# Patient Record
Sex: MALE | Race: WHITE | Employment: OTHER | URBAN - NONMETROPOLITAN AREA
[De-identification: names, ages, dates, MRNs, and addresses within clinical notes are randomized per-mention and may not be internally consistent; named-entity substitution may affect disease eponyms.]

---

## 2017-09-13 LAB
BUN BLDV-MCNC: 21 MG/DL
CALCIUM SERPL-MCNC: 8.5 MG/DL
CHLORIDE BLD-SCNC: 103 MMOL/L
CO2: 29 MMOL/L
CREAT SERPL-MCNC: 1.56 MG/DL
CREATININE, URINE: 85
GFR CALCULATED: 44
GLUCOSE BLD-MCNC: 181 MG/DL
MICROALBUMIN/CREAT 24H UR: 398.3 MG/G{CREAT}
MICROALBUMIN/CREAT UR-RTO: 467
POTASSIUM SERPL-SCNC: 3.7 MMOL/L
SODIUM BLD-SCNC: 140 MMOL/L

## 2017-09-15 ENCOUNTER — OFFICE VISIT (OUTPATIENT)
Dept: NEPHROLOGY | Age: 72
End: 2017-09-15
Payer: COMMERCIAL

## 2017-09-15 VITALS
OXYGEN SATURATION: 98 % | WEIGHT: 208.6 LBS | DIASTOLIC BLOOD PRESSURE: 78 MMHG | SYSTOLIC BLOOD PRESSURE: 143 MMHG | HEART RATE: 69 BPM

## 2017-09-15 DIAGNOSIS — I10 ESSENTIAL HYPERTENSION, BENIGN: Primary | ICD-10-CM

## 2017-09-15 PROCEDURE — 99213 OFFICE O/P EST LOW 20 MIN: CPT | Performed by: INTERNAL MEDICINE

## 2017-09-15 RX ORDER — LATANOPROST 50 UG/ML
1 SOLUTION/ DROPS OPHTHALMIC NIGHTLY
COMMUNITY
Start: 2017-09-11

## 2017-09-15 RX ORDER — TAMSULOSIN HYDROCHLORIDE 0.4 MG/1
0.4 CAPSULE ORAL
Status: ON HOLD | COMMUNITY
Start: 2017-07-18 | End: 2019-02-19

## 2017-09-15 ASSESSMENT — ENCOUNTER SYMPTOMS
COUGH: 0
ABDOMINAL PAIN: 0
NAUSEA: 0
BACK PAIN: 0
DIARRHEA: 0
GASTROINTESTINAL NEGATIVE: 1
WHEEZING: 0
RESPIRATORY NEGATIVE: 1
ABDOMINAL DISTENTION: 0
BLOOD IN STOOL: 0
SHORTNESS OF BREATH: 0
CONSTIPATION: 0
VOMITING: 0
FACIAL SWELLING: 0
CHEST TIGHTNESS: 0

## 2018-09-16 LAB
BUN BLDV-MCNC: 28 MG/DL
CALCIUM SERPL-MCNC: 8.5 MG/DL
CHLORIDE BLD-SCNC: 104 MMOL/L
CO2: 29 MMOL/L
CREAT SERPL-MCNC: 1.84 MG/DL
CREATININE, URINE: 318
CREATININE, URINE: NORMAL
GFR CALCULATED: 36
GLUCOSE BLD-MCNC: 168 MG/DL
MICROALBUMIN/CREAT 24H UR: 1513.3 MG/G{CREAT}
MICROALBUMIN/CREAT 24H UR: 1513.3 MG/G{CREAT}
MICROALBUMIN/CREAT UR-RTO: 476
MICROALBUMIN/CREAT UR-RTO: NORMAL
POTASSIUM SERPL-SCNC: 3.9 MMOL/L
SODIUM BLD-SCNC: 139 MMOL/L

## 2018-09-21 ENCOUNTER — OFFICE VISIT (OUTPATIENT)
Dept: NEPHROLOGY | Age: 73
End: 2018-09-21
Payer: COMMERCIAL

## 2018-09-21 VITALS
DIASTOLIC BLOOD PRESSURE: 75 MMHG | WEIGHT: 205.7 LBS | HEART RATE: 59 BPM | OXYGEN SATURATION: 97 % | SYSTOLIC BLOOD PRESSURE: 148 MMHG

## 2018-09-21 DIAGNOSIS — I10 ESSENTIAL HYPERTENSION: ICD-10-CM

## 2018-09-21 DIAGNOSIS — N18.30 CKD (CHRONIC KIDNEY DISEASE), STAGE III (HCC): Primary | ICD-10-CM

## 2018-09-21 DIAGNOSIS — N28.1 RENAL CYST: ICD-10-CM

## 2018-09-21 DIAGNOSIS — E11.21 DIABETIC NEPHROPATHY ASSOCIATED WITH TYPE 2 DIABETES MELLITUS (HCC): ICD-10-CM

## 2018-09-21 PROCEDURE — 99213 OFFICE O/P EST LOW 20 MIN: CPT | Performed by: INTERNAL MEDICINE

## 2018-09-21 RX ORDER — CLONIDINE HYDROCHLORIDE 0.2 MG/1
TABLET ORAL
COMMUNITY
Start: 2018-07-24 | End: 2018-09-21 | Stop reason: SDUPTHER

## 2018-09-21 RX ORDER — PEN NEEDLE, DIABETIC 32 GX 1/4"
NEEDLE, DISPOSABLE MISCELLANEOUS
Status: ON HOLD | COMMUNITY
Start: 2018-07-09 | End: 2022-01-01 | Stop reason: HOSPADM

## 2018-09-21 RX ORDER — VERAPAMIL HYDROCHLORIDE 360 MG/1
360 CAPSULE, DELAYED RELEASE PELLETS ORAL
Status: ON HOLD | COMMUNITY
Start: 2018-08-01 | End: 2019-01-18 | Stop reason: ALTCHOICE

## 2018-09-21 RX ORDER — CLONIDINE HYDROCHLORIDE 0.2 MG/1
TABLET ORAL
Qty: 60 TABLET | Refills: 3 | Status: ON HOLD
Start: 2018-09-21 | End: 2019-01-18 | Stop reason: ALTCHOICE

## 2018-09-21 NOTE — PROGRESS NOTES
guarding  Extremities: No sig LE edema     Medications:  Current Outpatient Prescriptions   Medication Sig Dispense Refill    insulin detemir (LEVEMIR) 100 UNIT/ML injection pen Inject 10 Units into the skin      verapamil (VERELAN) 360 MG extended release capsule Take 360 mg by mouth      cloNIDine (CATAPRES) 0.2 MG tablet 2 tablets in the morning, 1 tablet at 5:00pm and 1 tablet at bedtime.  BD ULTRA-FINE MICRO PEN NEEDLE 32G X 6 MM MISC       tamsulosin (FLOMAX) 0.4 MG capsule Take 0.4 mg by mouth      latanoprost (XALATAN) 0.005 % ophthalmic solution       metoprolol (TOPROL-XL) 50 MG XL tablet Take 50 mg by mouth daily       timolol (TIMOPTIC) 0.5 % ophthalmic solution 1 drop daily       aspirin 81 MG tablet Take 81 mg by mouth daily      glimepiride (AMARYL) 4 MG tablet Take 4 mg by mouth 2 times daily      benazepril (LOTENSIN) 40 MG tablet Take 40 mg by mouth daily       No current facility-administered medications for this visit. Laboratory & Diagnostics:  2015: US: R 10.5, L 10.1, 1.4 cm left kidney cyst  Creat 1.8, K 3.9, UACR ~ 500 mg/g       Impression/Plan:   1. CKD III: likely on basis of HTN and DM nephropathy. He has had some proteinuria. He has been diabetic for 10+ years. He was recently started on insulin. Will monitor renal function. He is on ACEI. Advised low salt diet. 2. HTN: sub-optimally controlled. Continue with current medications but will increase clonidine evening dose to two tablets. He will call me in 1-2 weeks with some readings. 3. Proteinuria secondary to diabetic nephropathy. Many diabetics are salt sensitive and reducing salt intake will help achieve blood pressure goals with secondary benefits of proteinuria reduction. 4. Left kidney cyst: repeat US prior to next visit. D/W pt and wife. 5. Bone Mineral in CKD: check phos and PTH  6. CKD: will also check CBC to evaluate hemoglobin    D/w patient at length.      Orders Placed This Encounter Procedures    US Renal Complete    Basic Metabolic Panel    Urinalysis with Microscopic    Protein / creatinine ratio, urine    Phosphorus    PTH, Intact    CBC     Return in about 6 months (around 3/21/2019).     Angus Opitz, MD  [facility]

## 2018-10-31 DIAGNOSIS — N18.30 CKD (CHRONIC KIDNEY DISEASE), STAGE III (HCC): ICD-10-CM

## 2018-10-31 DIAGNOSIS — N28.1 RENAL CYST: ICD-10-CM

## 2018-12-13 LAB
ALBUMIN SERPL-MCNC: 3 G/DL
ALP BLD-CCNC: 115 U/L
ALT SERPL-CCNC: 30 U/L
ANION GAP SERPL CALCULATED.3IONS-SCNC: NORMAL MMOL/L
AST SERPL-CCNC: 19 U/L
BASOPHILS ABSOLUTE: ABNORMAL /ΜL
BASOPHILS RELATIVE PERCENT: ABNORMAL %
BILIRUB SERPL-MCNC: 0.2 MG/DL (ref 0.1–1.4)
BILIRUBIN, URINE: NEGATIVE
BLOOD, URINE: POSITIVE
BUN BLDV-MCNC: 24 MG/DL
CALCIUM SERPL-MCNC: 8.1 MG/DL
CHLORIDE BLD-SCNC: 107 MMOL/L
CHOLESTEROL, TOTAL: 219 MG/DL
CHOLESTEROL/HDL RATIO: ABNORMAL
CLARITY: CLEAR
CO2: 28 MMOL/L
COLOR: YELLOW
CREAT SERPL-MCNC: 1.8 MG/DL
CREATININE, URINE: 181
EOSINOPHILS ABSOLUTE: ABNORMAL /ΜL
EOSINOPHILS RELATIVE PERCENT: ABNORMAL %
GFR CALCULATED: 37.4
GLUCOSE BLD-MCNC: 102 MG/DL
GLUCOSE URINE: NEGATIVE
HCT VFR BLD CALC: 39.4 % (ref 41–53)
HDLC SERPL-MCNC: 34 MG/DL (ref 35–70)
HEMOGLOBIN: 12.8 G/DL (ref 13.5–17.5)
KETONES, URINE: NEGATIVE
LDL CHOLESTEROL CALCULATED: 156 MG/DL (ref 0–160)
LEUKOCYTE ESTERASE, URINE: NEGATIVE
LYMPHOCYTES ABSOLUTE: ABNORMAL /ΜL
LYMPHOCYTES RELATIVE PERCENT: ABNORMAL %
MCH RBC QN AUTO: ABNORMAL PG
MCHC RBC AUTO-ENTMCNC: ABNORMAL G/DL
MCV RBC AUTO: ABNORMAL FL
MICROALBUMIN/CREAT 24H UR: 2480 MG/G{CREAT}
MICROALBUMIN/CREAT UR-RTO: 1370.2
MONOCYTES ABSOLUTE: ABNORMAL /ΜL
MONOCYTES RELATIVE PERCENT: ABNORMAL %
NEUTROPHILS ABSOLUTE: ABNORMAL /ΜL
NEUTROPHILS RELATIVE PERCENT: ABNORMAL %
NITRITE, URINE: NEGATIVE
PH UA: 6 (ref 4.5–8)
PLATELET # BLD: 221 K/ΜL
PMV BLD AUTO: ABNORMAL FL
POTASSIUM SERPL-SCNC: 4.5 MMOL/L
PROTEIN UA: ABNORMAL
RBC # BLD: 4.83 10^6/ΜL
SODIUM BLD-SCNC: 140 MMOL/L
SPECIFIC GRAVITY, URINE: 1.02
TOTAL PROTEIN: 7.8
TRIGL SERPL-MCNC: 146 MG/DL
UROBILINOGEN, URINE: NORMAL
VLDLC SERPL CALC-MCNC: ABNORMAL MG/DL
WBC # BLD: 5.1 10^3/ML

## 2019-01-17 PROBLEM — I46.9 CARDIAC ARREST (HCC): Status: ACTIVE | Noted: 2019-01-17

## 2019-01-17 PROBLEM — I21.4 NSTEMI (NON-ST ELEVATED MYOCARDIAL INFARCTION) (HCC): Status: ACTIVE | Noted: 2019-01-17

## 2019-01-18 ENCOUNTER — APPOINTMENT (OUTPATIENT)
Dept: GENERAL RADIOLOGY | Age: 74
DRG: 216 | End: 2019-01-18
Attending: INTERNAL MEDICINE
Payer: MEDICARE

## 2019-01-18 ENCOUNTER — HOSPITAL ENCOUNTER (INPATIENT)
Age: 74
LOS: 25 days | Discharge: NURSING, REHAB OR LONG-TERM CARE | DRG: 216 | End: 2019-02-12
Attending: INTERNAL MEDICINE | Admitting: INTERNAL MEDICINE
Payer: MEDICARE

## 2019-01-18 DIAGNOSIS — I21.4 NSTEMI (NON-ST ELEVATED MYOCARDIAL INFARCTION) (HCC): Primary | ICD-10-CM

## 2019-01-18 PROBLEM — Z86.74 HISTORY OF CARDIAC ARREST: Status: ACTIVE | Noted: 2019-01-18

## 2019-01-18 PROBLEM — E11.65 UNCONTROLLED TYPE 2 DIABETES MELLITUS WITH HYPERGLYCEMIA (HCC): Status: ACTIVE | Noted: 2019-01-18

## 2019-01-18 PROBLEM — N18.6 ESRD ON DIALYSIS (HCC): Status: ACTIVE | Noted: 2019-01-18

## 2019-01-18 PROBLEM — Z99.2 ESRD ON DIALYSIS (HCC): Status: ACTIVE | Noted: 2019-01-18

## 2019-01-18 PROBLEM — R94.31 ABNORMAL EKG: Status: ACTIVE | Noted: 2019-01-18

## 2019-01-18 PROBLEM — D64.9 ANEMIA: Status: ACTIVE | Noted: 2019-01-18

## 2019-01-18 PROBLEM — E83.51 HYPOCALCEMIA: Status: ACTIVE | Noted: 2019-01-18

## 2019-01-18 LAB
ABO: NORMAL
ALBUMIN SERPL-MCNC: 2.5 G/DL (ref 3.5–5.1)
ANION GAP SERPL CALCULATED.3IONS-SCNC: 11 MEQ/L (ref 8–16)
ANTIBODY SCREEN: NORMAL
APTT: 65 SECONDS (ref 22–38)
APTT: 67.3 SECONDS (ref 22–38)
APTT: 85.2 SECONDS (ref 22–38)
BACTERIA: ABNORMAL
BILIRUBIN URINE: ABNORMAL
BLOOD, URINE: NEGATIVE
BUN BLDV-MCNC: 50 MG/DL (ref 7–22)
CALCIUM SERPL-MCNC: 7.8 MG/DL (ref 8.5–10.5)
CASTS: ABNORMAL /LPF
CASTS: ABNORMAL /LPF
CHARACTER, URINE: ABNORMAL
CHLORIDE BLD-SCNC: 98 MEQ/L (ref 98–111)
CHOLESTEROL, TOTAL: 101 MG/DL (ref 100–199)
CO2: 27 MEQ/L (ref 23–33)
COLOR: ABNORMAL
CREAT SERPL-MCNC: 3.1 MG/DL (ref 0.4–1.2)
CRYSTALS: ABNORMAL
EKG ATRIAL RATE: 76 BPM
EKG P AXIS: 67 DEGREES
EKG P-R INTERVAL: 172 MS
EKG Q-T INTERVAL: 412 MS
EKG QRS DURATION: 94 MS
EKG QTC CALCULATION (BAZETT): 463 MS
EKG R AXIS: 63 DEGREES
EKG T AXIS: 161 DEGREES
EKG VENTRICULAR RATE: 76 BPM
EPITHELIAL CELLS, UA: ABNORMAL /HPF
ERYTHROCYTE [DISTWIDTH] IN BLOOD BY AUTOMATED COUNT: 18.1 % (ref 11.5–14.5)
ERYTHROCYTE [DISTWIDTH] IN BLOOD BY AUTOMATED COUNT: 57.8 FL (ref 35–45)
FERRITIN: 473 NG/ML (ref 22–322)
FOLATE: > 20 NG/ML (ref 4.8–24.2)
GFR SERPL CREATININE-BSD FRML MDRD: 20 ML/MIN/1.73M2
GLUCOSE BLD-MCNC: 117 MG/DL (ref 70–108)
GLUCOSE BLD-MCNC: 146 MG/DL (ref 70–108)
GLUCOSE BLD-MCNC: 172 MG/DL (ref 70–108)
GLUCOSE BLD-MCNC: 173 MG/DL (ref 70–108)
GLUCOSE BLD-MCNC: 270 MG/DL (ref 70–108)
GLUCOSE, URINE: NEGATIVE MG/DL
HCT VFR BLD CALC: 25 % (ref 42–52)
HCT VFR BLD CALC: 26 % (ref 42–52)
HCT VFR BLD CALC: 28.8 % (ref 42–52)
HDLC SERPL-MCNC: 35 MG/DL
HEMOCCULT STL QL: NEGATIVE
HEMOGLOBIN: 7.5 GM/DL (ref 14–18)
HEMOGLOBIN: 7.9 GM/DL (ref 14–18)
HEMOGLOBIN: 9 GM/DL (ref 14–18)
ICTOTEST: NEGATIVE
INR BLD: 1.29 (ref 0.85–1.13)
IRON: 23 UG/DL (ref 65–195)
KETONES, URINE: ABNORMAL
LACTIC ACID: 1.1 MMOL/L (ref 0.5–2.2)
LDL CHOLESTEROL CALCULATED: 54 MG/DL
LEUKOCYTE EST, POC: ABNORMAL
LV EF: 33 %
LVEF MODALITY: NORMAL
MAGNESIUM: 1.9 MG/DL (ref 1.6–2.4)
MCH RBC QN AUTO: 26.6 PG (ref 26–33)
MCHC RBC AUTO-ENTMCNC: 30.4 GM/DL (ref 32.2–35.5)
MCV RBC AUTO: 87.5 FL (ref 80–94)
MISCELLANEOUS LAB TEST RESULT: ABNORMAL
NITRITE, URINE: NEGATIVE
PH UA: 5
PHOSPHORUS: 3.9 MG/DL (ref 2.4–4.7)
PLATELET # BLD: 166 THOU/MM3 (ref 130–400)
PMV BLD AUTO: 11.1 FL (ref 9.4–12.4)
POTASSIUM SERPL-SCNC: 4.8 MEQ/L (ref 3.5–5.2)
PROTEIN UA: 30 MG/DL
RBC # BLD: 2.97 MILL/MM3 (ref 4.7–6.1)
RBC URINE: ABNORMAL /HPF
RENAL EPITHELIAL, UA: PRESENT
RH FACTOR: NORMAL
SODIUM BLD-SCNC: 136 MEQ/L (ref 135–145)
SPECIFIC GRAVITY UA: 1.02 (ref 1–1.03)
TOTAL CK: 28 U/L (ref 55–170)
TRIGL SERPL-MCNC: 61 MG/DL (ref 0–199)
TROPONIN T: 0.09 NG/ML
TROPONIN T: 0.1 NG/ML
UROBILINOGEN, URINE: 0.2 EU/DL
VITAMIN B-12: 1318 PG/ML (ref 211–911)
WBC # BLD: 8.7 THOU/MM3 (ref 4.8–10.8)
WBC UA: ABNORMAL /HPF
YEAST: PRESENT

## 2019-01-18 PROCEDURE — 85610 PROTHROMBIN TIME: CPT

## 2019-01-18 PROCEDURE — 82550 ASSAY OF CK (CPK): CPT

## 2019-01-18 PROCEDURE — 99232 SBSQ HOSP IP/OBS MODERATE 35: CPT | Performed by: FAMILY MEDICINE

## 2019-01-18 PROCEDURE — 82728 ASSAY OF FERRITIN: CPT

## 2019-01-18 PROCEDURE — 82948 REAGENT STRIP/BLOOD GLUCOSE: CPT

## 2019-01-18 PROCEDURE — 86901 BLOOD TYPING SEROLOGIC RH(D): CPT

## 2019-01-18 PROCEDURE — 6360000002 HC RX W HCPCS: Performed by: INTERNAL MEDICINE

## 2019-01-18 PROCEDURE — 90935 HEMODIALYSIS ONE EVALUATION: CPT | Performed by: INTERNAL MEDICINE

## 2019-01-18 PROCEDURE — 5A1D70Z PERFORMANCE OF URINARY FILTRATION, INTERMITTENT, LESS THAN 6 HOURS PER DAY: ICD-10-PCS | Performed by: INTERNAL MEDICINE

## 2019-01-18 PROCEDURE — 82272 OCCULT BLD FECES 1-3 TESTS: CPT

## 2019-01-18 PROCEDURE — 81001 URINALYSIS AUTO W/SCOPE: CPT

## 2019-01-18 PROCEDURE — 6370000000 HC RX 637 (ALT 250 FOR IP): Performed by: INTERNAL MEDICINE

## 2019-01-18 PROCEDURE — 94660 CPAP INITIATION&MGMT: CPT

## 2019-01-18 PROCEDURE — 85027 COMPLETE CBC AUTOMATED: CPT

## 2019-01-18 PROCEDURE — 80061 LIPID PANEL: CPT

## 2019-01-18 PROCEDURE — 93010 ELECTROCARDIOGRAM REPORT: CPT | Performed by: INTERNAL MEDICINE

## 2019-01-18 PROCEDURE — 83735 ASSAY OF MAGNESIUM: CPT

## 2019-01-18 PROCEDURE — 83540 ASSAY OF IRON: CPT

## 2019-01-18 PROCEDURE — 93005 ELECTROCARDIOGRAM TRACING: CPT | Performed by: INTERNAL MEDICINE

## 2019-01-18 PROCEDURE — 86850 RBC ANTIBODY SCREEN: CPT

## 2019-01-18 PROCEDURE — 85014 HEMATOCRIT: CPT

## 2019-01-18 PROCEDURE — 86900 BLOOD TYPING SEROLOGIC ABO: CPT

## 2019-01-18 PROCEDURE — 99223 1ST HOSP IP/OBS HIGH 75: CPT | Performed by: INTERNAL MEDICINE

## 2019-01-18 PROCEDURE — 2140000000 HC CCU INTERMEDIATE R&B

## 2019-01-18 PROCEDURE — 90935 HEMODIALYSIS ONE EVALUATION: CPT

## 2019-01-18 PROCEDURE — 93306 TTE W/DOPPLER COMPLETE: CPT

## 2019-01-18 PROCEDURE — 85730 THROMBOPLASTIN TIME PARTIAL: CPT

## 2019-01-18 PROCEDURE — 71045 X-RAY EXAM CHEST 1 VIEW: CPT

## 2019-01-18 PROCEDURE — 2709999900 HC NON-CHARGEABLE SUPPLY

## 2019-01-18 PROCEDURE — P9016 RBC LEUKOCYTES REDUCED: HCPCS

## 2019-01-18 PROCEDURE — 85018 HEMOGLOBIN: CPT

## 2019-01-18 PROCEDURE — 80069 RENAL FUNCTION PANEL: CPT

## 2019-01-18 PROCEDURE — 99222 1ST HOSP IP/OBS MODERATE 55: CPT | Performed by: INTERNAL MEDICINE

## 2019-01-18 PROCEDURE — 36415 COLL VENOUS BLD VENIPUNCTURE: CPT

## 2019-01-18 PROCEDURE — 82607 VITAMIN B-12: CPT

## 2019-01-18 PROCEDURE — 83605 ASSAY OF LACTIC ACID: CPT

## 2019-01-18 PROCEDURE — 82746 ASSAY OF FOLIC ACID SERUM: CPT

## 2019-01-18 PROCEDURE — 84484 ASSAY OF TROPONIN QUANT: CPT

## 2019-01-18 PROCEDURE — 2580000003 HC RX 258: Performed by: INTERNAL MEDICINE

## 2019-01-18 PROCEDURE — 86923 COMPATIBILITY TEST ELECTRIC: CPT

## 2019-01-18 RX ORDER — TIMOLOL MALEATE 5 MG/ML
1 SOLUTION/ DROPS OPHTHALMIC DAILY
Status: DISCONTINUED | OUTPATIENT
Start: 2019-01-18 | End: 2019-01-28

## 2019-01-18 RX ORDER — CARVEDILOL 25 MG/1
50 TABLET ORAL 2 TIMES DAILY
Status: DISCONTINUED | OUTPATIENT
Start: 2019-01-18 | End: 2019-01-19

## 2019-01-18 RX ORDER — AMLODIPINE BESYLATE 10 MG/1
10 TABLET ORAL DAILY
Status: ON HOLD | COMMUNITY
End: 2019-02-12 | Stop reason: HOSPADM

## 2019-01-18 RX ORDER — CARVEDILOL 25 MG/1
50 TABLET ORAL 2 TIMES DAILY
Status: ON HOLD | COMMUNITY
End: 2019-02-12 | Stop reason: HOSPADM

## 2019-01-18 RX ORDER — ATORVASTATIN CALCIUM 40 MG/1
40 TABLET, FILM COATED ORAL NIGHTLY
COMMUNITY
End: 2019-03-06

## 2019-01-18 RX ORDER — HEPARIN SODIUM 1000 [USP'U]/ML
60 INJECTION, SOLUTION INTRAVENOUS; SUBCUTANEOUS ONCE
Status: DISCONTINUED | OUTPATIENT
Start: 2019-01-18 | End: 2019-01-18

## 2019-01-18 RX ORDER — AMLODIPINE BESYLATE 10 MG/1
10 TABLET ORAL DAILY
Status: DISCONTINUED | OUTPATIENT
Start: 2019-01-18 | End: 2019-01-19

## 2019-01-18 RX ORDER — HEPARIN SODIUM 10000 [USP'U]/100ML
12 INJECTION, SOLUTION INTRAVENOUS CONTINUOUS
Status: DISCONTINUED | OUTPATIENT
Start: 2019-01-18 | End: 2019-01-18 | Stop reason: ALTCHOICE

## 2019-01-18 RX ORDER — HEPARIN SODIUM 10000 [USP'U]/100ML
INJECTION, SOLUTION INTRAVENOUS
Status: DISPENSED
Start: 2019-01-18 | End: 2019-01-18

## 2019-01-18 RX ORDER — ATORVASTATIN CALCIUM 40 MG/1
40 TABLET, FILM COATED ORAL NIGHTLY
Status: DISCONTINUED | OUTPATIENT
Start: 2019-01-18 | End: 2019-01-28

## 2019-01-18 RX ORDER — NICOTINE POLACRILEX 4 MG
15 LOZENGE BUCCAL PRN
Status: DISCONTINUED | OUTPATIENT
Start: 2019-01-18 | End: 2019-01-28

## 2019-01-18 RX ORDER — INSULIN GLARGINE 100 [IU]/ML
15 INJECTION, SOLUTION SUBCUTANEOUS NIGHTLY
Status: DISCONTINUED | OUTPATIENT
Start: 2019-01-18 | End: 2019-01-28

## 2019-01-18 RX ORDER — HEPARIN SODIUM 1000 [USP'U]/ML
4000 INJECTION, SOLUTION INTRAVENOUS; SUBCUTANEOUS PRN
Status: DISCONTINUED | OUTPATIENT
Start: 2019-01-18 | End: 2019-01-18

## 2019-01-18 RX ORDER — LATANOPROST 50 UG/ML
1 SOLUTION/ DROPS OPHTHALMIC DAILY
Status: DISCONTINUED | OUTPATIENT
Start: 2019-01-18 | End: 2019-01-28

## 2019-01-18 RX ORDER — 0.9 % SODIUM CHLORIDE 0.9 %
250 INTRAVENOUS SOLUTION INTRAVENOUS ONCE
Status: DISCONTINUED | OUTPATIENT
Start: 2019-01-18 | End: 2019-01-28

## 2019-01-18 RX ORDER — ONDANSETRON 2 MG/ML
4 INJECTION INTRAMUSCULAR; INTRAVENOUS EVERY 6 HOURS PRN
Status: DISCONTINUED | OUTPATIENT
Start: 2019-01-18 | End: 2019-01-28

## 2019-01-18 RX ORDER — DEXTROSE MONOHYDRATE 25 G/50ML
12.5 INJECTION, SOLUTION INTRAVENOUS PRN
Status: DISCONTINUED | OUTPATIENT
Start: 2019-01-18 | End: 2019-01-28

## 2019-01-18 RX ORDER — ASPIRIN 81 MG/1
81 TABLET, CHEWABLE ORAL DAILY
Status: DISCONTINUED | OUTPATIENT
Start: 2019-01-18 | End: 2019-01-28

## 2019-01-18 RX ORDER — LISINOPRIL 40 MG/1
40 TABLET ORAL DAILY
Status: DISCONTINUED | OUTPATIENT
Start: 2019-01-18 | End: 2019-01-19

## 2019-01-18 RX ORDER — DEXTROSE MONOHYDRATE 50 MG/ML
100 INJECTION, SOLUTION INTRAVENOUS PRN
Status: DISCONTINUED | OUTPATIENT
Start: 2019-01-18 | End: 2019-01-28

## 2019-01-18 RX ORDER — TAMSULOSIN HYDROCHLORIDE 0.4 MG/1
0.4 CAPSULE ORAL DAILY
Status: DISCONTINUED | OUTPATIENT
Start: 2019-01-18 | End: 2019-01-28

## 2019-01-18 RX ORDER — HEPARIN SODIUM 10000 [USP'U]/100ML
12 INJECTION, SOLUTION INTRAVENOUS CONTINUOUS
Status: DISCONTINUED | OUTPATIENT
Start: 2019-01-18 | End: 2019-01-18

## 2019-01-18 RX ORDER — HEPARIN SODIUM 1000 [USP'U]/ML
2000 INJECTION, SOLUTION INTRAVENOUS; SUBCUTANEOUS PRN
Status: DISCONTINUED | OUTPATIENT
Start: 2019-01-18 | End: 2019-01-18

## 2019-01-18 RX ORDER — SODIUM CHLORIDE 9 MG/ML
INJECTION, SOLUTION INTRAVENOUS CONTINUOUS
Status: DISCONTINUED | OUTPATIENT
Start: 2019-01-18 | End: 2019-01-18

## 2019-01-18 RX ORDER — SODIUM CHLORIDE 0.9 % (FLUSH) 0.9 %
10 SYRINGE (ML) INJECTION EVERY 12 HOURS SCHEDULED
Status: DISCONTINUED | OUTPATIENT
Start: 2019-01-18 | End: 2019-01-23 | Stop reason: SDUPTHER

## 2019-01-18 RX ORDER — SODIUM CHLORIDE 0.9 % (FLUSH) 0.9 %
10 SYRINGE (ML) INJECTION PRN
Status: DISCONTINUED | OUTPATIENT
Start: 2019-01-18 | End: 2019-01-23 | Stop reason: SDUPTHER

## 2019-01-18 RX ADMIN — LATANOPROST 1 DROP: 50 SOLUTION/ DROPS OPHTHALMIC at 20:20

## 2019-01-18 RX ADMIN — CARVEDILOL 50 MG: 25 TABLET, FILM COATED ORAL at 15:49

## 2019-01-18 RX ADMIN — AMLODIPINE BESYLATE 10 MG: 10 TABLET ORAL at 15:49

## 2019-01-18 RX ADMIN — HEPARIN SODIUM AND DEXTROSE 12 UNITS/KG/HR: 10000; 5 INJECTION INTRAVENOUS at 02:28

## 2019-01-18 RX ADMIN — ATORVASTATIN CALCIUM 40 MG: 40 TABLET, FILM COATED ORAL at 20:19

## 2019-01-18 RX ADMIN — INSULIN GLARGINE 15 UNITS: 100 INJECTION, SOLUTION SUBCUTANEOUS at 20:26

## 2019-01-18 RX ADMIN — LISINOPRIL 40 MG: 40 TABLET ORAL at 15:49

## 2019-01-18 RX ADMIN — TIMOLOL MALEATE 1 DROP: 5 SOLUTION/ DROPS OPHTHALMIC at 20:20

## 2019-01-18 RX ADMIN — CARVEDILOL 50 MG: 25 TABLET, FILM COATED ORAL at 20:19

## 2019-01-18 RX ADMIN — TAMSULOSIN HYDROCHLORIDE 0.4 MG: 0.4 CAPSULE ORAL at 15:49

## 2019-01-18 RX ADMIN — Medication 10 ML: at 20:20

## 2019-01-18 RX ADMIN — ASPIRIN 81 MG 81 MG: 81 TABLET ORAL at 15:49

## 2019-01-18 ASSESSMENT — ENCOUNTER SYMPTOMS
EYES NEGATIVE: 1
SHORTNESS OF BREATH: 1
COUGH: 0
VOMITING: 0
BACK PAIN: 0
ABDOMINAL PAIN: 0
NAUSEA: 0
SORE THROAT: 0

## 2019-01-18 ASSESSMENT — PAIN SCALES - GENERAL
PAINLEVEL_OUTOF10: 0
PAINLEVEL_OUTOF10: 0

## 2019-01-19 ENCOUNTER — APPOINTMENT (OUTPATIENT)
Dept: GENERAL RADIOLOGY | Age: 74
DRG: 216 | End: 2019-01-19
Attending: INTERNAL MEDICINE
Payer: MEDICARE

## 2019-01-19 LAB
ALBUMIN SERPL-MCNC: 2.6 G/DL (ref 3.5–5.1)
ANION GAP SERPL CALCULATED.3IONS-SCNC: 12 MEQ/L (ref 8–16)
BASE EXCESS (CALCULATED): 3.4 MMOL/L (ref -2.5–2.5)
BUN BLDV-MCNC: 63 MG/DL (ref 7–22)
CALCIUM SERPL-MCNC: 7.7 MG/DL (ref 8.5–10.5)
CHLORIDE BLD-SCNC: 96 MEQ/L (ref 98–111)
CO2: 27 MEQ/L (ref 23–33)
COLLECTED BY:: ABNORMAL
CREAT SERPL-MCNC: 3.7 MG/DL (ref 0.4–1.2)
DEVICE: ABNORMAL
ERYTHROCYTE [DISTWIDTH] IN BLOOD BY AUTOMATED COUNT: 17.7 % (ref 11.5–14.5)
ERYTHROCYTE [DISTWIDTH] IN BLOOD BY AUTOMATED COUNT: 57.4 FL (ref 35–45)
GFR SERPL CREATININE-BSD FRML MDRD: 16 ML/MIN/1.73M2
GLUCOSE BLD-MCNC: 100 MG/DL (ref 70–108)
GLUCOSE BLD-MCNC: 145 MG/DL (ref 70–108)
GLUCOSE BLD-MCNC: 145 MG/DL (ref 70–108)
GLUCOSE BLD-MCNC: 234 MG/DL (ref 70–108)
GLUCOSE BLD-MCNC: 291 MG/DL (ref 70–108)
HCO3: 28 MMOL/L (ref 23–28)
HCT VFR BLD CALC: 27.5 % (ref 42–52)
HEMOGLOBIN: 8.4 GM/DL (ref 14–18)
IFIO2: 1
INR BLD: 1.24 (ref 0.85–1.13)
MAGNESIUM: 2 MG/DL (ref 1.6–2.4)
MCH RBC QN AUTO: 26.9 PG (ref 26–33)
MCHC RBC AUTO-ENTMCNC: 30.5 GM/DL (ref 32.2–35.5)
MCV RBC AUTO: 88.1 FL (ref 80–94)
O2 SATURATION: 93 %
PCO2: 40 MMHG (ref 35–45)
PH BLOOD GAS: 7.45 (ref 7.35–7.45)
PHOSPHORUS: 4 MG/DL (ref 2.4–4.7)
PLATELET # BLD: 189 THOU/MM3 (ref 130–400)
PMV BLD AUTO: 10.7 FL (ref 9.4–12.4)
PO2: 66 MMHG (ref 71–104)
POTASSIUM SERPL-SCNC: 4.9 MEQ/L (ref 3.5–5.2)
PRO-BNP: ABNORMAL PG/ML (ref 0–900)
RBC # BLD: 3.12 MILL/MM3 (ref 4.7–6.1)
SODIUM BLD-SCNC: 135 MEQ/L (ref 135–145)
SOURCE, BLOOD GAS: ABNORMAL
WBC # BLD: 6.5 THOU/MM3 (ref 4.8–10.8)

## 2019-01-19 PROCEDURE — 36415 COLL VENOUS BLD VENIPUNCTURE: CPT

## 2019-01-19 PROCEDURE — 71045 X-RAY EXAM CHEST 1 VIEW: CPT

## 2019-01-19 PROCEDURE — 85610 PROTHROMBIN TIME: CPT

## 2019-01-19 PROCEDURE — 2140000000 HC CCU INTERMEDIATE R&B

## 2019-01-19 PROCEDURE — 82948 REAGENT STRIP/BLOOD GLUCOSE: CPT

## 2019-01-19 PROCEDURE — 85027 COMPLETE CBC AUTOMATED: CPT

## 2019-01-19 PROCEDURE — 82803 BLOOD GASES ANY COMBINATION: CPT

## 2019-01-19 PROCEDURE — 83735 ASSAY OF MAGNESIUM: CPT

## 2019-01-19 PROCEDURE — 6370000000 HC RX 637 (ALT 250 FOR IP): Performed by: INTERNAL MEDICINE

## 2019-01-19 PROCEDURE — 36600 WITHDRAWAL OF ARTERIAL BLOOD: CPT

## 2019-01-19 PROCEDURE — 2500000003 HC RX 250 WO HCPCS: Performed by: FAMILY MEDICINE

## 2019-01-19 PROCEDURE — 6370000000 HC RX 637 (ALT 250 FOR IP): Performed by: PHYSICIAN ASSISTANT

## 2019-01-19 PROCEDURE — 2709999900 HC NON-CHARGEABLE SUPPLY

## 2019-01-19 PROCEDURE — 99232 SBSQ HOSP IP/OBS MODERATE 35: CPT | Performed by: FAMILY MEDICINE

## 2019-01-19 PROCEDURE — 99232 SBSQ HOSP IP/OBS MODERATE 35: CPT | Performed by: INTERNAL MEDICINE

## 2019-01-19 PROCEDURE — 99233 SBSQ HOSP IP/OBS HIGH 50: CPT | Performed by: PHYSICIAN ASSISTANT

## 2019-01-19 PROCEDURE — 83880 ASSAY OF NATRIURETIC PEPTIDE: CPT

## 2019-01-19 PROCEDURE — 80069 RENAL FUNCTION PANEL: CPT

## 2019-01-19 PROCEDURE — 2580000003 HC RX 258: Performed by: INTERNAL MEDICINE

## 2019-01-19 RX ORDER — BUMETANIDE 0.25 MG/ML
2 INJECTION, SOLUTION INTRAMUSCULAR; INTRAVENOUS ONCE
Status: COMPLETED | OUTPATIENT
Start: 2019-01-19 | End: 2019-01-19

## 2019-01-19 RX ORDER — CARVEDILOL 6.25 MG/1
6.25 TABLET ORAL 2 TIMES DAILY WITH MEALS
Status: DISCONTINUED | OUTPATIENT
Start: 2019-01-19 | End: 2019-01-20

## 2019-01-19 RX ADMIN — Medication 10 ML: at 20:28

## 2019-01-19 RX ADMIN — INSULIN LISPRO 3 UNITS: 100 INJECTION, SOLUTION INTRAVENOUS; SUBCUTANEOUS at 11:39

## 2019-01-19 RX ADMIN — Medication 10 ML: at 08:24

## 2019-01-19 RX ADMIN — TAMSULOSIN HYDROCHLORIDE 0.4 MG: 0.4 CAPSULE ORAL at 08:23

## 2019-01-19 RX ADMIN — ASPIRIN 81 MG 81 MG: 81 TABLET ORAL at 08:23

## 2019-01-19 RX ADMIN — TIMOLOL MALEATE 1 DROP: 5 SOLUTION/ DROPS OPHTHALMIC at 08:24

## 2019-01-19 RX ADMIN — CARVEDILOL 6.25 MG: 6.25 TABLET, FILM COATED ORAL at 17:22

## 2019-01-19 RX ADMIN — BUMETANIDE 2 MG: 0.25 INJECTION INTRAMUSCULAR; INTRAVENOUS at 08:58

## 2019-01-19 RX ADMIN — INSULIN LISPRO 1 UNITS: 100 INJECTION, SOLUTION INTRAVENOUS; SUBCUTANEOUS at 06:03

## 2019-01-19 RX ADMIN — Medication 1 UNITS: at 20:30

## 2019-01-19 RX ADMIN — ATORVASTATIN CALCIUM 40 MG: 40 TABLET, FILM COATED ORAL at 20:25

## 2019-01-19 RX ADMIN — LATANOPROST 1 DROP: 50 SOLUTION/ DROPS OPHTHALMIC at 20:26

## 2019-01-19 ASSESSMENT — PAIN SCALES - GENERAL
PAINLEVEL_OUTOF10: 0

## 2019-01-20 ENCOUNTER — APPOINTMENT (OUTPATIENT)
Dept: GENERAL RADIOLOGY | Age: 74
DRG: 216 | End: 2019-01-20
Attending: INTERNAL MEDICINE
Payer: MEDICARE

## 2019-01-20 LAB
ALLEN TEST: POSITIVE
ANION GAP SERPL CALCULATED.3IONS-SCNC: 11 MEQ/L (ref 8–16)
APTT: 156 SECONDS (ref 22–38)
APTT: 38.2 SECONDS (ref 22–38)
BASE EXCESS (CALCULATED): 3.3 MMOL/L (ref -2.5–2.5)
BUN BLDV-MCNC: 76 MG/DL (ref 7–22)
CALCIUM SERPL-MCNC: 7.9 MG/DL (ref 8.5–10.5)
CHLORIDE BLD-SCNC: 99 MEQ/L (ref 98–111)
CO2: 25 MEQ/L (ref 23–33)
COLLECTED BY:: ABNORMAL
CREAT SERPL-MCNC: 3.7 MG/DL (ref 0.4–1.2)
DEVICE: ABNORMAL
DIRECT COOMBS: NORMAL
ERYTHROCYTE [DISTWIDTH] IN BLOOD BY AUTOMATED COUNT: 17.7 % (ref 11.5–14.5)
ERYTHROCYTE [DISTWIDTH] IN BLOOD BY AUTOMATED COUNT: 17.7 % (ref 11.5–14.5)
ERYTHROCYTE [DISTWIDTH] IN BLOOD BY AUTOMATED COUNT: 56.7 FL (ref 35–45)
ERYTHROCYTE [DISTWIDTH] IN BLOOD BY AUTOMATED COUNT: 58.8 FL (ref 35–45)
GFR SERPL CREATININE-BSD FRML MDRD: 16 ML/MIN/1.73M2
GLUCOSE BLD-MCNC: 143 MG/DL (ref 70–108)
GLUCOSE BLD-MCNC: 155 MG/DL (ref 70–108)
GLUCOSE BLD-MCNC: 161 MG/DL (ref 70–108)
GLUCOSE BLD-MCNC: 175 MG/DL (ref 70–108)
GLUCOSE BLD-MCNC: 279 MG/DL (ref 70–108)
HCO3: 27 MMOL/L (ref 23–28)
HCT VFR BLD CALC: 32.2 % (ref 42–52)
HCT VFR BLD CALC: 32.7 % (ref 42–52)
HEMOGLOBIN: 10.2 GM/DL (ref 14–18)
HEMOGLOBIN: 9.3 GM/DL (ref 14–18)
IFIO2: 2
INR BLD: 1.21 (ref 0.85–1.13)
IRON SATURATION: 15 % (ref 20–50)
IRON: 23 UG/DL (ref 65–195)
MAGNESIUM: 2 MG/DL (ref 1.6–2.4)
MCH RBC QN AUTO: 26.5 PG (ref 26–33)
MCH RBC QN AUTO: 27.3 PG (ref 26–33)
MCHC RBC AUTO-ENTMCNC: 28.9 GM/DL (ref 32.2–35.5)
MCHC RBC AUTO-ENTMCNC: 31.2 GM/DL (ref 32.2–35.5)
MCV RBC AUTO: 87.7 FL (ref 80–94)
MCV RBC AUTO: 91.7 FL (ref 80–94)
O2 SATURATION: 95 %
PCO2: 38 MMHG (ref 35–45)
PH BLOOD GAS: 7.46 (ref 7.35–7.45)
PLATELET # BLD: 205 THOU/MM3 (ref 130–400)
PLATELET # BLD: 239 THOU/MM3 (ref 130–400)
PMV BLD AUTO: 10.1 FL (ref 9.4–12.4)
PMV BLD AUTO: 10.6 FL (ref 9.4–12.4)
PO2: 70 MMHG (ref 71–104)
POTASSIUM SERPL-SCNC: 4.5 MEQ/L (ref 3.5–5.2)
RBC # BLD: 3.51 MILL/MM3 (ref 4.7–6.1)
RBC # BLD: 3.73 MILL/MM3 (ref 4.7–6.1)
SODIUM BLD-SCNC: 135 MEQ/L (ref 135–145)
SOURCE, BLOOD GAS: ABNORMAL
TOTAL IRON BINDING CAPACITY: 157 UG/DL (ref 171–450)
TSH SERPL DL<=0.05 MIU/L-ACNC: 3.29 UIU/ML (ref 0.4–4.2)
WBC # BLD: 5.9 THOU/MM3 (ref 4.8–10.8)
WBC # BLD: 8.7 THOU/MM3 (ref 4.8–10.8)

## 2019-01-20 PROCEDURE — 6370000000 HC RX 637 (ALT 250 FOR IP): Performed by: INTERNAL MEDICINE

## 2019-01-20 PROCEDURE — 85730 THROMBOPLASTIN TIME PARTIAL: CPT

## 2019-01-20 PROCEDURE — 2140000000 HC CCU INTERMEDIATE R&B

## 2019-01-20 PROCEDURE — 82948 REAGENT STRIP/BLOOD GLUCOSE: CPT

## 2019-01-20 PROCEDURE — 36600 WITHDRAWAL OF ARTERIAL BLOOD: CPT

## 2019-01-20 PROCEDURE — 83540 ASSAY OF IRON: CPT

## 2019-01-20 PROCEDURE — 2580000003 HC RX 258: Performed by: INTERNAL MEDICINE

## 2019-01-20 PROCEDURE — 84443 ASSAY THYROID STIM HORMONE: CPT

## 2019-01-20 PROCEDURE — 86334 IMMUNOFIX E-PHORESIS SERUM: CPT

## 2019-01-20 PROCEDURE — 2500000003 HC RX 250 WO HCPCS: Performed by: FAMILY MEDICINE

## 2019-01-20 PROCEDURE — 85027 COMPLETE CBC AUTOMATED: CPT

## 2019-01-20 PROCEDURE — 83735 ASSAY OF MAGNESIUM: CPT

## 2019-01-20 PROCEDURE — 99233 SBSQ HOSP IP/OBS HIGH 50: CPT | Performed by: FAMILY MEDICINE

## 2019-01-20 PROCEDURE — 82803 BLOOD GASES ANY COMBINATION: CPT

## 2019-01-20 PROCEDURE — 85610 PROTHROMBIN TIME: CPT

## 2019-01-20 PROCEDURE — 71045 X-RAY EXAM CHEST 1 VIEW: CPT

## 2019-01-20 PROCEDURE — 2700000000 HC OXYGEN THERAPY PER DAY

## 2019-01-20 PROCEDURE — 6370000000 HC RX 637 (ALT 250 FOR IP): Performed by: FAMILY MEDICINE

## 2019-01-20 PROCEDURE — 93005 ELECTROCARDIOGRAM TRACING: CPT | Performed by: INTERNAL MEDICINE

## 2019-01-20 PROCEDURE — 6370000000 HC RX 637 (ALT 250 FOR IP): Performed by: PHYSICIAN ASSISTANT

## 2019-01-20 PROCEDURE — 93005 ELECTROCARDIOGRAM TRACING: CPT | Performed by: FAMILY MEDICINE

## 2019-01-20 PROCEDURE — 2500000003 HC RX 250 WO HCPCS: Performed by: PHYSICIAN ASSISTANT

## 2019-01-20 PROCEDURE — 36415 COLL VENOUS BLD VENIPUNCTURE: CPT

## 2019-01-20 PROCEDURE — 86880 COOMBS TEST DIRECT: CPT

## 2019-01-20 PROCEDURE — 2709999900 HC NON-CHARGEABLE SUPPLY

## 2019-01-20 PROCEDURE — 2580000003 HC RX 258: Performed by: PHYSICIAN ASSISTANT

## 2019-01-20 PROCEDURE — 80048 BASIC METABOLIC PNL TOTAL CA: CPT

## 2019-01-20 PROCEDURE — 99232 SBSQ HOSP IP/OBS MODERATE 35: CPT | Performed by: INTERNAL MEDICINE

## 2019-01-20 PROCEDURE — 83550 IRON BINDING TEST: CPT

## 2019-01-20 PROCEDURE — 94640 AIRWAY INHALATION TREATMENT: CPT

## 2019-01-20 PROCEDURE — 6360000002 HC RX W HCPCS: Performed by: INTERNAL MEDICINE

## 2019-01-20 RX ORDER — METOPROLOL TARTRATE 5 MG/5ML
5 INJECTION INTRAVENOUS ONCE
Status: COMPLETED | OUTPATIENT
Start: 2019-01-20 | End: 2019-01-20

## 2019-01-20 RX ORDER — HEPARIN SODIUM 10000 [USP'U]/100ML
18 INJECTION, SOLUTION INTRAVENOUS CONTINUOUS
Status: DISCONTINUED | OUTPATIENT
Start: 2019-01-20 | End: 2019-01-28

## 2019-01-20 RX ORDER — HEPARIN SODIUM 1000 [USP'U]/ML
80 INJECTION, SOLUTION INTRAVENOUS; SUBCUTANEOUS PRN
Status: DISCONTINUED | OUTPATIENT
Start: 2019-01-20 | End: 2019-01-28

## 2019-01-20 RX ORDER — CARVEDILOL 6.25 MG/1
12.5 TABLET ORAL 2 TIMES DAILY WITH MEALS
Status: DISCONTINUED | OUTPATIENT
Start: 2019-01-20 | End: 2019-01-20

## 2019-01-20 RX ORDER — HEPARIN SODIUM 1000 [USP'U]/ML
80 INJECTION, SOLUTION INTRAVENOUS; SUBCUTANEOUS ONCE
Status: COMPLETED | OUTPATIENT
Start: 2019-01-20 | End: 2019-01-20

## 2019-01-20 RX ORDER — IPRATROPIUM BROMIDE AND ALBUTEROL SULFATE 2.5; .5 MG/3ML; MG/3ML
1 SOLUTION RESPIRATORY (INHALATION)
Status: DISCONTINUED | OUTPATIENT
Start: 2019-01-20 | End: 2019-01-21

## 2019-01-20 RX ORDER — HEPARIN SODIUM 1000 [USP'U]/ML
40 INJECTION, SOLUTION INTRAVENOUS; SUBCUTANEOUS PRN
Status: DISCONTINUED | OUTPATIENT
Start: 2019-01-20 | End: 2019-01-28

## 2019-01-20 RX ORDER — BUMETANIDE 0.25 MG/ML
1 INJECTION, SOLUTION INTRAMUSCULAR; INTRAVENOUS ONCE
Status: COMPLETED | OUTPATIENT
Start: 2019-01-20 | End: 2019-01-20

## 2019-01-20 RX ORDER — CARVEDILOL 25 MG/1
25 TABLET ORAL 2 TIMES DAILY WITH MEALS
Status: DISCONTINUED | OUTPATIENT
Start: 2019-01-20 | End: 2019-01-24

## 2019-01-20 RX ADMIN — ASPIRIN 81 MG 81 MG: 81 TABLET ORAL at 10:10

## 2019-01-20 RX ADMIN — DILTIAZEM HYDROCHLORIDE 5 MG/HR: 5 INJECTION INTRAVENOUS at 10:35

## 2019-01-20 RX ADMIN — HEPARIN SODIUM 7340 UNITS: 1000 INJECTION INTRAVENOUS; SUBCUTANEOUS at 10:20

## 2019-01-20 RX ADMIN — BUMETANIDE 1 MG: 0.25 INJECTION INTRAMUSCULAR; INTRAVENOUS at 20:26

## 2019-01-20 RX ADMIN — HEPARIN SODIUM AND DEXTROSE 18 UNITS/KG/HR: 10000; 5 INJECTION INTRAVENOUS at 10:18

## 2019-01-20 RX ADMIN — INSULIN LISPRO 1 UNITS: 100 INJECTION, SOLUTION INTRAVENOUS; SUBCUTANEOUS at 20:27

## 2019-01-20 RX ADMIN — LATANOPROST 1 DROP: 50 SOLUTION/ DROPS OPHTHALMIC at 20:27

## 2019-01-20 RX ADMIN — Medication 10 ML: at 20:28

## 2019-01-20 RX ADMIN — TIMOLOL MALEATE 1 DROP: 5 SOLUTION/ DROPS OPHTHALMIC at 10:11

## 2019-01-20 RX ADMIN — Medication 10 ML: at 08:38

## 2019-01-20 RX ADMIN — INSULIN LISPRO 6 UNITS: 100 INJECTION, SOLUTION INTRAVENOUS; SUBCUTANEOUS at 12:44

## 2019-01-20 RX ADMIN — INSULIN LISPRO 2 UNITS: 100 INJECTION, SOLUTION INTRAVENOUS; SUBCUTANEOUS at 16:43

## 2019-01-20 RX ADMIN — ATORVASTATIN CALCIUM 40 MG: 40 TABLET, FILM COATED ORAL at 20:26

## 2019-01-20 RX ADMIN — IPRATROPIUM BROMIDE AND ALBUTEROL SULFATE 1 AMPULE: .5; 3 SOLUTION RESPIRATORY (INHALATION) at 19:11

## 2019-01-20 RX ADMIN — METOPROLOL TARTRATE 5 MG: 5 INJECTION, SOLUTION INTRAVENOUS at 08:36

## 2019-01-20 RX ADMIN — INSULIN GLARGINE 15 UNITS: 100 INJECTION, SOLUTION SUBCUTANEOUS at 20:27

## 2019-01-20 RX ADMIN — CARVEDILOL 12.5 MG: 6.25 TABLET, FILM COATED ORAL at 10:10

## 2019-01-20 RX ADMIN — INSULIN LISPRO 2 UNITS: 100 INJECTION, SOLUTION INTRAVENOUS; SUBCUTANEOUS at 10:20

## 2019-01-20 RX ADMIN — CARVEDILOL 25 MG: 25 TABLET, FILM COATED ORAL at 17:30

## 2019-01-20 RX ADMIN — TAMSULOSIN HYDROCHLORIDE 0.4 MG: 0.4 CAPSULE ORAL at 10:10

## 2019-01-20 ASSESSMENT — PAIN SCALES - GENERAL
PAINLEVEL_OUTOF10: 0

## 2019-01-21 LAB
ABSOLUTE RETIC #: 82 THOU/MM3 (ref 20–115)
ANION GAP SERPL CALCULATED.3IONS-SCNC: 10 MEQ/L (ref 8–16)
APTT: 42.6 SECONDS (ref 22–38)
APTT: 65.4 SECONDS (ref 22–38)
APTT: 76 SECONDS (ref 22–38)
BUN BLDV-MCNC: 72 MG/DL (ref 7–22)
CALCIUM SERPL-MCNC: 7.7 MG/DL (ref 8.5–10.5)
CHLORIDE BLD-SCNC: 97 MEQ/L (ref 98–111)
CO2: 26 MEQ/L (ref 23–33)
CREAT SERPL-MCNC: 3.3 MG/DL (ref 0.4–1.2)
EKG ATRIAL RATE: 119 BPM
EKG ATRIAL RATE: 72 BPM
EKG ATRIAL RATE: 85 BPM
EKG ATRIAL RATE: 89 BPM
EKG P AXIS: 54 DEGREES
EKG P AXIS: 60 DEGREES
EKG P AXIS: 65 DEGREES
EKG P-R INTERVAL: 164 MS
EKG P-R INTERVAL: 168 MS
EKG P-R INTERVAL: 170 MS
EKG Q-T INTERVAL: 348 MS
EKG Q-T INTERVAL: 372 MS
EKG Q-T INTERVAL: 380 MS
EKG Q-T INTERVAL: 392 MS
EKG QRS DURATION: 102 MS
EKG QRS DURATION: 102 MS
EKG QRS DURATION: 94 MS
EKG QRS DURATION: 96 MS
EKG QTC CALCULATION (BAZETT): 407 MS
EKG QTC CALCULATION (BAZETT): 452 MS
EKG QTC CALCULATION (BAZETT): 476 MS
EKG QTC CALCULATION (BAZETT): 489 MS
EKG R AXIS: 59 DEGREES
EKG R AXIS: 60 DEGREES
EKG R AXIS: 64 DEGREES
EKG R AXIS: 69 DEGREES
EKG T AXIS: -107 DEGREES
EKG T AXIS: -119 DEGREES
EKG T AXIS: -152 DEGREES
EKG T AXIS: -174 DEGREES
EKG VENTRICULAR RATE: 119 BPM
EKG VENTRICULAR RATE: 72 BPM
EKG VENTRICULAR RATE: 85 BPM
EKG VENTRICULAR RATE: 89 BPM
ERYTHROCYTE [DISTWIDTH] IN BLOOD BY AUTOMATED COUNT: 17.6 % (ref 11.5–14.5)
ERYTHROCYTE [DISTWIDTH] IN BLOOD BY AUTOMATED COUNT: 56.7 FL (ref 35–45)
GFR SERPL CREATININE-BSD FRML MDRD: 19 ML/MIN/1.73M2
GLUCOSE BLD-MCNC: 116 MG/DL (ref 70–108)
GLUCOSE BLD-MCNC: 136 MG/DL (ref 70–108)
GLUCOSE BLD-MCNC: 162 MG/DL (ref 70–108)
GLUCOSE BLD-MCNC: 229 MG/DL (ref 70–108)
HCT VFR BLD CALC: 28.5 % (ref 42–52)
HEMOGLOBIN: 8.7 GM/DL (ref 14–18)
IMMATURE RETIC FRACT: 16.7 % (ref 2.3–13.4)
INR BLD: 1.28 (ref 0.85–1.13)
MCH RBC QN AUTO: 27.1 PG (ref 26–33)
MCHC RBC AUTO-ENTMCNC: 30.5 GM/DL (ref 32.2–35.5)
MCV RBC AUTO: 88.8 FL (ref 80–94)
PLATELET # BLD: 152 THOU/MM3 (ref 130–400)
PMV BLD AUTO: 11.8 FL (ref 9.4–12.4)
POTASSIUM SERPL-SCNC: 4.1 MEQ/L (ref 3.5–5.2)
PROCALCITONIN: 0.77 NG/ML (ref 0.01–0.09)
RBC # BLD: 3.21 MILL/MM3 (ref 4.7–6.1)
RETIC HEMOGLOBIN: 28.5 PG (ref 28.2–35.7)
RETICULOCYTE ABSOLUTE COUNT: 2.6 % (ref 0.5–2)
SODIUM BLD-SCNC: 133 MEQ/L (ref 135–145)
WBC # BLD: 5.3 THOU/MM3 (ref 4.8–10.8)

## 2019-01-21 PROCEDURE — 93005 ELECTROCARDIOGRAM TRACING: CPT | Performed by: NURSE PRACTITIONER

## 2019-01-21 PROCEDURE — 85046 RETICYTE/HGB CONCENTRATE: CPT

## 2019-01-21 PROCEDURE — 93010 ELECTROCARDIOGRAM REPORT: CPT | Performed by: INTERNAL MEDICINE

## 2019-01-21 PROCEDURE — 6370000000 HC RX 637 (ALT 250 FOR IP): Performed by: PHYSICIAN ASSISTANT

## 2019-01-21 PROCEDURE — 6370000000 HC RX 637 (ALT 250 FOR IP): Performed by: FAMILY MEDICINE

## 2019-01-21 PROCEDURE — 82948 REAGENT STRIP/BLOOD GLUCOSE: CPT

## 2019-01-21 PROCEDURE — 99232 SBSQ HOSP IP/OBS MODERATE 35: CPT | Performed by: INTERNAL MEDICINE

## 2019-01-21 PROCEDURE — 6360000002 HC RX W HCPCS: Performed by: INTERNAL MEDICINE

## 2019-01-21 PROCEDURE — 7100000000 HC PACU RECOVERY - FIRST 15 MIN: Performed by: INTERNAL MEDICINE

## 2019-01-21 PROCEDURE — 2580000003 HC RX 258: Performed by: INTERNAL MEDICINE

## 2019-01-21 PROCEDURE — 7100000001 HC PACU RECOVERY - ADDTL 15 MIN: Performed by: INTERNAL MEDICINE

## 2019-01-21 PROCEDURE — 6370000000 HC RX 637 (ALT 250 FOR IP): Performed by: INTERNAL MEDICINE

## 2019-01-21 PROCEDURE — 0DJ08ZZ INSPECTION OF UPPER INTESTINAL TRACT, VIA NATURAL OR ARTIFICIAL OPENING ENDOSCOPIC: ICD-10-PCS | Performed by: INTERNAL MEDICINE

## 2019-01-21 PROCEDURE — 2709999900 HC NON-CHARGEABLE SUPPLY

## 2019-01-21 PROCEDURE — 90935 HEMODIALYSIS ONE EVALUATION: CPT

## 2019-01-21 PROCEDURE — 94640 AIRWAY INHALATION TREATMENT: CPT

## 2019-01-21 PROCEDURE — 84145 PROCALCITONIN (PCT): CPT

## 2019-01-21 PROCEDURE — 80048 BASIC METABOLIC PNL TOTAL CA: CPT

## 2019-01-21 PROCEDURE — 2140000000 HC CCU INTERMEDIATE R&B

## 2019-01-21 PROCEDURE — 36415 COLL VENOUS BLD VENIPUNCTURE: CPT

## 2019-01-21 PROCEDURE — 85610 PROTHROMBIN TIME: CPT

## 2019-01-21 PROCEDURE — 99232 SBSQ HOSP IP/OBS MODERATE 35: CPT | Performed by: FAMILY MEDICINE

## 2019-01-21 PROCEDURE — C9113 INJ PANTOPRAZOLE SODIUM, VIA: HCPCS | Performed by: INTERNAL MEDICINE

## 2019-01-21 PROCEDURE — 2709999900 HC NON-CHARGEABLE SUPPLY: Performed by: INTERNAL MEDICINE

## 2019-01-21 PROCEDURE — 85027 COMPLETE CBC AUTOMATED: CPT

## 2019-01-21 PROCEDURE — 3609012800 HC EGD DIAGNOSTIC ONLY: Performed by: INTERNAL MEDICINE

## 2019-01-21 PROCEDURE — 85730 THROMBOPLASTIN TIME PARTIAL: CPT

## 2019-01-21 RX ORDER — NITROGLYCERIN 0.4 MG/1
0.4 TABLET SUBLINGUAL EVERY 5 MIN PRN
COMMUNITY
End: 2019-05-06 | Stop reason: ALTCHOICE

## 2019-01-21 RX ORDER — 0.9 % SODIUM CHLORIDE 0.9 %
10 VIAL (ML) INJECTION 2 TIMES DAILY
Status: DISCONTINUED | OUTPATIENT
Start: 2019-01-21 | End: 2019-01-28

## 2019-01-21 RX ORDER — IPRATROPIUM BROMIDE AND ALBUTEROL SULFATE 2.5; .5 MG/3ML; MG/3ML
1 SOLUTION RESPIRATORY (INHALATION) EVERY 4 HOURS PRN
Status: DISCONTINUED | OUTPATIENT
Start: 2019-01-21 | End: 2019-01-28

## 2019-01-21 RX ORDER — ACETAMINOPHEN 325 MG/1
650 TABLET ORAL EVERY 4 HOURS PRN
COMMUNITY

## 2019-01-21 RX ORDER — GUAIFENESIN 600 MG/1
600 TABLET, EXTENDED RELEASE ORAL 2 TIMES DAILY
Status: DISCONTINUED | OUTPATIENT
Start: 2019-01-21 | End: 2019-01-23

## 2019-01-21 RX ORDER — FENTANYL CITRATE 50 UG/ML
INJECTION, SOLUTION INTRAMUSCULAR; INTRAVENOUS PRN
Status: DISCONTINUED | OUTPATIENT
Start: 2019-01-21 | End: 2019-01-21 | Stop reason: HOSPADM

## 2019-01-21 RX ORDER — MIDAZOLAM HYDROCHLORIDE 1 MG/ML
INJECTION INTRAMUSCULAR; INTRAVENOUS PRN
Status: DISCONTINUED | OUTPATIENT
Start: 2019-01-21 | End: 2019-01-21 | Stop reason: HOSPADM

## 2019-01-21 RX ORDER — PANTOPRAZOLE SODIUM 40 MG/1
40 TABLET, DELAYED RELEASE ORAL
Status: DISCONTINUED | OUTPATIENT
Start: 2019-01-22 | End: 2019-01-28

## 2019-01-21 RX ORDER — PANTOPRAZOLE SODIUM 40 MG/10ML
40 INJECTION, POWDER, LYOPHILIZED, FOR SOLUTION INTRAVENOUS 2 TIMES DAILY
Status: DISCONTINUED | OUTPATIENT
Start: 2019-01-21 | End: 2019-01-23

## 2019-01-21 RX ADMIN — LATANOPROST 1 DROP: 50 SOLUTION/ DROPS OPHTHALMIC at 20:06

## 2019-01-21 RX ADMIN — CARVEDILOL 25 MG: 25 TABLET, FILM COATED ORAL at 17:41

## 2019-01-21 RX ADMIN — INSULIN GLARGINE 15 UNITS: 100 INJECTION, SOLUTION SUBCUTANEOUS at 20:18

## 2019-01-21 RX ADMIN — PANTOPRAZOLE SODIUM 40 MG: 40 INJECTION, POWDER, FOR SOLUTION INTRAVENOUS at 20:06

## 2019-01-21 RX ADMIN — GUAIFENESIN 600 MG: 600 TABLET, EXTENDED RELEASE ORAL at 13:17

## 2019-01-21 RX ADMIN — CARVEDILOL 25 MG: 25 TABLET, FILM COATED ORAL at 13:16

## 2019-01-21 RX ADMIN — Medication 10 ML: at 13:17

## 2019-01-21 RX ADMIN — INSULIN LISPRO 2 UNITS: 100 INJECTION, SOLUTION INTRAVENOUS; SUBCUTANEOUS at 20:18

## 2019-01-21 RX ADMIN — ASPIRIN 81 MG 81 MG: 81 TABLET ORAL at 13:16

## 2019-01-21 RX ADMIN — GUAIFENESIN 600 MG: 600 TABLET, EXTENDED RELEASE ORAL at 20:06

## 2019-01-21 RX ADMIN — Medication 10 ML: at 20:07

## 2019-01-21 RX ADMIN — HEPARIN SODIUM AND DEXTROSE 14 UNITS/KG/HR: 10000; 5 INJECTION INTRAVENOUS at 04:51

## 2019-01-21 RX ADMIN — TAMSULOSIN HYDROCHLORIDE 0.4 MG: 0.4 CAPSULE ORAL at 13:15

## 2019-01-21 RX ADMIN — ATORVASTATIN CALCIUM 40 MG: 40 TABLET, FILM COATED ORAL at 20:06

## 2019-01-21 RX ADMIN — TIMOLOL MALEATE 1 DROP: 5 SOLUTION/ DROPS OPHTHALMIC at 13:16

## 2019-01-21 RX ADMIN — IPRATROPIUM BROMIDE AND ALBUTEROL SULFATE 1 AMPULE: .5; 3 SOLUTION RESPIRATORY (INHALATION) at 07:33

## 2019-01-21 ASSESSMENT — PAIN SCALES - GENERAL
PAINLEVEL_OUTOF10: 0

## 2019-01-21 ASSESSMENT — PAIN - FUNCTIONAL ASSESSMENT: PAIN_FUNCTIONAL_ASSESSMENT: 0-10

## 2019-01-22 LAB
ANION GAP SERPL CALCULATED.3IONS-SCNC: 9 MEQ/L (ref 8–16)
APTT: 140.3 SECONDS (ref 22–38)
APTT: 59.9 SECONDS (ref 22–38)
APTT: 97.5 SECONDS (ref 22–38)
BUN BLDV-MCNC: 38 MG/DL (ref 7–22)
CALCIUM SERPL-MCNC: 7.9 MG/DL (ref 8.5–10.5)
CHLORIDE BLD-SCNC: 99 MEQ/L (ref 98–111)
CO2: 28 MEQ/L (ref 23–33)
CREAT SERPL-MCNC: 2.5 MG/DL (ref 0.4–1.2)
GFR SERPL CREATININE-BSD FRML MDRD: 26 ML/MIN/1.73M2
GLUCOSE BLD-MCNC: 118 MG/DL (ref 70–108)
GLUCOSE BLD-MCNC: 134 MG/DL (ref 70–108)
GLUCOSE BLD-MCNC: 180 MG/DL (ref 70–108)
GLUCOSE BLD-MCNC: 205 MG/DL (ref 70–108)
GLUCOSE BLD-MCNC: 263 MG/DL (ref 70–108)
HCT VFR BLD CALC: 32.6 % (ref 42–52)
HEMOGLOBIN: 9.7 GM/DL (ref 14–18)
INR BLD: 1.32 (ref 0.85–1.13)
MAGNESIUM: 2 MG/DL (ref 1.6–2.4)
PLATELET # BLD: 182 THOU/MM3 (ref 130–400)
POTASSIUM SERPL-SCNC: 4.5 MEQ/L (ref 3.5–5.2)
REASON FOR REJECTION: NORMAL
REJECTED TEST: NORMAL
SODIUM BLD-SCNC: 136 MEQ/L (ref 135–145)

## 2019-01-22 PROCEDURE — C9113 INJ PANTOPRAZOLE SODIUM, VIA: HCPCS | Performed by: INTERNAL MEDICINE

## 2019-01-22 PROCEDURE — 6370000000 HC RX 637 (ALT 250 FOR IP): Performed by: FAMILY MEDICINE

## 2019-01-22 PROCEDURE — 2709999900 HC NON-CHARGEABLE SUPPLY

## 2019-01-22 PROCEDURE — 99232 SBSQ HOSP IP/OBS MODERATE 35: CPT | Performed by: INTERNAL MEDICINE

## 2019-01-22 PROCEDURE — 85014 HEMATOCRIT: CPT

## 2019-01-22 PROCEDURE — 97530 THERAPEUTIC ACTIVITIES: CPT

## 2019-01-22 PROCEDURE — 6360000002 HC RX W HCPCS: Performed by: INTERNAL MEDICINE

## 2019-01-22 PROCEDURE — 2140000000 HC CCU INTERMEDIATE R&B

## 2019-01-22 PROCEDURE — 85049 AUTOMATED PLATELET COUNT: CPT

## 2019-01-22 PROCEDURE — 83735 ASSAY OF MAGNESIUM: CPT

## 2019-01-22 PROCEDURE — 97110 THERAPEUTIC EXERCISES: CPT

## 2019-01-22 PROCEDURE — 6370000000 HC RX 637 (ALT 250 FOR IP): Performed by: PHYSICIAN ASSISTANT

## 2019-01-22 PROCEDURE — 97535 SELF CARE MNGMENT TRAINING: CPT

## 2019-01-22 PROCEDURE — 97166 OT EVAL MOD COMPLEX 45 MIN: CPT

## 2019-01-22 PROCEDURE — 85018 HEMOGLOBIN: CPT

## 2019-01-22 PROCEDURE — 99232 SBSQ HOSP IP/OBS MODERATE 35: CPT | Performed by: NURSE PRACTITIONER

## 2019-01-22 PROCEDURE — 80048 BASIC METABOLIC PNL TOTAL CA: CPT

## 2019-01-22 PROCEDURE — 6370000000 HC RX 637 (ALT 250 FOR IP): Performed by: INTERNAL MEDICINE

## 2019-01-22 PROCEDURE — 2580000003 HC RX 258: Performed by: INTERNAL MEDICINE

## 2019-01-22 PROCEDURE — 36415 COLL VENOUS BLD VENIPUNCTURE: CPT

## 2019-01-22 PROCEDURE — 97162 PT EVAL MOD COMPLEX 30 MIN: CPT

## 2019-01-22 PROCEDURE — 85730 THROMBOPLASTIN TIME PARTIAL: CPT

## 2019-01-22 PROCEDURE — 85610 PROTHROMBIN TIME: CPT

## 2019-01-22 PROCEDURE — 82948 REAGENT STRIP/BLOOD GLUCOSE: CPT

## 2019-01-22 RX ORDER — SODIUM CHLORIDE 9 MG/ML
INJECTION, SOLUTION INTRAVENOUS CONTINUOUS
Status: DISCONTINUED | OUTPATIENT
Start: 2019-01-22 | End: 2019-01-23 | Stop reason: SDUPTHER

## 2019-01-22 RX ADMIN — LATANOPROST 1 DROP: 50 SOLUTION/ DROPS OPHTHALMIC at 20:02

## 2019-01-22 RX ADMIN — PANTOPRAZOLE SODIUM 40 MG: 40 INJECTION, POWDER, FOR SOLUTION INTRAVENOUS at 08:19

## 2019-01-22 RX ADMIN — PANTOPRAZOLE SODIUM 40 MG: 40 TABLET, DELAYED RELEASE ORAL at 06:16

## 2019-01-22 RX ADMIN — INSULIN LISPRO 3 UNITS: 100 INJECTION, SOLUTION INTRAVENOUS; SUBCUTANEOUS at 20:56

## 2019-01-22 RX ADMIN — Medication 10 ML: at 20:01

## 2019-01-22 RX ADMIN — SODIUM CHLORIDE: 9 INJECTION, SOLUTION INTRAVENOUS at 23:21

## 2019-01-22 RX ADMIN — GUAIFENESIN 600 MG: 600 TABLET, EXTENDED RELEASE ORAL at 08:19

## 2019-01-22 RX ADMIN — GUAIFENESIN 600 MG: 600 TABLET, EXTENDED RELEASE ORAL at 20:01

## 2019-01-22 RX ADMIN — TAMSULOSIN HYDROCHLORIDE 0.4 MG: 0.4 CAPSULE ORAL at 08:19

## 2019-01-22 RX ADMIN — IRON SUCROSE 200 MG: 20 INJECTION, SOLUTION INTRAVENOUS at 16:00

## 2019-01-22 RX ADMIN — PANTOPRAZOLE SODIUM 40 MG: 40 INJECTION, POWDER, FOR SOLUTION INTRAVENOUS at 20:02

## 2019-01-22 RX ADMIN — Medication 10 ML: at 08:19

## 2019-01-22 RX ADMIN — HEPARIN SODIUM AND DEXTROSE 12 UNITS/KG/HR: 10000; 5 INJECTION INTRAVENOUS at 06:25

## 2019-01-22 RX ADMIN — Medication 10 ML: at 10:13

## 2019-01-22 RX ADMIN — ACETYLCYSTEINE 1000 MG: 500 TABLET, EFFERVESCENT ORAL at 15:59

## 2019-01-22 RX ADMIN — ATORVASTATIN CALCIUM 40 MG: 40 TABLET, FILM COATED ORAL at 20:01

## 2019-01-22 RX ADMIN — CARVEDILOL 25 MG: 25 TABLET, FILM COATED ORAL at 08:19

## 2019-01-22 RX ADMIN — TIMOLOL MALEATE 1 DROP: 5 SOLUTION/ DROPS OPHTHALMIC at 14:21

## 2019-01-22 RX ADMIN — INSULIN LISPRO 4 UNITS: 100 INJECTION, SOLUTION INTRAVENOUS; SUBCUTANEOUS at 11:25

## 2019-01-22 RX ADMIN — ASPIRIN 81 MG 81 MG: 81 TABLET ORAL at 08:19

## 2019-01-22 RX ADMIN — CARVEDILOL 25 MG: 25 TABLET, FILM COATED ORAL at 16:01

## 2019-01-22 RX ADMIN — DARBEPOETIN ALFA 40 MCG: 40 INJECTION, SOLUTION INTRAVENOUS; SUBCUTANEOUS at 18:10

## 2019-01-22 ASSESSMENT — PAIN SCALES - GENERAL
PAINLEVEL_OUTOF10: 0

## 2019-01-23 ENCOUNTER — APPOINTMENT (OUTPATIENT)
Dept: GENERAL RADIOLOGY | Age: 74
DRG: 216 | End: 2019-01-23
Attending: INTERNAL MEDICINE
Payer: MEDICARE

## 2019-01-23 ENCOUNTER — APPOINTMENT (OUTPATIENT)
Dept: CARDIAC CATH/INVASIVE PROCEDURES | Age: 74
DRG: 216 | End: 2019-01-23
Attending: INTERNAL MEDICINE
Payer: MEDICARE

## 2019-01-23 LAB
ABO: NORMAL
ANION GAP SERPL CALCULATED.3IONS-SCNC: 12 MEQ/L (ref 8–16)
ANION GAP SERPL CALCULATED.3IONS-SCNC: 13 MEQ/L (ref 8–16)
ANTIBODY SCREEN: NORMAL
APTT: 79 SECONDS (ref 22–38)
APTT: 81.8 SECONDS (ref 22–38)
BASE EXCESS (CALCULATED): 1.6 MMOL/L (ref -2.5–2.5)
BASE EXCESS (CALCULATED): 2.5 MMOL/L (ref -2.5–2.5)
BASOPHILS # BLD: 0.2 %
BASOPHILS ABSOLUTE: 0 THOU/MM3 (ref 0–0.1)
BUN BLDV-MCNC: 33 MG/DL (ref 7–22)
BUN BLDV-MCNC: 46 MG/DL (ref 7–22)
CALCIUM IONIZED: 1.06 MMOL/L (ref 1.12–1.32)
CALCIUM SERPL-MCNC: 7.7 MG/DL (ref 8.5–10.5)
CALCIUM SERPL-MCNC: 7.7 MG/DL (ref 8.5–10.5)
CHLORIDE BLD-SCNC: 102 MEQ/L (ref 98–111)
CHLORIDE BLD-SCNC: 97 MEQ/L (ref 98–111)
CO2: 22 MEQ/L (ref 23–33)
CO2: 22 MEQ/L (ref 23–33)
COLLECTED BY:: ABNORMAL
CREAT SERPL-MCNC: 2.1 MG/DL (ref 0.4–1.2)
CREAT SERPL-MCNC: 2.9 MG/DL (ref 0.4–1.2)
DEVICE: ABNORMAL
DEVICE: ABNORMAL
EKG ATRIAL RATE: 86 BPM
EKG P AXIS: 56 DEGREES
EKG P-R INTERVAL: 166 MS
EKG Q-T INTERVAL: 380 MS
EKG QRS DURATION: 102 MS
EKG QTC CALCULATION (BAZETT): 454 MS
EKG R AXIS: 46 DEGREES
EKG T AXIS: -80 DEGREES
EKG VENTRICULAR RATE: 86 BPM
EOSINOPHIL # BLD: 0 %
EOSINOPHILS ABSOLUTE: 0 THOU/MM3 (ref 0–0.4)
ERYTHROCYTE [DISTWIDTH] IN BLOOD BY AUTOMATED COUNT: 17.2 % (ref 11.5–14.5)
ERYTHROCYTE [DISTWIDTH] IN BLOOD BY AUTOMATED COUNT: 17.2 % (ref 11.5–14.5)
ERYTHROCYTE [DISTWIDTH] IN BLOOD BY AUTOMATED COUNT: 53.1 FL (ref 35–45)
ERYTHROCYTE [DISTWIDTH] IN BLOOD BY AUTOMATED COUNT: 56.1 FL (ref 35–45)
GFR SERPL CREATININE-BSD FRML MDRD: 22 ML/MIN/1.73M2
GFR SERPL CREATININE-BSD FRML MDRD: 31 ML/MIN/1.73M2
GLUCOSE BLD-MCNC: 151 MG/DL (ref 70–108)
GLUCOSE BLD-MCNC: 213 MG/DL (ref 70–108)
GLUCOSE BLD-MCNC: 215 MG/DL (ref 70–108)
GLUCOSE, WHOLE BLOOD: 174 MG/DL (ref 70–108)
GLUCOSE, WHOLE BLOOD: 249 MG/DL (ref 70–108)
HCO3: 24 MMOL/L (ref 23–28)
HCO3: 25 MMOL/L (ref 23–28)
HCT VFR BLD CALC: 27.3 % (ref 42–52)
HCT VFR BLD CALC: 28.3 % (ref 42–52)
HEMOGLOBIN: 8.6 GM/DL (ref 14–18)
HEMOGLOBIN: 8.6 GM/DL (ref 14–18)
IFIO2: 50
IFIO2: 50
IMMATURE GRANS (ABS): 0.02 THOU/MM3 (ref 0–0.07)
IMMATURE GRANULOCYTES: 0.3 %
INR BLD: 1.24 (ref 0.85–1.13)
INR BLD: 1.29 (ref 0.85–1.13)
LYMPHOCYTES # BLD: 26.8 %
LYMPHOCYTES ABSOLUTE: 1.7 THOU/MM3 (ref 1–4.8)
MAGNESIUM: 2 MG/DL (ref 1.6–2.4)
MCH RBC QN AUTO: 27 PG (ref 26–33)
MCH RBC QN AUTO: 27.1 PG (ref 26–33)
MCHC RBC AUTO-ENTMCNC: 30.4 GM/DL (ref 32.2–35.5)
MCHC RBC AUTO-ENTMCNC: 31.5 GM/DL (ref 32.2–35.5)
MCV RBC AUTO: 85.6 FL (ref 80–94)
MCV RBC AUTO: 89.3 FL (ref 80–94)
MODE: ABNORMAL
MODE: ABNORMAL
MONOCYTES # BLD: 5.9 %
MONOCYTES ABSOLUTE: 0.4 THOU/MM3 (ref 0.4–1.3)
MRSA SCREEN RT-PCR: POSITIVE
NUCLEATED RED BLOOD CELLS: 0 /100 WBC
O2 SATURATION: 100 %
O2 SATURATION: 98 %
PCO2 (TEMP CORRECTED): 25 (ref 35–45)
PCO2: 26 MMHG (ref 35–45)
PCO2: 34 MMHG (ref 35–45)
PH (TEMPERATURE CORRECTED): 7.58 (ref 7.35–7.45)
PH BLOOD GAS: 7.48 (ref 7.35–7.45)
PH BLOOD GAS: 7.58 (ref 7.35–7.45)
PHOSPHORUS: 2.8 MG/DL (ref 2.4–4.7)
PLATELET # BLD: 141 THOU/MM3 (ref 130–400)
PLATELET # BLD: 170 THOU/MM3 (ref 130–400)
PMV BLD AUTO: 11.2 FL (ref 9.4–12.4)
PMV BLD AUTO: 11.3 FL (ref 9.4–12.4)
PO2 (TEMP CORRECTED): 166 (ref 71–104)
PO2: 167 MMHG (ref 71–104)
PO2: 95 MMHG (ref 71–104)
POC O2 SATURATION: 48 % (ref 94–97)
POC O2 SATURATION: 49 % (ref 94–97)
POC O2 SATURATION: 90 % (ref 94–97)
POTASSIUM REFLEX MAGNESIUM: 4 MEQ/L (ref 3.5–5.2)
POTASSIUM SERPL-SCNC: 4.3 MEQ/L (ref 3.5–5.2)
RBC # BLD: 3.17 MILL/MM3 (ref 4.7–6.1)
RBC # BLD: 3.19 MILL/MM3 (ref 4.7–6.1)
RH FACTOR: NORMAL
SEG NEUTROPHILS: 66.8 %
SEGMENTED NEUTROPHILS ABSOLUTE COUNT: 4.1 THOU/MM3 (ref 1.8–7.7)
SET PEEP: 8 MMHG
SET PEEP: 8 MMHG
SET PRESS SUPP: 14 CMH2O
SET PRESS SUPP: 16 CMH2O
SET RESPIRATORY RATE: 12 BPM
SET RESPIRATORY RATE: 12 BPM
SODIUM BLD-SCNC: 131 MEQ/L (ref 135–145)
SODIUM BLD-SCNC: 137 MEQ/L (ref 135–145)
SOURCE, BLOOD GAS: ABNORMAL
TEMPERATURE: 36.9
VANCOMYCIN RESISTANT ENTEROCOCCUS: POSITIVE
WBC # BLD: 5.3 THOU/MM3 (ref 4.8–10.8)
WBC # BLD: 6.2 THOU/MM3 (ref 4.8–10.8)

## 2019-01-23 PROCEDURE — 2580000003 HC RX 258: Performed by: INTERNAL MEDICINE

## 2019-01-23 PROCEDURE — 6360000002 HC RX W HCPCS: Performed by: INTERNAL MEDICINE

## 2019-01-23 PROCEDURE — 87081 CULTURE SCREEN ONLY: CPT

## 2019-01-23 PROCEDURE — 94002 VENT MGMT INPAT INIT DAY: CPT

## 2019-01-23 PROCEDURE — 5A1935Z RESPIRATORY VENTILATION, LESS THAN 24 CONSECUTIVE HOURS: ICD-10-PCS | Performed by: INTERNAL MEDICINE

## 2019-01-23 PROCEDURE — 82803 BLOOD GASES ANY COMBINATION: CPT

## 2019-01-23 PROCEDURE — 36620 INSERTION CATHETER ARTERY: CPT | Performed by: INTERNAL MEDICINE

## 2019-01-23 PROCEDURE — 86850 RBC ANTIBODY SCREEN: CPT

## 2019-01-23 PROCEDURE — 2500000003 HC RX 250 WO HCPCS: Performed by: INTERNAL MEDICINE

## 2019-01-23 PROCEDURE — 93460 R&L HRT ART/VENTRICLE ANGIO: CPT

## 2019-01-23 PROCEDURE — 2700000000 HC OXYGEN THERAPY PER DAY

## 2019-01-23 PROCEDURE — C1887 CATHETER, GUIDING: HCPCS

## 2019-01-23 PROCEDURE — 86901 BLOOD TYPING SEROLOGIC RH(D): CPT

## 2019-01-23 PROCEDURE — 85025 COMPLETE CBC W/AUTO DIFF WBC: CPT

## 2019-01-23 PROCEDURE — 99291 CRITICAL CARE FIRST HOUR: CPT | Performed by: INTERNAL MEDICINE

## 2019-01-23 PROCEDURE — 86900 BLOOD TYPING SEROLOGIC ABO: CPT

## 2019-01-23 PROCEDURE — 2709999900 HC NON-CHARGEABLE SUPPLY

## 2019-01-23 PROCEDURE — 87147 CULTURE TYPE IMMUNOLOGIC: CPT

## 2019-01-23 PROCEDURE — 71045 X-RAY EXAM CHEST 1 VIEW: CPT

## 2019-01-23 PROCEDURE — 85610 PROTHROMBIN TIME: CPT

## 2019-01-23 PROCEDURE — 99223 1ST HOSP IP/OBS HIGH 75: CPT | Performed by: THORACIC SURGERY (CARDIOTHORACIC VASCULAR SURGERY)

## 2019-01-23 PROCEDURE — 80048 BASIC METABOLIC PNL TOTAL CA: CPT

## 2019-01-23 PROCEDURE — 2500000003 HC RX 250 WO HCPCS

## 2019-01-23 PROCEDURE — 31500 INSERT EMERGENCY AIRWAY: CPT | Performed by: INTERNAL MEDICINE

## 2019-01-23 PROCEDURE — 6360000002 HC RX W HCPCS

## 2019-01-23 PROCEDURE — 99292 CRITICAL CARE ADDL 30 MIN: CPT | Performed by: INTERNAL MEDICINE

## 2019-01-23 PROCEDURE — 82330 ASSAY OF CALCIUM: CPT

## 2019-01-23 PROCEDURE — 6370000000 HC RX 637 (ALT 250 FOR IP): Performed by: INTERNAL MEDICINE

## 2019-01-23 PROCEDURE — 31500 INSERT EMERGENCY AIRWAY: CPT

## 2019-01-23 PROCEDURE — 37799 UNLISTED PX VASCULAR SURGERY: CPT

## 2019-01-23 PROCEDURE — 94640 AIRWAY INHALATION TREATMENT: CPT

## 2019-01-23 PROCEDURE — 90935 HEMODIALYSIS ONE EVALUATION: CPT

## 2019-01-23 PROCEDURE — 6370000000 HC RX 637 (ALT 250 FOR IP): Performed by: NURSE PRACTITIONER

## 2019-01-23 PROCEDURE — 82947 ASSAY GLUCOSE BLOOD QUANT: CPT

## 2019-01-23 PROCEDURE — 82948 REAGENT STRIP/BLOOD GLUCOSE: CPT

## 2019-01-23 PROCEDURE — 94770 HC ETCO2 MONITOR DAILY: CPT

## 2019-01-23 PROCEDURE — 83735 ASSAY OF MAGNESIUM: CPT

## 2019-01-23 PROCEDURE — 2720000010 HC SURG SUPPLY STERILE

## 2019-01-23 PROCEDURE — 6370000000 HC RX 637 (ALT 250 FOR IP): Performed by: FAMILY MEDICINE

## 2019-01-23 PROCEDURE — 92950 HEART/LUNG RESUSCITATION CPR: CPT

## 2019-01-23 PROCEDURE — C1769 GUIDE WIRE: HCPCS

## 2019-01-23 PROCEDURE — 6360000004 HC RX CONTRAST MEDICATION: Performed by: INTERNAL MEDICINE

## 2019-01-23 PROCEDURE — 93005 ELECTROCARDIOGRAM TRACING: CPT | Performed by: NURSE PRACTITIONER

## 2019-01-23 PROCEDURE — B2151ZZ FLUOROSCOPY OF LEFT HEART USING LOW OSMOLAR CONTRAST: ICD-10-PCS | Performed by: INTERNAL MEDICINE

## 2019-01-23 PROCEDURE — 84100 ASSAY OF PHOSPHORUS: CPT

## 2019-01-23 PROCEDURE — 02HV33Z INSERTION OF INFUSION DEVICE INTO SUPERIOR VENA CAVA, PERCUTANEOUS APPROACH: ICD-10-PCS | Performed by: INTERNAL MEDICINE

## 2019-01-23 PROCEDURE — 99232 SBSQ HOSP IP/OBS MODERATE 35: CPT | Performed by: INTERNAL MEDICINE

## 2019-01-23 PROCEDURE — 36620 INSERTION CATHETER ARTERY: CPT

## 2019-01-23 PROCEDURE — C1894 INTRO/SHEATH, NON-LASER: HCPCS

## 2019-01-23 PROCEDURE — 87086 URINE CULTURE/COLONY COUNT: CPT

## 2019-01-23 PROCEDURE — 75630 X-RAY AORTA LEG ARTERIES: CPT | Performed by: INTERNAL MEDICINE

## 2019-01-23 PROCEDURE — 36556 INSERT NON-TUNNEL CV CATH: CPT | Performed by: INTERNAL MEDICINE

## 2019-01-23 PROCEDURE — 75625 CONTRAST EXAM ABDOMINL AORTA: CPT

## 2019-01-23 PROCEDURE — 93460 R&L HRT ART/VENTRICLE ANGIO: CPT | Performed by: INTERNAL MEDICINE

## 2019-01-23 PROCEDURE — 2580000003 HC RX 258

## 2019-01-23 PROCEDURE — 87500 VANOMYCIN DNA AMP PROBE: CPT

## 2019-01-23 PROCEDURE — 87641 MR-STAPH DNA AMP PROBE: CPT

## 2019-01-23 PROCEDURE — 36415 COLL VENOUS BLD VENIPUNCTURE: CPT

## 2019-01-23 PROCEDURE — B2111ZZ FLUOROSCOPY OF MULTIPLE CORONARY ARTERIES USING LOW OSMOLAR CONTRAST: ICD-10-PCS | Performed by: INTERNAL MEDICINE

## 2019-01-23 PROCEDURE — B3101ZZ FLUOROSCOPY OF THORACIC AORTA USING LOW OSMOLAR CONTRAST: ICD-10-PCS | Performed by: INTERNAL MEDICINE

## 2019-01-23 PROCEDURE — 85730 THROMBOPLASTIN TIME PARTIAL: CPT

## 2019-01-23 PROCEDURE — 4A023N8 MEASUREMENT OF CARDIAC SAMPLING AND PRESSURE, BILATERAL, PERCUTANEOUS APPROACH: ICD-10-PCS | Performed by: INTERNAL MEDICINE

## 2019-01-23 PROCEDURE — 85027 COMPLETE CBC AUTOMATED: CPT

## 2019-01-23 PROCEDURE — 0BH18EZ INSERTION OF ENDOTRACHEAL AIRWAY INTO TRACHEA, VIA NATURAL OR ARTIFICIAL OPENING ENDOSCOPIC: ICD-10-PCS | Performed by: INTERNAL MEDICINE

## 2019-01-23 PROCEDURE — C1751 CATH, INF, PER/CENT/MIDLINE: HCPCS

## 2019-01-23 PROCEDURE — 2000000000 HC ICU R&B

## 2019-01-23 PROCEDURE — B41D1ZZ FLUOROSCOPY OF AORTA AND BILATERAL LOWER EXTREMITY ARTERIES USING LOW OSMOLAR CONTRAST: ICD-10-PCS | Performed by: INTERNAL MEDICINE

## 2019-01-23 PROCEDURE — 36556 INSERT NON-TUNNEL CV CATH: CPT

## 2019-01-23 PROCEDURE — 82810 BLOOD GASES O2 SAT ONLY: CPT

## 2019-01-23 RX ORDER — LORAZEPAM 2 MG/ML
INJECTION INTRAMUSCULAR
Status: DISPENSED
Start: 2019-01-23 | End: 2019-01-24

## 2019-01-23 RX ORDER — SODIUM CHLORIDE 9 MG/ML
INJECTION, SOLUTION INTRAVENOUS CONTINUOUS
Status: DISCONTINUED | OUTPATIENT
Start: 2019-01-23 | End: 2019-01-23

## 2019-01-23 RX ORDER — SODIUM CHLORIDE 0.9 % (FLUSH) 0.9 %
10 SYRINGE (ML) INJECTION EVERY 12 HOURS SCHEDULED
Status: DISCONTINUED | OUTPATIENT
Start: 2019-01-23 | End: 2019-01-23 | Stop reason: SDUPTHER

## 2019-01-23 RX ORDER — WARFARIN SODIUM 5 MG/1
5 TABLET ORAL ONCE
Status: DISCONTINUED | OUTPATIENT
Start: 2019-01-23 | End: 2019-01-23

## 2019-01-23 RX ORDER — ATROPINE SULFATE 0.4 MG/ML
0.5 AMPUL (ML) INJECTION
Status: ACTIVE | OUTPATIENT
Start: 2019-01-23 | End: 2019-01-23

## 2019-01-23 RX ORDER — NITROGLYCERIN 0.4 MG/1
0.4 TABLET SUBLINGUAL EVERY 5 MIN PRN
Status: DISCONTINUED | OUTPATIENT
Start: 2019-01-23 | End: 2019-01-28

## 2019-01-23 RX ORDER — SODIUM CHLORIDE 9 MG/ML
INJECTION, SOLUTION INTRAVENOUS CONTINUOUS
Status: DISCONTINUED | OUTPATIENT
Start: 2019-01-23 | End: 2019-01-23 | Stop reason: SDUPTHER

## 2019-01-23 RX ORDER — KETAMINE HCL IN NACL, ISO-OSM 100MG/10ML
SYRINGE (ML) INJECTION
Status: DISPENSED
Start: 2019-01-23 | End: 2019-01-24

## 2019-01-23 RX ORDER — ALBUMIN (HUMAN) 12.5 G/50ML
25 SOLUTION INTRAVENOUS PRN
Status: DISCONTINUED | OUTPATIENT
Start: 2019-01-23 | End: 2019-01-28

## 2019-01-23 RX ORDER — HEPARIN SODIUM 10000 [USP'U]/100ML
INJECTION, SOLUTION INTRAVENOUS
Status: DISPENSED
Start: 2019-01-23 | End: 2019-01-24

## 2019-01-23 RX ORDER — ASPIRIN 325 MG
325 TABLET ORAL ONCE
Status: COMPLETED | OUTPATIENT
Start: 2019-01-23 | End: 2019-01-23

## 2019-01-23 RX ORDER — DIPHENHYDRAMINE HYDROCHLORIDE 50 MG/ML
50 INJECTION INTRAMUSCULAR; INTRAVENOUS ONCE
Status: DISCONTINUED | OUTPATIENT
Start: 2019-01-23 | End: 2019-01-28

## 2019-01-23 RX ORDER — DEXMEDETOMIDINE HYDROCHLORIDE 4 UG/ML
0.2 INJECTION, SOLUTION INTRAVENOUS CONTINUOUS
Status: DISCONTINUED | OUTPATIENT
Start: 2019-01-23 | End: 2019-01-23

## 2019-01-23 RX ORDER — PROPOFOL 10 MG/ML
INJECTION, EMULSION INTRAVENOUS
Status: DISPENSED
Start: 2019-01-23 | End: 2019-01-24

## 2019-01-23 RX ORDER — SODIUM CHLORIDE 0.9 % (FLUSH) 0.9 %
10 SYRINGE (ML) INJECTION PRN
Status: DISCONTINUED | OUTPATIENT
Start: 2019-01-23 | End: 2019-01-23 | Stop reason: SDUPTHER

## 2019-01-23 RX ORDER — CHLORHEXIDINE GLUCONATE 0.12 MG/ML
15 RINSE ORAL 2 TIMES DAILY
Status: DISCONTINUED | OUTPATIENT
Start: 2019-01-23 | End: 2019-01-24

## 2019-01-23 RX ORDER — ACETAMINOPHEN 325 MG/1
650 TABLET ORAL EVERY 4 HOURS PRN
Status: DISCONTINUED | OUTPATIENT
Start: 2019-01-23 | End: 2019-01-28

## 2019-01-23 RX ORDER — SODIUM CHLORIDE 0.9 % (FLUSH) 0.9 %
10 SYRINGE (ML) INJECTION EVERY 12 HOURS SCHEDULED
Status: DISCONTINUED | OUTPATIENT
Start: 2019-01-23 | End: 2019-01-28

## 2019-01-23 RX ORDER — SODIUM CHLORIDE 0.9 % (FLUSH) 0.9 %
10 SYRINGE (ML) INJECTION PRN
Status: DISCONTINUED | OUTPATIENT
Start: 2019-01-23 | End: 2019-01-28

## 2019-01-23 RX ORDER — ALBUMIN (HUMAN) 12.5 G/50ML
25 SOLUTION INTRAVENOUS ONCE
Status: DISCONTINUED | OUTPATIENT
Start: 2019-01-23 | End: 2019-01-28

## 2019-01-23 RX ADMIN — SODIUM CHLORIDE 0.2 MCG/KG/HR: 9 INJECTION, SOLUTION INTRAVENOUS at 12:45

## 2019-01-23 RX ADMIN — HEPARIN SODIUM AND DEXTROSE 10 UNITS/KG/HR: 10000; 5 INJECTION INTRAVENOUS at 14:16

## 2019-01-23 RX ADMIN — IPRATROPIUM BROMIDE AND ALBUTEROL SULFATE 1 AMPULE: .5; 3 SOLUTION RESPIRATORY (INHALATION) at 11:52

## 2019-01-23 RX ADMIN — INSULIN GLARGINE 15 UNITS: 100 INJECTION, SOLUTION SUBCUTANEOUS at 22:01

## 2019-01-23 RX ADMIN — Medication 15 ML: at 18:17

## 2019-01-23 RX ADMIN — INSULIN LISPRO 4 UNITS: 100 INJECTION, SOLUTION INTRAVENOUS; SUBCUTANEOUS at 18:16

## 2019-01-23 RX ADMIN — EPINEPHRINE 3 MCG/MIN: 1 INJECTION, SOLUTION INTRAMUSCULAR; INTRAVENOUS; SUBCUTANEOUS at 13:42

## 2019-01-23 RX ADMIN — IOPAMIDOL 140 ML: 755 INJECTION, SOLUTION INTRAVENOUS at 11:07

## 2019-01-23 RX ADMIN — Medication 50 MEQ: at 13:06

## 2019-01-23 RX ADMIN — INSULIN LISPRO 1 UNITS: 100 INJECTION, SOLUTION INTRAVENOUS; SUBCUTANEOUS at 22:01

## 2019-01-23 RX ADMIN — ACETYLCYSTEINE 1000 MG: 500 TABLET, EFFERVESCENT ORAL at 21:54

## 2019-01-23 RX ADMIN — ATORVASTATIN CALCIUM 40 MG: 40 TABLET, FILM COATED ORAL at 21:53

## 2019-01-23 RX ADMIN — ASPIRIN 325 MG: 325 TABLET ORAL at 09:56

## 2019-01-23 RX ADMIN — Medication 3300 ML/HR: at 20:56

## 2019-01-23 RX ADMIN — Medication 10 ML: at 21:54

## 2019-01-23 RX ADMIN — MIDAZOLAM 1 MG/HR: 5 INJECTION INTRAMUSCULAR; INTRAVENOUS at 15:56

## 2019-01-23 RX ADMIN — Medication 3300 ML/HR: at 16:27

## 2019-01-23 RX ADMIN — Medication 3300 ML/HR: at 16:29

## 2019-01-23 RX ADMIN — Medication 15 ML: at 21:54

## 2019-01-23 RX ADMIN — Medication 3300 ML/HR: at 16:28

## 2019-01-23 RX ADMIN — LATANOPROST 1 DROP: 50 SOLUTION/ DROPS OPHTHALMIC at 21:58

## 2019-01-23 RX ADMIN — Medication 3300 ML/HR: at 20:55

## 2019-01-23 RX ADMIN — EPINEPHRINE 1 MG: 0.1 INJECTION, SOLUTION ENDOTRACHEAL; INTRACARDIAC; INTRAVENOUS at 13:05

## 2019-01-23 RX ADMIN — Medication 3300 ML/HR: at 20:54

## 2019-01-23 ASSESSMENT — PAIN SCALES - GENERAL
PAINLEVEL_OUTOF10: 0

## 2019-01-23 ASSESSMENT — PULMONARY FUNCTION TESTS
PIF_VALUE: 29
PIF_VALUE: 16
PIF_VALUE: 25

## 2019-01-24 ENCOUNTER — APPOINTMENT (OUTPATIENT)
Dept: GENERAL RADIOLOGY | Age: 74
DRG: 216 | End: 2019-01-24
Attending: INTERNAL MEDICINE
Payer: MEDICARE

## 2019-01-24 ENCOUNTER — APPOINTMENT (OUTPATIENT)
Dept: CT IMAGING | Age: 74
DRG: 216 | End: 2019-01-24
Attending: INTERNAL MEDICINE
Payer: MEDICARE

## 2019-01-24 ENCOUNTER — APPOINTMENT (OUTPATIENT)
Dept: INTERVENTIONAL RADIOLOGY/VASCULAR | Age: 74
DRG: 216 | End: 2019-01-24
Attending: INTERNAL MEDICINE
Payer: MEDICARE

## 2019-01-24 LAB
ALLEN TEST: ABNORMAL
ALLEN TEST: NEGATIVE
AMORPHOUS: ABNORMAL
ANION GAP SERPL CALCULATED.3IONS-SCNC: 10 MEQ/L (ref 8–16)
ANION GAP SERPL CALCULATED.3IONS-SCNC: 11 MEQ/L (ref 8–16)
ANION GAP SERPL CALCULATED.3IONS-SCNC: 11 MEQ/L (ref 8–16)
ANION GAP SERPL CALCULATED.3IONS-SCNC: 6 MEQ/L (ref 8–16)
APTT: 131.8 SECONDS (ref 22–38)
APTT: 47.5 SECONDS (ref 22–38)
APTT: 49.4 SECONDS (ref 22–38)
APTT: 56.2 SECONDS (ref 22–38)
APTT: 94.7 SECONDS (ref 22–38)
APTT: 96.1 SECONDS (ref 22–38)
BACTERIA: ABNORMAL
BASE EXCESS (CALCULATED): 1.5 MMOL/L (ref -2.5–2.5)
BASE EXCESS (CALCULATED): 2 MMOL/L (ref -2.5–2.5)
BASE EXCESS (CALCULATED): 3 MMOL/L (ref -2.5–2.5)
BASE EXCESS (CALCULATED): 3.3 MMOL/L (ref -2.5–2.5)
BASE EXCESS (CALCULATED): 4.3 MMOL/L (ref -2.5–2.5)
BILIRUBIN URINE: ABNORMAL
BLOOD, URINE: ABNORMAL
BUN BLDV-MCNC: 15 MG/DL (ref 7–22)
BUN BLDV-MCNC: 16 MG/DL (ref 7–22)
BUN BLDV-MCNC: 17 MG/DL (ref 7–22)
BUN BLDV-MCNC: 20 MG/DL (ref 7–22)
BUN BLDV-MCNC: 22 MG/DL (ref 7–22)
BUN BLDV-MCNC: 27 MG/DL (ref 7–22)
CALCIUM IONIZED: 1.07 MMOL/L (ref 1.12–1.32)
CALCIUM IONIZED: 1.09 MMOL/L (ref 1.12–1.32)
CALCIUM IONIZED: 1.1 MMOL/L (ref 1.12–1.32)
CALCIUM IONIZED: 1.1 MMOL/L (ref 1.12–1.32)
CALCIUM IONIZED: 1.11 MMOL/L (ref 1.12–1.32)
CALCIUM IONIZED: 1.11 MMOL/L (ref 1.12–1.32)
CALCIUM SERPL-MCNC: 7.6 MG/DL (ref 8.5–10.5)
CALCIUM SERPL-MCNC: 7.6 MG/DL (ref 8.5–10.5)
CALCIUM SERPL-MCNC: 7.7 MG/DL (ref 8.5–10.5)
CALCIUM SERPL-MCNC: 7.8 MG/DL (ref 8.5–10.5)
CALCIUM SERPL-MCNC: 8.1 MG/DL (ref 8.5–10.5)
CALCIUM SERPL-MCNC: 8.1 MG/DL (ref 8.5–10.5)
CASTS: ABNORMAL /LPF
CHARACTER, URINE: ABNORMAL
CHLORIDE BLD-SCNC: 101 MEQ/L (ref 98–111)
CHLORIDE BLD-SCNC: 103 MEQ/L (ref 98–111)
CHLORIDE BLD-SCNC: 105 MEQ/L (ref 98–111)
CHLORIDE BLD-SCNC: 99 MEQ/L (ref 98–111)
CO2: 23 MEQ/L (ref 23–33)
CO2: 24 MEQ/L (ref 23–33)
CO2: 24 MEQ/L (ref 23–33)
CO2: 25 MEQ/L (ref 23–33)
CO2: 26 MEQ/L (ref 23–33)
CO2: 26 MEQ/L (ref 23–33)
COLLECTED BY:: ABNORMAL
COLLECTED BY:: NORMAL
COLOR: ABNORMAL
CREAT SERPL-MCNC: 1.4 MG/DL (ref 0.4–1.2)
CREAT SERPL-MCNC: 1.4 MG/DL (ref 0.4–1.2)
CREAT SERPL-MCNC: 1.5 MG/DL (ref 0.4–1.2)
CREAT SERPL-MCNC: 1.6 MG/DL (ref 0.4–1.2)
CREAT SERPL-MCNC: 1.6 MG/DL (ref 0.4–1.2)
CREAT SERPL-MCNC: 1.7 MG/DL (ref 0.4–1.2)
CRYSTALS: ABNORMAL
DEVICE: ABNORMAL
EPITHELIAL CELLS, UA: ABNORMAL /HPF
ERYTHROCYTE [DISTWIDTH] IN BLOOD BY AUTOMATED COUNT: 17.2 % (ref 11.5–14.5)
ERYTHROCYTE [DISTWIDTH] IN BLOOD BY AUTOMATED COUNT: 17.4 % (ref 11.5–14.5)
ERYTHROCYTE [DISTWIDTH] IN BLOOD BY AUTOMATED COUNT: 17.5 % (ref 11.5–14.5)
ERYTHROCYTE [DISTWIDTH] IN BLOOD BY AUTOMATED COUNT: 17.6 % (ref 11.5–14.5)
ERYTHROCYTE [DISTWIDTH] IN BLOOD BY AUTOMATED COUNT: 17.7 % (ref 11.5–14.5)
ERYTHROCYTE [DISTWIDTH] IN BLOOD BY AUTOMATED COUNT: 18 % (ref 11.5–14.5)
ERYTHROCYTE [DISTWIDTH] IN BLOOD BY AUTOMATED COUNT: 53 FL (ref 35–45)
ERYTHROCYTE [DISTWIDTH] IN BLOOD BY AUTOMATED COUNT: 53.7 FL (ref 35–45)
ERYTHROCYTE [DISTWIDTH] IN BLOOD BY AUTOMATED COUNT: 55.1 FL (ref 35–45)
ERYTHROCYTE [DISTWIDTH] IN BLOOD BY AUTOMATED COUNT: 56 FL (ref 35–45)
ERYTHROCYTE [DISTWIDTH] IN BLOOD BY AUTOMATED COUNT: 56.4 FL (ref 35–45)
ERYTHROCYTE [DISTWIDTH] IN BLOOD BY AUTOMATED COUNT: 58.3 FL (ref 35–45)
GFR SERPL CREATININE-BSD FRML MDRD: 40 ML/MIN/1.73M2
GFR SERPL CREATININE-BSD FRML MDRD: 43 ML/MIN/1.73M2
GFR SERPL CREATININE-BSD FRML MDRD: 43 ML/MIN/1.73M2
GFR SERPL CREATININE-BSD FRML MDRD: 46 ML/MIN/1.73M2
GFR SERPL CREATININE-BSD FRML MDRD: 50 ML/MIN/1.73M2
GFR SERPL CREATININE-BSD FRML MDRD: 50 ML/MIN/1.73M2
GLUCOSE BLD-MCNC: 128 MG/DL (ref 70–108)
GLUCOSE BLD-MCNC: 134 MG/DL (ref 70–108)
GLUCOSE BLD-MCNC: 141 MG/DL (ref 70–108)
GLUCOSE BLD-MCNC: 146 MG/DL (ref 70–108)
GLUCOSE BLD-MCNC: 149 MG/DL (ref 70–108)
GLUCOSE BLD-MCNC: 154 MG/DL (ref 70–108)
GLUCOSE BLD-MCNC: 157 MG/DL (ref 70–108)
GLUCOSE BLD-MCNC: 177 MG/DL (ref 70–108)
GLUCOSE, URINE: NEGATIVE MG/DL
GLUCOSE, WHOLE BLOOD: 138 MG/DL (ref 70–108)
GLUCOSE, WHOLE BLOOD: 149 MG/DL (ref 70–108)
HCO3: 25 MMOL/L (ref 23–28)
HCO3: 26 MMOL/L (ref 23–28)
HCO3: 26 MMOL/L (ref 23–28)
HCO3: 27 MMOL/L (ref 23–28)
HCO3: 29 MMOL/L (ref 23–28)
HCT VFR BLD CALC: 24.9 % (ref 42–52)
HCT VFR BLD CALC: 25.3 % (ref 42–52)
HCT VFR BLD CALC: 26.6 % (ref 42–52)
HCT VFR BLD CALC: 27.2 % (ref 42–52)
HCT VFR BLD CALC: 28.8 % (ref 42–52)
HCT VFR BLD CALC: 33.4 % (ref 42–52)
HEMOGLOBIN: 10 GM/DL (ref 14–18)
HEMOGLOBIN: 7.7 GM/DL (ref 14–18)
HEMOGLOBIN: 8 GM/DL (ref 14–18)
HEMOGLOBIN: 8.4 GM/DL (ref 14–18)
HEMOGLOBIN: 8.4 GM/DL (ref 14–18)
HEMOGLOBIN: 8.8 GM/DL (ref 14–18)
ICTOTEST: NORMAL
IFIO2: 25
IFIO2: 25
IFIO2: 3
IFIO2: 30
IMMUNOFIXATION RESULT, SERUM: NORMAL
INR BLD: 1.25 (ref 0.85–1.13)
INR BLD: 1.29 (ref 0.85–1.13)
INR BLD: 1.29 (ref 0.85–1.13)
INR BLD: 1.3 (ref 0.85–1.13)
INR BLD: 1.34 (ref 0.85–1.13)
INR BLD: 1.34 (ref 0.85–1.13)
KETONES, URINE: ABNORMAL
LEUKOCYTE EST, POC: ABNORMAL
MAGNESIUM: 2 MG/DL (ref 1.6–2.4)
MAGNESIUM: 2.1 MG/DL (ref 1.6–2.4)
MAGNESIUM: 2.1 MG/DL (ref 1.6–2.4)
MAGNESIUM: 2.2 MG/DL (ref 1.6–2.4)
MAGNESIUM: 2.2 MG/DL (ref 1.6–2.4)
MAGNESIUM: 2.3 MG/DL (ref 1.6–2.4)
MCH RBC QN AUTO: 26.7 PG (ref 26–33)
MCH RBC QN AUTO: 26.7 PG (ref 26–33)
MCH RBC QN AUTO: 26.9 PG (ref 26–33)
MCH RBC QN AUTO: 27 PG (ref 26–33)
MCH RBC QN AUTO: 27.1 PG (ref 26–33)
MCH RBC QN AUTO: 27.2 PG (ref 26–33)
MCHC RBC AUTO-ENTMCNC: 29.9 GM/DL (ref 32.2–35.5)
MCHC RBC AUTO-ENTMCNC: 30.6 GM/DL (ref 32.2–35.5)
MCHC RBC AUTO-ENTMCNC: 30.9 GM/DL (ref 32.2–35.5)
MCHC RBC AUTO-ENTMCNC: 30.9 GM/DL (ref 32.2–35.5)
MCHC RBC AUTO-ENTMCNC: 31.6 GM/DL (ref 32.2–35.5)
MCHC RBC AUTO-ENTMCNC: 31.6 GM/DL (ref 32.2–35.5)
MCV RBC AUTO: 85.5 FL (ref 80–94)
MCV RBC AUTO: 86.1 FL (ref 80–94)
MCV RBC AUTO: 86.3 FL (ref 80–94)
MCV RBC AUTO: 87.1 FL (ref 80–94)
MCV RBC AUTO: 87.5 FL (ref 80–94)
MCV RBC AUTO: 90.5 FL (ref 80–94)
MODE: ABNORMAL
MUCUS: ABNORMAL
NITRITE, URINE: NEGATIVE
O2 SATURATION: 92 %
O2 SATURATION: 97 %
O2 SATURATION: 97 %
O2 SATURATION: 98 %
O2 SATURATION: 98 %
PCO2 (TEMP CORRECTED): 25 (ref 35–45)
PCO2: 27 MMHG (ref 35–45)
PCO2: 32 MMHG (ref 35–45)
PCO2: 38 MMHG (ref 35–45)
PCO2: 40 MMHG (ref 35–45)
PCO2: 45 MMHG (ref 35–45)
PH (TEMPERATURE CORRECTED): 7.6 (ref 7.35–7.45)
PH BLOOD GAS: 7.39 (ref 7.35–7.45)
PH BLOOD GAS: 7.45 (ref 7.35–7.45)
PH BLOOD GAS: 7.46 (ref 7.35–7.45)
PH BLOOD GAS: 7.52 (ref 7.35–7.45)
PH BLOOD GAS: 7.58 (ref 7.35–7.45)
PH UA: 6
PHOSPHORUS: 2.2 MG/DL (ref 2.4–4.7)
PHOSPHORUS: 2.3 MG/DL (ref 2.4–4.7)
PHOSPHORUS: 2.5 MG/DL (ref 2.4–4.7)
PHOSPHORUS: 2.6 MG/DL (ref 2.4–4.7)
PHOSPHORUS: 2.8 MG/DL (ref 2.4–4.7)
PHOSPHORUS: 2.8 MG/DL (ref 2.4–4.7)
PIP: 18 CMH2O
PIP: 18 CMH2O
PLATELET # BLD: 100 THOU/MM3 (ref 130–400)
PLATELET # BLD: 147 THOU/MM3 (ref 130–400)
PLATELET # BLD: 151 THOU/MM3 (ref 130–400)
PLATELET # BLD: 154 THOU/MM3 (ref 130–400)
PLATELET # BLD: 156 THOU/MM3 (ref 130–400)
PLATELET # BLD: 163 THOU/MM3 (ref 130–400)
PMV BLD AUTO: 10.4 FL (ref 9.4–12.4)
PMV BLD AUTO: 10.5 FL (ref 9.4–12.4)
PMV BLD AUTO: 10.5 FL (ref 9.4–12.4)
PMV BLD AUTO: 11.2 FL (ref 9.4–12.4)
PMV BLD AUTO: 11.8 FL (ref 9.4–12.4)
PMV BLD AUTO: 13 FL (ref 9.4–12.4)
PO2 (TEMP CORRECTED): 68 (ref 71–104)
PO2: 103 MMHG (ref 71–104)
PO2: 60 MMHG (ref 71–104)
PO2: 74 MMHG (ref 71–104)
PO2: 88 MMHG (ref 71–104)
PO2: 92 MMHG (ref 71–104)
POTASSIUM SERPL-SCNC: 4.2 MEQ/L (ref 3.5–5.2)
POTASSIUM SERPL-SCNC: 4.3 MEQ/L (ref 3.5–5.2)
POTASSIUM SERPL-SCNC: 4.4 MEQ/L (ref 3.5–5.2)
POTASSIUM SERPL-SCNC: 4.6 MEQ/L (ref 3.5–5.2)
PROTEIN UA: 100 MG/DL
RBC # BLD: 2.86 MILL/MM3 (ref 4.7–6.1)
RBC # BLD: 2.96 MILL/MM3 (ref 4.7–6.1)
RBC # BLD: 3.09 MILL/MM3 (ref 4.7–6.1)
RBC # BLD: 3.15 MILL/MM3 (ref 4.7–6.1)
RBC # BLD: 3.29 MILL/MM3 (ref 4.7–6.1)
RBC # BLD: 3.69 MILL/MM3 (ref 4.7–6.1)
RBC URINE: > 200 /HPF
SET PEEP: 6 MMHG
SET PRESS SUPP: 12 CMH2O
SET RESPIRATORY RATE: 12 BPM
SET RESPIRATORY RATE: 12 BPM
SODIUM BLD-SCNC: 135 MEQ/L (ref 135–145)
SODIUM BLD-SCNC: 135 MEQ/L (ref 135–145)
SODIUM BLD-SCNC: 136 MEQ/L (ref 135–145)
SODIUM BLD-SCNC: 137 MEQ/L (ref 135–145)
SODIUM BLD-SCNC: 137 MEQ/L (ref 135–145)
SODIUM BLD-SCNC: 140 MEQ/L (ref 135–145)
SOURCE, BLOOD GAS: ABNORMAL
SPECIFIC GRAVITY UA: 1.01 (ref 1–1.03)
TEMPERATURE: 35.7
UROBILINOGEN, URINE: 0.2 EU/DL
WBC # BLD: 4.6 THOU/MM3 (ref 4.8–10.8)
WBC # BLD: 5.2 THOU/MM3 (ref 4.8–10.8)
WBC # BLD: 5.3 THOU/MM3 (ref 4.8–10.8)
WBC # BLD: 5.7 THOU/MM3 (ref 4.8–10.8)
WBC # BLD: 6.5 THOU/MM3 (ref 4.8–10.8)
WBC # BLD: 8.8 THOU/MM3 (ref 4.8–10.8)
WBC UA: ABNORMAL /HPF

## 2019-01-24 PROCEDURE — 37799 UNLISTED PX VASCULAR SURGERY: CPT

## 2019-01-24 PROCEDURE — 82330 ASSAY OF CALCIUM: CPT

## 2019-01-24 PROCEDURE — 82803 BLOOD GASES ANY COMBINATION: CPT

## 2019-01-24 PROCEDURE — P9046 ALBUMIN (HUMAN), 25%, 20 ML: HCPCS | Performed by: INTERNAL MEDICINE

## 2019-01-24 PROCEDURE — 6370000000 HC RX 637 (ALT 250 FOR IP): Performed by: INTERNAL MEDICINE

## 2019-01-24 PROCEDURE — 6360000002 HC RX W HCPCS: Performed by: INTERNAL MEDICINE

## 2019-01-24 PROCEDURE — 93880 EXTRACRANIAL BILAT STUDY: CPT

## 2019-01-24 PROCEDURE — 2500000003 HC RX 250 WO HCPCS: Performed by: INTERNAL MEDICINE

## 2019-01-24 PROCEDURE — 99291 CRITICAL CARE FIRST HOUR: CPT | Performed by: INTERNAL MEDICINE

## 2019-01-24 PROCEDURE — 94660 CPAP INITIATION&MGMT: CPT

## 2019-01-24 PROCEDURE — 2580000003 HC RX 258: Performed by: INTERNAL MEDICINE

## 2019-01-24 PROCEDURE — 6370000000 HC RX 637 (ALT 250 FOR IP): Performed by: PHYSICIAN ASSISTANT

## 2019-01-24 PROCEDURE — 71045 X-RAY EXAM CHEST 1 VIEW: CPT

## 2019-01-24 PROCEDURE — 94761 N-INVAS EAR/PLS OXIMETRY MLT: CPT

## 2019-01-24 PROCEDURE — 81001 URINALYSIS AUTO W/SCOPE: CPT

## 2019-01-24 PROCEDURE — 74176 CT ABD & PELVIS W/O CONTRAST: CPT

## 2019-01-24 PROCEDURE — 82947 ASSAY GLUCOSE BLOOD QUANT: CPT

## 2019-01-24 PROCEDURE — 2709999900 HC NON-CHARGEABLE SUPPLY

## 2019-01-24 PROCEDURE — 84100 ASSAY OF PHOSPHORUS: CPT

## 2019-01-24 PROCEDURE — 99232 SBSQ HOSP IP/OBS MODERATE 35: CPT | Performed by: PHYSICIAN ASSISTANT

## 2019-01-24 PROCEDURE — 6360000002 HC RX W HCPCS

## 2019-01-24 PROCEDURE — 85027 COMPLETE CBC AUTOMATED: CPT

## 2019-01-24 PROCEDURE — 85610 PROTHROMBIN TIME: CPT

## 2019-01-24 PROCEDURE — 94003 VENT MGMT INPAT SUBQ DAY: CPT

## 2019-01-24 PROCEDURE — 36415 COLL VENOUS BLD VENIPUNCTURE: CPT

## 2019-01-24 PROCEDURE — 71250 CT THORAX DX C-: CPT

## 2019-01-24 PROCEDURE — 94760 N-INVAS EAR/PLS OXIMETRY 1: CPT

## 2019-01-24 PROCEDURE — 83735 ASSAY OF MAGNESIUM: CPT

## 2019-01-24 PROCEDURE — 82948 REAGENT STRIP/BLOOD GLUCOSE: CPT

## 2019-01-24 PROCEDURE — 93971 EXTREMITY STUDY: CPT

## 2019-01-24 PROCEDURE — 99232 SBSQ HOSP IP/OBS MODERATE 35: CPT | Performed by: INTERNAL MEDICINE

## 2019-01-24 PROCEDURE — 80048 BASIC METABOLIC PNL TOTAL CA: CPT

## 2019-01-24 PROCEDURE — 2000000000 HC ICU R&B

## 2019-01-24 PROCEDURE — 85730 THROMBOPLASTIN TIME PARTIAL: CPT

## 2019-01-24 PROCEDURE — 2700000000 HC OXYGEN THERAPY PER DAY

## 2019-01-24 RX ORDER — SODIUM CHLORIDE 450 MG/100ML
INJECTION, SOLUTION INTRAVENOUS CONTINUOUS
Status: DISCONTINUED | OUTPATIENT
Start: 2019-01-24 | End: 2019-01-24

## 2019-01-24 RX ORDER — SODIUM CHLORIDE 0.9 % (FLUSH) 0.9 %
10 SYRINGE (ML) INJECTION PRN
Status: DISCONTINUED | OUTPATIENT
Start: 2019-01-24 | End: 2019-01-24 | Stop reason: SDUPTHER

## 2019-01-24 RX ORDER — METOPROLOL TARTRATE 5 MG/5ML
5 INJECTION INTRAVENOUS EVERY 6 HOURS PRN
Status: DISCONTINUED | OUTPATIENT
Start: 2019-01-24 | End: 2019-01-28

## 2019-01-24 RX ORDER — METOPROLOL SUCCINATE 25 MG/1
12.5 TABLET, EXTENDED RELEASE ORAL DAILY
Status: DISCONTINUED | OUTPATIENT
Start: 2019-01-24 | End: 2019-01-26

## 2019-01-24 RX ORDER — SODIUM CHLORIDE 0.9 % (FLUSH) 0.9 %
10 SYRINGE (ML) INJECTION EVERY 12 HOURS SCHEDULED
Status: DISCONTINUED | OUTPATIENT
Start: 2019-01-24 | End: 2019-01-24 | Stop reason: SDUPTHER

## 2019-01-24 RX ADMIN — Medication 3300 ML/HR: at 10:20

## 2019-01-24 RX ADMIN — SODIUM CHLORIDE 0.7 MCG/KG/HR: 9 INJECTION, SOLUTION INTRAVENOUS at 01:14

## 2019-01-24 RX ADMIN — Medication 15 ML: at 07:33

## 2019-01-24 RX ADMIN — INSULIN LISPRO 2 UNITS: 100 INJECTION, SOLUTION INTRAVENOUS; SUBCUTANEOUS at 17:05

## 2019-01-24 RX ADMIN — Medication 3300 ML/HR: at 02:02

## 2019-01-24 RX ADMIN — Medication 10 ML: at 08:13

## 2019-01-24 RX ADMIN — Medication 3300 ML/HR: at 05:42

## 2019-01-24 RX ADMIN — IRON SUCROSE 200 MG: 20 INJECTION, SOLUTION INTRAVENOUS at 08:13

## 2019-01-24 RX ADMIN — PANTOPRAZOLE SODIUM 40 MG: 40 TABLET, DELAYED RELEASE ORAL at 07:34

## 2019-01-24 RX ADMIN — Medication 3300 ML/HR: at 05:44

## 2019-01-24 RX ADMIN — Medication 3300 ML/HR: at 10:21

## 2019-01-24 RX ADMIN — INSULIN GLARGINE 15 UNITS: 100 INJECTION, SOLUTION SUBCUTANEOUS at 21:07

## 2019-01-24 RX ADMIN — Medication 3300 ML/HR: at 14:43

## 2019-01-24 RX ADMIN — Medication 3300 ML/HR: at 05:43

## 2019-01-24 RX ADMIN — Medication 3300 ML/HR: at 20:23

## 2019-01-24 RX ADMIN — HEPARIN SODIUM AND DEXTROSE 7 UNITS/KG/HR: 10000; 5 INJECTION INTRAVENOUS at 17:40

## 2019-01-24 RX ADMIN — Medication 3300 ML/HR: at 20:25

## 2019-01-24 RX ADMIN — SODIUM PHOSPHATE, MONOBASIC, MONOHYDRATE 14 MMOL: 276; 142 INJECTION, SOLUTION INTRAVENOUS at 06:50

## 2019-01-24 RX ADMIN — SODIUM CHLORIDE 0.8 MCG/KG/HR: 9 INJECTION, SOLUTION INTRAVENOUS at 07:36

## 2019-01-24 RX ADMIN — Medication 10 ML: at 07:31

## 2019-01-24 RX ADMIN — TAMSULOSIN HYDROCHLORIDE 0.4 MG: 0.4 CAPSULE ORAL at 07:30

## 2019-01-24 RX ADMIN — HEPARIN SODIUM 3670 UNITS: 1000 INJECTION INTRAVENOUS; SUBCUTANEOUS at 22:14

## 2019-01-24 RX ADMIN — Medication 3300 ML/HR: at 10:19

## 2019-01-24 RX ADMIN — Medication 3300 ML/HR: at 02:03

## 2019-01-24 RX ADMIN — Medication 3300 ML/HR: at 20:24

## 2019-01-24 RX ADMIN — Medication 1 MG: at 19:57

## 2019-01-24 RX ADMIN — INSULIN LISPRO 2 UNITS: 100 INJECTION, SOLUTION INTRAVENOUS; SUBCUTANEOUS at 08:13

## 2019-01-24 RX ADMIN — TIMOLOL MALEATE 1 DROP: 5 SOLUTION/ DROPS OPHTHALMIC at 07:30

## 2019-01-24 RX ADMIN — METOPROLOL TARTRATE 5 MG: 5 INJECTION, SOLUTION INTRAVENOUS at 18:19

## 2019-01-24 RX ADMIN — ALBUMIN (HUMAN) 25 G: 0.25 INJECTION, SOLUTION INTRAVENOUS at 09:14

## 2019-01-24 RX ADMIN — ACETYLCYSTEINE 1000 MG: 500 TABLET, EFFERVESCENT ORAL at 07:30

## 2019-01-24 RX ADMIN — HYDROMORPHONE HYDROCHLORIDE 1 MG: 1 INJECTION, SOLUTION INTRAMUSCULAR; INTRAVENOUS; SUBCUTANEOUS at 19:57

## 2019-01-24 RX ADMIN — ASPIRIN 81 MG 81 MG: 81 TABLET ORAL at 07:34

## 2019-01-24 RX ADMIN — METOPROLOL SUCCINATE 12.5 MG: 25 TABLET, FILM COATED, EXTENDED RELEASE ORAL at 17:37

## 2019-01-24 RX ADMIN — Medication 3300 ML/HR: at 02:04

## 2019-01-24 RX ADMIN — Medication 3300 ML/HR: at 14:44

## 2019-01-24 ASSESSMENT — PAIN DESCRIPTION - LOCATION: LOCATION: OTHER (COMMENT)

## 2019-01-24 ASSESSMENT — PAIN SCALES - GENERAL
PAINLEVEL_OUTOF10: 0
PAINLEVEL_OUTOF10: 0
PAINLEVEL_OUTOF10: 6
PAINLEVEL_OUTOF10: 0

## 2019-01-24 ASSESSMENT — PAIN DESCRIPTION - PAIN TYPE: TYPE: ACUTE PAIN

## 2019-01-24 ASSESSMENT — PULMONARY FUNCTION TESTS
PIF_VALUE: 20
PIF_VALUE: 19
PIF_VALUE: 19
PIF_VALUE: 21

## 2019-01-25 LAB
ALLEN TEST: ABNORMAL
ALLEN TEST: NORMAL
ANION GAP SERPL CALCULATED.3IONS-SCNC: 10 MEQ/L (ref 8–16)
ANION GAP SERPL CALCULATED.3IONS-SCNC: 10 MEQ/L (ref 8–16)
ANION GAP SERPL CALCULATED.3IONS-SCNC: 6 MEQ/L (ref 8–16)
ANION GAP SERPL CALCULATED.3IONS-SCNC: 8 MEQ/L (ref 8–16)
APTT: 101.3 SECONDS (ref 22–38)
APTT: 56 SECONDS (ref 22–38)
APTT: 60.4 SECONDS (ref 22–38)
APTT: > 200 SECONDS (ref 22–38)
BASE EXCESS (CALCULATED): 2.1 MMOL/L (ref -2.5–2.5)
BASE EXCESS (CALCULATED): 2.3 MMOL/L (ref -2.5–2.5)
BASE EXCESS (CALCULATED): 2.7 MMOL/L (ref -2.5–2.5)
BASE EXCESS (CALCULATED): 3 MMOL/L (ref -2.5–2.5)
BASE EXCESS (CALCULATED): 4.5 MMOL/L (ref -2.5–2.5)
BUN BLDV-MCNC: 11 MG/DL (ref 7–22)
BUN BLDV-MCNC: 12 MG/DL (ref 7–22)
BUN BLDV-MCNC: 13 MG/DL (ref 7–22)
BUN BLDV-MCNC: 15 MG/DL (ref 7–22)
CALCIUM IONIZED: 1.1 MMOL/L (ref 1.12–1.32)
CALCIUM IONIZED: 1.11 MMOL/L (ref 1.12–1.32)
CALCIUM IONIZED: 1.11 MMOL/L (ref 1.12–1.32)
CALCIUM IONIZED: 1.12 MMOL/L (ref 1.12–1.32)
CALCIUM SERPL-MCNC: 7.7 MG/DL (ref 8.5–10.5)
CALCIUM SERPL-MCNC: 7.8 MG/DL (ref 8.5–10.5)
CALCIUM SERPL-MCNC: 7.9 MG/DL (ref 8.5–10.5)
CALCIUM SERPL-MCNC: 8 MG/DL (ref 8.5–10.5)
CHLORIDE BLD-SCNC: 102 MEQ/L (ref 98–111)
CHLORIDE BLD-SCNC: 103 MEQ/L (ref 98–111)
CHLORIDE BLD-SCNC: 104 MEQ/L (ref 98–111)
CHLORIDE BLD-SCNC: 105 MEQ/L (ref 98–111)
CO2: 24 MEQ/L (ref 23–33)
CO2: 24 MEQ/L (ref 23–33)
CO2: 25 MEQ/L (ref 23–33)
CO2: 26 MEQ/L (ref 23–33)
COLLECTED BY:: ABNORMAL
COLLECTED BY:: NORMAL
CREAT SERPL-MCNC: 1.4 MG/DL (ref 0.4–1.2)
CREAT SERPL-MCNC: 1.5 MG/DL (ref 0.4–1.2)
CREAT SERPL-MCNC: 1.5 MG/DL (ref 0.4–1.2)
CREAT SERPL-MCNC: 1.6 MG/DL (ref 0.4–1.2)
DEVICE: ABNORMAL
DEVICE: NORMAL
ERYTHROCYTE [DISTWIDTH] IN BLOOD BY AUTOMATED COUNT: 17.7 % (ref 11.5–14.5)
ERYTHROCYTE [DISTWIDTH] IN BLOOD BY AUTOMATED COUNT: 17.8 % (ref 11.5–14.5)
ERYTHROCYTE [DISTWIDTH] IN BLOOD BY AUTOMATED COUNT: 17.9 % (ref 11.5–14.5)
ERYTHROCYTE [DISTWIDTH] IN BLOOD BY AUTOMATED COUNT: 17.9 % (ref 11.5–14.5)
ERYTHROCYTE [DISTWIDTH] IN BLOOD BY AUTOMATED COUNT: 57.7 FL (ref 35–45)
ERYTHROCYTE [DISTWIDTH] IN BLOOD BY AUTOMATED COUNT: 58.3 FL (ref 35–45)
GFR SERPL CREATININE-BSD FRML MDRD: 43 ML/MIN/1.73M2
GFR SERPL CREATININE-BSD FRML MDRD: 46 ML/MIN/1.73M2
GFR SERPL CREATININE-BSD FRML MDRD: 46 ML/MIN/1.73M2
GFR SERPL CREATININE-BSD FRML MDRD: 50 ML/MIN/1.73M2
GLUCOSE BLD-MCNC: 101 MG/DL (ref 70–108)
GLUCOSE BLD-MCNC: 108 MG/DL (ref 70–108)
GLUCOSE BLD-MCNC: 119 MG/DL (ref 70–108)
GLUCOSE BLD-MCNC: 124 MG/DL (ref 70–108)
GLUCOSE BLD-MCNC: 136 MG/DL (ref 70–108)
GLUCOSE BLD-MCNC: 139 MG/DL (ref 70–108)
GLUCOSE BLD-MCNC: 140 MG/DL (ref 70–108)
GLUCOSE BLD-MCNC: 150 MG/DL (ref 70–108)
HCO3: 26 MMOL/L (ref 23–28)
HCO3: 27 MMOL/L (ref 23–28)
HCO3: 28 MMOL/L (ref 23–28)
HCT VFR BLD CALC: 26.5 % (ref 42–52)
HCT VFR BLD CALC: 27.6 % (ref 42–52)
HCT VFR BLD CALC: 28.3 % (ref 42–52)
HCT VFR BLD CALC: 28.5 % (ref 42–52)
HEMOGLOBIN: 8 GM/DL (ref 14–18)
HEMOGLOBIN: 8.2 GM/DL (ref 14–18)
HEMOGLOBIN: 8.5 GM/DL (ref 14–18)
HEMOGLOBIN: 8.8 GM/DL (ref 14–18)
IFIO2: 30
IFIO2: 30
IFIO2: 35
IFIO2: 40
IFIO2: 40
IGA: 124 MG/DL (ref 70–400)
IGG: 1401 MG/DL (ref 700–1600)
IGM: 450 MG/DL (ref 40–230)
INR BLD: 1.5 (ref 0.85–1.13)
INR BLD: 1.58 (ref 0.85–1.13)
MAGNESIUM: 2.3 MG/DL (ref 1.6–2.4)
MAGNESIUM: 2.4 MG/DL (ref 1.6–2.4)
MCH RBC QN AUTO: 26.5 PG (ref 26–33)
MCH RBC QN AUTO: 26.9 PG (ref 26–33)
MCH RBC QN AUTO: 26.9 PG (ref 26–33)
MCH RBC QN AUTO: 27.7 PG (ref 26–33)
MCHC RBC AUTO-ENTMCNC: 29.7 GM/DL (ref 32.2–35.5)
MCHC RBC AUTO-ENTMCNC: 30 GM/DL (ref 32.2–35.5)
MCHC RBC AUTO-ENTMCNC: 30.2 GM/DL (ref 32.2–35.5)
MCHC RBC AUTO-ENTMCNC: 30.9 GM/DL (ref 32.2–35.5)
MCV RBC AUTO: 89.2 FL (ref 80–94)
MCV RBC AUTO: 89.3 FL (ref 80–94)
MCV RBC AUTO: 89.6 FL (ref 80–94)
MCV RBC AUTO: 89.6 FL (ref 80–94)
MODE: ABNORMAL
O2 SATURATION: 95 %
O2 SATURATION: 97 %
O2 SATURATION: 97 %
O2 SATURATION: 98 %
O2 SATURATION: 98 %
ORGANISM: ABNORMAL
PCO2: 34 MMHG (ref 35–45)
PCO2: 35 MMHG (ref 35–45)
PCO2: 36 MMHG (ref 35–45)
PCO2: 36 MMHG (ref 35–45)
PCO2: 40 MMHG (ref 35–45)
PH BLOOD GAS: 7.43 (ref 7.35–7.45)
PH BLOOD GAS: 7.47 (ref 7.35–7.45)
PH BLOOD GAS: 7.48 (ref 7.35–7.45)
PH BLOOD GAS: 7.5 (ref 7.35–7.45)
PH BLOOD GAS: 7.5 (ref 7.35–7.45)
PHOSPHORUS: 1.9 MG/DL (ref 2.4–4.7)
PHOSPHORUS: 2.1 MG/DL (ref 2.4–4.7)
PHOSPHORUS: 2.1 MG/DL (ref 2.4–4.7)
PHOSPHORUS: 2.8 MG/DL (ref 2.4–4.7)
PIP: 18 CMH2O
PIP: 18 CMH2O
PLATELET # BLD: 100 THOU/MM3 (ref 130–400)
PLATELET # BLD: 130 THOU/MM3 (ref 130–400)
PLATELET # BLD: 163 THOU/MM3 (ref 130–400)
PLATELET # BLD: 181 THOU/MM3 (ref 130–400)
PMV BLD AUTO: 11.6 FL (ref 9.4–12.4)
PMV BLD AUTO: 11.6 FL (ref 9.4–12.4)
PMV BLD AUTO: 12.2 FL (ref 9.4–12.4)
PMV BLD AUTO: 12.4 FL (ref 9.4–12.4)
PO2: 72 MMHG (ref 71–104)
PO2: 86 MMHG (ref 71–104)
PO2: 90 MMHG (ref 71–104)
PO2: 93 MMHG (ref 71–104)
PO2: 97 MMHG (ref 71–104)
POTASSIUM SERPL-SCNC: 4.3 MEQ/L (ref 3.5–5.2)
POTASSIUM SERPL-SCNC: 4.3 MEQ/L (ref 3.5–5.2)
POTASSIUM SERPL-SCNC: 4.4 MEQ/L (ref 3.5–5.2)
POTASSIUM SERPL-SCNC: 4.7 MEQ/L (ref 3.5–5.2)
RBC # BLD: 2.97 MILL/MM3 (ref 4.7–6.1)
RBC # BLD: 3.09 MILL/MM3 (ref 4.7–6.1)
RBC # BLD: 3.16 MILL/MM3 (ref 4.7–6.1)
RBC # BLD: 3.18 MILL/MM3 (ref 4.7–6.1)
SET PEEP: 6 MMHG
SET PRESS SUPP: 12 CMH2O
SODIUM BLD-SCNC: 135 MEQ/L (ref 135–145)
SODIUM BLD-SCNC: 135 MEQ/L (ref 135–145)
SODIUM BLD-SCNC: 138 MEQ/L (ref 135–145)
SODIUM BLD-SCNC: 139 MEQ/L (ref 135–145)
SOURCE, BLOOD GAS: ABNORMAL
SOURCE, BLOOD GAS: NORMAL
URINE CULTURE, ROUTINE: ABNORMAL
WBC # BLD: 5.1 THOU/MM3 (ref 4.8–10.8)
WBC # BLD: 6.1 THOU/MM3 (ref 4.8–10.8)
WBC # BLD: 6.3 THOU/MM3 (ref 4.8–10.8)
WBC # BLD: 7.3 THOU/MM3 (ref 4.8–10.8)

## 2019-01-25 PROCEDURE — 6370000000 HC RX 637 (ALT 250 FOR IP): Performed by: INTERNAL MEDICINE

## 2019-01-25 PROCEDURE — 2580000003 HC RX 258: Performed by: INTERNAL MEDICINE

## 2019-01-25 PROCEDURE — 84100 ASSAY OF PHOSPHORUS: CPT

## 2019-01-25 PROCEDURE — 80048 BASIC METABOLIC PNL TOTAL CA: CPT

## 2019-01-25 PROCEDURE — 83735 ASSAY OF MAGNESIUM: CPT

## 2019-01-25 PROCEDURE — 2700000000 HC OXYGEN THERAPY PER DAY

## 2019-01-25 PROCEDURE — 94760 N-INVAS EAR/PLS OXIMETRY 1: CPT

## 2019-01-25 PROCEDURE — 6360000002 HC RX W HCPCS: Performed by: INTERNAL MEDICINE

## 2019-01-25 PROCEDURE — 94660 CPAP INITIATION&MGMT: CPT

## 2019-01-25 PROCEDURE — 85610 PROTHROMBIN TIME: CPT

## 2019-01-25 PROCEDURE — 6370000000 HC RX 637 (ALT 250 FOR IP): Performed by: PHYSICIAN ASSISTANT

## 2019-01-25 PROCEDURE — 36415 COLL VENOUS BLD VENIPUNCTURE: CPT

## 2019-01-25 PROCEDURE — 36600 WITHDRAWAL OF ARTERIAL BLOOD: CPT

## 2019-01-25 PROCEDURE — 99231 SBSQ HOSP IP/OBS SF/LOW 25: CPT | Performed by: NURSE PRACTITIONER

## 2019-01-25 PROCEDURE — 99232 SBSQ HOSP IP/OBS MODERATE 35: CPT | Performed by: INTERNAL MEDICINE

## 2019-01-25 PROCEDURE — 37799 UNLISTED PX VASCULAR SURGERY: CPT

## 2019-01-25 PROCEDURE — 82803 BLOOD GASES ANY COMBINATION: CPT

## 2019-01-25 PROCEDURE — 2709999900 HC NON-CHARGEABLE SUPPLY

## 2019-01-25 PROCEDURE — 99233 SBSQ HOSP IP/OBS HIGH 50: CPT | Performed by: INTERNAL MEDICINE

## 2019-01-25 PROCEDURE — 2500000003 HC RX 250 WO HCPCS: Performed by: INTERNAL MEDICINE

## 2019-01-25 PROCEDURE — 3E1M39Z IRRIGATION OF PERITONEAL CAVITY USING DIALYSATE, PERCUTANEOUS APPROACH: ICD-10-PCS | Performed by: INTERNAL MEDICINE

## 2019-01-25 PROCEDURE — 2000000000 HC ICU R&B

## 2019-01-25 PROCEDURE — 85730 THROMBOPLASTIN TIME PARTIAL: CPT

## 2019-01-25 PROCEDURE — 82948 REAGENT STRIP/BLOOD GLUCOSE: CPT

## 2019-01-25 PROCEDURE — 85027 COMPLETE CBC AUTOMATED: CPT

## 2019-01-25 PROCEDURE — 90945 DIALYSIS ONE EVALUATION: CPT

## 2019-01-25 PROCEDURE — 82330 ASSAY OF CALCIUM: CPT

## 2019-01-25 PROCEDURE — 82784 ASSAY IGA/IGD/IGG/IGM EACH: CPT

## 2019-01-25 PROCEDURE — 83883 ASSAY NEPHELOMETRY NOT SPEC: CPT

## 2019-01-25 RX ADMIN — Medication 3300 ML/HR: at 21:26

## 2019-01-25 RX ADMIN — TIMOLOL MALEATE 1 DROP: 5 SOLUTION/ DROPS OPHTHALMIC at 08:59

## 2019-01-25 RX ADMIN — Medication 3300 ML/HR: at 01:02

## 2019-01-25 RX ADMIN — METOPROLOL SUCCINATE 12.5 MG: 25 TABLET, FILM COATED, EXTENDED RELEASE ORAL at 08:58

## 2019-01-25 RX ADMIN — INSULIN GLARGINE 15 UNITS: 100 INJECTION, SOLUTION SUBCUTANEOUS at 20:57

## 2019-01-25 RX ADMIN — HEPARIN SODIUM AND DEXTROSE 10 UNITS/KG/HR: 10000; 5 INJECTION INTRAVENOUS at 21:00

## 2019-01-25 RX ADMIN — SODIUM PHOSPHATE, MONOBASIC, MONOHYDRATE 7.8 MMOL: 276; 142 INJECTION, SOLUTION INTRAVENOUS at 08:59

## 2019-01-25 RX ADMIN — Medication 3300 ML/HR: at 01:01

## 2019-01-25 RX ADMIN — Medication 3300 ML/HR: at 17:35

## 2019-01-25 RX ADMIN — Medication 3300 ML/HR: at 21:24

## 2019-01-25 RX ADMIN — Medication 10 ML: at 20:59

## 2019-01-25 RX ADMIN — Medication 3300 ML/HR: at 17:37

## 2019-01-25 RX ADMIN — PANTOPRAZOLE SODIUM 40 MG: 40 TABLET, DELAYED RELEASE ORAL at 08:59

## 2019-01-25 RX ADMIN — Medication 3300 ML/HR: at 13:34

## 2019-01-25 RX ADMIN — Medication 3300 ML/HR: at 05:31

## 2019-01-25 RX ADMIN — Medication 3300 ML/HR: at 05:30

## 2019-01-25 RX ADMIN — Medication 3300 ML/HR: at 05:29

## 2019-01-25 RX ADMIN — Medication 3300 ML/HR: at 21:28

## 2019-01-25 RX ADMIN — Medication 3300 ML/HR: at 09:14

## 2019-01-25 RX ADMIN — ATORVASTATIN CALCIUM 40 MG: 40 TABLET, FILM COATED ORAL at 21:02

## 2019-01-25 RX ADMIN — TAMSULOSIN HYDROCHLORIDE 0.4 MG: 0.4 CAPSULE ORAL at 08:59

## 2019-01-25 RX ADMIN — Medication 10 ML: at 21:08

## 2019-01-25 RX ADMIN — MUPIROCIN: 20 OINTMENT TOPICAL at 08:59

## 2019-01-25 RX ADMIN — Medication 3300 ML/HR: at 13:36

## 2019-01-25 RX ADMIN — Medication 3300 ML/HR: at 17:36

## 2019-01-25 RX ADMIN — LATANOPROST 1 DROP: 50 SOLUTION/ DROPS OPHTHALMIC at 21:08

## 2019-01-25 RX ADMIN — MUPIROCIN: 20 OINTMENT TOPICAL at 20:59

## 2019-01-25 RX ADMIN — INSULIN LISPRO 1 UNITS: 100 INJECTION, SOLUTION INTRAVENOUS; SUBCUTANEOUS at 20:58

## 2019-01-25 RX ADMIN — Medication 10 ML: at 08:59

## 2019-01-25 RX ADMIN — Medication 3300 ML/HR: at 09:13

## 2019-01-25 RX ADMIN — POTASSIUM & SODIUM PHOSPHATES POWDER PACK 280-160-250 MG 500 MG: 280-160-250 PACK at 23:12

## 2019-01-25 RX ADMIN — Medication 10 ML: at 09:07

## 2019-01-25 RX ADMIN — Medication 3300 ML/HR: at 13:33

## 2019-01-25 RX ADMIN — ASPIRIN 81 MG 81 MG: 81 TABLET ORAL at 08:59

## 2019-01-25 RX ADMIN — Medication 3300 ML/HR: at 01:00

## 2019-01-25 RX ADMIN — Medication 3300 ML/HR: at 09:16

## 2019-01-25 ASSESSMENT — PAIN SCALES - GENERAL
PAINLEVEL_OUTOF10: 0

## 2019-01-26 ENCOUNTER — APPOINTMENT (OUTPATIENT)
Dept: GENERAL RADIOLOGY | Age: 74
DRG: 216 | End: 2019-01-26
Attending: INTERNAL MEDICINE
Payer: MEDICARE

## 2019-01-26 LAB
ALLEN TEST: POSITIVE
ANION GAP SERPL CALCULATED.3IONS-SCNC: 11 MEQ/L (ref 8–16)
ANION GAP SERPL CALCULATED.3IONS-SCNC: 11 MEQ/L (ref 8–16)
ANION GAP SERPL CALCULATED.3IONS-SCNC: 7 MEQ/L (ref 8–16)
ANION GAP SERPL CALCULATED.3IONS-SCNC: 8 MEQ/L (ref 8–16)
APTT: 64.8 SECONDS (ref 22–38)
APTT: 68.3 SECONDS (ref 22–38)
APTT: 69.2 SECONDS (ref 22–38)
APTT: 74.5 SECONDS (ref 22–38)
BASE EXCESS (CALCULATED): 1.8 MMOL/L (ref -2.5–2.5)
BASE EXCESS (CALCULATED): 2.7 MMOL/L (ref -2.5–2.5)
BASE EXCESS (CALCULATED): 4.4 MMOL/L (ref -2.5–2.5)
BASE EXCESS (CALCULATED): 4.5 MMOL/L (ref -2.5–2.5)
BUN BLDV-MCNC: 11 MG/DL (ref 7–22)
BUN BLDV-MCNC: 11 MG/DL (ref 7–22)
BUN BLDV-MCNC: 12 MG/DL (ref 7–22)
BUN BLDV-MCNC: 12 MG/DL (ref 7–22)
CALCIUM IONIZED: 1.07 MMOL/L (ref 1.12–1.32)
CALCIUM IONIZED: 1.09 MMOL/L (ref 1.12–1.32)
CALCIUM IONIZED: 1.1 MMOL/L (ref 1.12–1.32)
CALCIUM IONIZED: 1.14 MMOL/L (ref 1.12–1.32)
CALCIUM SERPL-MCNC: 7.5 MG/DL (ref 8.5–10.5)
CALCIUM SERPL-MCNC: 7.7 MG/DL (ref 8.5–10.5)
CALCIUM SERPL-MCNC: 7.8 MG/DL (ref 8.5–10.5)
CALCIUM SERPL-MCNC: 7.8 MG/DL (ref 8.5–10.5)
CHLORIDE BLD-SCNC: 100 MEQ/L (ref 98–111)
CHLORIDE BLD-SCNC: 102 MEQ/L (ref 98–111)
CHLORIDE BLD-SCNC: 103 MEQ/L (ref 98–111)
CHLORIDE BLD-SCNC: 105 MEQ/L (ref 98–111)
CO2: 24 MEQ/L (ref 23–33)
CO2: 24 MEQ/L (ref 23–33)
CO2: 25 MEQ/L (ref 23–33)
CO2: 26 MEQ/L (ref 23–33)
COLLECTED BY:: ABNORMAL
CREAT SERPL-MCNC: 1.4 MG/DL (ref 0.4–1.2)
CREAT SERPL-MCNC: 1.5 MG/DL (ref 0.4–1.2)
DEVICE: ABNORMAL
EKG ATRIAL RATE: 109 BPM
EKG P AXIS: 81 DEGREES
EKG P-R INTERVAL: 176 MS
EKG Q-T INTERVAL: 340 MS
EKG QRS DURATION: 92 MS
EKG QTC CALCULATION (BAZETT): 457 MS
EKG R AXIS: 67 DEGREES
EKG T AXIS: -179 DEGREES
EKG VENTRICULAR RATE: 109 BPM
ERYTHROCYTE [DISTWIDTH] IN BLOOD BY AUTOMATED COUNT: 17.6 % (ref 11.5–14.5)
ERYTHROCYTE [DISTWIDTH] IN BLOOD BY AUTOMATED COUNT: 17.6 % (ref 11.5–14.5)
ERYTHROCYTE [DISTWIDTH] IN BLOOD BY AUTOMATED COUNT: 17.7 % (ref 11.5–14.5)
ERYTHROCYTE [DISTWIDTH] IN BLOOD BY AUTOMATED COUNT: 17.7 % (ref 11.5–14.5)
ERYTHROCYTE [DISTWIDTH] IN BLOOD BY AUTOMATED COUNT: 56.1 FL (ref 35–45)
ERYTHROCYTE [DISTWIDTH] IN BLOOD BY AUTOMATED COUNT: 57.4 FL (ref 35–45)
ERYTHROCYTE [DISTWIDTH] IN BLOOD BY AUTOMATED COUNT: 58 FL (ref 35–45)
ERYTHROCYTE [DISTWIDTH] IN BLOOD BY AUTOMATED COUNT: 58.4 FL (ref 35–45)
GFR SERPL CREATININE-BSD FRML MDRD: 46 ML/MIN/1.73M2
GFR SERPL CREATININE-BSD FRML MDRD: 50 ML/MIN/1.73M2
GLUCOSE BLD-MCNC: 101 MG/DL (ref 70–108)
GLUCOSE BLD-MCNC: 165 MG/DL (ref 70–108)
GLUCOSE BLD-MCNC: 174 MG/DL (ref 70–108)
GLUCOSE BLD-MCNC: 175 MG/DL (ref 70–108)
GLUCOSE BLD-MCNC: 179 MG/DL (ref 70–108)
GLUCOSE BLD-MCNC: 224 MG/DL (ref 70–108)
GLUCOSE, WHOLE BLOOD: 174 MG/DL (ref 70–108)
HCO3: 26 MMOL/L (ref 23–28)
HCO3: 26 MMOL/L (ref 23–28)
HCO3: 28 MMOL/L (ref 23–28)
HCO3: 28 MMOL/L (ref 23–28)
HCT VFR BLD CALC: 25.8 % (ref 42–52)
HCT VFR BLD CALC: 26 % (ref 42–52)
HCT VFR BLD CALC: 26.7 % (ref 42–52)
HCT VFR BLD CALC: 27.6 % (ref 42–52)
HEMOGLOBIN: 7.7 GM/DL (ref 14–18)
HEMOGLOBIN: 8 GM/DL (ref 14–18)
HEMOGLOBIN: 8 GM/DL (ref 14–18)
HEMOGLOBIN: 8.2 GM/DL (ref 14–18)
IFIO2: 2
IFIO2: 30
INR BLD: 1.24 (ref 0.85–1.13)
INR BLD: 1.28 (ref 0.85–1.13)
INR BLD: 1.48 (ref 0.85–1.13)
INR BLD: 1.74 (ref 0.85–1.13)
MAGNESIUM: 2.3 MG/DL (ref 1.6–2.4)
MAGNESIUM: 2.4 MG/DL (ref 1.6–2.4)
MCH RBC QN AUTO: 26.6 PG (ref 26–33)
MCH RBC QN AUTO: 26.7 PG (ref 26–33)
MCH RBC QN AUTO: 26.8 PG (ref 26–33)
MCH RBC QN AUTO: 27.7 PG (ref 26–33)
MCHC RBC AUTO-ENTMCNC: 29.7 GM/DL (ref 32.2–35.5)
MCHC RBC AUTO-ENTMCNC: 29.8 GM/DL (ref 32.2–35.5)
MCHC RBC AUTO-ENTMCNC: 30 GM/DL (ref 32.2–35.5)
MCHC RBC AUTO-ENTMCNC: 30.8 GM/DL (ref 32.2–35.5)
MCV RBC AUTO: 89.6 FL (ref 80–94)
MCV RBC AUTO: 90 FL (ref 80–94)
MODE: ABNORMAL
O2 SATURATION: 93 %
O2 SATURATION: 98 %
PCO2: 35 MMHG (ref 35–45)
PCO2: 36 MMHG (ref 35–45)
PCO2: 37 MMHG (ref 35–45)
PCO2: 37 MMHG (ref 35–45)
PH BLOOD GAS: 7.46 (ref 7.35–7.45)
PH BLOOD GAS: 7.49 (ref 7.35–7.45)
PHOSPHORUS: 1.6 MG/DL (ref 2.4–4.7)
PHOSPHORUS: 1.7 MG/DL (ref 2.4–4.7)
PHOSPHORUS: 2 MG/DL (ref 2.4–4.7)
PHOSPHORUS: 2.6 MG/DL (ref 2.4–4.7)
PLATELET # BLD: 102 THOU/MM3 (ref 130–400)
PLATELET # BLD: 115 THOU/MM3 (ref 130–400)
PLATELET # BLD: 167 THOU/MM3 (ref 130–400)
PLATELET # BLD: 169 THOU/MM3 (ref 130–400)
PMV BLD AUTO: 10.5 FL (ref 9.4–12.4)
PMV BLD AUTO: 11.8 FL (ref 9.4–12.4)
PMV BLD AUTO: 11.9 FL (ref 9.4–12.4)
PMV BLD AUTO: 12.3 FL (ref 9.4–12.4)
PO2: 64 MMHG (ref 71–104)
PO2: 91 MMHG (ref 71–104)
PO2: 93 MMHG (ref 71–104)
PO2: 99 MMHG (ref 71–104)
POTASSIUM SERPL-SCNC: 4.4 MEQ/L (ref 3.5–5.2)
POTASSIUM SERPL-SCNC: 4.6 MEQ/L (ref 3.5–5.2)
RBC # BLD: 2.88 MILL/MM3 (ref 4.7–6.1)
RBC # BLD: 2.89 MILL/MM3 (ref 4.7–6.1)
RBC # BLD: 2.98 MILL/MM3 (ref 4.7–6.1)
RBC # BLD: 3.08 MILL/MM3 (ref 4.7–6.1)
SET PEEP: 6 MMHG
SET PEEP: 6 MMHG
SET PRESS SUPP: 12 CMH2O
SET PRESS SUPP: 12 CMH2O
SODIUM BLD-SCNC: 135 MEQ/L (ref 135–145)
SODIUM BLD-SCNC: 135 MEQ/L (ref 135–145)
SODIUM BLD-SCNC: 138 MEQ/L (ref 135–145)
SODIUM BLD-SCNC: 138 MEQ/L (ref 135–145)
SOURCE, BLOOD GAS: ABNORMAL
WBC # BLD: 4.5 THOU/MM3 (ref 4.8–10.8)
WBC # BLD: 4.6 THOU/MM3 (ref 4.8–10.8)
WBC # BLD: 4.6 THOU/MM3 (ref 4.8–10.8)
WBC # BLD: 4.8 THOU/MM3 (ref 4.8–10.8)

## 2019-01-26 PROCEDURE — 80048 BASIC METABOLIC PNL TOTAL CA: CPT

## 2019-01-26 PROCEDURE — 2700000000 HC OXYGEN THERAPY PER DAY

## 2019-01-26 PROCEDURE — 99233 SBSQ HOSP IP/OBS HIGH 50: CPT | Performed by: INTERNAL MEDICINE

## 2019-01-26 PROCEDURE — 92610 EVALUATE SWALLOWING FUNCTION: CPT

## 2019-01-26 PROCEDURE — 6370000000 HC RX 637 (ALT 250 FOR IP): Performed by: INTERNAL MEDICINE

## 2019-01-26 PROCEDURE — 84100 ASSAY OF PHOSPHORUS: CPT

## 2019-01-26 PROCEDURE — 2500000003 HC RX 250 WO HCPCS: Performed by: INTERNAL MEDICINE

## 2019-01-26 PROCEDURE — 85730 THROMBOPLASTIN TIME PARTIAL: CPT

## 2019-01-26 PROCEDURE — 82330 ASSAY OF CALCIUM: CPT

## 2019-01-26 PROCEDURE — 82947 ASSAY GLUCOSE BLOOD QUANT: CPT

## 2019-01-26 PROCEDURE — 83735 ASSAY OF MAGNESIUM: CPT

## 2019-01-26 PROCEDURE — 2580000003 HC RX 258: Performed by: INTERNAL MEDICINE

## 2019-01-26 PROCEDURE — 2000000000 HC ICU R&B

## 2019-01-26 PROCEDURE — 37799 UNLISTED PX VASCULAR SURGERY: CPT

## 2019-01-26 PROCEDURE — 6370000000 HC RX 637 (ALT 250 FOR IP): Performed by: PHYSICIAN ASSISTANT

## 2019-01-26 PROCEDURE — 6360000002 HC RX W HCPCS: Performed by: INTERNAL MEDICINE

## 2019-01-26 PROCEDURE — 94660 CPAP INITIATION&MGMT: CPT

## 2019-01-26 PROCEDURE — 82803 BLOOD GASES ANY COMBINATION: CPT

## 2019-01-26 PROCEDURE — 90945 DIALYSIS ONE EVALUATION: CPT

## 2019-01-26 PROCEDURE — 71045 X-RAY EXAM CHEST 1 VIEW: CPT

## 2019-01-26 PROCEDURE — 2709999900 HC NON-CHARGEABLE SUPPLY

## 2019-01-26 PROCEDURE — 93005 ELECTROCARDIOGRAM TRACING: CPT | Performed by: INTERNAL MEDICINE

## 2019-01-26 PROCEDURE — 93010 ELECTROCARDIOGRAM REPORT: CPT | Performed by: INTERNAL MEDICINE

## 2019-01-26 PROCEDURE — 36415 COLL VENOUS BLD VENIPUNCTURE: CPT

## 2019-01-26 PROCEDURE — 85610 PROTHROMBIN TIME: CPT

## 2019-01-26 PROCEDURE — 82948 REAGENT STRIP/BLOOD GLUCOSE: CPT

## 2019-01-26 PROCEDURE — 85027 COMPLETE CBC AUTOMATED: CPT

## 2019-01-26 RX ORDER — NITROGLYCERIN 20 MG/100ML
5 INJECTION INTRAVENOUS CONTINUOUS
Status: DISCONTINUED | OUTPATIENT
Start: 2019-01-26 | End: 2019-01-28

## 2019-01-26 RX ADMIN — ATORVASTATIN CALCIUM 40 MG: 40 TABLET, FILM COATED ORAL at 20:13

## 2019-01-26 RX ADMIN — Medication 2370 ML/HR: at 22:03

## 2019-01-26 RX ADMIN — SODIUM PHOSPHATE, MONOBASIC, MONOHYDRATE 13 MMOL: 276; 142 INJECTION, SOLUTION INTRAVENOUS at 10:15

## 2019-01-26 RX ADMIN — Medication 10 ML: at 08:05

## 2019-01-26 RX ADMIN — METOPROLOL SUCCINATE 12.5 MG: 25 TABLET, FILM COATED, EXTENDED RELEASE ORAL at 07:54

## 2019-01-26 RX ADMIN — INSULIN GLARGINE 15 UNITS: 100 INJECTION, SOLUTION SUBCUTANEOUS at 20:27

## 2019-01-26 RX ADMIN — Medication 3300 ML/HR: at 02:34

## 2019-01-26 RX ADMIN — NITROGLYCERIN 10 MCG/MIN: 20 INJECTION INTRAVENOUS at 20:09

## 2019-01-26 RX ADMIN — Medication 10 ML: at 20:13

## 2019-01-26 RX ADMIN — INSULIN LISPRO 1 UNITS: 100 INJECTION, SOLUTION INTRAVENOUS; SUBCUTANEOUS at 20:27

## 2019-01-26 RX ADMIN — ASPIRIN 81 MG 81 MG: 81 TABLET ORAL at 07:54

## 2019-01-26 RX ADMIN — CALCIUM GLUCONATE 1.5 G: 98 INJECTION, SOLUTION INTRAVENOUS at 10:19

## 2019-01-26 RX ADMIN — Medication 10 ML: at 07:55

## 2019-01-26 RX ADMIN — Medication 2370 ML/HR: at 12:20

## 2019-01-26 RX ADMIN — INSULIN LISPRO 2 UNITS: 100 INJECTION, SOLUTION INTRAVENOUS; SUBCUTANEOUS at 12:18

## 2019-01-26 RX ADMIN — Medication 3300 ML/HR: at 02:33

## 2019-01-26 RX ADMIN — INSULIN LISPRO 4 UNITS: 100 INJECTION, SOLUTION INTRAVENOUS; SUBCUTANEOUS at 07:46

## 2019-01-26 RX ADMIN — Medication 3300 ML/HR: at 05:44

## 2019-01-26 RX ADMIN — LATANOPROST 1 DROP: 50 SOLUTION/ DROPS OPHTHALMIC at 20:13

## 2019-01-26 RX ADMIN — Medication 2370 ML/HR: at 12:19

## 2019-01-26 RX ADMIN — TAMSULOSIN HYDROCHLORIDE 0.4 MG: 0.4 CAPSULE ORAL at 07:54

## 2019-01-26 RX ADMIN — Medication 2370 ML/HR: at 18:06

## 2019-01-26 RX ADMIN — MUPIROCIN: 20 OINTMENT TOPICAL at 10:22

## 2019-01-26 RX ADMIN — Medication 3300 ML/HR: at 05:43

## 2019-01-26 RX ADMIN — Medication 3300 ML/HR: at 05:45

## 2019-01-26 RX ADMIN — Medication 2370 ML/HR: at 22:05

## 2019-01-26 RX ADMIN — METOPROLOL TARTRATE 12.5 MG: 25 TABLET ORAL at 21:59

## 2019-01-26 RX ADMIN — Medication 2370 ML/HR: at 22:04

## 2019-01-26 RX ADMIN — TIMOLOL MALEATE 1 DROP: 5 SOLUTION/ DROPS OPHTHALMIC at 08:00

## 2019-01-26 RX ADMIN — Medication 2370 ML/HR: at 18:04

## 2019-01-26 RX ADMIN — Medication 2370 ML/HR: at 12:21

## 2019-01-26 RX ADMIN — INSULIN LISPRO 2 UNITS: 100 INJECTION, SOLUTION INTRAVENOUS; SUBCUTANEOUS at 17:58

## 2019-01-26 RX ADMIN — Medication 2370 ML/HR: at 18:03

## 2019-01-26 RX ADMIN — Medication 3300 ML/HR: at 02:36

## 2019-01-26 RX ADMIN — PANTOPRAZOLE SODIUM 40 MG: 40 TABLET, DELAYED RELEASE ORAL at 06:08

## 2019-01-26 RX ADMIN — MUPIROCIN: 20 OINTMENT TOPICAL at 20:13

## 2019-01-26 ASSESSMENT — PAIN SCALES - GENERAL
PAINLEVEL_OUTOF10: 0

## 2019-01-26 ASSESSMENT — PULMONARY FUNCTION TESTS: PIF_VALUE: 15

## 2019-01-27 ENCOUNTER — APPOINTMENT (OUTPATIENT)
Dept: GENERAL RADIOLOGY | Age: 74
DRG: 216 | End: 2019-01-27
Attending: INTERNAL MEDICINE
Payer: MEDICARE

## 2019-01-27 LAB
ABO: NORMAL
ALLEN TEST: ABNORMAL
ALLEN TEST: ABNORMAL
ALLEN TEST: POSITIVE
ALLEN TEST: POSITIVE
AMORPHOUS: ABNORMAL
ANION GAP SERPL CALCULATED.3IONS-SCNC: 6 MEQ/L (ref 8–16)
ANION GAP SERPL CALCULATED.3IONS-SCNC: 7 MEQ/L (ref 8–16)
ANION GAP SERPL CALCULATED.3IONS-SCNC: 8 MEQ/L (ref 8–16)
ANION GAP SERPL CALCULATED.3IONS-SCNC: 8 MEQ/L (ref 8–16)
ANTIBODY SCREEN: NORMAL
APTT: 58 SECONDS (ref 22–38)
APTT: 65.3 SECONDS (ref 22–38)
APTT: 67.4 SECONDS (ref 22–38)
APTT: 78 SECONDS (ref 22–38)
BACTERIA: ABNORMAL
BASE EXCESS (CALCULATED): 2.6 MMOL/L (ref -2.5–2.5)
BASE EXCESS (CALCULATED): 3.2 MMOL/L (ref -2.5–2.5)
BASE EXCESS (CALCULATED): 3.4 MMOL/L (ref -2.5–2.5)
BASE EXCESS (CALCULATED): 3.9 MMOL/L (ref -2.5–2.5)
BILIRUBIN URINE: ABNORMAL
BLOOD, URINE: ABNORMAL
BUN BLDV-MCNC: 8 MG/DL (ref 7–22)
BUN BLDV-MCNC: 8 MG/DL (ref 7–22)
BUN BLDV-MCNC: 9 MG/DL (ref 7–22)
BUN BLDV-MCNC: 9 MG/DL (ref 7–22)
CALCIUM IONIZED: 1.1 MMOL/L (ref 1.12–1.32)
CALCIUM IONIZED: 1.12 MMOL/L (ref 1.12–1.32)
CALCIUM IONIZED: 1.13 MMOL/L (ref 1.12–1.32)
CALCIUM IONIZED: 1.18 MMOL/L (ref 1.12–1.32)
CALCIUM SERPL-MCNC: 7.5 MG/DL (ref 8.5–10.5)
CALCIUM SERPL-MCNC: 7.6 MG/DL (ref 8.5–10.5)
CALCIUM SERPL-MCNC: 7.6 MG/DL (ref 8.5–10.5)
CALCIUM SERPL-MCNC: 8 MG/DL (ref 8.5–10.5)
CASTS: ABNORMAL /LPF
CHARACTER, URINE: ABNORMAL
CHLORIDE BLD-SCNC: 101 MEQ/L (ref 98–111)
CHLORIDE BLD-SCNC: 102 MEQ/L (ref 98–111)
CHLORIDE BLD-SCNC: 104 MEQ/L (ref 98–111)
CHLORIDE BLD-SCNC: 105 MEQ/L (ref 98–111)
CO2: 25 MEQ/L (ref 23–33)
CO2: 26 MEQ/L (ref 23–33)
CO2: 27 MEQ/L (ref 23–33)
CO2: 27 MEQ/L (ref 23–33)
COLLECTED BY:: ABNORMAL
COLOR: ABNORMAL
CREAT SERPL-MCNC: 1.2 MG/DL (ref 0.4–1.2)
CREAT SERPL-MCNC: 1.3 MG/DL (ref 0.4–1.2)
CREAT SERPL-MCNC: 1.3 MG/DL (ref 0.4–1.2)
CREAT SERPL-MCNC: 1.4 MG/DL (ref 0.4–1.2)
CRYSTALS: ABNORMAL
DEVICE: ABNORMAL
EPITHELIAL CELLS, UA: ABNORMAL /HPF
ERYTHROCYTE [DISTWIDTH] IN BLOOD BY AUTOMATED COUNT: 17.3 % (ref 11.5–14.5)
ERYTHROCYTE [DISTWIDTH] IN BLOOD BY AUTOMATED COUNT: 17.4 % (ref 11.5–14.5)
ERYTHROCYTE [DISTWIDTH] IN BLOOD BY AUTOMATED COUNT: 17.5 % (ref 11.5–14.5)
ERYTHROCYTE [DISTWIDTH] IN BLOOD BY AUTOMATED COUNT: 17.8 % (ref 11.5–14.5)
ERYTHROCYTE [DISTWIDTH] IN BLOOD BY AUTOMATED COUNT: 56.7 FL (ref 35–45)
ERYTHROCYTE [DISTWIDTH] IN BLOOD BY AUTOMATED COUNT: 57.8 FL (ref 35–45)
ERYTHROCYTE [DISTWIDTH] IN BLOOD BY AUTOMATED COUNT: 57.9 FL (ref 35–45)
ERYTHROCYTE [DISTWIDTH] IN BLOOD BY AUTOMATED COUNT: 58.7 FL (ref 35–45)
GFR SERPL CREATININE-BSD FRML MDRD: 50 ML/MIN/1.73M2
GFR SERPL CREATININE-BSD FRML MDRD: 54 ML/MIN/1.73M2
GFR SERPL CREATININE-BSD FRML MDRD: 54 ML/MIN/1.73M2
GFR SERPL CREATININE-BSD FRML MDRD: 60 ML/MIN/1.73M2
GLUCOSE BLD-MCNC: 126 MG/DL (ref 70–108)
GLUCOSE BLD-MCNC: 170 MG/DL (ref 70–108)
GLUCOSE BLD-MCNC: 172 MG/DL (ref 70–108)
GLUCOSE BLD-MCNC: 251 MG/DL (ref 70–108)
GLUCOSE BLD-MCNC: 252 MG/DL (ref 70–108)
GLUCOSE BLD-MCNC: 259 MG/DL (ref 70–108)
GLUCOSE BLD-MCNC: 98 MG/DL (ref 70–108)
GLUCOSE, URINE: 100 MG/DL
GLUCOSE, WHOLE BLOOD: 97 MG/DL (ref 70–108)
HCO3: 27 MMOL/L (ref 23–28)
HCO3: 28 MMOL/L (ref 23–28)
HCT VFR BLD CALC: 25.3 % (ref 42–52)
HCT VFR BLD CALC: 25.4 % (ref 42–52)
HCT VFR BLD CALC: 25.8 % (ref 42–52)
HCT VFR BLD CALC: 27.3 % (ref 42–52)
HEMOGLOBIN: 7.5 GM/DL (ref 14–18)
HEMOGLOBIN: 7.6 GM/DL (ref 14–18)
HEMOGLOBIN: 7.9 GM/DL (ref 14–18)
HEMOGLOBIN: 8 GM/DL (ref 14–18)
ICTOTEST: NEGATIVE
IFIO2: 2
IFIO2: 30
INR BLD: 1.18 (ref 0.85–1.13)
KAPPA/LAMBDA FREE LIGHT CHAINS: NORMAL
KETONES, URINE: ABNORMAL
LEUKOCYTE ESTERASE, URINE: ABNORMAL
MAGNESIUM: 2.2 MG/DL (ref 1.6–2.4)
MAGNESIUM: 2.3 MG/DL (ref 1.6–2.4)
MCH RBC QN AUTO: 26.7 PG (ref 26–33)
MCH RBC QN AUTO: 26.8 PG (ref 26–33)
MCH RBC QN AUTO: 27.3 PG (ref 26–33)
MCH RBC QN AUTO: 27.3 PG (ref 26–33)
MCHC RBC AUTO-ENTMCNC: 29.3 GM/DL (ref 32.2–35.5)
MCHC RBC AUTO-ENTMCNC: 29.5 GM/DL (ref 32.2–35.5)
MCHC RBC AUTO-ENTMCNC: 30 GM/DL (ref 32.2–35.5)
MCHC RBC AUTO-ENTMCNC: 30.6 GM/DL (ref 32.2–35.5)
MCV RBC AUTO: 89.3 FL (ref 80–94)
MCV RBC AUTO: 90.4 FL (ref 80–94)
MCV RBC AUTO: 91 FL (ref 80–94)
MCV RBC AUTO: 91.3 FL (ref 80–94)
MODE: ABNORMAL
MODE: ABNORMAL
MRSA SCREEN: ABNORMAL
NITRITE, URINE: POSITIVE
O2 SATURATION: 95 %
O2 SATURATION: 98 %
O2 SATURATION: 98 %
O2 SATURATION: 99 %
ORGANISM: ABNORMAL
PCO2: 39 MMHG (ref 35–45)
PCO2: 39 MMHG (ref 35–45)
PCO2: 40 MMHG (ref 35–45)
PCO2: 40 MMHG (ref 35–45)
PH BLOOD GAS: 7.45 (ref 7.35–7.45)
PH BLOOD GAS: 7.45 (ref 7.35–7.45)
PH BLOOD GAS: 7.46 (ref 7.35–7.45)
PH BLOOD GAS: 7.46 (ref 7.35–7.45)
PH UA: 5.5
PHOSPHORUS: 1.5 MG/DL (ref 2.4–4.7)
PHOSPHORUS: 1.8 MG/DL (ref 2.4–4.7)
PHOSPHORUS: 2 MG/DL (ref 2.4–4.7)
PHOSPHORUS: 2.5 MG/DL (ref 2.4–4.7)
PLATELET # BLD: 106 THOU/MM3 (ref 130–400)
PLATELET # BLD: 106 THOU/MM3 (ref 130–400)
PLATELET # BLD: 110 THOU/MM3 (ref 130–400)
PLATELET # BLD: 122 THOU/MM3 (ref 130–400)
PMV BLD AUTO: 10.7 FL (ref 9.4–12.4)
PMV BLD AUTO: 11.3 FL (ref 9.4–12.4)
PMV BLD AUTO: 12.4 FL (ref 9.4–12.4)
PMV BLD AUTO: 12.4 FL (ref 9.4–12.4)
PO2: 109 MMHG (ref 71–104)
PO2: 75 MMHG (ref 71–104)
PO2: 96 MMHG (ref 71–104)
PO2: 97 MMHG (ref 71–104)
POTASSIUM SERPL-SCNC: 4.1 MEQ/L (ref 3.5–5.2)
POTASSIUM SERPL-SCNC: 4.1 MEQ/L (ref 3.5–5.2)
POTASSIUM SERPL-SCNC: 4.8 MEQ/L (ref 3.5–5.2)
POTASSIUM SERPL-SCNC: 4.9 MEQ/L (ref 3.5–5.2)
PROTEIN UA: >= 1000 MG/DL
RBC # BLD: 2.78 MILL/MM3 (ref 4.7–6.1)
RBC # BLD: 2.81 MILL/MM3 (ref 4.7–6.1)
RBC # BLD: 2.89 MILL/MM3 (ref 4.7–6.1)
RBC # BLD: 2.99 MILL/MM3 (ref 4.7–6.1)
RBC URINE: > 200 /HPF
RENAL EPITHELIAL, UA: ABNORMAL
RH FACTOR: NORMAL
SET PEEP: 6 MMHG
SET PRESS SUPP: 12 CMH2O
SODIUM BLD-SCNC: 135 MEQ/L (ref 135–145)
SODIUM BLD-SCNC: 135 MEQ/L (ref 135–145)
SODIUM BLD-SCNC: 137 MEQ/L (ref 135–145)
SODIUM BLD-SCNC: 139 MEQ/L (ref 135–145)
SOURCE, BLOOD GAS: ABNORMAL
SPECIFIC GRAVITY UA: 1.03 (ref 1–1.03)
UROBILINOGEN, URINE: 1 EU/DL
WBC # BLD: 4.3 THOU/MM3 (ref 4.8–10.8)
WBC # BLD: 4.4 THOU/MM3 (ref 4.8–10.8)
WBC # BLD: 4.4 THOU/MM3 (ref 4.8–10.8)
WBC # BLD: 4.8 THOU/MM3 (ref 4.8–10.8)
WBC UA: > 200 /HPF
YEAST: ABNORMAL

## 2019-01-27 PROCEDURE — 2580000003 HC RX 258: Performed by: THORACIC SURGERY (CARDIOTHORACIC VASCULAR SURGERY)

## 2019-01-27 PROCEDURE — 86900 BLOOD TYPING SEROLOGIC ABO: CPT

## 2019-01-27 PROCEDURE — 82947 ASSAY GLUCOSE BLOOD QUANT: CPT

## 2019-01-27 PROCEDURE — 51702 INSERT TEMP BLADDER CATH: CPT

## 2019-01-27 PROCEDURE — 2000000000 HC ICU R&B

## 2019-01-27 PROCEDURE — 2580000003 HC RX 258: Performed by: INTERNAL MEDICINE

## 2019-01-27 PROCEDURE — 99233 SBSQ HOSP IP/OBS HIGH 50: CPT | Performed by: INTERNAL MEDICINE

## 2019-01-27 PROCEDURE — 94660 CPAP INITIATION&MGMT: CPT

## 2019-01-27 PROCEDURE — 86850 RBC ANTIBODY SCREEN: CPT

## 2019-01-27 PROCEDURE — 85027 COMPLETE CBC AUTOMATED: CPT

## 2019-01-27 PROCEDURE — 90945 DIALYSIS ONE EVALUATION: CPT

## 2019-01-27 PROCEDURE — 6360000002 HC RX W HCPCS: Performed by: INTERNAL MEDICINE

## 2019-01-27 PROCEDURE — 6370000000 HC RX 637 (ALT 250 FOR IP): Performed by: INTERNAL MEDICINE

## 2019-01-27 PROCEDURE — 99232 SBSQ HOSP IP/OBS MODERATE 35: CPT | Performed by: INTERNAL MEDICINE

## 2019-01-27 PROCEDURE — 82803 BLOOD GASES ANY COMBINATION: CPT

## 2019-01-27 PROCEDURE — 86923 COMPATIBILITY TEST ELECTRIC: CPT

## 2019-01-27 PROCEDURE — 86901 BLOOD TYPING SEROLOGIC RH(D): CPT

## 2019-01-27 PROCEDURE — 37799 UNLISTED PX VASCULAR SURGERY: CPT

## 2019-01-27 PROCEDURE — 82948 REAGENT STRIP/BLOOD GLUCOSE: CPT

## 2019-01-27 PROCEDURE — P9016 RBC LEUKOCYTES REDUCED: HCPCS

## 2019-01-27 PROCEDURE — 2500000003 HC RX 250 WO HCPCS: Performed by: INTERNAL MEDICINE

## 2019-01-27 PROCEDURE — 82330 ASSAY OF CALCIUM: CPT

## 2019-01-27 PROCEDURE — 81001 URINALYSIS AUTO W/SCOPE: CPT

## 2019-01-27 PROCEDURE — 84100 ASSAY OF PHOSPHORUS: CPT

## 2019-01-27 PROCEDURE — 2700000000 HC OXYGEN THERAPY PER DAY

## 2019-01-27 PROCEDURE — 36430 TRANSFUSION BLD/BLD COMPNT: CPT

## 2019-01-27 PROCEDURE — 85610 PROTHROMBIN TIME: CPT

## 2019-01-27 PROCEDURE — 2709999900 HC NON-CHARGEABLE SUPPLY

## 2019-01-27 PROCEDURE — 71045 X-RAY EXAM CHEST 1 VIEW: CPT

## 2019-01-27 PROCEDURE — 83735 ASSAY OF MAGNESIUM: CPT

## 2019-01-27 PROCEDURE — 85730 THROMBOPLASTIN TIME PARTIAL: CPT

## 2019-01-27 PROCEDURE — 80048 BASIC METABOLIC PNL TOTAL CA: CPT

## 2019-01-27 PROCEDURE — 36415 COLL VENOUS BLD VENIPUNCTURE: CPT

## 2019-01-27 RX ORDER — 0.9 % SODIUM CHLORIDE 0.9 %
250 INTRAVENOUS SOLUTION INTRAVENOUS ONCE
Status: COMPLETED | OUTPATIENT
Start: 2019-01-27 | End: 2019-01-28

## 2019-01-27 RX ADMIN — MUPIROCIN: 20 OINTMENT TOPICAL at 08:23

## 2019-01-27 RX ADMIN — Medication 10 ML: at 20:14

## 2019-01-27 RX ADMIN — LATANOPROST 1 DROP: 50 SOLUTION/ DROPS OPHTHALMIC at 21:27

## 2019-01-27 RX ADMIN — INSULIN LISPRO 3 UNITS: 100 INJECTION, SOLUTION INTRAVENOUS; SUBCUTANEOUS at 21:26

## 2019-01-27 RX ADMIN — Medication 2370 ML/HR: at 06:15

## 2019-01-27 RX ADMIN — METOPROLOL TARTRATE 12.5 MG: 25 TABLET ORAL at 21:27

## 2019-01-27 RX ADMIN — Medication 10 ML: at 08:21

## 2019-01-27 RX ADMIN — MUPIROCIN: 20 OINTMENT TOPICAL at 21:28

## 2019-01-27 RX ADMIN — TIMOLOL MALEATE 1 DROP: 5 SOLUTION/ DROPS OPHTHALMIC at 08:19

## 2019-01-27 RX ADMIN — Medication 2370 ML/HR: at 21:48

## 2019-01-27 RX ADMIN — Medication 2370 ML/HR: at 10:09

## 2019-01-27 RX ADMIN — Medication 2370 ML/HR: at 16:07

## 2019-01-27 RX ADMIN — METOPROLOL TARTRATE 12.5 MG: 25 TABLET ORAL at 08:21

## 2019-01-27 RX ADMIN — ASPIRIN 81 MG 81 MG: 81 TABLET ORAL at 08:21

## 2019-01-27 RX ADMIN — SODIUM PHOSPHATE, MONOBASIC, MONOHYDRATE 22 MMOL: 276; 142 INJECTION, SOLUTION INTRAVENOUS at 20:11

## 2019-01-27 RX ADMIN — HEPARIN SODIUM AND DEXTROSE 10 UNITS/KG/HR: 10000; 5 INJECTION INTRAVENOUS at 06:23

## 2019-01-27 RX ADMIN — TAMSULOSIN HYDROCHLORIDE 0.4 MG: 0.4 CAPSULE ORAL at 08:21

## 2019-01-27 RX ADMIN — Medication 10 ML: at 08:23

## 2019-01-27 RX ADMIN — Medication 2370 ML/HR: at 16:08

## 2019-01-27 RX ADMIN — SODIUM PHOSPHATE, MONOBASIC, MONOHYDRATE AND SODIUM PHOSPHATE, DIBASIC, ANHYDROUS 13 MMOL: 276; 142 INJECTION, SOLUTION INTRAVENOUS at 02:04

## 2019-01-27 RX ADMIN — Medication 2370 ML/HR: at 21:50

## 2019-01-27 RX ADMIN — INSULIN LISPRO 6 UNITS: 100 INJECTION, SOLUTION INTRAVENOUS; SUBCUTANEOUS at 12:27

## 2019-01-27 RX ADMIN — ATORVASTATIN CALCIUM 40 MG: 40 TABLET, FILM COATED ORAL at 21:27

## 2019-01-27 RX ADMIN — INSULIN GLARGINE 15 UNITS: 100 INJECTION, SOLUTION SUBCUTANEOUS at 21:26

## 2019-01-27 RX ADMIN — Medication 2370 ML/HR: at 21:49

## 2019-01-27 RX ADMIN — INSULIN LISPRO 2 UNITS: 100 INJECTION, SOLUTION INTRAVENOUS; SUBCUTANEOUS at 17:22

## 2019-01-27 RX ADMIN — Medication 2370 ML/HR: at 06:13

## 2019-01-27 RX ADMIN — Medication 2370 ML/HR: at 10:07

## 2019-01-27 RX ADMIN — Medication 2370 ML/HR: at 10:08

## 2019-01-27 RX ADMIN — Medication 2370 ML/HR: at 16:06

## 2019-01-27 RX ADMIN — SODIUM CHLORIDE 250 ML: 9 INJECTION, SOLUTION INTRAVENOUS at 17:53

## 2019-01-27 RX ADMIN — PANTOPRAZOLE SODIUM 40 MG: 40 TABLET, DELAYED RELEASE ORAL at 06:15

## 2019-01-27 RX ADMIN — CALCIUM GLUCONATE 1.5 G: 98 INJECTION, SOLUTION INTRAVENOUS at 09:33

## 2019-01-27 ASSESSMENT — PAIN SCALES - GENERAL
PAINLEVEL_OUTOF10: 0

## 2019-01-28 ENCOUNTER — APPOINTMENT (OUTPATIENT)
Dept: GENERAL RADIOLOGY | Age: 74
DRG: 216 | End: 2019-01-28
Attending: INTERNAL MEDICINE
Payer: MEDICARE

## 2019-01-28 ENCOUNTER — ANESTHESIA (OUTPATIENT)
Dept: OPERATING ROOM | Age: 74
DRG: 216 | End: 2019-01-28
Payer: MEDICARE

## 2019-01-28 ENCOUNTER — ANESTHESIA EVENT (OUTPATIENT)
Dept: OPERATING ROOM | Age: 74
DRG: 216 | End: 2019-01-28
Payer: MEDICARE

## 2019-01-28 VITALS
TEMPERATURE: 99.7 F | SYSTOLIC BLOOD PRESSURE: 154 MMHG | OXYGEN SATURATION: 100 % | RESPIRATION RATE: 9 BRPM | DIASTOLIC BLOOD PRESSURE: 78 MMHG

## 2019-01-28 LAB
ACTIVATED CLOTTING TIME: 156 SECONDS (ref 99–130)
ACTIVATED CLOTTING TIME: 680 SECONDS (ref 99–130)
ACTIVATED CLOTTING TIME: 711 SECONDS (ref 99–130)
ACTIVATED CLOTTING TIME: 770 SECONDS (ref 99–130)
ACTIVATED CLOTTING TIME: 811 SECONDS (ref 99–130)
ALLEN TEST: ABNORMAL
ALLEN TEST: NORMAL
ANION GAP SERPL CALCULATED.3IONS-SCNC: 13 MEQ/L (ref 8–16)
ANION GAP SERPL CALCULATED.3IONS-SCNC: 6 MEQ/L (ref 8–16)
ANION GAP SERPL CALCULATED.3IONS-SCNC: 9 MEQ/L (ref 8–16)
ANION GAP SERPL CALCULATED.3IONS-SCNC: 9 MEQ/L (ref 8–16)
APTT: 37.9 SECONDS (ref 22–38)
APTT: 44.2 SECONDS (ref 22–38)
APTT: 72.1 SECONDS (ref 22–38)
BASE EXCESS (CALCULATED): -0.9 MMOL/L (ref -2.5–2.5)
BASE EXCESS (CALCULATED): -0.9 MMOL/L (ref -2.5–2.5)
BASE EXCESS (CALCULATED): -2.3 MMOL/L (ref -2.5–2.5)
BASE EXCESS (CALCULATED): 0 MMOL/L (ref -2.5–2.5)
BASE EXCESS (CALCULATED): 0.9 MMOL/L (ref -2.5–2.5)
BASE EXCESS (CALCULATED): 1.3 MMOL/L (ref -2.5–2.5)
BASE EXCESS (CALCULATED): 1.5 MMOL/L (ref -2.5–2.5)
BASE EXCESS (CALCULATED): 2.2 MMOL/L (ref -2.5–2.5)
BASE EXCESS (CALCULATED): 2.6 MMOL/L (ref -2.5–2.5)
BASE EXCESS (CALCULATED): 2.8 MMOL/L (ref -2.5–2.5)
BASE EXCESS (CALCULATED): 3.3 MMOL/L (ref -2.5–2.5)
BASE EXCESS (CALCULATED): 3.9 MMOL/L (ref -2.5–2.5)
BASE EXCESS (CALCULATED): 7.6 MMOL/L (ref -2.5–2.5)
BASE EXCESS MIXED: 3.1 MMOL/L (ref -2–3)
BUN BLDV-MCNC: 10 MG/DL (ref 7–22)
BUN BLDV-MCNC: 11 MG/DL (ref 7–22)
BUN BLDV-MCNC: 11 MG/DL (ref 7–22)
BUN BLDV-MCNC: 9 MG/DL (ref 7–22)
CALCIUM IONIZED SERUM: 1.01 MMOL/L (ref 1.12–1.32)
CALCIUM IONIZED SERUM: 1.03 MMOL/L (ref 1.12–1.32)
CALCIUM IONIZED SERUM: 1.06 MMOL/L (ref 1.12–1.32)
CALCIUM IONIZED SERUM: 1.07 MMOL/L (ref 1.12–1.32)
CALCIUM IONIZED SERUM: 1.08 MMOL/L (ref 1.12–1.32)
CALCIUM IONIZED SERUM: 1.15 MMOL/L (ref 1.12–1.32)
CALCIUM IONIZED SERUM: 1.16 MMOL/L (ref 1.12–1.32)
CALCIUM IONIZED SERUM: 1.17 MMOL/L (ref 1.12–1.32)
CALCIUM IONIZED SERUM: 1.28 MMOL/L (ref 1.12–1.32)
CALCIUM IONIZED: 1.06 MMOL/L (ref 1.12–1.32)
CALCIUM IONIZED: 1.08 MMOL/L (ref 1.12–1.32)
CALCIUM IONIZED: 1.13 MMOL/L (ref 1.12–1.32)
CALCIUM IONIZED: 1.14 MMOL/L (ref 1.12–1.32)
CALCIUM SERPL-MCNC: 7.2 MG/DL (ref 8.5–10.5)
CALCIUM SERPL-MCNC: 7.5 MG/DL (ref 8.5–10.5)
CALCIUM SERPL-MCNC: 7.6 MG/DL (ref 8.5–10.5)
CALCIUM SERPL-MCNC: 7.7 MG/DL (ref 8.5–10.5)
CARTRIDGE COLOR: NORMAL
CARTRIDGE COLOR: NORMAL
CHLORIDE BLD-SCNC: 103 MEQ/L (ref 98–111)
CHLORIDE BLD-SCNC: 104 MEQ/L (ref 98–111)
CHLORIDE BLD-SCNC: 106 MEQ/L (ref 98–111)
CHLORIDE BLD-SCNC: 106 MEQ/L (ref 98–111)
CO2: 21 MEQ/L (ref 23–33)
CO2: 23 MEQ/L (ref 23–33)
CO2: 24 MEQ/L (ref 23–33)
CO2: 25 MEQ/L (ref 23–33)
COLLECTED BY:: ABNORMAL
COLLECTED BY:: NORMAL
COMMENT: ABNORMAL
CREAT SERPL-MCNC: 1.4 MG/DL (ref 0.4–1.2)
CREAT SERPL-MCNC: 1.4 MG/DL (ref 0.4–1.2)
CREAT SERPL-MCNC: 1.7 MG/DL (ref 0.4–1.2)
CREAT SERPL-MCNC: 1.8 MG/DL (ref 0.4–1.2)
DEVICE: ABNORMAL
DEVICE: NORMAL
ERYTHROCYTE [DISTWIDTH] IN BLOOD BY AUTOMATED COUNT: 17.3 % (ref 11.5–14.5)
ERYTHROCYTE [DISTWIDTH] IN BLOOD BY AUTOMATED COUNT: 17.6 % (ref 11.5–14.5)
ERYTHROCYTE [DISTWIDTH] IN BLOOD BY AUTOMATED COUNT: 17.8 % (ref 11.5–14.5)
ERYTHROCYTE [DISTWIDTH] IN BLOOD BY AUTOMATED COUNT: 17.8 % (ref 11.5–14.5)
ERYTHROCYTE [DISTWIDTH] IN BLOOD BY AUTOMATED COUNT: 55.1 FL (ref 35–45)
ERYTHROCYTE [DISTWIDTH] IN BLOOD BY AUTOMATED COUNT: 56 FL (ref 35–45)
ERYTHROCYTE [DISTWIDTH] IN BLOOD BY AUTOMATED COUNT: 56.1 FL (ref 35–45)
ERYTHROCYTE [DISTWIDTH] IN BLOOD BY AUTOMATED COUNT: 56.7 FL (ref 35–45)
GFR SERPL CREATININE-BSD FRML MDRD: 37 ML/MIN/1.73M2
GFR SERPL CREATININE-BSD FRML MDRD: 40 ML/MIN/1.73M2
GFR SERPL CREATININE-BSD FRML MDRD: 50 ML/MIN/1.73M2
GFR SERPL CREATININE-BSD FRML MDRD: 50 ML/MIN/1.73M2
GLUCOSE BLD-MCNC: 108 MG/DL (ref 70–108)
GLUCOSE BLD-MCNC: 139 MG/DL (ref 70–108)
GLUCOSE BLD-MCNC: 160 MG/DL (ref 70–108)
GLUCOSE BLD-MCNC: 261 MG/DL (ref 70–108)
GLUCOSE, WHOLE BLOOD: 109 MG/DL (ref 70–108)
GLUCOSE, WHOLE BLOOD: 118 MG/DL (ref 70–108)
GLUCOSE, WHOLE BLOOD: 124 MG/DL (ref 70–108)
GLUCOSE, WHOLE BLOOD: 144 MG/DL (ref 70–108)
GLUCOSE, WHOLE BLOOD: 160 MG/DL (ref 70–108)
GLUCOSE, WHOLE BLOOD: 162 MG/DL (ref 70–108)
GLUCOSE, WHOLE BLOOD: 164 MG/DL (ref 70–108)
GLUCOSE, WHOLE BLOOD: 167 MG/DL (ref 70–108)
GLUCOSE, WHOLE BLOOD: 168 MG/DL (ref 70–108)
GLUCOSE, WHOLE BLOOD: 168 MG/DL (ref 70–108)
GLUCOSE, WHOLE BLOOD: 186 MG/DL (ref 70–108)
GLUCOSE, WHOLE BLOOD: 194 MG/DL (ref 70–108)
GLUCOSE, WHOLE BLOOD: 200 MG/DL (ref 70–108)
GLUCOSE, WHOLE BLOOD: 202 MG/DL (ref 70–108)
GLUCOSE, WHOLE BLOOD: 207 MG/DL (ref 70–108)
GLUCOSE, WHOLE BLOOD: 219 MG/DL (ref 70–108)
GLUCOSE, WHOLE BLOOD: 84 MG/DL (ref 70–108)
HCO3, MIXED: 30 MMOL/L (ref 23–28)
HCO3: 22 MMOL/L (ref 23–28)
HCO3: 23 MMOL/L (ref 23–28)
HCO3: 24 MMOL/L (ref 23–28)
HCO3: 25 MMOL/L (ref 23–28)
HCO3: 25 MMOL/L (ref 23–28)
HCO3: 26 MMOL/L (ref 23–28)
HCO3: 27 MMOL/L (ref 23–28)
HCO3: 28 MMOL/L (ref 23–28)
HCO3: 28 MMOL/L (ref 23–28)
HCO3: 29 MMOL/L (ref 23–28)
HCO3: 33 MMOL/L (ref 23–28)
HCT VFR BLD CALC: 20.8 % (ref 42–52)
HCT VFR BLD CALC: 25.5 % (ref 42–52)
HCT VFR BLD CALC: 26 % (ref 42–52)
HCT VFR BLD CALC: 28.4 % (ref 42–52)
HCT VFR BLD CALC: 29.9 % (ref 42–52)
HCT VFR BLD CALC: 31.5 % (ref 42–52)
HEMOGLOBIN FINGERSTICK, POC: 5.9 G/DL (ref 14–18)
HEMOGLOBIN FINGERSTICK, POC: 6 G/DL (ref 14–18)
HEMOGLOBIN FINGERSTICK, POC: 6.3 G/DL (ref 14–18)
HEMOGLOBIN FINGERSTICK, POC: 6.5 G/DL (ref 14–18)
HEMOGLOBIN FINGERSTICK, POC: 7.5 G/DL (ref 14–18)
HEMOGLOBIN FINGERSTICK, POC: 7.6 G/DL (ref 14–18)
HEMOGLOBIN FINGERSTICK, POC: 7.6 G/DL (ref 14–18)
HEMOGLOBIN: 6.8 GM/DL (ref 14–18)
HEMOGLOBIN: 8.2 GM/DL (ref 14–18)
HEMOGLOBIN: 8.3 GM/DL (ref 14–18)
HEMOGLOBIN: 9 GM/DL (ref 14–18)
HEMOGLOBIN: 9.4 GM/DL (ref 14–18)
HEMOGLOBIN: 9.8 GM/DL (ref 14–18)
IFIO2: 25
IFIO2: 30
IFIO2: 30
IFIO2: 40
IFIO2: 80
INR BLD: 1.14 (ref 0.85–1.13)
INR BLD: 1.15 (ref 0.85–1.13)
MAGNESIUM: 2.2 MG/DL (ref 1.6–2.4)
MAGNESIUM: 2.2 MG/DL (ref 1.6–2.4)
MAGNESIUM: 3.3 MG/DL (ref 1.6–2.4)
MAGNESIUM: 3.7 MG/DL (ref 1.6–2.4)
MCH RBC QN AUTO: 27.7 PG (ref 26–33)
MCH RBC QN AUTO: 27.7 PG (ref 26–33)
MCH RBC QN AUTO: 28.1 PG (ref 26–33)
MCH RBC QN AUTO: 28.7 PG (ref 26–33)
MCHC RBC AUTO-ENTMCNC: 31.1 GM/DL (ref 32.2–35.5)
MCHC RBC AUTO-ENTMCNC: 31.4 GM/DL (ref 32.2–35.5)
MCHC RBC AUTO-ENTMCNC: 31.9 GM/DL (ref 32.2–35.5)
MCHC RBC AUTO-ENTMCNC: 32.2 GM/DL (ref 32.2–35.5)
MCV RBC AUTO: 88.1 FL (ref 80–94)
MCV RBC AUTO: 88.2 FL (ref 80–94)
MCV RBC AUTO: 89 FL (ref 80–94)
MCV RBC AUTO: 89.2 FL (ref 80–94)
MODE: ABNORMAL
MODE: ABNORMAL
O2 SAT, MIXED: 56 %
O2 SATURATION: 100 %
O2 SATURATION: 95 %
O2 SATURATION: 98 %
O2 SATURATION: 98 %
O2 SATURATION: 99 %
PATIENT BOLUS: NORMAL
PATIENT HEPARIN CONCENTRATION: 2
PATIENT HEPARIN CONCENTRATION: 3
PCO2, MIXED VENOUS: 59 MMHG (ref 41–51)
PCO2: 34 MMHG (ref 35–45)
PCO2: 34 MMHG (ref 35–45)
PCO2: 37 MMHG (ref 35–45)
PCO2: 39 MMHG (ref 35–45)
PCO2: 42 MMHG (ref 35–45)
PCO2: 44 MMHG (ref 35–45)
PCO2: 44 MMHG (ref 35–45)
PCO2: 45 MMHG (ref 35–45)
PCO2: 45 MMHG (ref 35–45)
PCO2: 48 MMHG (ref 35–45)
PCO2: 51 MMHG (ref 35–45)
PH BLOOD GAS: 7.35 (ref 7.35–7.45)
PH BLOOD GAS: 7.36 (ref 7.35–7.45)
PH BLOOD GAS: 7.39 (ref 7.35–7.45)
PH BLOOD GAS: 7.4 (ref 7.35–7.45)
PH BLOOD GAS: 7.4 (ref 7.35–7.45)
PH BLOOD GAS: 7.41 (ref 7.35–7.45)
PH BLOOD GAS: 7.42 (ref 7.35–7.45)
PH BLOOD GAS: 7.43 (ref 7.35–7.45)
PH BLOOD GAS: 7.43 (ref 7.35–7.45)
PH BLOOD GAS: 7.44 (ref 7.35–7.45)
PH BLOOD GAS: 7.44 (ref 7.35–7.45)
PH BLOOD GAS: 7.45 (ref 7.35–7.45)
PH BLOOD GAS: 7.47 (ref 7.35–7.45)
PH, MIXED: 7.31 (ref 7.31–7.41)
PHOSPHORUS: 2.2 MG/DL (ref 2.4–4.7)
PHOSPHORUS: 2.3 MG/DL (ref 2.4–4.7)
PHOSPHORUS: 2.9 MG/DL (ref 2.4–4.7)
PIP: 12 CMH2O
PLATELET # BLD: 130 THOU/MM3 (ref 130–400)
PLATELET # BLD: 135 THOU/MM3 (ref 130–400)
PLATELET # BLD: 137 THOU/MM3 (ref 130–400)
PLATELET # BLD: 147 THOU/MM3 (ref 130–400)
PLATELET # BLD: 148 THOU/MM3 (ref 130–400)
PLATELET # BLD: 161 THOU/MM3 (ref 130–400)
PMV BLD AUTO: 10.5 FL (ref 9.4–12.4)
PMV BLD AUTO: 10.6 FL (ref 9.4–12.4)
PMV BLD AUTO: 10.7 FL (ref 9.4–12.4)
PMV BLD AUTO: 9.8 FL (ref 9.4–12.4)
PO2 MIXED: 33 MMHG (ref 25–40)
PO2: 103 MMHG (ref 71–104)
PO2: 105 MMHG (ref 71–104)
PO2: 150 MMHG (ref 71–104)
PO2: 237 MMHG (ref 71–104)
PO2: 268 MMHG (ref 71–104)
PO2: 295 MMHG (ref 71–104)
PO2: 300 MMHG (ref 71–104)
PO2: 308 MMHG (ref 71–104)
PO2: 341 MMHG (ref 71–104)
PO2: 344 MMHG (ref 71–104)
PO2: 477 MMHG (ref 71–104)
PO2: 541 MMHG (ref 71–104)
PO2: 69 MMHG (ref 71–104)
POTASSIUM SERPL-SCNC: 4.3 MEQ/L (ref 3.5–5.2)
POTASSIUM SERPL-SCNC: 4.7 MEQ/L (ref 3.5–5.2)
POTASSIUM SERPL-SCNC: 5 MEQ/L (ref 3.5–5.2)
POTASSIUM SERPL-SCNC: 5.5 MEQ/L (ref 3.5–5.2)
POTASSIUM, WHOLE BLOOD: 4.6 MEQ/L (ref 3.5–4.9)
POTASSIUM, WHOLE BLOOD: 4.7 MEQ/L (ref 3.5–4.9)
POTASSIUM, WHOLE BLOOD: 4.9 MEQ/L (ref 3.5–4.9)
POTASSIUM, WHOLE BLOOD: 5.2 MEQ/L (ref 3.5–4.9)
POTASSIUM, WHOLE BLOOD: 5.3 MEQ/L (ref 3.5–4.9)
POTASSIUM, WHOLE BLOOD: 5.3 MEQ/L (ref 3.5–4.9)
POTASSIUM, WHOLE BLOOD: 5.5 MEQ/L (ref 3.5–4.9)
POTASSIUM, WHOLE BLOOD: 5.9 MEQ/L (ref 3.5–4.9)
POTASSIUM, WHOLE BLOOD: 6.2 MEQ/L (ref 3.5–4.9)
PROJECTED HEPARIN CONCENTATION: 2
RANGE: NORMAL
RANGE: NORMAL
RBC # BLD: 2.86 MILL/MM3 (ref 4.7–6.1)
RBC # BLD: 2.95 MILL/MM3 (ref 4.7–6.1)
RBC # BLD: 3.39 MILL/MM3 (ref 4.7–6.1)
RBC # BLD: 3.54 MILL/MM3 (ref 4.7–6.1)
SET PEEP: 5 MMHG
SET PEEP: 5 MMHG
SET PEEP: 6 MMHG
SET PRESS SUPP: 12 CMH2O
SET RESPIRATORY RATE: 16 BPM
SET TIDAL VOLUME: 600 ML
SODIUM BLD-SCNC: 135 MEQ/L (ref 135–145)
SODIUM BLD-SCNC: 136 MEQ/L (ref 135–145)
SODIUM BLD-SCNC: 138 MEQ/L (ref 135–145)
SODIUM BLD-SCNC: 140 MEQ/L (ref 135–145)
SODIUM, WHOLE BLOOD: 137 MEQ/L (ref 138–146)
SODIUM, WHOLE BLOOD: 138 MEQ/L (ref 138–146)
SODIUM, WHOLE BLOOD: 139 MEQ/L (ref 138–146)
SODIUM, WHOLE BLOOD: 140 MEQ/L (ref 138–146)
SODIUM, WHOLE BLOOD: 141 MEQ/L (ref 138–146)
SOURCE, BLOOD GAS: ABNORMAL
SOURCE, BLOOD GAS: NORMAL
WBC # BLD: 13.5 THOU/MM3 (ref 4.8–10.8)
WBC # BLD: 16.9 THOU/MM3 (ref 4.8–10.8)
WBC # BLD: 6 THOU/MM3 (ref 4.8–10.8)
WBC # BLD: 6.1 THOU/MM3 (ref 4.8–10.8)

## 2019-01-28 PROCEDURE — 2580000003 HC RX 258

## 2019-01-28 PROCEDURE — 2580000003 HC RX 258: Performed by: INTERNAL MEDICINE

## 2019-01-28 PROCEDURE — 99232 SBSQ HOSP IP/OBS MODERATE 35: CPT | Performed by: INTERNAL MEDICINE

## 2019-01-28 PROCEDURE — 02100Z9 BYPASS CORONARY ARTERY, ONE ARTERY FROM LEFT INTERNAL MAMMARY, OPEN APPROACH: ICD-10-PCS | Performed by: THORACIC SURGERY (CARDIOTHORACIC VASCULAR SURGERY)

## 2019-01-28 PROCEDURE — 94003 VENT MGMT INPAT SUBQ DAY: CPT

## 2019-01-28 PROCEDURE — 3600000018 HC SURGERY OHS ADDTL 15MIN: Performed by: THORACIC SURGERY (CARDIOTHORACIC VASCULAR SURGERY)

## 2019-01-28 PROCEDURE — P9045 ALBUMIN (HUMAN), 5%, 250 ML: HCPCS | Performed by: NURSE ANESTHETIST, CERTIFIED REGISTERED

## 2019-01-28 PROCEDURE — 33518 CABG ARTERY-VEIN TWO: CPT | Performed by: THORACIC SURGERY (CARDIOTHORACIC VASCULAR SURGERY)

## 2019-01-28 PROCEDURE — 82330 ASSAY OF CALCIUM: CPT

## 2019-01-28 PROCEDURE — 6360000002 HC RX W HCPCS: Performed by: THORACIC SURGERY (CARDIOTHORACIC VASCULAR SURGERY)

## 2019-01-28 PROCEDURE — 85520 HEPARIN ASSAY: CPT

## 2019-01-28 PROCEDURE — 84100 ASSAY OF PHOSPHORUS: CPT

## 2019-01-28 PROCEDURE — 2500000003 HC RX 250 WO HCPCS

## 2019-01-28 PROCEDURE — 85018 HEMOGLOBIN: CPT

## 2019-01-28 PROCEDURE — 021109W BYPASS CORONARY ARTERY, TWO ARTERIES FROM AORTA WITH AUTOLOGOUS VENOUS TISSUE, OPEN APPROACH: ICD-10-PCS | Performed by: THORACIC SURGERY (CARDIOTHORACIC VASCULAR SURGERY)

## 2019-01-28 PROCEDURE — 6370000000 HC RX 637 (ALT 250 FOR IP): Performed by: THORACIC SURGERY (CARDIOTHORACIC VASCULAR SURGERY)

## 2019-01-28 PROCEDURE — 6370000000 HC RX 637 (ALT 250 FOR IP): Performed by: NURSE ANESTHETIST, CERTIFIED REGISTERED

## 2019-01-28 PROCEDURE — 2780000006 HC MISC HEART VALVE: Performed by: THORACIC SURGERY (CARDIOTHORACIC VASCULAR SURGERY)

## 2019-01-28 PROCEDURE — 3600000008 HC SURGERY OHS BASE: Performed by: THORACIC SURGERY (CARDIOTHORACIC VASCULAR SURGERY)

## 2019-01-28 PROCEDURE — 71045 X-RAY EXAM CHEST 1 VIEW: CPT

## 2019-01-28 PROCEDURE — 2709999900 HC NON-CHARGEABLE SUPPLY: Performed by: THORACIC SURGERY (CARDIOTHORACIC VASCULAR SURGERY)

## 2019-01-28 PROCEDURE — 33533 CABG ARTERIAL SINGLE: CPT | Performed by: THORACIC SURGERY (CARDIOTHORACIC VASCULAR SURGERY)

## 2019-01-28 PROCEDURE — S0028 INJECTION, FAMOTIDINE, 20 MG: HCPCS | Performed by: THORACIC SURGERY (CARDIOTHORACIC VASCULAR SURGERY)

## 2019-01-28 PROCEDURE — 82803 BLOOD GASES ANY COMBINATION: CPT

## 2019-01-28 PROCEDURE — 5A02210 ASSISTANCE WITH CARDIAC OUTPUT USING BALLOON PUMP, CONTINUOUS: ICD-10-PCS | Performed by: THORACIC SURGERY (CARDIOTHORACIC VASCULAR SURGERY)

## 2019-01-28 PROCEDURE — P9047 ALBUMIN (HUMAN), 25%, 50ML: HCPCS

## 2019-01-28 PROCEDURE — 2780000010 HC IMPLANT OTHER: Performed by: THORACIC SURGERY (CARDIOTHORACIC VASCULAR SURGERY)

## 2019-01-28 PROCEDURE — 82947 ASSAY GLUCOSE BLOOD QUANT: CPT

## 2019-01-28 PROCEDURE — 2580000003 HC RX 258: Performed by: THORACIC SURGERY (CARDIOTHORACIC VASCULAR SURGERY)

## 2019-01-28 PROCEDURE — P9045 ALBUMIN (HUMAN), 5%, 250 ML: HCPCS

## 2019-01-28 PROCEDURE — 33508 ENDOSCOPIC VEIN HARVEST: CPT | Performed by: THORACIC SURGERY (CARDIOTHORACIC VASCULAR SURGERY)

## 2019-01-28 PROCEDURE — 2709999900 HC NON-CHARGEABLE SUPPLY

## 2019-01-28 PROCEDURE — 2500000003 HC RX 250 WO HCPCS: Performed by: NURSE ANESTHETIST, CERTIFIED REGISTERED

## 2019-01-28 PROCEDURE — 94660 CPAP INITIATION&MGMT: CPT

## 2019-01-28 PROCEDURE — 6360000002 HC RX W HCPCS

## 2019-01-28 PROCEDURE — 3700000001 HC ADD 15 MINUTES (ANESTHESIA): Performed by: THORACIC SURGERY (CARDIOTHORACIC VASCULAR SURGERY)

## 2019-01-28 PROCEDURE — 33426 REPAIR OF MITRAL VALVE: CPT | Performed by: THORACIC SURGERY (CARDIOTHORACIC VASCULAR SURGERY)

## 2019-01-28 PROCEDURE — 2500000003 HC RX 250 WO HCPCS: Performed by: THORACIC SURGERY (CARDIOTHORACIC VASCULAR SURGERY)

## 2019-01-28 PROCEDURE — 33259 ABLATE ATRIA W/BYPASS ADD-ON: CPT | Performed by: THORACIC SURGERY (CARDIOTHORACIC VASCULAR SURGERY)

## 2019-01-28 PROCEDURE — 6370000000 HC RX 637 (ALT 250 FOR IP): Performed by: INTERNAL MEDICINE

## 2019-01-28 PROCEDURE — 2580000003 HC RX 258: Performed by: NURSE ANESTHETIST, CERTIFIED REGISTERED

## 2019-01-28 PROCEDURE — 2700000000 HC OXYGEN THERAPY PER DAY

## 2019-01-28 PROCEDURE — 37799 UNLISTED PX VASCULAR SURGERY: CPT

## 2019-01-28 PROCEDURE — 84132 ASSAY OF SERUM POTASSIUM: CPT

## 2019-01-28 PROCEDURE — 85730 THROMBOPLASTIN TIME PARTIAL: CPT

## 2019-01-28 PROCEDURE — P9045 ALBUMIN (HUMAN), 5%, 250 ML: HCPCS | Performed by: THORACIC SURGERY (CARDIOTHORACIC VASCULAR SURGERY)

## 2019-01-28 PROCEDURE — 5A1221Z PERFORMANCE OF CARDIAC OUTPUT, CONTINUOUS: ICD-10-PCS | Performed by: THORACIC SURGERY (CARDIOTHORACIC VASCULAR SURGERY)

## 2019-01-28 PROCEDURE — 3700000000 HC ANESTHESIA ATTENDED CARE: Performed by: THORACIC SURGERY (CARDIOTHORACIC VASCULAR SURGERY)

## 2019-01-28 PROCEDURE — 36415 COLL VENOUS BLD VENIPUNCTURE: CPT

## 2019-01-28 PROCEDURE — 02UG0JZ SUPPLEMENT MITRAL VALVE WITH SYNTHETIC SUBSTITUTE, OPEN APPROACH: ICD-10-PCS | Performed by: THORACIC SURGERY (CARDIOTHORACIC VASCULAR SURGERY)

## 2019-01-28 PROCEDURE — 02580ZZ DESTRUCTION OF CONDUCTION MECHANISM, OPEN APPROACH: ICD-10-PCS | Performed by: THORACIC SURGERY (CARDIOTHORACIC VASCULAR SURGERY)

## 2019-01-28 PROCEDURE — 6360000002 HC RX W HCPCS: Performed by: NURSE ANESTHETIST, CERTIFIED REGISTERED

## 2019-01-28 PROCEDURE — 6360000002 HC RX W HCPCS: Performed by: INTERNAL MEDICINE

## 2019-01-28 PROCEDURE — 06BP4ZZ EXCISION OF RIGHT SAPHENOUS VEIN, PERCUTANEOUS ENDOSCOPIC APPROACH: ICD-10-PCS | Performed by: THORACIC SURGERY (CARDIOTHORACIC VASCULAR SURGERY)

## 2019-01-28 PROCEDURE — 33967 INSERT I-AORT PERCUT DEVICE: CPT | Performed by: THORACIC SURGERY (CARDIOTHORACIC VASCULAR SURGERY)

## 2019-01-28 PROCEDURE — 85027 COMPLETE CBC AUTOMATED: CPT

## 2019-01-28 PROCEDURE — 2000000000 HC ICU R&B

## 2019-01-28 PROCEDURE — 6370000000 HC RX 637 (ALT 250 FOR IP): Performed by: PHYSICIAN ASSISTANT

## 2019-01-28 PROCEDURE — 80048 BASIC METABOLIC PNL TOTAL CA: CPT

## 2019-01-28 PROCEDURE — 2720000010 HC SURG SUPPLY STERILE: Performed by: THORACIC SURGERY (CARDIOTHORACIC VASCULAR SURGERY)

## 2019-01-28 PROCEDURE — 84295 ASSAY OF SERUM SODIUM: CPT

## 2019-01-28 PROCEDURE — 83735 ASSAY OF MAGNESIUM: CPT

## 2019-01-28 PROCEDURE — 85610 PROTHROMBIN TIME: CPT

## 2019-01-28 DEVICE — RING ANNULO HEART MITRAL PHYSIO 28MM: Type: IMPLANTABLE DEVICE | Site: HEART | Status: FUNCTIONAL

## 2019-01-28 DEVICE — ZINACTIVE USE 2540329 DEVICE OCCL CLP L40MM PLUNG GRP FLX SHFT FOR GILLINOV: Type: IMPLANTABLE DEVICE | Site: HEART | Status: FUNCTIONAL

## 2019-01-28 RX ORDER — PROTAMINE SULFATE 10 MG/ML
50 INJECTION, SOLUTION INTRAVENOUS
Status: ACTIVE | OUTPATIENT
Start: 2019-01-28 | End: 2019-01-28

## 2019-01-28 RX ORDER — DEXTROSE MONOHYDRATE 50 MG/ML
100 INJECTION, SOLUTION INTRAVENOUS PRN
Status: DISCONTINUED | OUTPATIENT
Start: 2019-01-28 | End: 2019-02-05

## 2019-01-28 RX ORDER — CEFAZOLIN SODIUM 1 G/3ML
INJECTION, POWDER, FOR SOLUTION INTRAMUSCULAR; INTRAVENOUS PRN
Status: DISCONTINUED | OUTPATIENT
Start: 2019-01-28 | End: 2019-01-29 | Stop reason: SDUPTHER

## 2019-01-28 RX ORDER — PROPOFOL 10 MG/ML
INJECTION, EMULSION INTRAVENOUS PRN
Status: DISCONTINUED | OUTPATIENT
Start: 2019-01-28 | End: 2019-01-29 | Stop reason: SDUPTHER

## 2019-01-28 RX ORDER — SODIUM CHLORIDE 9 MG/ML
INJECTION, SOLUTION INTRAVENOUS CONTINUOUS PRN
Status: DISCONTINUED | OUTPATIENT
Start: 2019-01-28 | End: 2019-01-29 | Stop reason: SDUPTHER

## 2019-01-28 RX ORDER — ALBUTEROL SULFATE 90 UG/1
2 AEROSOL, METERED RESPIRATORY (INHALATION) EVERY 6 HOURS PRN
Status: DISCONTINUED | OUTPATIENT
Start: 2019-01-28 | End: 2019-02-12 | Stop reason: HOSPADM

## 2019-01-28 RX ORDER — OXYCODONE HYDROCHLORIDE 5 MG/1
10 TABLET ORAL EVERY 4 HOURS PRN
Status: DISCONTINUED | OUTPATIENT
Start: 2019-01-28 | End: 2019-02-01

## 2019-01-28 RX ORDER — SUCCINYLCHOLINE CHLORIDE 20 MG/ML
INJECTION INTRAMUSCULAR; INTRAVENOUS PRN
Status: DISCONTINUED | OUTPATIENT
Start: 2019-01-28 | End: 2019-01-29 | Stop reason: SDUPTHER

## 2019-01-28 RX ORDER — ALBUMIN, HUMAN INJ 5% 5 %
SOLUTION INTRAVENOUS
Status: COMPLETED
Start: 2019-01-28 | End: 2019-01-28

## 2019-01-28 RX ORDER — NICOTINE POLACRILEX 4 MG
15 LOZENGE BUCCAL PRN
Status: DISCONTINUED | OUTPATIENT
Start: 2019-01-28 | End: 2019-02-05

## 2019-01-28 RX ORDER — BISACODYL 10 MG
10 SUPPOSITORY, RECTAL RECTAL DAILY PRN
Status: DISCONTINUED | OUTPATIENT
Start: 2019-01-28 | End: 2019-02-12 | Stop reason: HOSPADM

## 2019-01-28 RX ORDER — M-VIT,TX,IRON,MINS/CALC/FOLIC 27MG-0.4MG
1 TABLET ORAL
Status: DISCONTINUED | OUTPATIENT
Start: 2019-01-29 | End: 2019-02-03

## 2019-01-28 RX ORDER — SODIUM CHLORIDE 9 MG/ML
INJECTION, SOLUTION INTRAVENOUS
Status: DISCONTINUED | OUTPATIENT
Start: 2019-01-28 | End: 2019-01-29

## 2019-01-28 RX ORDER — MIDAZOLAM HYDROCHLORIDE 1 MG/ML
INJECTION INTRAMUSCULAR; INTRAVENOUS PRN
Status: DISCONTINUED | OUTPATIENT
Start: 2019-01-28 | End: 2019-01-29 | Stop reason: SDUPTHER

## 2019-01-28 RX ORDER — PROTAMINE SULFATE 10 MG/ML
INJECTION, SOLUTION INTRAVENOUS PRN
Status: DISCONTINUED | OUTPATIENT
Start: 2019-01-28 | End: 2019-01-29 | Stop reason: SDUPTHER

## 2019-01-28 RX ORDER — POTASSIUM CHLORIDE 29.8 MG/ML
20 INJECTION INTRAVENOUS PRN
Status: DISCONTINUED | OUTPATIENT
Start: 2019-01-29 | End: 2019-02-12 | Stop reason: HOSPADM

## 2019-01-28 RX ORDER — METOPROLOL TARTRATE 5 MG/5ML
2.5 INJECTION INTRAVENOUS EVERY 10 MIN PRN
Status: DISCONTINUED | OUTPATIENT
Start: 2019-01-28 | End: 2019-02-05

## 2019-01-28 RX ORDER — CHLORHEXIDINE GLUCONATE 0.12 MG/ML
15 RINSE ORAL ONCE
Status: COMPLETED | OUTPATIENT
Start: 2019-01-28 | End: 2019-01-28

## 2019-01-28 RX ORDER — ALBUMIN, HUMAN INJ 5% 5 %
SOLUTION INTRAVENOUS PRN
Status: DISCONTINUED | OUTPATIENT
Start: 2019-01-28 | End: 2019-01-29 | Stop reason: SDUPTHER

## 2019-01-28 RX ORDER — DEXTROSE MONOHYDRATE 25 G/50ML
12.5 INJECTION, SOLUTION INTRAVENOUS PRN
Status: DISCONTINUED | OUTPATIENT
Start: 2019-01-28 | End: 2019-02-01 | Stop reason: SDUPTHER

## 2019-01-28 RX ORDER — SODIUM CHLORIDE 9 MG/ML
INJECTION, SOLUTION INTRAVENOUS CONTINUOUS
Status: DISCONTINUED | OUTPATIENT
Start: 2019-01-28 | End: 2019-01-30

## 2019-01-28 RX ORDER — OXYCODONE HYDROCHLORIDE 5 MG/1
5 TABLET ORAL EVERY 4 HOURS PRN
Status: DISCONTINUED | OUTPATIENT
Start: 2019-01-28 | End: 2019-02-01

## 2019-01-28 RX ORDER — HEPARIN SODIUM 1000 [USP'U]/ML
INJECTION, SOLUTION INTRAVENOUS; SUBCUTANEOUS PRN
Status: DISCONTINUED | OUTPATIENT
Start: 2019-01-28 | End: 2019-01-29 | Stop reason: SDUPTHER

## 2019-01-28 RX ORDER — ONDANSETRON 2 MG/ML
4 INJECTION INTRAMUSCULAR; INTRAVENOUS EVERY 6 HOURS PRN
Status: DISCONTINUED | OUTPATIENT
Start: 2019-01-28 | End: 2019-02-12 | Stop reason: HOSPADM

## 2019-01-28 RX ORDER — SODIUM CHLORIDE 0.9 % (FLUSH) 0.9 %
10 SYRINGE (ML) INJECTION EVERY 12 HOURS SCHEDULED
Status: DISCONTINUED | OUTPATIENT
Start: 2019-01-28 | End: 2019-02-05 | Stop reason: SDUPTHER

## 2019-01-28 RX ORDER — MORPHINE SULFATE 2 MG/ML
INJECTION, SOLUTION INTRAMUSCULAR; INTRAVENOUS
Status: COMPLETED
Start: 2019-01-28 | End: 2019-01-28

## 2019-01-28 RX ORDER — ENALAPRILAT 2.5 MG/2ML
0.62 INJECTION INTRAVENOUS
Status: DISCONTINUED | OUTPATIENT
Start: 2019-01-28 | End: 2019-02-05

## 2019-01-28 RX ORDER — VECURONIUM BROMIDE 1 MG/ML
INJECTION, POWDER, LYOPHILIZED, FOR SOLUTION INTRAVENOUS PRN
Status: DISCONTINUED | OUTPATIENT
Start: 2019-01-28 | End: 2019-01-29 | Stop reason: SDUPTHER

## 2019-01-28 RX ORDER — ACETAMINOPHEN 325 MG/1
650 TABLET ORAL EVERY 4 HOURS PRN
Status: DISCONTINUED | OUTPATIENT
Start: 2019-01-28 | End: 2019-02-12 | Stop reason: HOSPADM

## 2019-01-28 RX ORDER — ALBUMIN, HUMAN INJ 5% 5 %
25 SOLUTION INTRAVENOUS PRN
Status: DISCONTINUED | OUTPATIENT
Start: 2019-01-28 | End: 2019-02-05

## 2019-01-28 RX ORDER — SENNOSIDES 8.8 MG/5ML
10 LIQUID ORAL DAILY PRN
Status: DISCONTINUED | OUTPATIENT
Start: 2019-01-28 | End: 2019-02-12 | Stop reason: HOSPADM

## 2019-01-28 RX ORDER — ACETAMINOPHEN 650 MG/1
650 SUPPOSITORY RECTAL EVERY 4 HOURS PRN
Status: DISCONTINUED | OUTPATIENT
Start: 2019-01-28 | End: 2019-02-05

## 2019-01-28 RX ORDER — NITROGLYCERIN 20 MG/100ML
INJECTION INTRAVENOUS PRN
Status: DISCONTINUED | OUTPATIENT
Start: 2019-01-28 | End: 2019-01-29 | Stop reason: SDUPTHER

## 2019-01-28 RX ORDER — FAMOTIDINE 20 MG/1
20 TABLET, FILM COATED ORAL 2 TIMES DAILY
Status: DISCONTINUED | OUTPATIENT
Start: 2019-01-30 | End: 2019-02-01

## 2019-01-28 RX ORDER — CHLORHEXIDINE GLUCONATE 4 G/100ML
SOLUTION TOPICAL SEE ADMIN INSTRUCTIONS
Status: DISCONTINUED | OUTPATIENT
Start: 2019-01-28 | End: 2019-01-28 | Stop reason: HOSPADM

## 2019-01-28 RX ORDER — SODIUM CHLORIDE 0.9 % (FLUSH) 0.9 %
10 SYRINGE (ML) INJECTION PRN
Status: DISCONTINUED | OUTPATIENT
Start: 2019-01-28 | End: 2019-02-05 | Stop reason: SDUPTHER

## 2019-01-28 RX ORDER — FENTANYL CITRATE 50 UG/ML
INJECTION, SOLUTION INTRAMUSCULAR; INTRAVENOUS PRN
Status: DISCONTINUED | OUTPATIENT
Start: 2019-01-28 | End: 2019-01-29 | Stop reason: SDUPTHER

## 2019-01-28 RX ORDER — MORPHINE SULFATE 2 MG/ML
2 INJECTION, SOLUTION INTRAMUSCULAR; INTRAVENOUS
Status: DISCONTINUED | OUTPATIENT
Start: 2019-01-28 | End: 2019-02-01

## 2019-01-28 RX ORDER — DOCUSATE SODIUM 100 MG/1
100 CAPSULE, LIQUID FILLED ORAL 2 TIMES DAILY
Status: DISCONTINUED | OUTPATIENT
Start: 2019-01-29 | End: 2019-02-02

## 2019-01-28 RX ORDER — AMIODARONE HYDROCHLORIDE 200 MG/1
200 TABLET ORAL 2 TIMES DAILY
Status: DISCONTINUED | OUTPATIENT
Start: 2019-01-28 | End: 2019-01-31

## 2019-01-28 RX ORDER — VASOPRESSIN 20 U/ML
INJECTION PARENTERAL CONTINUOUS PRN
Status: DISCONTINUED | OUTPATIENT
Start: 2019-01-28 | End: 2019-01-29 | Stop reason: SDUPTHER

## 2019-01-28 RX ORDER — ROCURONIUM BROMIDE 10 MG/ML
INJECTION, SOLUTION INTRAVENOUS PRN
Status: DISCONTINUED | OUTPATIENT
Start: 2019-01-28 | End: 2019-01-29 | Stop reason: SDUPTHER

## 2019-01-28 RX ADMIN — FENTANYL CITRATE 100 MCG: 50 INJECTION INTRAMUSCULAR; INTRAVENOUS at 08:45

## 2019-01-28 RX ADMIN — PHENYLEPHRINE HYDROCHLORIDE 100 MCG: 10 INJECTION INTRAVENOUS at 08:17

## 2019-01-28 RX ADMIN — Medication 10 ML: at 20:55

## 2019-01-28 RX ADMIN — Medication 2370 ML/HR: at 03:41

## 2019-01-28 RX ADMIN — Medication 2370 ML/HR: at 22:27

## 2019-01-28 RX ADMIN — Medication 15 ML: at 06:16

## 2019-01-28 RX ADMIN — VECURONIUM BROMIDE 6 MG: 10 INJECTION, POWDER, LYOPHILIZED, FOR SOLUTION INTRAVENOUS at 09:17

## 2019-01-28 RX ADMIN — Medication 2370 ML/HR: at 17:30

## 2019-01-28 RX ADMIN — Medication 2.8 UNITS/HR: at 10:19

## 2019-01-28 RX ADMIN — FAMOTIDINE 20 MG: 10 INJECTION, SOLUTION INTRAVENOUS at 21:51

## 2019-01-28 RX ADMIN — OXYCODONE HYDROCHLORIDE 10 MG: 5 TABLET ORAL at 23:21

## 2019-01-28 RX ADMIN — METOPROLOL TARTRATE 12.5 MG: 25 TABLET ORAL at 06:12

## 2019-01-28 RX ADMIN — Medication 2370 ML/HR: at 03:40

## 2019-01-28 RX ADMIN — MORPHINE SULFATE 2 MG: 2 INJECTION, SOLUTION INTRAMUSCULAR; INTRAVENOUS at 15:20

## 2019-01-28 RX ADMIN — MIDAZOLAM HYDROCHLORIDE 5 MG: 1 INJECTION, SOLUTION INTRAMUSCULAR; INTRAVENOUS at 12:21

## 2019-01-28 RX ADMIN — CALCIUM GLUCONATE 1.5 G: 98 INJECTION, SOLUTION INTRAVENOUS at 20:55

## 2019-01-28 RX ADMIN — PROPOFOL 100 MG: 10 INJECTION, EMULSION INTRAVENOUS at 07:48

## 2019-01-28 RX ADMIN — VECURONIUM BROMIDE 10 MG: 10 INJECTION, POWDER, LYOPHILIZED, FOR SOLUTION INTRAVENOUS at 08:04

## 2019-01-28 RX ADMIN — FENTANYL CITRATE 100 MCG: 50 INJECTION INTRAMUSCULAR; INTRAVENOUS at 08:40

## 2019-01-28 RX ADMIN — PHENYLEPHRINE HYDROCHLORIDE 100 MCG: 10 INJECTION INTRAVENOUS at 13:41

## 2019-01-28 RX ADMIN — SODIUM CHLORIDE: 9 INJECTION, SOLUTION INTRAVENOUS at 08:08

## 2019-01-28 RX ADMIN — ALBUMIN (HUMAN) 25 G: 12.5 INJECTION, SOLUTION INTRAVENOUS at 15:31

## 2019-01-28 RX ADMIN — FENTANYL CITRATE 100 MCG: 50 INJECTION INTRAMUSCULAR; INTRAVENOUS at 09:47

## 2019-01-28 RX ADMIN — CEFAZOLIN 2000 MG: 1 INJECTION, POWDER, FOR SOLUTION INTRAMUSCULAR; INTRAVENOUS; PARENTERAL at 08:46

## 2019-01-28 RX ADMIN — MORPHINE SULFATE 2 MG: 2 INJECTION, SOLUTION INTRAMUSCULAR; INTRAVENOUS at 16:08

## 2019-01-28 RX ADMIN — Medication 10 ML: at 21:56

## 2019-01-28 RX ADMIN — PHENYLEPHRINE HYDROCHLORIDE 100 MCG: 10 INJECTION INTRAVENOUS at 08:10

## 2019-01-28 RX ADMIN — DESMOPRESSIN ACETATE 24 MCG: 4 INJECTION, SOLUTION INTRAVENOUS; SUBCUTANEOUS at 14:07

## 2019-01-28 RX ADMIN — HEPARIN SODIUM 5000 UNITS: 1000 INJECTION, SOLUTION INTRAVENOUS; SUBCUTANEOUS at 09:29

## 2019-01-28 RX ADMIN — NITROGLYCERIN 50 MCG: 20 INJECTION INTRAVENOUS at 13:34

## 2019-01-28 RX ADMIN — PROTAMINE SULFATE 200 MG: 10 INJECTION, SOLUTION INTRAVENOUS at 13:27

## 2019-01-28 RX ADMIN — CALCIUM GLUCONATE 1.5 G: 98 INJECTION, SOLUTION INTRAVENOUS at 01:22

## 2019-01-28 RX ADMIN — SODIUM CHLORIDE: 9 INJECTION, SOLUTION INTRAVENOUS at 07:30

## 2019-01-28 RX ADMIN — Medication 2370 ML/HR: at 03:39

## 2019-01-28 RX ADMIN — ALBUMIN (HUMAN) 250 ML: 12.5 SOLUTION INTRAVENOUS at 13:38

## 2019-01-28 RX ADMIN — FENTANYL CITRATE 100 MCG: 50 INJECTION INTRAMUSCULAR; INTRAVENOUS at 09:43

## 2019-01-28 RX ADMIN — ALBUMIN (HUMAN) 25 G: 12.5 INJECTION, SOLUTION INTRAVENOUS at 18:01

## 2019-01-28 RX ADMIN — OXYCODONE HYDROCHLORIDE 10 MG: 5 TABLET ORAL at 18:57

## 2019-01-28 RX ADMIN — Medication 10 ML: at 21:58

## 2019-01-28 RX ADMIN — PHENYLEPHRINE HYDROCHLORIDE 100 MCG: 10 INJECTION INTRAVENOUS at 13:16

## 2019-01-28 RX ADMIN — ROCURONIUM BROMIDE 20 MG: 10 INJECTION INTRAVENOUS at 12:21

## 2019-01-28 RX ADMIN — ALBUMIN (HUMAN) 250 ML: 12.5 SOLUTION INTRAVENOUS at 14:01

## 2019-01-28 RX ADMIN — AMIODARONE HYDROCHLORIDE 200 MG: 200 TABLET ORAL at 21:20

## 2019-01-28 RX ADMIN — SUCCINYLCHOLINE CHLORIDE 100 MG: 20 INJECTION, SOLUTION INTRAMUSCULAR; INTRAVENOUS at 07:48

## 2019-01-28 RX ADMIN — SODIUM CHLORIDE: 9 INJECTION, SOLUTION INTRAVENOUS at 15:31

## 2019-01-28 RX ADMIN — Medication 10 ML: at 21:57

## 2019-01-28 RX ADMIN — NITROGLYCERIN 50 MCG: 20 INJECTION INTRAVENOUS at 13:35

## 2019-01-28 RX ADMIN — PHENYLEPHRINE HYDROCHLORIDE 100 MCG: 10 INJECTION INTRAVENOUS at 13:07

## 2019-01-28 RX ADMIN — SODIUM CHLORIDE 0.2 MCG/KG/HR: 9 INJECTION, SOLUTION INTRAVENOUS at 16:23

## 2019-01-28 RX ADMIN — HEPARIN SODIUM 26000 UNITS: 1000 INJECTION, SOLUTION INTRAVENOUS; SUBCUTANEOUS at 09:58

## 2019-01-28 RX ADMIN — FENTANYL CITRATE 100 MCG: 50 INJECTION INTRAMUSCULAR; INTRAVENOUS at 07:48

## 2019-01-28 RX ADMIN — AMINOCAPROIC ACID 5 G/HR: 250 INJECTION, SOLUTION INTRAVENOUS at 09:07

## 2019-01-28 RX ADMIN — Medication 2 G: at 17:15

## 2019-01-28 RX ADMIN — VASOPRESSIN 0.04 UNITS/HR: 20 INJECTION INTRAVENOUS at 13:54

## 2019-01-28 RX ADMIN — FENTANYL CITRATE 100 MCG: 50 INJECTION INTRAMUSCULAR; INTRAVENOUS at 09:11

## 2019-01-28 RX ADMIN — MORPHINE SULFATE 2 MG: 2 INJECTION, SOLUTION INTRAMUSCULAR; INTRAVENOUS at 17:15

## 2019-01-28 RX ADMIN — Medication 10 ML: at 21:55

## 2019-01-28 RX ADMIN — Medication 2370 ML/HR: at 22:26

## 2019-01-28 RX ADMIN — Medication 2370 ML/HR: at 22:28

## 2019-01-28 RX ADMIN — PHENYLEPHRINE HYDROCHLORIDE 50 MCG/MIN: 10 INJECTION, SOLUTION INTRAMUSCULAR; INTRAVENOUS; SUBCUTANEOUS at 13:06

## 2019-01-28 RX ADMIN — Medication 10 ML: at 21:59

## 2019-01-28 RX ADMIN — EPINEPHRINE 5 MCG/MIN: 1 INJECTION, SOLUTION INTRAMUSCULAR; INTRAVENOUS; SUBCUTANEOUS at 12:50

## 2019-01-28 RX ADMIN — PHENYLEPHRINE HYDROCHLORIDE 100 MCG: 10 INJECTION INTRAVENOUS at 07:52

## 2019-01-28 RX ADMIN — SODIUM CHLORIDE: 9 INJECTION, SOLUTION INTRAVENOUS at 08:37

## 2019-01-28 ASSESSMENT — PULMONARY FUNCTION TESTS
PIF_VALUE: 0
PIF_VALUE: 20
PIF_VALUE: 23
PIF_VALUE: 21
PIF_VALUE: 0
PIF_VALUE: 20
PIF_VALUE: 22
PIF_VALUE: 0
PIF_VALUE: 18
PIF_VALUE: 19
PIF_VALUE: 21
PIF_VALUE: 23
PIF_VALUE: 18
PIF_VALUE: 18
PIF_VALUE: 19
PIF_VALUE: 21
PIF_VALUE: 18
PIF_VALUE: 19
PIF_VALUE: 20
PIF_VALUE: 22
PIF_VALUE: 19
PIF_VALUE: 22
PIF_VALUE: 21
PIF_VALUE: 1
PIF_VALUE: 20
PIF_VALUE: 23
PIF_VALUE: 1
PIF_VALUE: 21
PIF_VALUE: 0
PIF_VALUE: 1
PIF_VALUE: 21
PIF_VALUE: 1
PIF_VALUE: 21
PIF_VALUE: 22
PIF_VALUE: 21
PIF_VALUE: 1
PIF_VALUE: 21
PIF_VALUE: 24
PIF_VALUE: 19
PIF_VALUE: 20
PIF_VALUE: 21
PIF_VALUE: 0
PIF_VALUE: 0
PIF_VALUE: 18
PIF_VALUE: 0
PIF_VALUE: 9
PIF_VALUE: 0
PIF_VALUE: 1
PIF_VALUE: 1
PIF_VALUE: 33
PIF_VALUE: 0
PIF_VALUE: 21
PIF_VALUE: 1
PIF_VALUE: 2
PIF_VALUE: 20
PIF_VALUE: 20
PIF_VALUE: 21
PIF_VALUE: 21
PIF_VALUE: 20
PIF_VALUE: 31
PIF_VALUE: 20
PIF_VALUE: 21
PIF_VALUE: 19
PIF_VALUE: 0
PIF_VALUE: 0
PIF_VALUE: 18
PIF_VALUE: 1
PIF_VALUE: 18
PIF_VALUE: 22
PIF_VALUE: 21
PIF_VALUE: 19
PIF_VALUE: 21
PIF_VALUE: 21
PIF_VALUE: 19
PIF_VALUE: 3
PIF_VALUE: 0
PIF_VALUE: 1
PIF_VALUE: 21
PIF_VALUE: 1
PIF_VALUE: 20
PIF_VALUE: 1
PIF_VALUE: 0
PIF_VALUE: 0
PIF_VALUE: 18
PIF_VALUE: 18
PIF_VALUE: 0
PIF_VALUE: 0
PIF_VALUE: 2
PIF_VALUE: 20
PIF_VALUE: 18
PIF_VALUE: 21
PIF_VALUE: 21
PIF_VALUE: 19
PIF_VALUE: 21
PIF_VALUE: 21
PIF_VALUE: 18
PIF_VALUE: 18
PIF_VALUE: 21
PIF_VALUE: 21
PIF_VALUE: 22
PIF_VALUE: 23
PIF_VALUE: 19
PIF_VALUE: 18
PIF_VALUE: 0
PIF_VALUE: 1
PIF_VALUE: 1
PIF_VALUE: 21
PIF_VALUE: 1
PIF_VALUE: 0
PIF_VALUE: 19
PIF_VALUE: 0
PIF_VALUE: 21
PIF_VALUE: 20
PIF_VALUE: 2
PIF_VALUE: 21
PIF_VALUE: 18
PIF_VALUE: 0
PIF_VALUE: 1
PIF_VALUE: 1
PIF_VALUE: 21
PIF_VALUE: 0
PIF_VALUE: 0
PIF_VALUE: 24
PIF_VALUE: 21
PIF_VALUE: 0
PIF_VALUE: 22
PIF_VALUE: 0
PIF_VALUE: 20
PIF_VALUE: 20
PIF_VALUE: 1
PIF_VALUE: 20
PIF_VALUE: 0
PIF_VALUE: 19
PIF_VALUE: 22
PIF_VALUE: 0
PIF_VALUE: 21
PIF_VALUE: 20
PIF_VALUE: 0
PIF_VALUE: 21
PIF_VALUE: 0
PIF_VALUE: 1
PIF_VALUE: 18
PIF_VALUE: 42
PIF_VALUE: 19
PIF_VALUE: 18
PIF_VALUE: 0
PIF_VALUE: 0
PIF_VALUE: 28
PIF_VALUE: 1
PIF_VALUE: 21
PIF_VALUE: 19
PIF_VALUE: 21
PIF_VALUE: 0
PIF_VALUE: 19
PIF_VALUE: 23
PIF_VALUE: 18
PIF_VALUE: 21
PIF_VALUE: 19
PIF_VALUE: 0
PIF_VALUE: 0
PIF_VALUE: 19
PIF_VALUE: 21
PIF_VALUE: 21
PIF_VALUE: 0
PIF_VALUE: 19
PIF_VALUE: 1
PIF_VALUE: 21
PIF_VALUE: 0
PIF_VALUE: 1
PIF_VALUE: 0
PIF_VALUE: 1
PIF_VALUE: 18
PIF_VALUE: 21
PIF_VALUE: 1
PIF_VALUE: 19
PIF_VALUE: 19
PIF_VALUE: 21
PIF_VALUE: 21
PIF_VALUE: 19
PIF_VALUE: 0
PIF_VALUE: 18
PIF_VALUE: 21
PIF_VALUE: 20
PIF_VALUE: 1
PIF_VALUE: 0
PIF_VALUE: 19
PIF_VALUE: 0
PIF_VALUE: 1
PIF_VALUE: 18
PIF_VALUE: 0
PIF_VALUE: 21
PIF_VALUE: 19
PIF_VALUE: 0
PIF_VALUE: 1
PIF_VALUE: 0
PIF_VALUE: 21
PIF_VALUE: 1
PIF_VALUE: 20
PIF_VALUE: 19
PIF_VALUE: 21
PIF_VALUE: 22
PIF_VALUE: 19
PIF_VALUE: 22
PIF_VALUE: 18
PIF_VALUE: 21
PIF_VALUE: 21
PIF_VALUE: 0
PIF_VALUE: 3
PIF_VALUE: 0
PIF_VALUE: 21
PIF_VALUE: 21
PIF_VALUE: 0
PIF_VALUE: 0
PIF_VALUE: 18
PIF_VALUE: 21
PIF_VALUE: 19
PIF_VALUE: 1
PIF_VALUE: 0
PIF_VALUE: 21
PIF_VALUE: 20
PIF_VALUE: 0
PIF_VALUE: 1
PIF_VALUE: 18
PIF_VALUE: 21
PIF_VALUE: 20
PIF_VALUE: 21
PIF_VALUE: 0
PIF_VALUE: 21
PIF_VALUE: 19
PIF_VALUE: 0
PIF_VALUE: 20
PIF_VALUE: 0
PIF_VALUE: 0
PIF_VALUE: 22
PIF_VALUE: 1
PIF_VALUE: 40
PIF_VALUE: 20
PIF_VALUE: 32
PIF_VALUE: 0
PIF_VALUE: 21
PIF_VALUE: 34
PIF_VALUE: 19
PIF_VALUE: 0
PIF_VALUE: 0
PIF_VALUE: 21
PIF_VALUE: 1
PIF_VALUE: 23
PIF_VALUE: 19
PIF_VALUE: 23
PIF_VALUE: 0
PIF_VALUE: 21
PIF_VALUE: 18
PIF_VALUE: 21
PIF_VALUE: 24
PIF_VALUE: 21
PIF_VALUE: 20
PIF_VALUE: 0
PIF_VALUE: 0
PIF_VALUE: 21
PIF_VALUE: 0
PIF_VALUE: 21
PIF_VALUE: 0
PIF_VALUE: 1
PIF_VALUE: 18
PIF_VALUE: 19
PIF_VALUE: 23
PIF_VALUE: 0
PIF_VALUE: 1
PIF_VALUE: 21
PIF_VALUE: 18
PIF_VALUE: 21
PIF_VALUE: 18
PIF_VALUE: 19
PIF_VALUE: 1
PIF_VALUE: 1
PIF_VALUE: 18
PIF_VALUE: 1
PIF_VALUE: 3
PIF_VALUE: 0
PIF_VALUE: 21
PIF_VALUE: 0
PIF_VALUE: 0
PIF_VALUE: 1
PIF_VALUE: 20
PIF_VALUE: 21
PIF_VALUE: 43
PIF_VALUE: 0
PIF_VALUE: 0
PIF_VALUE: 18
PIF_VALUE: 21
PIF_VALUE: 0
PIF_VALUE: 0
PIF_VALUE: 2
PIF_VALUE: 21
PIF_VALUE: 19
PIF_VALUE: 0
PIF_VALUE: 19
PIF_VALUE: 21
PIF_VALUE: 18
PIF_VALUE: 21
PIF_VALUE: 1
PIF_VALUE: 0
PIF_VALUE: 22
PIF_VALUE: 21
PIF_VALUE: 18
PIF_VALUE: 1
PIF_VALUE: 21
PIF_VALUE: 19
PIF_VALUE: 0
PIF_VALUE: 1
PIF_VALUE: 22
PIF_VALUE: 0
PIF_VALUE: 19
PIF_VALUE: 1
PIF_VALUE: 1
PIF_VALUE: 22
PIF_VALUE: 19
PIF_VALUE: 20
PIF_VALUE: 22
PIF_VALUE: 20
PIF_VALUE: 2
PIF_VALUE: 44
PIF_VALUE: 1
PIF_VALUE: 19
PIF_VALUE: 22
PIF_VALUE: 2
PIF_VALUE: 0
PIF_VALUE: 19
PIF_VALUE: 22
PIF_VALUE: 1
PIF_VALUE: 21
PIF_VALUE: 0
PIF_VALUE: 22
PIF_VALUE: 0
PIF_VALUE: 1
PIF_VALUE: 0
PIF_VALUE: 0
PIF_VALUE: 21
PIF_VALUE: 22
PIF_VALUE: 18
PIF_VALUE: 0
PIF_VALUE: 19
PIF_VALUE: 0
PIF_VALUE: 18
PIF_VALUE: 0
PIF_VALUE: 25
PIF_VALUE: 0
PIF_VALUE: 0
PIF_VALUE: 1
PIF_VALUE: 19
PIF_VALUE: 20
PIF_VALUE: 20
PIF_VALUE: 1
PIF_VALUE: 1
PIF_VALUE: 0
PIF_VALUE: 1
PIF_VALUE: 21
PIF_VALUE: 18
PIF_VALUE: 0
PIF_VALUE: 19
PIF_VALUE: 20
PIF_VALUE: 21
PIF_VALUE: 0
PIF_VALUE: 1
PIF_VALUE: 21
PIF_VALUE: 22
PIF_VALUE: 21
PIF_VALUE: 0
PIF_VALUE: 20
PIF_VALUE: 21
PIF_VALUE: 0
PIF_VALUE: 18
PIF_VALUE: 0
PIF_VALUE: 1
PIF_VALUE: 0
PIF_VALUE: 0
PIF_VALUE: 21
PIF_VALUE: 21
PIF_VALUE: 0
PIF_VALUE: 18
PIF_VALUE: 21
PIF_VALUE: 0
PIF_VALUE: 22
PIF_VALUE: 19
PIF_VALUE: 35
PIF_VALUE: 2
PIF_VALUE: 0
PIF_VALUE: 0
PIF_VALUE: 21
PIF_VALUE: 19
PIF_VALUE: 19
PIF_VALUE: 0
PIF_VALUE: 0
PIF_VALUE: 23
PIF_VALUE: 20
PIF_VALUE: 0
PIF_VALUE: 21
PIF_VALUE: 20
PIF_VALUE: 19
PIF_VALUE: 22
PIF_VALUE: 1
PIF_VALUE: 2
PIF_VALUE: 1
PIF_VALUE: 1
PIF_VALUE: 20
PIF_VALUE: 20
PIF_VALUE: 22
PIF_VALUE: 19
PIF_VALUE: 21

## 2019-01-28 ASSESSMENT — PAIN DESCRIPTION - DESCRIPTORS: DESCRIPTORS: ACHING

## 2019-01-28 ASSESSMENT — PAIN SCALES - GENERAL
PAINLEVEL_OUTOF10: 6
PAINLEVEL_OUTOF10: 7
PAINLEVEL_OUTOF10: 6
PAINLEVEL_OUTOF10: 0
PAINLEVEL_OUTOF10: 7
PAINLEVEL_OUTOF10: 0

## 2019-01-28 ASSESSMENT — PAIN DESCRIPTION - PAIN TYPE
TYPE: ACUTE PAIN;SURGICAL PAIN
TYPE: SURGICAL PAIN

## 2019-01-28 ASSESSMENT — PAIN DESCRIPTION - LOCATION
LOCATION: BACK;SHOULDER
LOCATION: CHEST;BACK;NECK

## 2019-01-29 ENCOUNTER — APPOINTMENT (OUTPATIENT)
Dept: GENERAL RADIOLOGY | Age: 74
DRG: 216 | End: 2019-01-29
Attending: INTERNAL MEDICINE
Payer: MEDICARE

## 2019-01-29 LAB
ACTIVATED CLOTTING TIME: 130 SECONDS (ref 99–130)
ACTIVATED CLOTTING TIME: 631 SECONDS (ref 99–130)
ALLEN TEST: ABNORMAL
ANION GAP SERPL CALCULATED.3IONS-SCNC: 12 MEQ/L (ref 8–16)
ANION GAP SERPL CALCULATED.3IONS-SCNC: 13 MEQ/L (ref 8–16)
ANION GAP SERPL CALCULATED.3IONS-SCNC: 13 MEQ/L (ref 8–16)
APTT: 39.8 SECONDS (ref 22–38)
APTT: 40.6 SECONDS (ref 22–38)
APTT: 42 SECONDS (ref 22–38)
BASE EXCESS (CALCULATED): -1 MMOL/L (ref -2.5–2.5)
BASE EXCESS (CALCULATED): -2.2 MMOL/L (ref -2.5–2.5)
BASE EXCESS (CALCULATED): -2.7 MMOL/L (ref -2.5–2.5)
BUN BLDV-MCNC: 10 MG/DL (ref 7–22)
BUN BLDV-MCNC: 11 MG/DL (ref 7–22)
BUN BLDV-MCNC: 11 MG/DL (ref 7–22)
CALCIUM IONIZED: 1.03 MMOL/L (ref 1.12–1.32)
CALCIUM IONIZED: 1.07 MMOL/L (ref 1.12–1.32)
CALCIUM IONIZED: 1.09 MMOL/L (ref 1.12–1.32)
CALCIUM SERPL-MCNC: 7.2 MG/DL (ref 8.5–10.5)
CALCIUM SERPL-MCNC: 7.4 MG/DL (ref 8.5–10.5)
CALCIUM SERPL-MCNC: 7.5 MG/DL (ref 8.5–10.5)
CARTRIDGE COLOR: NORMAL
CARTRIDGE COLOR: NORMAL
CHLORIDE BLD-SCNC: 103 MEQ/L (ref 98–111)
CHLORIDE BLD-SCNC: 104 MEQ/L (ref 98–111)
CHLORIDE BLD-SCNC: 105 MEQ/L (ref 98–111)
CO2: 19 MEQ/L (ref 23–33)
CO2: 21 MEQ/L (ref 23–33)
CO2: 22 MEQ/L (ref 23–33)
COLLECTED BY:: ABNORMAL
CREAT SERPL-MCNC: 1.5 MG/DL (ref 0.4–1.2)
CREAT SERPL-MCNC: 1.5 MG/DL (ref 0.4–1.2)
CREAT SERPL-MCNC: 1.7 MG/DL (ref 0.4–1.2)
DEVICE: ABNORMAL
EKG ATRIAL RATE: 49 BPM
EKG Q-T INTERVAL: 424 MS
EKG QRS DURATION: 96 MS
EKG QTC CALCULATION (BAZETT): 444 MS
EKG R AXIS: 68 DEGREES
EKG T AXIS: 100 DEGREES
EKG VENTRICULAR RATE: 66 BPM
ERYTHROCYTE [DISTWIDTH] IN BLOOD BY AUTOMATED COUNT: 18.1 % (ref 11.5–14.5)
ERYTHROCYTE [DISTWIDTH] IN BLOOD BY AUTOMATED COUNT: 18.1 % (ref 11.5–14.5)
ERYTHROCYTE [DISTWIDTH] IN BLOOD BY AUTOMATED COUNT: 18.2 % (ref 11.5–14.5)
ERYTHROCYTE [DISTWIDTH] IN BLOOD BY AUTOMATED COUNT: 58.2 FL (ref 35–45)
ERYTHROCYTE [DISTWIDTH] IN BLOOD BY AUTOMATED COUNT: 58.5 FL (ref 35–45)
ERYTHROCYTE [DISTWIDTH] IN BLOOD BY AUTOMATED COUNT: 58.9 FL (ref 35–45)
GFR SERPL CREATININE-BSD FRML MDRD: 40 ML/MIN/1.73M2
GFR SERPL CREATININE-BSD FRML MDRD: 46 ML/MIN/1.73M2
GFR SERPL CREATININE-BSD FRML MDRD: 46 ML/MIN/1.73M2
GLUCOSE BLD-MCNC: 117 MG/DL (ref 70–108)
GLUCOSE BLD-MCNC: 129 MG/DL (ref 70–108)
GLUCOSE BLD-MCNC: 99 MG/DL (ref 70–108)
GLUCOSE, WHOLE BLOOD: 103 MG/DL (ref 70–108)
GLUCOSE, WHOLE BLOOD: 107 MG/DL (ref 70–108)
GLUCOSE, WHOLE BLOOD: 110 MG/DL (ref 70–108)
GLUCOSE, WHOLE BLOOD: 110 MG/DL (ref 70–108)
GLUCOSE, WHOLE BLOOD: 112 MG/DL (ref 70–108)
GLUCOSE, WHOLE BLOOD: 114 MG/DL (ref 70–108)
GLUCOSE, WHOLE BLOOD: 120 MG/DL (ref 70–108)
GLUCOSE, WHOLE BLOOD: 122 MG/DL (ref 70–108)
GLUCOSE, WHOLE BLOOD: 124 MG/DL (ref 70–108)
GLUCOSE, WHOLE BLOOD: 83 MG/DL (ref 70–108)
GLUCOSE, WHOLE BLOOD: 93 MG/DL (ref 70–108)
GLUCOSE, WHOLE BLOOD: 93 MG/DL (ref 70–108)
GLUCOSE, WHOLE BLOOD: 94 MG/DL (ref 70–108)
HCO3: 22 MMOL/L (ref 23–28)
HCO3: 23 MMOL/L (ref 23–28)
HCO3: 24 MMOL/L (ref 23–28)
HCT VFR BLD CALC: 24 % (ref 42–52)
HCT VFR BLD CALC: 24.3 % (ref 42–52)
HCT VFR BLD CALC: 24.7 % (ref 42–52)
HEMOGLOBIN: 7.4 GM/DL (ref 14–18)
HEMOGLOBIN: 7.5 GM/DL (ref 14–18)
HEMOGLOBIN: 7.7 GM/DL (ref 14–18)
IFIO2: 2
IFIO2: 21
IFIO2: 4
MAGNESIUM: 2.6 MG/DL (ref 1.6–2.4)
MAGNESIUM: 2.8 MG/DL (ref 1.6–2.4)
MAGNESIUM: 2.9 MG/DL (ref 1.6–2.4)
MCH RBC QN AUTO: 27.8 PG (ref 26–33)
MCH RBC QN AUTO: 27.9 PG (ref 26–33)
MCH RBC QN AUTO: 28 PG (ref 26–33)
MCHC RBC AUTO-ENTMCNC: 30.8 GM/DL (ref 32.2–35.5)
MCHC RBC AUTO-ENTMCNC: 30.9 GM/DL (ref 32.2–35.5)
MCHC RBC AUTO-ENTMCNC: 31.2 GM/DL (ref 32.2–35.5)
MCV RBC AUTO: 89.5 FL (ref 80–94)
MCV RBC AUTO: 90 FL (ref 80–94)
MCV RBC AUTO: 90.9 FL (ref 80–94)
O2 SATURATION: 92 %
O2 SATURATION: 97 %
O2 SATURATION: 99 %
PATIENT HEPARIN CONCENTRATION: 0
PATIENT HEPARIN CONCENTRATION: 2
PCO2: 36 MMHG (ref 35–45)
PCO2: 38 MMHG (ref 35–45)
PCO2: 39 MMHG (ref 35–45)
PH BLOOD GAS: 7.38 (ref 7.35–7.45)
PH BLOOD GAS: 7.4 (ref 7.35–7.45)
PH BLOOD GAS: 7.4 (ref 7.35–7.45)
PHOSPHORUS: 2.9 MG/DL (ref 2.4–4.7)
PHOSPHORUS: 2.9 MG/DL (ref 2.4–4.7)
PHOSPHORUS: 3.1 MG/DL (ref 2.4–4.7)
PLATELET # BLD: 142 THOU/MM3 (ref 130–400)
PLATELET # BLD: 151 THOU/MM3 (ref 130–400)
PLATELET # BLD: 176 THOU/MM3 (ref 130–400)
PMV BLD AUTO: 10.4 FL (ref 9.4–12.4)
PMV BLD AUTO: 11.5 FL (ref 9.4–12.4)
PMV BLD AUTO: 11.9 FL (ref 9.4–12.4)
PO2: 138 MMHG (ref 71–104)
PO2: 66 MMHG (ref 71–104)
PO2: 85 MMHG (ref 71–104)
POTASSIUM SERPL-SCNC: 5.1 MEQ/L (ref 3.5–5.2)
POTASSIUM SERPL-SCNC: 5.2 MEQ/L (ref 3.5–5.2)
POTASSIUM SERPL-SCNC: 5.4 MEQ/L (ref 3.5–5.2)
RANGE: NORMAL
RANGE: NORMAL
RBC # BLD: 2.64 MILL/MM3 (ref 4.7–6.1)
RBC # BLD: 2.7 MILL/MM3 (ref 4.7–6.1)
RBC # BLD: 2.76 MILL/MM3 (ref 4.7–6.1)
SODIUM BLD-SCNC: 137 MEQ/L (ref 135–145)
SODIUM BLD-SCNC: 137 MEQ/L (ref 135–145)
SODIUM BLD-SCNC: 138 MEQ/L (ref 135–145)
SOURCE, BLOOD GAS: ABNORMAL
WBC # BLD: 11.9 THOU/MM3 (ref 4.8–10.8)
WBC # BLD: 13.2 THOU/MM3 (ref 4.8–10.8)
WBC # BLD: 14.8 THOU/MM3 (ref 4.8–10.8)

## 2019-01-29 PROCEDURE — 85027 COMPLETE CBC AUTOMATED: CPT

## 2019-01-29 PROCEDURE — 85730 THROMBOPLASTIN TIME PARTIAL: CPT

## 2019-01-29 PROCEDURE — 84100 ASSAY OF PHOSPHORUS: CPT

## 2019-01-29 PROCEDURE — 6370000000 HC RX 637 (ALT 250 FOR IP): Performed by: THORACIC SURGERY (CARDIOTHORACIC VASCULAR SURGERY)

## 2019-01-29 PROCEDURE — 80048 BASIC METABOLIC PNL TOTAL CA: CPT

## 2019-01-29 PROCEDURE — 71045 X-RAY EXAM CHEST 1 VIEW: CPT

## 2019-01-29 PROCEDURE — 33968 REMOVE AORTIC ASSIST DEVICE: CPT | Performed by: THORACIC SURGERY (CARDIOTHORACIC VASCULAR SURGERY)

## 2019-01-29 PROCEDURE — 83735 ASSAY OF MAGNESIUM: CPT

## 2019-01-29 PROCEDURE — 82803 BLOOD GASES ANY COMBINATION: CPT

## 2019-01-29 PROCEDURE — 2580000003 HC RX 258: Performed by: THORACIC SURGERY (CARDIOTHORACIC VASCULAR SURGERY)

## 2019-01-29 PROCEDURE — 2500000003 HC RX 250 WO HCPCS: Performed by: INTERNAL MEDICINE

## 2019-01-29 PROCEDURE — 2580000003 HC RX 258: Performed by: INTERNAL MEDICINE

## 2019-01-29 PROCEDURE — 94640 AIRWAY INHALATION TREATMENT: CPT

## 2019-01-29 PROCEDURE — 36592 COLLECT BLOOD FROM PICC: CPT

## 2019-01-29 PROCEDURE — 2000000000 HC ICU R&B

## 2019-01-29 PROCEDURE — 82947 ASSAY GLUCOSE BLOOD QUANT: CPT

## 2019-01-29 PROCEDURE — 94660 CPAP INITIATION&MGMT: CPT

## 2019-01-29 PROCEDURE — 37799 UNLISTED PX VASCULAR SURGERY: CPT

## 2019-01-29 PROCEDURE — S0028 INJECTION, FAMOTIDINE, 20 MG: HCPCS | Performed by: THORACIC SURGERY (CARDIOTHORACIC VASCULAR SURGERY)

## 2019-01-29 PROCEDURE — 99233 SBSQ HOSP IP/OBS HIGH 50: CPT | Performed by: INTERNAL MEDICINE

## 2019-01-29 PROCEDURE — 6360000002 HC RX W HCPCS: Performed by: THORACIC SURGERY (CARDIOTHORACIC VASCULAR SURGERY)

## 2019-01-29 PROCEDURE — 94760 N-INVAS EAR/PLS OXIMETRY 1: CPT

## 2019-01-29 PROCEDURE — 92610 EVALUATE SWALLOWING FUNCTION: CPT

## 2019-01-29 PROCEDURE — 2700000000 HC OXYGEN THERAPY PER DAY

## 2019-01-29 PROCEDURE — 82330 ASSAY OF CALCIUM: CPT

## 2019-01-29 PROCEDURE — 2500000003 HC RX 250 WO HCPCS: Performed by: THORACIC SURGERY (CARDIOTHORACIC VASCULAR SURGERY)

## 2019-01-29 PROCEDURE — 93010 ELECTROCARDIOGRAM REPORT: CPT | Performed by: INTERNAL MEDICINE

## 2019-01-29 PROCEDURE — 93005 ELECTROCARDIOGRAM TRACING: CPT | Performed by: THORACIC SURGERY (CARDIOTHORACIC VASCULAR SURGERY)

## 2019-01-29 PROCEDURE — 36415 COLL VENOUS BLD VENIPUNCTURE: CPT

## 2019-01-29 PROCEDURE — 2709999900 HC NON-CHARGEABLE SUPPLY

## 2019-01-29 RX ORDER — MIDODRINE HYDROCHLORIDE 10 MG/1
10 TABLET ORAL
Status: DISCONTINUED | OUTPATIENT
Start: 2019-01-29 | End: 2019-01-31

## 2019-01-29 RX ORDER — SODIUM CHLORIDE 9 MG/ML
INJECTION, SOLUTION INTRAVENOUS
Status: DISCONTINUED | OUTPATIENT
Start: 2019-01-29 | End: 2019-02-01

## 2019-01-29 RX ORDER — DEXMEDETOMIDINE HYDROCHLORIDE 4 UG/ML
0.2 INJECTION, SOLUTION INTRAVENOUS CONTINUOUS
Status: DISCONTINUED | OUTPATIENT
Start: 2019-01-29 | End: 2019-01-29 | Stop reason: SDUPTHER

## 2019-01-29 RX ADMIN — EPINEPHRINE 2 MCG/MIN: 1 INJECTION, SOLUTION, CONCENTRATE INTRAVENOUS at 12:08

## 2019-01-29 RX ADMIN — FAMOTIDINE 20 MG: 10 INJECTION, SOLUTION INTRAVENOUS at 20:50

## 2019-01-29 RX ADMIN — MIDODRINE HYDROCHLORIDE 10 MG: 10 TABLET ORAL at 18:15

## 2019-01-29 RX ADMIN — MORPHINE SULFATE 2 MG: 2 INJECTION, SOLUTION INTRAMUSCULAR; INTRAVENOUS at 21:02

## 2019-01-29 RX ADMIN — Medication 2370 ML/HR: at 03:05

## 2019-01-29 RX ADMIN — SODIUM CHLORIDE 100 ML: 9 INJECTION, SOLUTION INTRAVENOUS at 22:00

## 2019-01-29 RX ADMIN — Medication 2370 ML/HR: at 22:33

## 2019-01-29 RX ADMIN — SODIUM CHLORIDE: 9 INJECTION, SOLUTION INTRAVENOUS at 15:00

## 2019-01-29 RX ADMIN — ONDANSETRON 4 MG: 2 INJECTION INTRAMUSCULAR; INTRAVENOUS at 10:12

## 2019-01-29 RX ADMIN — OXYCODONE HYDROCHLORIDE 5 MG: 5 TABLET ORAL at 09:13

## 2019-01-29 RX ADMIN — Medication 2 G: at 00:32

## 2019-01-29 RX ADMIN — DEXMEDETOMIDINE 0.2 MCG/KG/HR: 100 INJECTION, SOLUTION, CONCENTRATE INTRAVENOUS at 22:22

## 2019-01-29 RX ADMIN — AMIODARONE HYDROCHLORIDE 200 MG: 200 TABLET ORAL at 22:13

## 2019-01-29 RX ADMIN — OXYCODONE HYDROCHLORIDE 10 MG: 5 TABLET ORAL at 05:12

## 2019-01-29 RX ADMIN — ACETAMINOPHEN 650 MG: 325 TABLET ORAL at 13:02

## 2019-01-29 RX ADMIN — SODIUM CHLORIDE: 9 INJECTION, SOLUTION INTRAVENOUS at 10:00

## 2019-01-29 RX ADMIN — Medication 2370 ML/HR: at 22:34

## 2019-01-29 RX ADMIN — Medication 10 ML: at 10:14

## 2019-01-29 RX ADMIN — DOCUSATE SODIUM 100 MG: 100 CAPSULE, LIQUID FILLED ORAL at 09:14

## 2019-01-29 RX ADMIN — SODIUM CHLORIDE: 9 INJECTION, SOLUTION INTRAVENOUS at 14:00

## 2019-01-29 RX ADMIN — SODIUM CHLORIDE 200 ML: 9 INJECTION, SOLUTION INTRAVENOUS at 06:00

## 2019-01-29 RX ADMIN — MULTIPLE VITAMINS W/ MINERALS TAB 1 TABLET: TAB at 09:14

## 2019-01-29 RX ADMIN — SODIUM CHLORIDE: 9 INJECTION, SOLUTION INTRAVENOUS at 20:00

## 2019-01-29 RX ADMIN — OXYCODONE HYDROCHLORIDE 5 MG: 5 TABLET ORAL at 10:07

## 2019-01-29 RX ADMIN — DOCUSATE SODIUM 100 MG: 100 CAPSULE, LIQUID FILLED ORAL at 22:13

## 2019-01-29 RX ADMIN — SODIUM CHLORIDE: 9 INJECTION, SOLUTION INTRAVENOUS at 21:00

## 2019-01-29 RX ADMIN — SODIUM CHLORIDE 0.5 UNITS/HR: 9 INJECTION, SOLUTION INTRAVENOUS at 22:01

## 2019-01-29 RX ADMIN — OXYCODONE HYDROCHLORIDE 10 MG: 5 TABLET ORAL at 19:33

## 2019-01-29 RX ADMIN — ASPIRIN 325 MG: 325 TABLET, DELAYED RELEASE ORAL at 09:14

## 2019-01-29 RX ADMIN — CALCIUM GLUCONATE 1.5 G: 98 INJECTION, SOLUTION INTRAVENOUS at 21:29

## 2019-01-29 RX ADMIN — Medication 2370 ML/HR: at 03:04

## 2019-01-29 RX ADMIN — Medication 10 ML: at 21:02

## 2019-01-29 RX ADMIN — OXYCODONE HYDROCHLORIDE 10 MG: 5 TABLET ORAL at 14:01

## 2019-01-29 RX ADMIN — SODIUM PHOSPHATE, MONOBASIC, MONOHYDRATE 11 MMOL: 276; 142 INJECTION, SOLUTION INTRAVENOUS at 00:35

## 2019-01-29 RX ADMIN — Medication 2370 ML/HR: at 15:28

## 2019-01-29 RX ADMIN — Medication 2370 ML/HR: at 03:03

## 2019-01-29 RX ADMIN — Medication 2370 ML/HR: at 10:28

## 2019-01-29 RX ADMIN — SODIUM CHLORIDE: 9 INJECTION, SOLUTION INTRAVENOUS at 00:00

## 2019-01-29 RX ADMIN — Medication 2370 ML/HR: at 22:35

## 2019-01-29 RX ADMIN — ENOXAPARIN SODIUM 40 MG: 40 INJECTION SUBCUTANEOUS at 21:02

## 2019-01-29 RX ADMIN — MORPHINE SULFATE 2 MG: 2 INJECTION, SOLUTION INTRAMUSCULAR; INTRAVENOUS at 14:38

## 2019-01-29 RX ADMIN — DEXMEDETOMIDINE HYDROCHLORIDE 0.3 MCG/KG/HR: 100 INJECTION, SOLUTION, CONCENTRATE INTRAVENOUS at 07:54

## 2019-01-29 RX ADMIN — Medication 2 PUFF: at 13:12

## 2019-01-29 RX ADMIN — Medication 2 G: at 09:29

## 2019-01-29 RX ADMIN — AMIODARONE HYDROCHLORIDE 200 MG: 200 TABLET ORAL at 09:13

## 2019-01-29 RX ADMIN — SODIUM CHLORIDE: 9 INJECTION, SOLUTION INTRAVENOUS at 23:00

## 2019-01-29 RX ADMIN — SODIUM CHLORIDE 200 ML: 9 INJECTION, SOLUTION INTRAVENOUS at 02:00

## 2019-01-29 RX ADMIN — Medication 2 G: at 16:20

## 2019-01-29 RX ADMIN — MIDODRINE HYDROCHLORIDE 10 MG: 10 TABLET ORAL at 12:34

## 2019-01-29 RX ADMIN — SODIUM CHLORIDE: 9 INJECTION, SOLUTION INTRAVENOUS at 12:00

## 2019-01-29 RX ADMIN — FAMOTIDINE 20 MG: 10 INJECTION, SOLUTION INTRAVENOUS at 09:14

## 2019-01-29 RX ADMIN — MIDODRINE HYDROCHLORIDE 10 MG: 10 TABLET ORAL at 10:07

## 2019-01-29 ASSESSMENT — PAIN DESCRIPTION - PAIN TYPE
TYPE: SURGICAL PAIN
TYPE: ACUTE PAIN;SURGICAL PAIN
TYPE: SURGICAL PAIN
TYPE: ACUTE PAIN;SURGICAL PAIN
TYPE: ACUTE PAIN;SURGICAL PAIN

## 2019-01-29 ASSESSMENT — PAIN DESCRIPTION - DESCRIPTORS
DESCRIPTORS: ACHING
DESCRIPTORS: PATIENT UNABLE TO DESCRIBE
DESCRIPTORS: PATIENT UNABLE TO DESCRIBE
DESCRIPTORS: ACHING
DESCRIPTORS: CONSTANT

## 2019-01-29 ASSESSMENT — PAIN SCALES - GENERAL
PAINLEVEL_OUTOF10: 7
PAINLEVEL_OUTOF10: 8
PAINLEVEL_OUTOF10: 7
PAINLEVEL_OUTOF10: 10
PAINLEVEL_OUTOF10: 6
PAINLEVEL_OUTOF10: 0
PAINLEVEL_OUTOF10: 5
PAINLEVEL_OUTOF10: 7
PAINLEVEL_OUTOF10: 0
PAINLEVEL_OUTOF10: 2
PAINLEVEL_OUTOF10: 7

## 2019-01-29 ASSESSMENT — PAIN - FUNCTIONAL ASSESSMENT: PAIN_FUNCTIONAL_ASSESSMENT: PREVENTS OR INTERFERES WITH ALL ACTIVE AND SOME PASSIVE ACTIVITIES

## 2019-01-29 ASSESSMENT — PAIN DESCRIPTION - LOCATION
LOCATION: BACK;CHEST;SHOULDER
LOCATION: BACK;CHEST;SHOULDER
LOCATION: BACK;CHEST;NECK
LOCATION: BACK;CHEST;SHOULDER
LOCATION: CHEST;BACK;NECK
LOCATION: BACK;CHEST;SHOULDER
LOCATION: BACK;CHEST;SHOULDER
LOCATION: CHEST;BACK;SHOULDER
LOCATION: BACK;CHEST;NECK

## 2019-01-30 ENCOUNTER — APPOINTMENT (OUTPATIENT)
Dept: GENERAL RADIOLOGY | Age: 74
DRG: 216 | End: 2019-01-30
Attending: INTERNAL MEDICINE
Payer: MEDICARE

## 2019-01-30 LAB
ABO: NORMAL
ACTIVATED CLOTTING TIME: 726 SECONDS (ref 99–130)
ALLEN TEST: ABNORMAL
ALLEN TEST: ABNORMAL
ALLEN TEST: POSITIVE
ANION GAP SERPL CALCULATED.3IONS-SCNC: 10 MEQ/L (ref 8–16)
ANION GAP SERPL CALCULATED.3IONS-SCNC: 12 MEQ/L (ref 8–16)
ANTIBODY SCREEN: NORMAL
APTT: 44.9 SECONDS (ref 22–38)
APTT: 48.7 SECONDS (ref 22–38)
APTT: 49.5 SECONDS (ref 22–38)
APTT: > 200 SECONDS (ref 22–38)
BASE EXCESS (CALCULATED): -0.3 MMOL/L (ref -2.5–2.5)
BASE EXCESS (CALCULATED): -0.7 MMOL/L (ref -2.5–2.5)
BASE EXCESS (CALCULATED): -1.7 MMOL/L (ref -2.5–2.5)
BUN BLDV-MCNC: 11 MG/DL (ref 7–22)
BUN BLDV-MCNC: 13 MG/DL (ref 7–22)
BUN BLDV-MCNC: 13 MG/DL (ref 7–22)
BUN BLDV-MCNC: 15 MG/DL (ref 7–22)
CALCIUM IONIZED: 1.09 MMOL/L (ref 1.12–1.32)
CALCIUM IONIZED: 1.15 MMOL/L (ref 1.12–1.32)
CALCIUM IONIZED: 1.24 MMOL/L (ref 1.12–1.32)
CALCIUM IONIZED: 1.25 MMOL/L (ref 1.12–1.32)
CALCIUM SERPL-MCNC: 7.7 MG/DL (ref 8.5–10.5)
CALCIUM SERPL-MCNC: 7.7 MG/DL (ref 8.5–10.5)
CALCIUM SERPL-MCNC: 8.3 MG/DL (ref 8.5–10.5)
CALCIUM SERPL-MCNC: 8.7 MG/DL (ref 8.5–10.5)
CARTRIDGE COLOR: NORMAL
CHLORIDE BLD-SCNC: 100 MEQ/L (ref 98–111)
CHLORIDE BLD-SCNC: 103 MEQ/L (ref 98–111)
CHLORIDE BLD-SCNC: 105 MEQ/L (ref 98–111)
CHLORIDE BLD-SCNC: 105 MEQ/L (ref 98–111)
CO2: 21 MEQ/L (ref 23–33)
CO2: 22 MEQ/L (ref 23–33)
CO2: 23 MEQ/L (ref 23–33)
CO2: 23 MEQ/L (ref 23–33)
COLLECTED BY:: ABNORMAL
COLLECTED BY:: ABNORMAL
COLLECTED BY:: NORMAL
CREAT SERPL-MCNC: 1.5 MG/DL (ref 0.4–1.2)
CREAT SERPL-MCNC: 1.7 MG/DL (ref 0.4–1.2)
DEVICE: ABNORMAL
DEVICE: ABNORMAL
DEVICE: NORMAL
ERYTHROCYTE [DISTWIDTH] IN BLOOD BY AUTOMATED COUNT: 17.2 % (ref 11.5–14.5)
ERYTHROCYTE [DISTWIDTH] IN BLOOD BY AUTOMATED COUNT: 17.5 % (ref 11.5–14.5)
ERYTHROCYTE [DISTWIDTH] IN BLOOD BY AUTOMATED COUNT: 18 % (ref 11.5–14.5)
ERYTHROCYTE [DISTWIDTH] IN BLOOD BY AUTOMATED COUNT: 18.1 % (ref 11.5–14.5)
ERYTHROCYTE [DISTWIDTH] IN BLOOD BY AUTOMATED COUNT: 55.8 FL (ref 35–45)
ERYTHROCYTE [DISTWIDTH] IN BLOOD BY AUTOMATED COUNT: 56.1 FL (ref 35–45)
ERYTHROCYTE [DISTWIDTH] IN BLOOD BY AUTOMATED COUNT: 58.7 FL (ref 35–45)
ERYTHROCYTE [DISTWIDTH] IN BLOOD BY AUTOMATED COUNT: 58.8 FL (ref 35–45)
GFR SERPL CREATININE-BSD FRML MDRD: 40 ML/MIN/1.73M2
GFR SERPL CREATININE-BSD FRML MDRD: 46 ML/MIN/1.73M2
GLUCOSE BLD-MCNC: 100 MG/DL (ref 70–108)
GLUCOSE BLD-MCNC: 108 MG/DL (ref 70–108)
GLUCOSE BLD-MCNC: 126 MG/DL (ref 70–108)
GLUCOSE BLD-MCNC: 140 MG/DL (ref 70–108)
GLUCOSE BLD-MCNC: 148 MG/DL (ref 70–108)
GLUCOSE BLD-MCNC: 88 MG/DL (ref 70–108)
GLUCOSE BLD-MCNC: 91 MG/DL (ref 70–108)
GLUCOSE, WHOLE BLOOD: 106 MG/DL (ref 70–108)
GLUCOSE, WHOLE BLOOD: 110 MG/DL (ref 70–108)
GLUCOSE, WHOLE BLOOD: 117 MG/DL (ref 70–108)
GLUCOSE, WHOLE BLOOD: 121 MG/DL (ref 70–108)
GLUCOSE, WHOLE BLOOD: 123 MG/DL (ref 70–108)
GLUCOSE, WHOLE BLOOD: 125 MG/DL (ref 70–108)
GLUCOSE, WHOLE BLOOD: 126 MG/DL (ref 70–108)
GLUCOSE, WHOLE BLOOD: 69 MG/DL (ref 70–108)
GLUCOSE, WHOLE BLOOD: 90 MG/DL (ref 70–108)
GLUCOSE, WHOLE BLOOD: 93 MG/DL (ref 70–108)
GLUCOSE, WHOLE BLOOD: 95 MG/DL (ref 70–108)
HCO3: 23 MMOL/L (ref 23–28)
HCO3: 24 MMOL/L (ref 23–28)
HCO3: 25 MMOL/L (ref 23–28)
HCT VFR BLD CALC: 22.3 % (ref 42–52)
HCT VFR BLD CALC: 23.9 % (ref 42–52)
HCT VFR BLD CALC: 26.6 % (ref 42–52)
HCT VFR BLD CALC: 26.9 % (ref 42–52)
HEMOGLOBIN: 6.8 GM/DL (ref 14–18)
HEMOGLOBIN: 7.3 GM/DL (ref 14–18)
HEMOGLOBIN: 8.3 GM/DL (ref 14–18)
HEMOGLOBIN: 8.6 GM/DL (ref 14–18)
IFIO2: 2
IFIO2: 30
IFIO2: 30
INR BLD: 7.29 (ref 0.85–1.13)
MAGNESIUM: 2.1 MG/DL (ref 1.6–2.4)
MAGNESIUM: 2.2 MG/DL (ref 1.6–2.4)
MAGNESIUM: 2.4 MG/DL (ref 1.6–2.4)
MAGNESIUM: 2.5 MG/DL (ref 1.6–2.4)
MCH RBC QN AUTO: 28.1 PG (ref 26–33)
MCH RBC QN AUTO: 28.1 PG (ref 26–33)
MCH RBC QN AUTO: 28.6 PG (ref 26–33)
MCH RBC QN AUTO: 29.1 PG (ref 26–33)
MCHC RBC AUTO-ENTMCNC: 30.5 GM/DL (ref 32.2–35.5)
MCHC RBC AUTO-ENTMCNC: 30.5 GM/DL (ref 32.2–35.5)
MCHC RBC AUTO-ENTMCNC: 31.2 GM/DL (ref 32.2–35.5)
MCHC RBC AUTO-ENTMCNC: 32 GM/DL (ref 32.2–35.5)
MCV RBC AUTO: 90.9 FL (ref 80–94)
MCV RBC AUTO: 91.7 FL (ref 80–94)
MCV RBC AUTO: 91.9 FL (ref 80–94)
MCV RBC AUTO: 92.1 FL (ref 80–94)
MODE: ABNORMAL
O2 SATURATION: 94 %
O2 SATURATION: 99 %
O2 SATURATION: 99 %
PATHOLOGIST REVIEW: ABNORMAL
PATIENT HEPARIN CONCENTRATION: 4
PCO2: 36 MMHG (ref 35–45)
PCO2: 39 MMHG (ref 35–45)
PCO2: 42 MMHG (ref 35–45)
PH BLOOD GAS: 7.38 (ref 7.35–7.45)
PH BLOOD GAS: 7.41 (ref 7.35–7.45)
PH BLOOD GAS: 7.41 (ref 7.35–7.45)
PHOSPHORUS: 2.8 MG/DL (ref 2.4–4.7)
PHOSPHORUS: 3 MG/DL (ref 2.4–4.7)
PHOSPHORUS: 3 MG/DL (ref 2.4–4.7)
PHOSPHORUS: 3.1 MG/DL (ref 2.4–4.7)
PLATELET # BLD: 124 THOU/MM3 (ref 130–400)
PLATELET # BLD: 153 THOU/MM3 (ref 130–400)
PLATELET # BLD: 155 THOU/MM3 (ref 130–400)
PLATELET # BLD: 158 THOU/MM3 (ref 130–400)
PMV BLD AUTO: 10.6 FL (ref 9.4–12.4)
PMV BLD AUTO: 11.1 FL (ref 9.4–12.4)
PMV BLD AUTO: 11.4 FL (ref 9.4–12.4)
PMV BLD AUTO: 11.7 FL (ref 9.4–12.4)
PO2: 123 MMHG (ref 71–104)
PO2: 141 MMHG (ref 71–104)
PO2: 72 MMHG (ref 71–104)
POTASSIUM SERPL-SCNC: 4.8 MEQ/L (ref 3.5–5.2)
POTASSIUM SERPL-SCNC: 5.1 MEQ/L (ref 3.5–5.2)
POTASSIUM SERPL-SCNC: 5.2 MEQ/L (ref 3.5–5.2)
POTASSIUM SERPL-SCNC: 5.6 MEQ/L (ref 3.5–5.2)
RANGE: NORMAL
RBC # BLD: 2.42 MILL/MM3 (ref 4.7–6.1)
RBC # BLD: 2.6 MILL/MM3 (ref 4.7–6.1)
RBC # BLD: 2.9 MILL/MM3 (ref 4.7–6.1)
RBC # BLD: 2.96 MILL/MM3 (ref 4.7–6.1)
REASON FOR REJECTION: NORMAL
REJECTED TEST: NORMAL
RH FACTOR: NORMAL
SET PEEP: 6 MMHG
SET PEEP: 6 MMHG
SET PRESS SUPP: 6 CMH2O
SET PRESS SUPP: 6 CMH2O
SET RESPIRATORY RATE: 12 BPM
SODIUM BLD-SCNC: 134 MEQ/L (ref 135–145)
SODIUM BLD-SCNC: 136 MEQ/L (ref 135–145)
SODIUM BLD-SCNC: 136 MEQ/L (ref 135–145)
SODIUM BLD-SCNC: 138 MEQ/L (ref 135–145)
SOURCE, BLOOD GAS: ABNORMAL
SOURCE, BLOOD GAS: ABNORMAL
SOURCE, BLOOD GAS: NORMAL
WBC # BLD: 11.5 THOU/MM3 (ref 4.8–10.8)
WBC # BLD: 11.9 THOU/MM3 (ref 4.8–10.8)
WBC # BLD: 12.2 THOU/MM3 (ref 4.8–10.8)
WBC # BLD: 12.4 THOU/MM3 (ref 4.8–10.8)

## 2019-01-30 PROCEDURE — 36592 COLLECT BLOOD FROM PICC: CPT

## 2019-01-30 PROCEDURE — 86850 RBC ANTIBODY SCREEN: CPT

## 2019-01-30 PROCEDURE — 85027 COMPLETE CBC AUTOMATED: CPT

## 2019-01-30 PROCEDURE — 2580000003 HC RX 258: Performed by: INTERNAL MEDICINE

## 2019-01-30 PROCEDURE — 85610 PROTHROMBIN TIME: CPT

## 2019-01-30 PROCEDURE — 6360000002 HC RX W HCPCS: Performed by: INTERNAL MEDICINE

## 2019-01-30 PROCEDURE — 71045 X-RAY EXAM CHEST 1 VIEW: CPT

## 2019-01-30 PROCEDURE — 37799 UNLISTED PX VASCULAR SURGERY: CPT

## 2019-01-30 PROCEDURE — 2000000000 HC ICU R&B

## 2019-01-30 PROCEDURE — 82803 BLOOD GASES ANY COMBINATION: CPT

## 2019-01-30 PROCEDURE — 6360000002 HC RX W HCPCS: Performed by: THORACIC SURGERY (CARDIOTHORACIC VASCULAR SURGERY)

## 2019-01-30 PROCEDURE — 82948 REAGENT STRIP/BLOOD GLUCOSE: CPT

## 2019-01-30 PROCEDURE — 83735 ASSAY OF MAGNESIUM: CPT

## 2019-01-30 PROCEDURE — 36600 WITHDRAWAL OF ARTERIAL BLOOD: CPT

## 2019-01-30 PROCEDURE — 36415 COLL VENOUS BLD VENIPUNCTURE: CPT

## 2019-01-30 PROCEDURE — 82947 ASSAY GLUCOSE BLOOD QUANT: CPT

## 2019-01-30 PROCEDURE — 6370000000 HC RX 637 (ALT 250 FOR IP): Performed by: THORACIC SURGERY (CARDIOTHORACIC VASCULAR SURGERY)

## 2019-01-30 PROCEDURE — 94660 CPAP INITIATION&MGMT: CPT

## 2019-01-30 PROCEDURE — 2580000003 HC RX 258: Performed by: THORACIC SURGERY (CARDIOTHORACIC VASCULAR SURGERY)

## 2019-01-30 PROCEDURE — 2700000000 HC OXYGEN THERAPY PER DAY

## 2019-01-30 PROCEDURE — 92526 ORAL FUNCTION THERAPY: CPT

## 2019-01-30 PROCEDURE — 85730 THROMBOPLASTIN TIME PARTIAL: CPT

## 2019-01-30 PROCEDURE — 2709999900 HC NON-CHARGEABLE SUPPLY

## 2019-01-30 PROCEDURE — 80048 BASIC METABOLIC PNL TOTAL CA: CPT

## 2019-01-30 PROCEDURE — 86900 BLOOD TYPING SEROLOGIC ABO: CPT

## 2019-01-30 PROCEDURE — L4397 STATIC OR DYNAMI AFO PRE OTS: HCPCS

## 2019-01-30 PROCEDURE — 86901 BLOOD TYPING SEROLOGIC RH(D): CPT

## 2019-01-30 PROCEDURE — 84100 ASSAY OF PHOSPHORUS: CPT

## 2019-01-30 PROCEDURE — 99024 POSTOP FOLLOW-UP VISIT: CPT | Performed by: PHYSICIAN ASSISTANT

## 2019-01-30 PROCEDURE — 2500000003 HC RX 250 WO HCPCS: Performed by: THORACIC SURGERY (CARDIOTHORACIC VASCULAR SURGERY)

## 2019-01-30 PROCEDURE — 99233 SBSQ HOSP IP/OBS HIGH 50: CPT | Performed by: INTERNAL MEDICINE

## 2019-01-30 PROCEDURE — 82330 ASSAY OF CALCIUM: CPT

## 2019-01-30 RX ORDER — PHYTONADIONE 10 MG/ML
10 INJECTION, EMULSION INTRAMUSCULAR; INTRAVENOUS; SUBCUTANEOUS EVERY 12 HOURS
Status: DISCONTINUED | OUTPATIENT
Start: 2019-01-30 | End: 2019-01-30 | Stop reason: ALTCHOICE

## 2019-01-30 RX ORDER — 0.9 % SODIUM CHLORIDE 0.9 %
250 INTRAVENOUS SOLUTION INTRAVENOUS ONCE
Status: DISCONTINUED | OUTPATIENT
Start: 2019-01-30 | End: 2019-01-30

## 2019-01-30 RX ADMIN — SODIUM CHLORIDE 100 ML: 9 INJECTION, SOLUTION INTRAVENOUS at 03:00

## 2019-01-30 RX ADMIN — DEXTROSE MONOHYDRATE 6.25 G: 25 INJECTION, SOLUTION INTRAVENOUS at 17:08

## 2019-01-30 RX ADMIN — AMIODARONE HYDROCHLORIDE 200 MG: 200 TABLET ORAL at 20:42

## 2019-01-30 RX ADMIN — SODIUM CHLORIDE 100 ML: 9 INJECTION, SOLUTION INTRAVENOUS at 04:00

## 2019-01-30 RX ADMIN — Medication: at 04:21

## 2019-01-30 RX ADMIN — ASPIRIN 325 MG: 325 TABLET, DELAYED RELEASE ORAL at 08:44

## 2019-01-30 RX ADMIN — ONDANSETRON 4 MG: 2 INJECTION INTRAMUSCULAR; INTRAVENOUS at 12:59

## 2019-01-30 RX ADMIN — Medication: at 04:20

## 2019-01-30 RX ADMIN — AMIODARONE HYDROCHLORIDE 200 MG: 200 TABLET ORAL at 09:44

## 2019-01-30 RX ADMIN — Medication: at 21:19

## 2019-01-30 RX ADMIN — ONDANSETRON 4 MG: 2 INJECTION INTRAMUSCULAR; INTRAVENOUS at 20:55

## 2019-01-30 RX ADMIN — Medication: at 10:39

## 2019-01-30 RX ADMIN — SODIUM CHLORIDE: 9 INJECTION, SOLUTION INTRAVENOUS at 05:00

## 2019-01-30 RX ADMIN — Medication 10 ML: at 09:51

## 2019-01-30 RX ADMIN — SODIUM CHLORIDE: 9 INJECTION, SOLUTION INTRAVENOUS at 02:00

## 2019-01-30 RX ADMIN — SODIUM CHLORIDE: 9 INJECTION, SOLUTION INTRAVENOUS at 01:00

## 2019-01-30 RX ADMIN — MIDODRINE HYDROCHLORIDE 10 MG: 10 TABLET ORAL at 13:00

## 2019-01-30 RX ADMIN — ACETAMINOPHEN 650 MG: 325 TABLET ORAL at 08:43

## 2019-01-30 RX ADMIN — SODIUM CHLORIDE: 9 INJECTION, SOLUTION INTRAVENOUS at 20:00

## 2019-01-30 RX ADMIN — FAMOTIDINE 20 MG: 10 INJECTION, SOLUTION INTRAVENOUS at 20:42

## 2019-01-30 RX ADMIN — Medication 2 G: at 00:42

## 2019-01-30 RX ADMIN — MIDODRINE HYDROCHLORIDE 10 MG: 10 TABLET ORAL at 09:44

## 2019-01-30 RX ADMIN — Medication: at 14:24

## 2019-01-30 RX ADMIN — FAMOTIDINE 20 MG: 10 INJECTION, SOLUTION INTRAVENOUS at 09:26

## 2019-01-30 RX ADMIN — DOCUSATE SODIUM 100 MG: 100 CAPSULE, LIQUID FILLED ORAL at 20:43

## 2019-01-30 RX ADMIN — SODIUM CHLORIDE 100 ML: 9 INJECTION, SOLUTION INTRAVENOUS at 23:13

## 2019-01-30 RX ADMIN — SODIUM CHLORIDE 100 ML: 9 INJECTION, SOLUTION INTRAVENOUS at 22:20

## 2019-01-30 RX ADMIN — Medication 10 ML: at 20:45

## 2019-01-30 RX ADMIN — SODIUM CHLORIDE 250 ML: 9 INJECTION, SOLUTION INTRAVENOUS at 09:38

## 2019-01-30 RX ADMIN — PHYTONADIONE 10 MG: 10 INJECTION, EMULSION INTRAMUSCULAR; INTRAVENOUS; SUBCUTANEOUS at 19:00

## 2019-01-30 RX ADMIN — Medication: at 21:17

## 2019-01-30 RX ADMIN — Medication: at 05:38

## 2019-01-30 RX ADMIN — MULTIPLE VITAMINS W/ MINERALS TAB 1 TABLET: TAB at 09:44

## 2019-01-30 RX ADMIN — OXYCODONE HYDROCHLORIDE 5 MG: 5 TABLET ORAL at 23:20

## 2019-01-30 RX ADMIN — SODIUM CHLORIDE: 9 INJECTION, SOLUTION INTRAVENOUS at 00:00

## 2019-01-30 RX ADMIN — SODIUM CHLORIDE 100 ML: 9 INJECTION, SOLUTION INTRAVENOUS at 21:00

## 2019-01-30 RX ADMIN — DOCUSATE SODIUM 100 MG: 100 CAPSULE, LIQUID FILLED ORAL at 08:43

## 2019-01-30 RX ADMIN — SODIUM CHLORIDE 0.12 MCG/KG/MIN: 9 INJECTION, SOLUTION INTRAVENOUS at 10:22

## 2019-01-30 RX ADMIN — Medication: at 21:18

## 2019-01-30 ASSESSMENT — PAIN SCALES - GENERAL
PAINLEVEL_OUTOF10: 0
PAINLEVEL_OUTOF10: 4
PAINLEVEL_OUTOF10: 3
PAINLEVEL_OUTOF10: 0

## 2019-01-30 ASSESSMENT — PAIN SCALES - PAIN ASSESSMENT IN ADVANCED DEMENTIA (PAINAD)
FACIALEXPRESSION: 0
CONSOLABILITY: 1
NEGVOCALIZATION: 1
BREATHING: 1
TOTALSCORE: 4
BODYLANGUAGE: 1

## 2019-01-31 ENCOUNTER — APPOINTMENT (OUTPATIENT)
Dept: GENERAL RADIOLOGY | Age: 74
DRG: 216 | End: 2019-01-31
Attending: INTERNAL MEDICINE
Payer: MEDICARE

## 2019-01-31 LAB
ALBUMIN SERPL-MCNC: 2.9 G/DL (ref 3.5–5.1)
ALP BLD-CCNC: 82 U/L (ref 38–126)
ALT SERPL-CCNC: 45 U/L (ref 11–66)
AMMONIA: 27 UMOL/L (ref 11–60)
ANION GAP SERPL CALCULATED.3IONS-SCNC: 13 MEQ/L (ref 8–16)
ANION GAP SERPL CALCULATED.3IONS-SCNC: 15 MEQ/L (ref 8–16)
ANION GAP SERPL CALCULATED.3IONS-SCNC: 15 MEQ/L (ref 8–16)
ANION GAP SERPL CALCULATED.3IONS-SCNC: 16 MEQ/L (ref 8–16)
AST SERPL-CCNC: 99 U/L (ref 5–40)
BILIRUB SERPL-MCNC: 0.8 MG/DL (ref 0.3–1.2)
BILIRUBIN DIRECT: 0.3 MG/DL (ref 0–0.3)
BUN BLDV-MCNC: 17 MG/DL (ref 7–22)
BUN BLDV-MCNC: 18 MG/DL (ref 7–22)
BUN BLDV-MCNC: 22 MG/DL (ref 7–22)
BUN BLDV-MCNC: 22 MG/DL (ref 7–22)
CALCIUM IONIZED: 1.31 MMOL/L (ref 1.12–1.32)
CALCIUM IONIZED: 1.33 MMOL/L (ref 1.12–1.32)
CALCIUM IONIZED: 1.33 MMOL/L (ref 1.12–1.32)
CALCIUM SERPL-MCNC: 9 MG/DL (ref 8.5–10.5)
CALCIUM SERPL-MCNC: 9.1 MG/DL (ref 8.5–10.5)
CALCIUM SERPL-MCNC: 9.2 MG/DL (ref 8.5–10.5)
CALCIUM SERPL-MCNC: 9.2 MG/DL (ref 8.5–10.5)
CHLORIDE BLD-SCNC: 100 MEQ/L (ref 98–111)
CHLORIDE BLD-SCNC: 100 MEQ/L (ref 98–111)
CHLORIDE BLD-SCNC: 101 MEQ/L (ref 98–111)
CHLORIDE BLD-SCNC: 103 MEQ/L (ref 98–111)
CO2: 20 MEQ/L (ref 23–33)
CO2: 20 MEQ/L (ref 23–33)
CO2: 21 MEQ/L (ref 23–33)
CO2: 21 MEQ/L (ref 23–33)
CREAT SERPL-MCNC: 1.5 MG/DL (ref 0.4–1.2)
CREAT SERPL-MCNC: 1.5 MG/DL (ref 0.4–1.2)
CREAT SERPL-MCNC: 1.6 MG/DL (ref 0.4–1.2)
CREAT SERPL-MCNC: 1.6 MG/DL (ref 0.4–1.2)
EKG ATRIAL RATE: 102 BPM
EKG ATRIAL RATE: 79 BPM
EKG P AXIS: 80 DEGREES
EKG P AXIS: 89 DEGREES
EKG P-R INTERVAL: 146 MS
EKG P-R INTERVAL: 192 MS
EKG Q-T INTERVAL: 358 MS
EKG Q-T INTERVAL: 428 MS
EKG QRS DURATION: 104 MS
EKG QRS DURATION: 108 MS
EKG QTC CALCULATION (BAZETT): 466 MS
EKG QTC CALCULATION (BAZETT): 490 MS
EKG R AXIS: 79 DEGREES
EKG R AXIS: 81 DEGREES
EKG T AXIS: 79 DEGREES
EKG T AXIS: 85 DEGREES
EKG VENTRICULAR RATE: 102 BPM
EKG VENTRICULAR RATE: 79 BPM
ERYTHROCYTE [DISTWIDTH] IN BLOOD BY AUTOMATED COUNT: 17.4 % (ref 11.5–14.5)
ERYTHROCYTE [DISTWIDTH] IN BLOOD BY AUTOMATED COUNT: 18.2 % (ref 11.5–14.5)
ERYTHROCYTE [DISTWIDTH] IN BLOOD BY AUTOMATED COUNT: 18.5 % (ref 11.5–14.5)
ERYTHROCYTE [DISTWIDTH] IN BLOOD BY AUTOMATED COUNT: 55.8 FL (ref 35–45)
ERYTHROCYTE [DISTWIDTH] IN BLOOD BY AUTOMATED COUNT: 56.2 FL (ref 35–45)
ERYTHROCYTE [DISTWIDTH] IN BLOOD BY AUTOMATED COUNT: 56.5 FL (ref 35–45)
GFR SERPL CREATININE-BSD FRML MDRD: 43 ML/MIN/1.73M2
GFR SERPL CREATININE-BSD FRML MDRD: 43 ML/MIN/1.73M2
GFR SERPL CREATININE-BSD FRML MDRD: 46 ML/MIN/1.73M2
GFR SERPL CREATININE-BSD FRML MDRD: 46 ML/MIN/1.73M2
GLUCOSE BLD-MCNC: 137 MG/DL (ref 70–108)
GLUCOSE BLD-MCNC: 142 MG/DL (ref 70–108)
GLUCOSE BLD-MCNC: 150 MG/DL (ref 70–108)
GLUCOSE BLD-MCNC: 154 MG/DL (ref 70–108)
GLUCOSE BLD-MCNC: 158 MG/DL (ref 70–108)
GLUCOSE BLD-MCNC: 168 MG/DL (ref 70–108)
GLUCOSE BLD-MCNC: 169 MG/DL (ref 70–108)
GLUCOSE BLD-MCNC: 175 MG/DL (ref 70–108)
GLUCOSE BLD-MCNC: 176 MG/DL (ref 70–108)
HCT VFR BLD CALC: 25.4 % (ref 42–52)
HCT VFR BLD CALC: 26.3 % (ref 42–52)
HCT VFR BLD CALC: 27.5 % (ref 42–52)
HEMOGLOBIN: 8 GM/DL (ref 14–18)
HEMOGLOBIN: 8.2 GM/DL (ref 14–18)
HEMOGLOBIN: 8.5 GM/DL (ref 14–18)
INR BLD: 1.39 (ref 0.85–1.13)
INR BLD: 1.54 (ref 0.85–1.13)
INR BLD: 2.68 (ref 0.85–1.13)
MAGNESIUM: 2.1 MG/DL (ref 1.6–2.4)
MAGNESIUM: 2.1 MG/DL (ref 1.6–2.4)
MAGNESIUM: 2.2 MG/DL (ref 1.6–2.4)
MAGNESIUM: 2.2 MG/DL (ref 1.6–2.4)
MCH RBC QN AUTO: 28.5 PG (ref 26–33)
MCH RBC QN AUTO: 28.8 PG (ref 26–33)
MCH RBC QN AUTO: 29 PG (ref 26–33)
MCHC RBC AUTO-ENTMCNC: 30.9 GM/DL (ref 32.2–35.5)
MCHC RBC AUTO-ENTMCNC: 31.2 GM/DL (ref 32.2–35.5)
MCHC RBC AUTO-ENTMCNC: 31.5 GM/DL (ref 32.2–35.5)
MCV RBC AUTO: 92 FL (ref 80–94)
MCV RBC AUTO: 92.3 FL (ref 80–94)
MCV RBC AUTO: 92.3 FL (ref 80–94)
PHOSPHORUS: 3 MG/DL (ref 2.4–4.7)
PHOSPHORUS: 3.5 MG/DL (ref 2.4–4.7)
PHOSPHORUS: 3.7 MG/DL (ref 2.4–4.7)
PLATELET # BLD: 123 THOU/MM3 (ref 130–400)
PLATELET # BLD: 159 THOU/MM3 (ref 130–400)
PLATELET # BLD: 198 THOU/MM3 (ref 130–400)
PMV BLD AUTO: 11 FL (ref 9.4–12.4)
PMV BLD AUTO: 11.6 FL (ref 9.4–12.4)
PMV BLD AUTO: 13.4 FL (ref 9.4–12.4)
POTASSIUM SERPL-SCNC: 4.6 MEQ/L (ref 3.5–5.2)
POTASSIUM SERPL-SCNC: 4.7 MEQ/L (ref 3.5–5.2)
POTASSIUM SERPL-SCNC: 4.9 MEQ/L (ref 3.5–5.2)
POTASSIUM SERPL-SCNC: 5 MEQ/L (ref 3.5–5.2)
RBC # BLD: 2.76 MILL/MM3 (ref 4.7–6.1)
RBC # BLD: 2.85 MILL/MM3 (ref 4.7–6.1)
RBC # BLD: 2.98 MILL/MM3 (ref 4.7–6.1)
SODIUM BLD-SCNC: 135 MEQ/L (ref 135–145)
SODIUM BLD-SCNC: 135 MEQ/L (ref 135–145)
SODIUM BLD-SCNC: 136 MEQ/L (ref 135–145)
SODIUM BLD-SCNC: 139 MEQ/L (ref 135–145)
TOTAL PROTEIN: 5.4 G/DL (ref 6.1–8)
WBC # BLD: 12.3 THOU/MM3 (ref 4.8–10.8)
WBC # BLD: 12.7 THOU/MM3 (ref 4.8–10.8)
WBC # BLD: 13.8 THOU/MM3 (ref 4.8–10.8)

## 2019-01-31 PROCEDURE — 85610 PROTHROMBIN TIME: CPT

## 2019-01-31 PROCEDURE — 6370000000 HC RX 637 (ALT 250 FOR IP): Performed by: THORACIC SURGERY (CARDIOTHORACIC VASCULAR SURGERY)

## 2019-01-31 PROCEDURE — 6360000002 HC RX W HCPCS: Performed by: THORACIC SURGERY (CARDIOTHORACIC VASCULAR SURGERY)

## 2019-01-31 PROCEDURE — 84100 ASSAY OF PHOSPHORUS: CPT

## 2019-01-31 PROCEDURE — 2580000003 HC RX 258: Performed by: THORACIC SURGERY (CARDIOTHORACIC VASCULAR SURGERY)

## 2019-01-31 PROCEDURE — 80053 COMPREHEN METABOLIC PANEL: CPT

## 2019-01-31 PROCEDURE — 31720 CLEARANCE OF AIRWAYS: CPT

## 2019-01-31 PROCEDURE — 36592 COLLECT BLOOD FROM PICC: CPT

## 2019-01-31 PROCEDURE — 71045 X-RAY EXAM CHEST 1 VIEW: CPT

## 2019-01-31 PROCEDURE — 6360000002 HC RX W HCPCS

## 2019-01-31 PROCEDURE — 74018 RADEX ABDOMEN 1 VIEW: CPT

## 2019-01-31 PROCEDURE — 2580000003 HC RX 258: Performed by: INTERNAL MEDICINE

## 2019-01-31 PROCEDURE — 2709999900 HC NON-CHARGEABLE SUPPLY

## 2019-01-31 PROCEDURE — 2000000000 HC ICU R&B

## 2019-01-31 PROCEDURE — 83735 ASSAY OF MAGNESIUM: CPT

## 2019-01-31 PROCEDURE — 6360000002 HC RX W HCPCS: Performed by: INTERNAL MEDICINE

## 2019-01-31 PROCEDURE — 93005 ELECTROCARDIOGRAM TRACING: CPT | Performed by: THORACIC SURGERY (CARDIOTHORACIC VASCULAR SURGERY)

## 2019-01-31 PROCEDURE — 94640 AIRWAY INHALATION TREATMENT: CPT

## 2019-01-31 PROCEDURE — APPSS30 APP SPLIT SHARED TIME 16-30 MINUTES: Performed by: PHYSICIAN ASSISTANT

## 2019-01-31 PROCEDURE — 82330 ASSAY OF CALCIUM: CPT

## 2019-01-31 PROCEDURE — 82248 BILIRUBIN DIRECT: CPT

## 2019-01-31 PROCEDURE — 99233 SBSQ HOSP IP/OBS HIGH 50: CPT | Performed by: INTERNAL MEDICINE

## 2019-01-31 PROCEDURE — 80048 BASIC METABOLIC PNL TOTAL CA: CPT

## 2019-01-31 PROCEDURE — 36415 COLL VENOUS BLD VENIPUNCTURE: CPT

## 2019-01-31 PROCEDURE — 93010 ELECTROCARDIOGRAM REPORT: CPT | Performed by: INTERNAL MEDICINE

## 2019-01-31 PROCEDURE — 92526 ORAL FUNCTION THERAPY: CPT

## 2019-01-31 PROCEDURE — 2500000003 HC RX 250 WO HCPCS: Performed by: THORACIC SURGERY (CARDIOTHORACIC VASCULAR SURGERY)

## 2019-01-31 PROCEDURE — 2700000000 HC OXYGEN THERAPY PER DAY

## 2019-01-31 PROCEDURE — 94761 N-INVAS EAR/PLS OXIMETRY MLT: CPT

## 2019-01-31 PROCEDURE — 85027 COMPLETE CBC AUTOMATED: CPT

## 2019-01-31 PROCEDURE — 94660 CPAP INITIATION&MGMT: CPT

## 2019-01-31 PROCEDURE — 82140 ASSAY OF AMMONIA: CPT

## 2019-01-31 PROCEDURE — 82948 REAGENT STRIP/BLOOD GLUCOSE: CPT

## 2019-01-31 RX ORDER — MIDODRINE HYDROCHLORIDE 5 MG/1
5 TABLET ORAL
Status: DISCONTINUED | OUTPATIENT
Start: 2019-01-31 | End: 2019-02-01

## 2019-01-31 RX ORDER — AMIODARONE HYDROCHLORIDE 200 MG/1
200 TABLET ORAL DAILY
Status: DISCONTINUED | OUTPATIENT
Start: 2019-01-31 | End: 2019-02-01

## 2019-01-31 RX ORDER — NALOXONE HYDROCHLORIDE 1 MG/ML
INJECTION INTRAMUSCULAR; INTRAVENOUS; SUBCUTANEOUS
Status: COMPLETED
Start: 2019-01-31 | End: 2019-01-31

## 2019-01-31 RX ORDER — LABETALOL HYDROCHLORIDE 5 MG/ML
5 INJECTION, SOLUTION INTRAVENOUS ONCE
Status: COMPLETED | OUTPATIENT
Start: 2019-01-31 | End: 2019-01-31

## 2019-01-31 RX ORDER — NALOXONE HYDROCHLORIDE 0.4 MG/ML
0.4 INJECTION, SOLUTION INTRAMUSCULAR; INTRAVENOUS; SUBCUTANEOUS ONCE
Status: DISCONTINUED | OUTPATIENT
Start: 2019-01-31 | End: 2019-02-05

## 2019-01-31 RX ADMIN — NALOXONE HYDROCHLORIDE 2 MG: 1 INJECTION PARENTERAL at 01:43

## 2019-01-31 RX ADMIN — ENOXAPARIN SODIUM 40 MG: 40 INJECTION SUBCUTANEOUS at 21:08

## 2019-01-31 RX ADMIN — SODIUM CHLORIDE 100 ML: 9 INJECTION, SOLUTION INTRAVENOUS at 04:06

## 2019-01-31 RX ADMIN — SODIUM CHLORIDE: 9 INJECTION, SOLUTION INTRAVENOUS at 06:09

## 2019-01-31 RX ADMIN — Medication: at 07:12

## 2019-01-31 RX ADMIN — FAMOTIDINE 20 MG: 20 TABLET, FILM COATED ORAL at 21:32

## 2019-01-31 RX ADMIN — FAMOTIDINE 20 MG: 10 INJECTION, SOLUTION INTRAVENOUS at 09:03

## 2019-01-31 RX ADMIN — SODIUM CHLORIDE 100 ML: 9 INJECTION, SOLUTION INTRAVENOUS at 00:10

## 2019-01-31 RX ADMIN — INSULIN LISPRO 1 UNITS: 100 INJECTION, SOLUTION INTRAVENOUS; SUBCUTANEOUS at 00:19

## 2019-01-31 RX ADMIN — METOPROLOL TARTRATE 2.5 MG: 5 INJECTION, SOLUTION INTRAVENOUS at 21:33

## 2019-01-31 RX ADMIN — Medication: at 07:13

## 2019-01-31 RX ADMIN — Medication 10 ML: at 09:03

## 2019-01-31 RX ADMIN — LABETALOL 20 MG/4 ML (5 MG/ML) INTRAVENOUS SYRINGE 5 MG: at 06:48

## 2019-01-31 RX ADMIN — Medication: at 11:57

## 2019-01-31 RX ADMIN — ASPIRIN 325 MG: 325 TABLET, DELAYED RELEASE ORAL at 09:03

## 2019-01-31 RX ADMIN — Medication: at 11:56

## 2019-01-31 RX ADMIN — INSULIN LISPRO 1 UNITS: 100 INJECTION, SOLUTION INTRAVENOUS; SUBCUTANEOUS at 18:28

## 2019-01-31 RX ADMIN — Medication: at 07:14

## 2019-01-31 RX ADMIN — Medication: at 02:08

## 2019-01-31 RX ADMIN — MULTIPLE VITAMINS W/ MINERALS TAB 1 TABLET: TAB at 09:03

## 2019-01-31 RX ADMIN — SODIUM CHLORIDE 100 ML: 9 INJECTION, SOLUTION INTRAVENOUS at 01:59

## 2019-01-31 RX ADMIN — PHYTONADIONE 10 MG: 10 INJECTION, EMULSION INTRAMUSCULAR; INTRAVENOUS; SUBCUTANEOUS at 06:49

## 2019-01-31 RX ADMIN — SODIUM CHLORIDE 100 ML: 9 INJECTION, SOLUTION INTRAVENOUS at 01:00

## 2019-01-31 RX ADMIN — FAMOTIDINE 20 MG: 10 INJECTION, SOLUTION INTRAVENOUS at 21:08

## 2019-01-31 RX ADMIN — DOCUSATE SODIUM 100 MG: 100 CAPSULE, LIQUID FILLED ORAL at 09:03

## 2019-01-31 RX ADMIN — Medication: at 02:06

## 2019-01-31 RX ADMIN — SODIUM CHLORIDE 100 ML: 9 INJECTION, SOLUTION INTRAVENOUS at 05:00

## 2019-01-31 RX ADMIN — DOCUSATE SODIUM 100 MG: 100 CAPSULE, LIQUID FILLED ORAL at 21:10

## 2019-01-31 RX ADMIN — Medication: at 11:55

## 2019-01-31 RX ADMIN — SODIUM CHLORIDE 100 ML: 9 INJECTION, SOLUTION INTRAVENOUS at 03:08

## 2019-01-31 RX ADMIN — MIDODRINE HYDROCHLORIDE 5 MG: 10 TABLET ORAL at 12:28

## 2019-01-31 RX ADMIN — Medication 10 ML: at 21:09

## 2019-01-31 RX ADMIN — INSULIN LISPRO 1 UNITS: 100 INJECTION, SOLUTION INTRAVENOUS; SUBCUTANEOUS at 11:16

## 2019-01-31 RX ADMIN — AMIODARONE HYDROCHLORIDE 200 MG: 200 TABLET ORAL at 09:03

## 2019-01-31 RX ADMIN — Medication: at 02:07

## 2019-01-31 RX ADMIN — Medication 10 ML: at 21:34

## 2019-01-31 ASSESSMENT — PAIN SCALES - GENERAL
PAINLEVEL_OUTOF10: 0

## 2019-02-01 ENCOUNTER — APPOINTMENT (OUTPATIENT)
Dept: CT IMAGING | Age: 74
DRG: 216 | End: 2019-02-01
Attending: INTERNAL MEDICINE
Payer: MEDICARE

## 2019-02-01 ENCOUNTER — APPOINTMENT (OUTPATIENT)
Dept: GENERAL RADIOLOGY | Age: 74
DRG: 216 | End: 2019-02-01
Attending: INTERNAL MEDICINE
Payer: MEDICARE

## 2019-02-01 LAB
ANION GAP SERPL CALCULATED.3IONS-SCNC: 15 MEQ/L (ref 8–16)
BUN BLDV-MCNC: 44 MG/DL (ref 7–22)
CALCIUM IONIZED: 1.29 MMOL/L (ref 1.12–1.32)
CALCIUM SERPL-MCNC: 8.7 MG/DL (ref 8.5–10.5)
CHLORIDE BLD-SCNC: 101 MEQ/L (ref 98–111)
CO2: 20 MEQ/L (ref 23–33)
CREAT SERPL-MCNC: 2.9 MG/DL (ref 0.4–1.2)
ERYTHROCYTE [DISTWIDTH] IN BLOOD BY AUTOMATED COUNT: 18.2 % (ref 11.5–14.5)
ERYTHROCYTE [DISTWIDTH] IN BLOOD BY AUTOMATED COUNT: 55.8 FL (ref 35–45)
GFR SERPL CREATININE-BSD FRML MDRD: 22 ML/MIN/1.73M2
GLUCOSE BLD-MCNC: 118 MG/DL (ref 70–108)
GLUCOSE BLD-MCNC: 129 MG/DL (ref 70–108)
GLUCOSE BLD-MCNC: 148 MG/DL (ref 70–108)
GLUCOSE BLD-MCNC: 150 MG/DL (ref 70–108)
GLUCOSE BLD-MCNC: 166 MG/DL (ref 70–108)
GLUCOSE BLD-MCNC: 175 MG/DL (ref 70–108)
GLUCOSE BLD-MCNC: 185 MG/DL (ref 70–108)
GLUCOSE BLD-MCNC: 208 MG/DL (ref 70–108)
GLUCOSE BLD-MCNC: 248 MG/DL (ref 70–108)
GLUCOSE BLD-MCNC: 276 MG/DL (ref 70–108)
GLUCOSE BLD-MCNC: 291 MG/DL (ref 70–108)
GLUCOSE BLD-MCNC: 309 MG/DL (ref 70–108)
GLUCOSE BLD-MCNC: 326 MG/DL (ref 70–108)
GLUCOSE BLD-MCNC: 89 MG/DL (ref 70–108)
GLUCOSE BLD-MCNC: 94 MG/DL (ref 70–108)
HCT VFR BLD CALC: 26.7 % (ref 42–52)
HEMOGLOBIN: 8.5 GM/DL (ref 14–18)
INR BLD: 1.15 (ref 0.85–1.13)
MAGNESIUM: 2.5 MG/DL (ref 1.6–2.4)
MCH RBC QN AUTO: 29.3 PG (ref 26–33)
MCHC RBC AUTO-ENTMCNC: 31.8 GM/DL (ref 32.2–35.5)
MCV RBC AUTO: 92.1 FL (ref 80–94)
PHOSPHORUS: 4.8 MG/DL (ref 2.4–4.7)
PLATELET # BLD: 133 THOU/MM3 (ref 130–400)
PMV BLD AUTO: 11.9 FL (ref 9.4–12.4)
POTASSIUM SERPL-SCNC: 4.7 MEQ/L (ref 3.5–5.2)
RBC # BLD: 2.9 MILL/MM3 (ref 4.7–6.1)
SODIUM BLD-SCNC: 136 MEQ/L (ref 135–145)
WBC # BLD: 12.5 THOU/MM3 (ref 4.8–10.8)

## 2019-02-01 PROCEDURE — 70450 CT HEAD/BRAIN W/O DYE: CPT

## 2019-02-01 PROCEDURE — 97110 THERAPEUTIC EXERCISES: CPT

## 2019-02-01 PROCEDURE — 99232 SBSQ HOSP IP/OBS MODERATE 35: CPT | Performed by: INTERNAL MEDICINE

## 2019-02-01 PROCEDURE — 2700000000 HC OXYGEN THERAPY PER DAY

## 2019-02-01 PROCEDURE — 85610 PROTHROMBIN TIME: CPT

## 2019-02-01 PROCEDURE — 6370000000 HC RX 637 (ALT 250 FOR IP): Performed by: THORACIC SURGERY (CARDIOTHORACIC VASCULAR SURGERY)

## 2019-02-01 PROCEDURE — 6360000002 HC RX W HCPCS: Performed by: THORACIC SURGERY (CARDIOTHORACIC VASCULAR SURGERY)

## 2019-02-01 PROCEDURE — 2500000003 HC RX 250 WO HCPCS: Performed by: THORACIC SURGERY (CARDIOTHORACIC VASCULAR SURGERY)

## 2019-02-01 PROCEDURE — 82330 ASSAY OF CALCIUM: CPT

## 2019-02-01 PROCEDURE — 83735 ASSAY OF MAGNESIUM: CPT

## 2019-02-01 PROCEDURE — P9046 ALBUMIN (HUMAN), 25%, 20 ML: HCPCS | Performed by: INTERNAL MEDICINE

## 2019-02-01 PROCEDURE — 6360000002 HC RX W HCPCS: Performed by: INTERNAL MEDICINE

## 2019-02-01 PROCEDURE — 94640 AIRWAY INHALATION TREATMENT: CPT

## 2019-02-01 PROCEDURE — 71045 X-RAY EXAM CHEST 1 VIEW: CPT

## 2019-02-01 PROCEDURE — 2580000003 HC RX 258: Performed by: THORACIC SURGERY (CARDIOTHORACIC VASCULAR SURGERY)

## 2019-02-01 PROCEDURE — 84100 ASSAY OF PHOSPHORUS: CPT

## 2019-02-01 PROCEDURE — 85027 COMPLETE CBC AUTOMATED: CPT

## 2019-02-01 PROCEDURE — 36415 COLL VENOUS BLD VENIPUNCTURE: CPT

## 2019-02-01 PROCEDURE — 2709999900 HC NON-CHARGEABLE SUPPLY

## 2019-02-01 PROCEDURE — 94660 CPAP INITIATION&MGMT: CPT

## 2019-02-01 PROCEDURE — 94761 N-INVAS EAR/PLS OXIMETRY MLT: CPT

## 2019-02-01 PROCEDURE — 80048 BASIC METABOLIC PNL TOTAL CA: CPT

## 2019-02-01 PROCEDURE — 97168 OT RE-EVAL EST PLAN CARE: CPT

## 2019-02-01 PROCEDURE — 90935 HEMODIALYSIS ONE EVALUATION: CPT

## 2019-02-01 PROCEDURE — 82948 REAGENT STRIP/BLOOD GLUCOSE: CPT

## 2019-02-01 PROCEDURE — C1729 CATH, DRAINAGE: HCPCS

## 2019-02-01 PROCEDURE — 2000000000 HC ICU R&B

## 2019-02-01 RX ORDER — HEPARIN SODIUM 5000 [USP'U]/ML
5000 INJECTION, SOLUTION INTRAVENOUS; SUBCUTANEOUS EVERY 8 HOURS SCHEDULED
Status: DISCONTINUED | OUTPATIENT
Start: 2019-02-01 | End: 2019-02-12 | Stop reason: HOSPADM

## 2019-02-01 RX ORDER — FAMOTIDINE 20 MG/1
20 TABLET, FILM COATED ORAL DAILY
Status: DISCONTINUED | OUTPATIENT
Start: 2019-02-02 | End: 2019-02-12 | Stop reason: HOSPADM

## 2019-02-01 RX ORDER — MIDODRINE HYDROCHLORIDE 2.5 MG/1
2.5 TABLET ORAL
Status: DISCONTINUED | OUTPATIENT
Start: 2019-02-01 | End: 2019-02-02

## 2019-02-01 RX ORDER — ASPIRIN 325 MG
325 TABLET ORAL DAILY
Status: DISCONTINUED | OUTPATIENT
Start: 2019-02-01 | End: 2019-02-12 | Stop reason: HOSPADM

## 2019-02-01 RX ORDER — MIDODRINE HYDROCHLORIDE 5 MG/1
5 TABLET ORAL
Status: DISCONTINUED | OUTPATIENT
Start: 2019-02-01 | End: 2019-02-01

## 2019-02-01 RX ORDER — DEXTROSE MONOHYDRATE 25 G/50ML
12.5 INJECTION, SOLUTION INTRAVENOUS PRN
Status: DISCONTINUED | OUTPATIENT
Start: 2019-02-01 | End: 2019-02-05

## 2019-02-01 RX ORDER — ALBUMIN (HUMAN) 12.5 G/50ML
25 SOLUTION INTRAVENOUS ONCE
Status: COMPLETED | OUTPATIENT
Start: 2019-02-01 | End: 2019-02-01

## 2019-02-01 RX ADMIN — Medication 10 ML: at 08:53

## 2019-02-01 RX ADMIN — INSULIN LISPRO 4 UNITS: 100 INJECTION, SOLUTION INTRAVENOUS; SUBCUTANEOUS at 07:44

## 2019-02-01 RX ADMIN — DARBEPOETIN ALFA 60 MCG: 60 INJECTION, SOLUTION INTRAVENOUS; SUBCUTANEOUS at 13:22

## 2019-02-01 RX ADMIN — SODIUM CHLORIDE 5.3 UNITS/HR: 0.9 INJECTION INTRAVENOUS at 10:27

## 2019-02-01 RX ADMIN — MULTIPLE VITAMINS W/ MINERALS TAB 1 TABLET: TAB at 08:52

## 2019-02-01 RX ADMIN — METOPROLOL TARTRATE 2.5 MG: 5 INJECTION, SOLUTION INTRAVENOUS at 02:39

## 2019-02-01 RX ADMIN — HEPARIN SODIUM 5000 UNITS: 5000 INJECTION INTRAVENOUS; SUBCUTANEOUS at 22:45

## 2019-02-01 RX ADMIN — INSULIN LISPRO 2 UNITS: 100 INJECTION, SOLUTION INTRAVENOUS; SUBCUTANEOUS at 00:59

## 2019-02-01 RX ADMIN — Medication 10 ML: at 02:43

## 2019-02-01 RX ADMIN — ASPIRIN 325 MG: 325 TABLET ORAL at 10:30

## 2019-02-01 RX ADMIN — Medication 10 ML: at 20:58

## 2019-02-01 RX ADMIN — Medication 10 ML: at 02:42

## 2019-02-01 RX ADMIN — AMIODARONE HYDROCHLORIDE 200 MG: 200 TABLET ORAL at 08:52

## 2019-02-01 RX ADMIN — Medication 10 ML: at 02:40

## 2019-02-01 RX ADMIN — FAMOTIDINE 20 MG: 20 TABLET, FILM COATED ORAL at 08:52

## 2019-02-01 RX ADMIN — ALBUMIN (HUMAN) 25 G: 0.25 INJECTION, SOLUTION INTRAVENOUS at 16:22

## 2019-02-01 ASSESSMENT — PAIN SCALES - PAIN ASSESSMENT IN ADVANCED DEMENTIA (PAINAD)
BODYLANGUAGE: 0
CONSOLABILITY: 0
NEGVOCALIZATION: 0
FACIALEXPRESSION: 0
BREATHING: 0
TOTALSCORE: 0

## 2019-02-01 ASSESSMENT — PULMONARY FUNCTION TESTS: PIF_VALUE: 14

## 2019-02-02 ENCOUNTER — APPOINTMENT (OUTPATIENT)
Dept: GENERAL RADIOLOGY | Age: 74
DRG: 216 | End: 2019-02-02
Attending: INTERNAL MEDICINE
Payer: MEDICARE

## 2019-02-02 LAB
ANION GAP SERPL CALCULATED.3IONS-SCNC: 11 MEQ/L (ref 8–16)
BUN BLDV-MCNC: 44 MG/DL (ref 7–22)
CALCIUM IONIZED: 1.22 MMOL/L (ref 1.12–1.32)
CALCIUM SERPL-MCNC: 8.5 MG/DL (ref 8.5–10.5)
CHLORIDE BLD-SCNC: 105 MEQ/L (ref 98–111)
CO2: 25 MEQ/L (ref 23–33)
CREAT SERPL-MCNC: 3.3 MG/DL (ref 0.4–1.2)
ERYTHROCYTE [DISTWIDTH] IN BLOOD BY AUTOMATED COUNT: 18.6 % (ref 11.5–14.5)
ERYTHROCYTE [DISTWIDTH] IN BLOOD BY AUTOMATED COUNT: 56.9 FL (ref 35–45)
FERRITIN: 753 NG/ML (ref 22–322)
GFR SERPL CREATININE-BSD FRML MDRD: 19 ML/MIN/1.73M2
GLUCOSE BLD-MCNC: 103 MG/DL (ref 70–108)
GLUCOSE BLD-MCNC: 106 MG/DL (ref 70–108)
GLUCOSE BLD-MCNC: 111 MG/DL (ref 70–108)
GLUCOSE BLD-MCNC: 112 MG/DL (ref 70–108)
GLUCOSE BLD-MCNC: 117 MG/DL (ref 70–108)
GLUCOSE BLD-MCNC: 122 MG/DL (ref 70–108)
GLUCOSE BLD-MCNC: 125 MG/DL (ref 70–108)
GLUCOSE BLD-MCNC: 126 MG/DL (ref 70–108)
GLUCOSE BLD-MCNC: 128 MG/DL (ref 70–108)
GLUCOSE BLD-MCNC: 129 MG/DL (ref 70–108)
GLUCOSE BLD-MCNC: 132 MG/DL (ref 70–108)
GLUCOSE BLD-MCNC: 140 MG/DL (ref 70–108)
GLUCOSE BLD-MCNC: 148 MG/DL (ref 70–108)
GLUCOSE BLD-MCNC: 150 MG/DL (ref 70–108)
GLUCOSE BLD-MCNC: 151 MG/DL (ref 70–108)
GLUCOSE BLD-MCNC: 168 MG/DL (ref 70–108)
GLUCOSE BLD-MCNC: 94 MG/DL (ref 70–108)
GLUCOSE BLD-MCNC: 99 MG/DL (ref 70–108)
HCT VFR BLD CALC: 23.4 % (ref 42–52)
HEMOGLOBIN: 7.2 GM/DL (ref 14–18)
INR BLD: 1 (ref 0.85–1.13)
IRON SATURATION: 38 % (ref 20–50)
IRON: 41 UG/DL (ref 65–195)
MAGNESIUM: 2.4 MG/DL (ref 1.6–2.4)
MCH RBC QN AUTO: 28.8 PG (ref 26–33)
MCHC RBC AUTO-ENTMCNC: 30.8 GM/DL (ref 32.2–35.5)
MCV RBC AUTO: 93.6 FL (ref 80–94)
PHOSPHORUS: 2.1 MG/DL (ref 2.4–4.7)
PLATELET # BLD: 150 THOU/MM3 (ref 130–400)
PMV BLD AUTO: 12.7 FL (ref 9.4–12.4)
POTASSIUM SERPL-SCNC: 4.1 MEQ/L (ref 3.5–5.2)
RBC # BLD: 2.5 MILL/MM3 (ref 4.7–6.1)
SODIUM BLD-SCNC: 141 MEQ/L (ref 135–145)
TOTAL IRON BINDING CAPACITY: 109 UG/DL (ref 171–450)
WBC # BLD: 10 THOU/MM3 (ref 4.8–10.8)

## 2019-02-02 PROCEDURE — 31720 CLEARANCE OF AIRWAYS: CPT

## 2019-02-02 PROCEDURE — 82948 REAGENT STRIP/BLOOD GLUCOSE: CPT

## 2019-02-02 PROCEDURE — 82330 ASSAY OF CALCIUM: CPT

## 2019-02-02 PROCEDURE — 80048 BASIC METABOLIC PNL TOTAL CA: CPT

## 2019-02-02 PROCEDURE — 71045 X-RAY EXAM CHEST 1 VIEW: CPT

## 2019-02-02 PROCEDURE — 82728 ASSAY OF FERRITIN: CPT

## 2019-02-02 PROCEDURE — 83735 ASSAY OF MAGNESIUM: CPT

## 2019-02-02 PROCEDURE — 83550 IRON BINDING TEST: CPT

## 2019-02-02 PROCEDURE — 6370000000 HC RX 637 (ALT 250 FOR IP): Performed by: THORACIC SURGERY (CARDIOTHORACIC VASCULAR SURGERY)

## 2019-02-02 PROCEDURE — 85027 COMPLETE CBC AUTOMATED: CPT

## 2019-02-02 PROCEDURE — 2000000000 HC ICU R&B

## 2019-02-02 PROCEDURE — 99232 SBSQ HOSP IP/OBS MODERATE 35: CPT | Performed by: INTERNAL MEDICINE

## 2019-02-02 PROCEDURE — 2580000003 HC RX 258: Performed by: THORACIC SURGERY (CARDIOTHORACIC VASCULAR SURGERY)

## 2019-02-02 PROCEDURE — 85610 PROTHROMBIN TIME: CPT

## 2019-02-02 PROCEDURE — 94640 AIRWAY INHALATION TREATMENT: CPT

## 2019-02-02 PROCEDURE — 6370000000 HC RX 637 (ALT 250 FOR IP): Performed by: INTERNAL MEDICINE

## 2019-02-02 PROCEDURE — 83540 ASSAY OF IRON: CPT

## 2019-02-02 PROCEDURE — 36415 COLL VENOUS BLD VENIPUNCTURE: CPT

## 2019-02-02 PROCEDURE — 2709999900 HC NON-CHARGEABLE SUPPLY

## 2019-02-02 PROCEDURE — 6360000002 HC RX W HCPCS: Performed by: THORACIC SURGERY (CARDIOTHORACIC VASCULAR SURGERY)

## 2019-02-02 PROCEDURE — 36592 COLLECT BLOOD FROM PICC: CPT

## 2019-02-02 PROCEDURE — 84100 ASSAY OF PHOSPHORUS: CPT

## 2019-02-02 RX ORDER — SODIUM CHLORIDE FOR INHALATION 0.9 %
3 VIAL, NEBULIZER (ML) INHALATION
Status: DISCONTINUED | OUTPATIENT
Start: 2019-02-02 | End: 2019-02-05

## 2019-02-02 RX ORDER — INSULIN GLARGINE 100 [IU]/ML
15 INJECTION, SOLUTION SUBCUTANEOUS 2 TIMES DAILY
Status: DISCONTINUED | OUTPATIENT
Start: 2019-02-02 | End: 2019-02-03

## 2019-02-02 RX ADMIN — POTASSIUM & SODIUM PHOSPHATES POWDER PACK 280-160-250 MG 250 MG: 280-160-250 PACK at 08:26

## 2019-02-02 RX ADMIN — MIDODRINE HYDROCHLORIDE 2.5 MG: 5 TABLET ORAL at 08:26

## 2019-02-02 RX ADMIN — MULTIPLE VITAMINS W/ MINERALS TAB 1 TABLET: TAB at 08:26

## 2019-02-02 RX ADMIN — ISODIUM CHLORIDE 3 ML: 0.03 SOLUTION RESPIRATORY (INHALATION) at 17:38

## 2019-02-02 RX ADMIN — HEPARIN SODIUM 5000 UNITS: 5000 INJECTION INTRAVENOUS; SUBCUTANEOUS at 14:00

## 2019-02-02 RX ADMIN — Medication 17.6 MG: at 12:43

## 2019-02-02 RX ADMIN — FAMOTIDINE 20 MG: 20 TABLET, FILM COATED ORAL at 08:30

## 2019-02-02 RX ADMIN — METOPROLOL TARTRATE 25 MG: 25 TABLET, FILM COATED ORAL at 11:38

## 2019-02-02 RX ADMIN — INSULIN GLARGINE 15 UNITS: 100 INJECTION, SOLUTION SUBCUTANEOUS at 21:16

## 2019-02-02 RX ADMIN — HEPARIN SODIUM 5000 UNITS: 5000 INJECTION INTRAVENOUS; SUBCUTANEOUS at 21:16

## 2019-02-02 RX ADMIN — SODIUM CHLORIDE 5 UNITS/HR: 0.9 INJECTION INTRAVENOUS at 01:03

## 2019-02-02 RX ADMIN — DOCUSATE SODIUM 100 MG: 50 LIQUID ORAL at 12:43

## 2019-02-02 RX ADMIN — Medication 10 ML: at 08:30

## 2019-02-02 RX ADMIN — HEPARIN SODIUM 5000 UNITS: 5000 INJECTION INTRAVENOUS; SUBCUTANEOUS at 05:50

## 2019-02-02 RX ADMIN — METOPROLOL TARTRATE 25 MG: 25 TABLET, FILM COATED ORAL at 21:11

## 2019-02-02 RX ADMIN — ISODIUM CHLORIDE 3 ML: 0.03 SOLUTION RESPIRATORY (INHALATION) at 21:58

## 2019-02-02 RX ADMIN — INSULIN GLARGINE 15 UNITS: 100 INJECTION, SOLUTION SUBCUTANEOUS at 11:38

## 2019-02-02 RX ADMIN — Medication 10 ML: at 21:14

## 2019-02-02 RX ADMIN — ASPIRIN 325 MG: 325 TABLET ORAL at 08:26

## 2019-02-02 ASSESSMENT — PAIN SCALES - GENERAL
PAINLEVEL_OUTOF10: 0
PAINLEVEL_OUTOF10: 0

## 2019-02-02 ASSESSMENT — PAIN SCALES - WONG BAKER: WONGBAKER_NUMERICALRESPONSE: 0

## 2019-02-03 ENCOUNTER — APPOINTMENT (OUTPATIENT)
Dept: GENERAL RADIOLOGY | Age: 74
DRG: 216 | End: 2019-02-03
Attending: INTERNAL MEDICINE
Payer: MEDICARE

## 2019-02-03 LAB
ANION GAP SERPL CALCULATED.3IONS-SCNC: 12 MEQ/L (ref 8–16)
BUN BLDV-MCNC: 68 MG/DL (ref 7–22)
CALCIUM IONIZED: 1.18 MMOL/L (ref 1.12–1.32)
CALCIUM SERPL-MCNC: 8.1 MG/DL (ref 8.5–10.5)
CHLORIDE BLD-SCNC: 105 MEQ/L (ref 98–111)
CO2: 24 MEQ/L (ref 23–33)
CREAT SERPL-MCNC: 3.9 MG/DL (ref 0.4–1.2)
ERYTHROCYTE [DISTWIDTH] IN BLOOD BY AUTOMATED COUNT: 19.6 % (ref 11.5–14.5)
ERYTHROCYTE [DISTWIDTH] IN BLOOD BY AUTOMATED COUNT: 57.5 FL (ref 35–45)
GFR SERPL CREATININE-BSD FRML MDRD: 15 ML/MIN/1.73M2
GLUCOSE BLD-MCNC: 104 MG/DL (ref 70–108)
GLUCOSE BLD-MCNC: 109 MG/DL (ref 70–108)
GLUCOSE BLD-MCNC: 114 MG/DL (ref 70–108)
GLUCOSE BLD-MCNC: 115 MG/DL (ref 70–108)
GLUCOSE BLD-MCNC: 118 MG/DL (ref 70–108)
GLUCOSE BLD-MCNC: 120 MG/DL (ref 70–108)
GLUCOSE BLD-MCNC: 127 MG/DL (ref 70–108)
GLUCOSE BLD-MCNC: 129 MG/DL (ref 70–108)
GLUCOSE BLD-MCNC: 133 MG/DL (ref 70–108)
GLUCOSE BLD-MCNC: 134 MG/DL (ref 70–108)
GLUCOSE BLD-MCNC: 135 MG/DL (ref 70–108)
GLUCOSE BLD-MCNC: 137 MG/DL (ref 70–108)
GLUCOSE BLD-MCNC: 80 MG/DL (ref 70–108)
GLUCOSE BLD-MCNC: 92 MG/DL (ref 70–108)
GLUCOSE BLD-MCNC: 93 MG/DL (ref 70–108)
GLUCOSE BLD-MCNC: 99 MG/DL (ref 70–108)
HCT VFR BLD CALC: 24.9 % (ref 42–52)
HEMOGLOBIN: 7.7 GM/DL (ref 14–18)
INR BLD: 1 (ref 0.85–1.13)
MAGNESIUM: 2.5 MG/DL (ref 1.6–2.4)
MCH RBC QN AUTO: 29.2 PG (ref 26–33)
MCHC RBC AUTO-ENTMCNC: 30.9 GM/DL (ref 32.2–35.5)
MCV RBC AUTO: 94.3 FL (ref 80–94)
PHOSPHORUS: 1.9 MG/DL (ref 2.4–4.7)
PLATELET # BLD: 104 THOU/MM3 (ref 130–400)
PMV BLD AUTO: 12.2 FL (ref 9.4–12.4)
POTASSIUM SERPL-SCNC: 4.1 MEQ/L (ref 3.5–5.2)
RBC # BLD: 2.64 MILL/MM3 (ref 4.7–6.1)
SODIUM BLD-SCNC: 141 MEQ/L (ref 135–145)
WBC # BLD: 11.2 THOU/MM3 (ref 4.8–10.8)

## 2019-02-03 PROCEDURE — 2580000003 HC RX 258: Performed by: THORACIC SURGERY (CARDIOTHORACIC VASCULAR SURGERY)

## 2019-02-03 PROCEDURE — 85610 PROTHROMBIN TIME: CPT

## 2019-02-03 PROCEDURE — 83735 ASSAY OF MAGNESIUM: CPT

## 2019-02-03 PROCEDURE — 6360000002 HC RX W HCPCS: Performed by: THORACIC SURGERY (CARDIOTHORACIC VASCULAR SURGERY)

## 2019-02-03 PROCEDURE — 99232 SBSQ HOSP IP/OBS MODERATE 35: CPT | Performed by: INTERNAL MEDICINE

## 2019-02-03 PROCEDURE — 94640 AIRWAY INHALATION TREATMENT: CPT

## 2019-02-03 PROCEDURE — 74018 RADEX ABDOMEN 1 VIEW: CPT

## 2019-02-03 PROCEDURE — 97530 THERAPEUTIC ACTIVITIES: CPT

## 2019-02-03 PROCEDURE — 80048 BASIC METABOLIC PNL TOTAL CA: CPT

## 2019-02-03 PROCEDURE — 85027 COMPLETE CBC AUTOMATED: CPT

## 2019-02-03 PROCEDURE — 6370000000 HC RX 637 (ALT 250 FOR IP): Performed by: THORACIC SURGERY (CARDIOTHORACIC VASCULAR SURGERY)

## 2019-02-03 PROCEDURE — 36415 COLL VENOUS BLD VENIPUNCTURE: CPT

## 2019-02-03 PROCEDURE — 71045 X-RAY EXAM CHEST 1 VIEW: CPT

## 2019-02-03 PROCEDURE — 2709999900 HC NON-CHARGEABLE SUPPLY

## 2019-02-03 PROCEDURE — 82330 ASSAY OF CALCIUM: CPT

## 2019-02-03 PROCEDURE — 82948 REAGENT STRIP/BLOOD GLUCOSE: CPT

## 2019-02-03 PROCEDURE — 84100 ASSAY OF PHOSPHORUS: CPT

## 2019-02-03 PROCEDURE — 94761 N-INVAS EAR/PLS OXIMETRY MLT: CPT

## 2019-02-03 PROCEDURE — 2000000000 HC ICU R&B

## 2019-02-03 PROCEDURE — 6370000000 HC RX 637 (ALT 250 FOR IP): Performed by: INTERNAL MEDICINE

## 2019-02-03 PROCEDURE — 97164 PT RE-EVAL EST PLAN CARE: CPT

## 2019-02-03 RX ORDER — INSULIN GLARGINE 100 [IU]/ML
30 INJECTION, SOLUTION SUBCUTANEOUS 2 TIMES DAILY
Status: DISCONTINUED | OUTPATIENT
Start: 2019-02-03 | End: 2019-02-12 | Stop reason: HOSPADM

## 2019-02-03 RX ORDER — INSULIN GLARGINE 100 [IU]/ML
15 INJECTION, SOLUTION SUBCUTANEOUS ONCE
Status: COMPLETED | OUTPATIENT
Start: 2019-02-03 | End: 2019-02-03

## 2019-02-03 RX ADMIN — FAMOTIDINE 20 MG: 20 TABLET, FILM COATED ORAL at 08:06

## 2019-02-03 RX ADMIN — ISODIUM CHLORIDE 3 ML: 0.03 SOLUTION RESPIRATORY (INHALATION) at 21:42

## 2019-02-03 RX ADMIN — ISODIUM CHLORIDE 3 ML: 0.03 SOLUTION RESPIRATORY (INHALATION) at 16:30

## 2019-02-03 RX ADMIN — HEPARIN SODIUM 5000 UNITS: 5000 INJECTION INTRAVENOUS; SUBCUTANEOUS at 07:40

## 2019-02-03 RX ADMIN — Medication 30 ML: at 09:31

## 2019-02-03 RX ADMIN — SODIUM CHLORIDE 3.4 UNITS/HR: 0.9 INJECTION INTRAVENOUS at 10:00

## 2019-02-03 RX ADMIN — INSULIN GLARGINE 15 UNITS: 100 INJECTION, SOLUTION SUBCUTANEOUS at 10:00

## 2019-02-03 RX ADMIN — ISODIUM CHLORIDE 3 ML: 0.03 SOLUTION RESPIRATORY (INHALATION) at 11:57

## 2019-02-03 RX ADMIN — POTASSIUM & SODIUM PHOSPHATES POWDER PACK 280-160-250 MG 250 MG: 280-160-250 PACK at 08:07

## 2019-02-03 RX ADMIN — INSULIN GLARGINE 30 UNITS: 100 INJECTION, SOLUTION SUBCUTANEOUS at 21:15

## 2019-02-03 RX ADMIN — HEPARIN SODIUM 5000 UNITS: 5000 INJECTION INTRAVENOUS; SUBCUTANEOUS at 17:17

## 2019-02-03 RX ADMIN — Medication 10 ML: at 08:07

## 2019-02-03 RX ADMIN — ISODIUM CHLORIDE 3 ML: 0.03 SOLUTION RESPIRATORY (INHALATION) at 08:14

## 2019-02-03 RX ADMIN — METOPROLOL TARTRATE 25 MG: 25 TABLET, FILM COATED ORAL at 09:31

## 2019-02-03 RX ADMIN — Medication 10 ML: at 22:28

## 2019-02-03 RX ADMIN — INSULIN GLARGINE 15 UNITS: 100 INJECTION, SOLUTION SUBCUTANEOUS at 13:50

## 2019-02-03 RX ADMIN — DOCUSATE SODIUM 100 MG: 50 LIQUID ORAL at 08:06

## 2019-02-03 RX ADMIN — METOPROLOL TARTRATE 25 MG: 25 TABLET, FILM COATED ORAL at 21:15

## 2019-02-03 RX ADMIN — ASPIRIN 325 MG: 325 TABLET ORAL at 08:06

## 2019-02-03 ASSESSMENT — PAIN SCALES - GENERAL
PAINLEVEL_OUTOF10: 0

## 2019-02-04 ENCOUNTER — APPOINTMENT (OUTPATIENT)
Dept: GENERAL RADIOLOGY | Age: 74
DRG: 216 | End: 2019-02-04
Attending: INTERNAL MEDICINE
Payer: MEDICARE

## 2019-02-04 LAB
ANION GAP SERPL CALCULATED.3IONS-SCNC: 15 MEQ/L (ref 8–16)
BUN BLDV-MCNC: 85 MG/DL (ref 7–22)
CALCIUM IONIZED: 1.15 MMOL/L (ref 1.12–1.32)
CALCIUM SERPL-MCNC: 8.2 MG/DL (ref 8.5–10.5)
CHLORIDE BLD-SCNC: 105 MEQ/L (ref 98–111)
CO2: 24 MEQ/L (ref 23–33)
CREAT SERPL-MCNC: 4.1 MG/DL (ref 0.4–1.2)
ERYTHROCYTE [DISTWIDTH] IN BLOOD BY AUTOMATED COUNT: 21.3 % (ref 11.5–14.5)
ERYTHROCYTE [DISTWIDTH] IN BLOOD BY AUTOMATED COUNT: 59.5 FL (ref 35–45)
GFR SERPL CREATININE-BSD FRML MDRD: 14 ML/MIN/1.73M2
GLUCOSE BLD-MCNC: 102 MG/DL (ref 70–108)
GLUCOSE BLD-MCNC: 108 MG/DL (ref 70–108)
GLUCOSE BLD-MCNC: 110 MG/DL (ref 70–108)
GLUCOSE BLD-MCNC: 110 MG/DL (ref 70–108)
GLUCOSE BLD-MCNC: 116 MG/DL (ref 70–108)
GLUCOSE BLD-MCNC: 118 MG/DL (ref 70–108)
GLUCOSE BLD-MCNC: 123 MG/DL (ref 70–108)
GLUCOSE BLD-MCNC: 67 MG/DL (ref 70–108)
GLUCOSE BLD-MCNC: 76 MG/DL (ref 70–108)
GLUCOSE BLD-MCNC: 77 MG/DL (ref 70–108)
HCT VFR BLD CALC: 27.4 % (ref 42–52)
HEMOGLOBIN: 8.3 GM/DL (ref 14–18)
INR BLD: 0.99 (ref 0.85–1.13)
MAGNESIUM: 2.8 MG/DL (ref 1.6–2.4)
MCH RBC QN AUTO: 29.2 PG (ref 26–33)
MCHC RBC AUTO-ENTMCNC: 30.3 GM/DL (ref 32.2–35.5)
MCV RBC AUTO: 96.5 FL (ref 80–94)
PHOSPHORUS: 2.4 MG/DL (ref 2.4–4.7)
PLATELET # BLD: 177 THOU/MM3 (ref 130–400)
PMV BLD AUTO: 10.7 FL (ref 9.4–12.4)
POTASSIUM SERPL-SCNC: 3.7 MEQ/L (ref 3.5–5.2)
RBC # BLD: 2.84 MILL/MM3 (ref 4.7–6.1)
SODIUM BLD-SCNC: 144 MEQ/L (ref 135–145)
WBC # BLD: 14.7 THOU/MM3 (ref 4.8–10.8)

## 2019-02-04 PROCEDURE — 6370000000 HC RX 637 (ALT 250 FOR IP): Performed by: THORACIC SURGERY (CARDIOTHORACIC VASCULAR SURGERY)

## 2019-02-04 PROCEDURE — APPSS30 APP SPLIT SHARED TIME 16-30 MINUTES: Performed by: PHYSICIAN ASSISTANT

## 2019-02-04 PROCEDURE — 85027 COMPLETE CBC AUTOMATED: CPT

## 2019-02-04 PROCEDURE — 6360000002 HC RX W HCPCS: Performed by: THORACIC SURGERY (CARDIOTHORACIC VASCULAR SURGERY)

## 2019-02-04 PROCEDURE — 84100 ASSAY OF PHOSPHORUS: CPT

## 2019-02-04 PROCEDURE — 97530 THERAPEUTIC ACTIVITIES: CPT

## 2019-02-04 PROCEDURE — 2000000000 HC ICU R&B

## 2019-02-04 PROCEDURE — 97535 SELF CARE MNGMENT TRAINING: CPT

## 2019-02-04 PROCEDURE — 71045 X-RAY EXAM CHEST 1 VIEW: CPT

## 2019-02-04 PROCEDURE — 94640 AIRWAY INHALATION TREATMENT: CPT

## 2019-02-04 PROCEDURE — 82948 REAGENT STRIP/BLOOD GLUCOSE: CPT

## 2019-02-04 PROCEDURE — 85610 PROTHROMBIN TIME: CPT

## 2019-02-04 PROCEDURE — 2709999900 HC NON-CHARGEABLE SUPPLY

## 2019-02-04 PROCEDURE — 36415 COLL VENOUS BLD VENIPUNCTURE: CPT

## 2019-02-04 PROCEDURE — 92526 ORAL FUNCTION THERAPY: CPT

## 2019-02-04 PROCEDURE — 83735 ASSAY OF MAGNESIUM: CPT

## 2019-02-04 PROCEDURE — 80048 BASIC METABOLIC PNL TOTAL CA: CPT

## 2019-02-04 PROCEDURE — 99232 SBSQ HOSP IP/OBS MODERATE 35: CPT | Performed by: INTERNAL MEDICINE

## 2019-02-04 PROCEDURE — 90935 HEMODIALYSIS ONE EVALUATION: CPT

## 2019-02-04 PROCEDURE — 2580000003 HC RX 258: Performed by: THORACIC SURGERY (CARDIOTHORACIC VASCULAR SURGERY)

## 2019-02-04 PROCEDURE — 82330 ASSAY OF CALCIUM: CPT

## 2019-02-04 RX ORDER — METOPROLOL TARTRATE 100 MG/1
100 TABLET ORAL 2 TIMES DAILY
Status: DISCONTINUED | OUTPATIENT
Start: 2019-02-04 | End: 2019-02-11

## 2019-02-04 RX ORDER — ALBUMIN (HUMAN) 12.5 G/50ML
25 SOLUTION INTRAVENOUS ONCE
Status: DISCONTINUED | OUTPATIENT
Start: 2019-02-04 | End: 2019-02-05

## 2019-02-04 RX ADMIN — ASPIRIN 325 MG: 325 TABLET ORAL at 09:58

## 2019-02-04 RX ADMIN — METOPROLOL TARTRATE 100 MG: 100 TABLET, FILM COATED ORAL at 09:58

## 2019-02-04 RX ADMIN — INSULIN GLARGINE 30 UNITS: 100 INJECTION, SOLUTION SUBCUTANEOUS at 08:31

## 2019-02-04 RX ADMIN — HEPARIN SODIUM 5000 UNITS: 5000 INJECTION INTRAVENOUS; SUBCUTANEOUS at 10:03

## 2019-02-04 RX ADMIN — DOCUSATE SODIUM 100 MG: 50 LIQUID ORAL at 09:58

## 2019-02-04 RX ADMIN — Medication 30 ML: at 10:02

## 2019-02-04 RX ADMIN — Medication 10 ML: at 09:59

## 2019-02-04 RX ADMIN — Medication 10 ML: at 20:39

## 2019-02-04 RX ADMIN — ISODIUM CHLORIDE 3 ML: 0.03 SOLUTION RESPIRATORY (INHALATION) at 20:45

## 2019-02-04 RX ADMIN — HEPARIN SODIUM 5000 UNITS: 5000 INJECTION INTRAVENOUS; SUBCUTANEOUS at 01:52

## 2019-02-04 RX ADMIN — ISODIUM CHLORIDE 3 ML: 0.03 SOLUTION RESPIRATORY (INHALATION) at 10:00

## 2019-02-04 RX ADMIN — FAMOTIDINE 20 MG: 20 TABLET, FILM COATED ORAL at 09:58

## 2019-02-04 RX ADMIN — ISODIUM CHLORIDE 3 ML: 0.03 SOLUTION RESPIRATORY (INHALATION) at 17:05

## 2019-02-04 RX ADMIN — INSULIN GLARGINE 30 UNITS: 100 INJECTION, SOLUTION SUBCUTANEOUS at 20:38

## 2019-02-04 RX ADMIN — Medication 17.6 MG: at 09:59

## 2019-02-04 RX ADMIN — HEPARIN SODIUM 5000 UNITS: 5000 INJECTION INTRAVENOUS; SUBCUTANEOUS at 16:56

## 2019-02-04 RX ADMIN — ISODIUM CHLORIDE 3 ML: 0.03 SOLUTION RESPIRATORY (INHALATION) at 13:36

## 2019-02-04 RX ADMIN — ACETAMINOPHEN 650 MG: 650 SUPPOSITORY RECTAL at 05:19

## 2019-02-04 ASSESSMENT — PAIN SCALES - GENERAL
PAINLEVEL_OUTOF10: 0
PAINLEVEL_OUTOF10: 6
PAINLEVEL_OUTOF10: 0
PAINLEVEL_OUTOF10: 0

## 2019-02-05 ENCOUNTER — APPOINTMENT (OUTPATIENT)
Dept: GENERAL RADIOLOGY | Age: 74
DRG: 216 | End: 2019-02-05
Attending: INTERNAL MEDICINE
Payer: MEDICARE

## 2019-02-05 LAB
ANION GAP SERPL CALCULATED.3IONS-SCNC: 10 MEQ/L (ref 8–16)
BUN BLDV-MCNC: 64 MG/DL (ref 7–22)
CALCIUM IONIZED: 1.11 MMOL/L (ref 1.12–1.32)
CALCIUM SERPL-MCNC: 7.7 MG/DL (ref 8.5–10.5)
CHLORIDE BLD-SCNC: 99 MEQ/L (ref 98–111)
CO2: 27 MEQ/L (ref 23–33)
CREAT SERPL-MCNC: 2.8 MG/DL (ref 0.4–1.2)
ERYTHROCYTE [DISTWIDTH] IN BLOOD BY AUTOMATED COUNT: 21.5 % (ref 11.5–14.5)
ERYTHROCYTE [DISTWIDTH] IN BLOOD BY AUTOMATED COUNT: 67.9 FL (ref 35–45)
GFR SERPL CREATININE-BSD FRML MDRD: 22 ML/MIN/1.73M2
GLUCOSE BLD-MCNC: 127 MG/DL (ref 70–108)
GLUCOSE BLD-MCNC: 153 MG/DL (ref 70–108)
GLUCOSE BLD-MCNC: 180 MG/DL (ref 70–108)
GLUCOSE BLD-MCNC: 187 MG/DL (ref 70–108)
GLUCOSE BLD-MCNC: 195 MG/DL (ref 70–108)
HCT VFR BLD CALC: 25.7 % (ref 42–52)
HEMOGLOBIN: 7.7 GM/DL (ref 14–18)
INR BLD: 1.08 (ref 0.85–1.13)
MAGNESIUM: 2.4 MG/DL (ref 1.6–2.4)
MCH RBC QN AUTO: 29.2 PG (ref 26–33)
MCHC RBC AUTO-ENTMCNC: 30 GM/DL (ref 32.2–35.5)
MCV RBC AUTO: 97.3 FL (ref 80–94)
PHOSPHORUS: 2.9 MG/DL (ref 2.4–4.7)
PLATELET # BLD: 153 THOU/MM3 (ref 130–400)
PMV BLD AUTO: 11.9 FL (ref 9.4–12.4)
POTASSIUM SERPL-SCNC: 4.1 MEQ/L (ref 3.5–5.2)
RBC # BLD: 2.64 MILL/MM3 (ref 4.7–6.1)
SODIUM BLD-SCNC: 136 MEQ/L (ref 135–145)
WBC # BLD: 11.2 THOU/MM3 (ref 4.8–10.8)

## 2019-02-05 PROCEDURE — 83735 ASSAY OF MAGNESIUM: CPT

## 2019-02-05 PROCEDURE — 2580000003 HC RX 258: Performed by: THORACIC SURGERY (CARDIOTHORACIC VASCULAR SURGERY)

## 2019-02-05 PROCEDURE — 82330 ASSAY OF CALCIUM: CPT

## 2019-02-05 PROCEDURE — 36415 COLL VENOUS BLD VENIPUNCTURE: CPT

## 2019-02-05 PROCEDURE — 6360000002 HC RX W HCPCS: Performed by: THORACIC SURGERY (CARDIOTHORACIC VASCULAR SURGERY)

## 2019-02-05 PROCEDURE — 71045 X-RAY EXAM CHEST 1 VIEW: CPT

## 2019-02-05 PROCEDURE — 97110 THERAPEUTIC EXERCISES: CPT

## 2019-02-05 PROCEDURE — 6370000000 HC RX 637 (ALT 250 FOR IP): Performed by: THORACIC SURGERY (CARDIOTHORACIC VASCULAR SURGERY)

## 2019-02-05 PROCEDURE — 93308 TTE F-UP OR LMTD: CPT

## 2019-02-05 PROCEDURE — 84100 ASSAY OF PHOSPHORUS: CPT

## 2019-02-05 PROCEDURE — C1729 CATH, DRAINAGE: HCPCS

## 2019-02-05 PROCEDURE — C1751 CATH, INF, PER/CENT/MIDLINE: HCPCS

## 2019-02-05 PROCEDURE — 99232 SBSQ HOSP IP/OBS MODERATE 35: CPT | Performed by: INTERNAL MEDICINE

## 2019-02-05 PROCEDURE — 97530 THERAPEUTIC ACTIVITIES: CPT

## 2019-02-05 PROCEDURE — APPSS30 APP SPLIT SHARED TIME 16-30 MINUTES: Performed by: PHYSICIAN ASSISTANT

## 2019-02-05 PROCEDURE — 36569 INSJ PICC 5 YR+ W/O IMAGING: CPT

## 2019-02-05 PROCEDURE — 02HV33Z INSERTION OF INFUSION DEVICE INTO SUPERIOR VENA CAVA, PERCUTANEOUS APPROACH: ICD-10-PCS | Performed by: THORACIC SURGERY (CARDIOTHORACIC VASCULAR SURGERY)

## 2019-02-05 PROCEDURE — 76937 US GUIDE VASCULAR ACCESS: CPT

## 2019-02-05 PROCEDURE — 94640 AIRWAY INHALATION TREATMENT: CPT

## 2019-02-05 PROCEDURE — 80048 BASIC METABOLIC PNL TOTAL CA: CPT

## 2019-02-05 PROCEDURE — 2060000000 HC ICU INTERMEDIATE R&B

## 2019-02-05 PROCEDURE — 85027 COMPLETE CBC AUTOMATED: CPT

## 2019-02-05 PROCEDURE — 36592 COLLECT BLOOD FROM PICC: CPT

## 2019-02-05 PROCEDURE — 2709999900 HC NON-CHARGEABLE SUPPLY

## 2019-02-05 PROCEDURE — 85610 PROTHROMBIN TIME: CPT

## 2019-02-05 PROCEDURE — 82948 REAGENT STRIP/BLOOD GLUCOSE: CPT

## 2019-02-05 RX ORDER — LIDOCAINE HYDROCHLORIDE 10 MG/ML
5 INJECTION, SOLUTION EPIDURAL; INFILTRATION; INTRACAUDAL; PERINEURAL ONCE
Status: DISCONTINUED | OUTPATIENT
Start: 2019-02-05 | End: 2019-02-05

## 2019-02-05 RX ORDER — SODIUM CHLORIDE 0.9 % (FLUSH) 0.9 %
10 SYRINGE (ML) INJECTION PRN
Status: DISCONTINUED | OUTPATIENT
Start: 2019-02-05 | End: 2019-02-05

## 2019-02-05 RX ORDER — SODIUM CHLORIDE 0.9 % (FLUSH) 0.9 %
10 SYRINGE (ML) INJECTION EVERY 12 HOURS SCHEDULED
Status: DISCONTINUED | OUTPATIENT
Start: 2019-02-05 | End: 2019-02-05

## 2019-02-05 RX ADMIN — HEPARIN SODIUM 5000 UNITS: 5000 INJECTION INTRAVENOUS; SUBCUTANEOUS at 17:13

## 2019-02-05 RX ADMIN — Medication 10 ML: at 08:48

## 2019-02-05 RX ADMIN — Medication 2 UNITS: at 11:43

## 2019-02-05 RX ADMIN — Medication 2 UNITS: at 08:44

## 2019-02-05 RX ADMIN — ISODIUM CHLORIDE 3 ML: 0.03 SOLUTION RESPIRATORY (INHALATION) at 17:10

## 2019-02-05 RX ADMIN — ISODIUM CHLORIDE 3 ML: 0.03 SOLUTION RESPIRATORY (INHALATION) at 08:55

## 2019-02-05 RX ADMIN — DOCUSATE SODIUM 100 MG: 50 LIQUID ORAL at 08:47

## 2019-02-05 RX ADMIN — HEPARIN SODIUM 5000 UNITS: 5000 INJECTION INTRAVENOUS; SUBCUTANEOUS at 01:44

## 2019-02-05 RX ADMIN — Medication 30 ML: at 08:48

## 2019-02-05 RX ADMIN — INSULIN GLARGINE 30 UNITS: 100 INJECTION, SOLUTION SUBCUTANEOUS at 08:45

## 2019-02-05 RX ADMIN — INSULIN GLARGINE 30 UNITS: 100 INJECTION, SOLUTION SUBCUTANEOUS at 22:47

## 2019-02-05 RX ADMIN — FAMOTIDINE 20 MG: 20 TABLET, FILM COATED ORAL at 08:48

## 2019-02-05 RX ADMIN — ISODIUM CHLORIDE 3 ML: 0.03 SOLUTION RESPIRATORY (INHALATION) at 12:51

## 2019-02-05 RX ADMIN — HEPARIN SODIUM 5000 UNITS: 5000 INJECTION INTRAVENOUS; SUBCUTANEOUS at 08:46

## 2019-02-05 RX ADMIN — ASPIRIN 325 MG: 325 TABLET ORAL at 08:48

## 2019-02-05 RX ADMIN — Medication 2 UNITS: at 17:13

## 2019-02-05 ASSESSMENT — PAIN SCALES - GENERAL
PAINLEVEL_OUTOF10: 4
PAINLEVEL_OUTOF10: 4
PAINLEVEL_OUTOF10: 0
PAINLEVEL_OUTOF10: 0
PAINLEVEL_OUTOF10: 4

## 2019-02-05 ASSESSMENT — PAIN DESCRIPTION - DESCRIPTORS: DESCRIPTORS: ACHING;CONSTANT

## 2019-02-05 ASSESSMENT — PAIN DESCRIPTION - PAIN TYPE: TYPE: SURGICAL PAIN

## 2019-02-05 ASSESSMENT — PAIN DESCRIPTION - ONSET: ONSET: ON-GOING

## 2019-02-05 ASSESSMENT — PAIN DESCRIPTION - LOCATION: LOCATION: STERNUM

## 2019-02-05 ASSESSMENT — PAIN DESCRIPTION - PROGRESSION: CLINICAL_PROGRESSION: NOT CHANGED

## 2019-02-05 ASSESSMENT — PAIN DESCRIPTION - FREQUENCY: FREQUENCY: CONTINUOUS

## 2019-02-06 ENCOUNTER — APPOINTMENT (OUTPATIENT)
Dept: GENERAL RADIOLOGY | Age: 74
DRG: 216 | End: 2019-02-06
Attending: INTERNAL MEDICINE
Payer: MEDICARE

## 2019-02-06 LAB
ANION GAP SERPL CALCULATED.3IONS-SCNC: 17 MEQ/L (ref 8–16)
BUN BLDV-MCNC: 81 MG/DL (ref 7–22)
CALCIUM IONIZED: 1.1 MMOL/L (ref 1.12–1.32)
CALCIUM SERPL-MCNC: 8 MG/DL (ref 8.5–10.5)
CHLORIDE BLD-SCNC: 101 MEQ/L (ref 98–111)
CO2: 23 MEQ/L (ref 23–33)
CREAT SERPL-MCNC: 3.4 MG/DL (ref 0.4–1.2)
ERYTHROCYTE [DISTWIDTH] IN BLOOD BY AUTOMATED COUNT: 20.5 % (ref 11.5–14.5)
ERYTHROCYTE [DISTWIDTH] IN BLOOD BY AUTOMATED COUNT: 67.2 FL (ref 35–45)
GFR SERPL CREATININE-BSD FRML MDRD: 18 ML/MIN/1.73M2
GLUCOSE BLD-MCNC: 111 MG/DL (ref 70–108)
GLUCOSE BLD-MCNC: 147 MG/DL (ref 70–108)
GLUCOSE BLD-MCNC: 61 MG/DL (ref 70–108)
GLUCOSE BLD-MCNC: 64 MG/DL (ref 70–108)
GLUCOSE BLD-MCNC: 71 MG/DL (ref 70–108)
GLUCOSE BLD-MCNC: 73 MG/DL (ref 70–108)
GLUCOSE BLD-MCNC: 93 MG/DL (ref 70–108)
GLUCOSE BLD-MCNC: 99 MG/DL (ref 70–108)
HCT VFR BLD CALC: 25.2 % (ref 42–52)
HEMOGLOBIN: 7.7 GM/DL (ref 14–18)
MCH RBC QN AUTO: 29.2 PG (ref 26–33)
MCHC RBC AUTO-ENTMCNC: 30.6 GM/DL (ref 32.2–35.5)
MCV RBC AUTO: 95.5 FL (ref 80–94)
PHOSPHORUS: 3 MG/DL (ref 2.4–4.7)
PLATELET # BLD: 165 THOU/MM3 (ref 130–400)
PMV BLD AUTO: 10.3 FL (ref 9.4–12.4)
POTASSIUM SERPL-SCNC: 4.1 MEQ/L (ref 3.5–5.2)
RBC # BLD: 2.64 MILL/MM3 (ref 4.7–6.1)
SODIUM BLD-SCNC: 141 MEQ/L (ref 135–145)
WBC # BLD: 10.3 THOU/MM3 (ref 4.8–10.8)

## 2019-02-06 PROCEDURE — 6360000002 HC RX W HCPCS: Performed by: THORACIC SURGERY (CARDIOTHORACIC VASCULAR SURGERY)

## 2019-02-06 PROCEDURE — 92526 ORAL FUNCTION THERAPY: CPT

## 2019-02-06 PROCEDURE — 36415 COLL VENOUS BLD VENIPUNCTURE: CPT

## 2019-02-06 PROCEDURE — 99024 POSTOP FOLLOW-UP VISIT: CPT | Performed by: PHYSICIAN ASSISTANT

## 2019-02-06 PROCEDURE — 2709999900 HC NON-CHARGEABLE SUPPLY

## 2019-02-06 PROCEDURE — 2060000000 HC ICU INTERMEDIATE R&B

## 2019-02-06 PROCEDURE — 90935 HEMODIALYSIS ONE EVALUATION: CPT

## 2019-02-06 PROCEDURE — 90935 HEMODIALYSIS ONE EVALUATION: CPT | Performed by: NURSE PRACTITIONER

## 2019-02-06 PROCEDURE — 80048 BASIC METABOLIC PNL TOTAL CA: CPT

## 2019-02-06 PROCEDURE — 97110 THERAPEUTIC EXERCISES: CPT

## 2019-02-06 PROCEDURE — 82948 REAGENT STRIP/BLOOD GLUCOSE: CPT

## 2019-02-06 PROCEDURE — 97535 SELF CARE MNGMENT TRAINING: CPT

## 2019-02-06 PROCEDURE — 6370000000 HC RX 637 (ALT 250 FOR IP): Performed by: THORACIC SURGERY (CARDIOTHORACIC VASCULAR SURGERY)

## 2019-02-06 PROCEDURE — 71045 X-RAY EXAM CHEST 1 VIEW: CPT

## 2019-02-06 PROCEDURE — 94640 AIRWAY INHALATION TREATMENT: CPT

## 2019-02-06 PROCEDURE — 84100 ASSAY OF PHOSPHORUS: CPT

## 2019-02-06 PROCEDURE — 2580000003 HC RX 258: Performed by: PHYSICIAN ASSISTANT

## 2019-02-06 PROCEDURE — 85027 COMPLETE CBC AUTOMATED: CPT

## 2019-02-06 PROCEDURE — 82330 ASSAY OF CALCIUM: CPT

## 2019-02-06 RX ORDER — DEXTROSE MONOHYDRATE 50 MG/ML
100 INJECTION, SOLUTION INTRAVENOUS PRN
Status: DISCONTINUED | OUTPATIENT
Start: 2019-02-06 | End: 2019-02-12 | Stop reason: HOSPADM

## 2019-02-06 RX ORDER — DEXTROSE MONOHYDRATE 25 G/50ML
12.5 INJECTION, SOLUTION INTRAVENOUS PRN
Status: DISCONTINUED | OUTPATIENT
Start: 2019-02-06 | End: 2019-02-12 | Stop reason: HOSPADM

## 2019-02-06 RX ORDER — DEXTROSE MONOHYDRATE 25 G/50ML
12.5 INJECTION, SOLUTION INTRAVENOUS PRN
Status: DISCONTINUED | OUTPATIENT
Start: 2019-02-06 | End: 2019-02-06 | Stop reason: SDUPTHER

## 2019-02-06 RX ORDER — NICOTINE POLACRILEX 4 MG
15 LOZENGE BUCCAL PRN
Status: DISCONTINUED | OUTPATIENT
Start: 2019-02-06 | End: 2019-02-12 | Stop reason: HOSPADM

## 2019-02-06 RX ORDER — SODIUM CHLORIDE FOR INHALATION 0.9 %
3 VIAL, NEBULIZER (ML) INHALATION
Status: DISCONTINUED | OUTPATIENT
Start: 2019-02-06 | End: 2019-02-12 | Stop reason: HOSPADM

## 2019-02-06 RX ADMIN — METOPROLOL TARTRATE 100 MG: 100 TABLET, FILM COATED ORAL at 21:34

## 2019-02-06 RX ADMIN — HEPARIN SODIUM 5000 UNITS: 5000 INJECTION INTRAVENOUS; SUBCUTANEOUS at 17:33

## 2019-02-06 RX ADMIN — HEPARIN SODIUM 5000 UNITS: 5000 INJECTION INTRAVENOUS; SUBCUTANEOUS at 01:28

## 2019-02-06 RX ADMIN — Medication 30 ML: at 09:53

## 2019-02-06 RX ADMIN — FAMOTIDINE 20 MG: 20 TABLET, FILM COATED ORAL at 09:55

## 2019-02-06 RX ADMIN — ASPIRIN 325 MG: 325 TABLET ORAL at 09:53

## 2019-02-06 RX ADMIN — ISODIUM CHLORIDE 3 ML: 0.03 SOLUTION RESPIRATORY (INHALATION) at 16:39

## 2019-02-06 RX ADMIN — DOCUSATE SODIUM 100 MG: 50 LIQUID ORAL at 09:55

## 2019-02-06 RX ADMIN — INSULIN GLARGINE 30 UNITS: 100 INJECTION, SOLUTION SUBCUTANEOUS at 21:34

## 2019-02-06 RX ADMIN — INSULIN LISPRO 1 UNITS: 100 INJECTION, SOLUTION INTRAVENOUS; SUBCUTANEOUS at 21:35

## 2019-02-06 RX ADMIN — DEXTROSE MONOHYDRATE 12.5 G: 25 INJECTION, SOLUTION INTRAVENOUS at 08:20

## 2019-02-06 RX ADMIN — METOPROLOL TARTRATE 100 MG: 100 TABLET, FILM COATED ORAL at 09:53

## 2019-02-06 RX ADMIN — HEPARIN SODIUM 5000 UNITS: 5000 INJECTION INTRAVENOUS; SUBCUTANEOUS at 09:31

## 2019-02-06 ASSESSMENT — PAIN SCALES - GENERAL
PAINLEVEL_OUTOF10: 0
PAINLEVEL_OUTOF10: 3
PAINLEVEL_OUTOF10: 5
PAINLEVEL_OUTOF10: 3
PAINLEVEL_OUTOF10: 0
PAINLEVEL_OUTOF10: 3
PAINLEVEL_OUTOF10: 0
PAINLEVEL_OUTOF10: 3
PAINLEVEL_OUTOF10: 5
PAINLEVEL_OUTOF10: 4
PAINLEVEL_OUTOF10: 4
PAINLEVEL_OUTOF10: 5
PAINLEVEL_OUTOF10: 4
PAINLEVEL_OUTOF10: 0

## 2019-02-06 ASSESSMENT — PAIN DESCRIPTION - ONSET: ONSET: ON-GOING

## 2019-02-06 ASSESSMENT — PAIN DESCRIPTION - PROGRESSION: CLINICAL_PROGRESSION: NOT CHANGED

## 2019-02-06 ASSESSMENT — PAIN DESCRIPTION - ORIENTATION: ORIENTATION: MID

## 2019-02-06 ASSESSMENT — PAIN DESCRIPTION - DESCRIPTORS: DESCRIPTORS: DISCOMFORT

## 2019-02-06 ASSESSMENT — PAIN DESCRIPTION - PAIN TYPE: TYPE: SURGICAL PAIN

## 2019-02-06 ASSESSMENT — PAIN DESCRIPTION - LOCATION: LOCATION: STERNUM

## 2019-02-06 ASSESSMENT — PAIN DESCRIPTION - FREQUENCY: FREQUENCY: CONTINUOUS

## 2019-02-07 ENCOUNTER — APPOINTMENT (OUTPATIENT)
Dept: GENERAL RADIOLOGY | Age: 74
DRG: 216 | End: 2019-02-07
Attending: INTERNAL MEDICINE
Payer: MEDICARE

## 2019-02-07 LAB
ANION GAP SERPL CALCULATED.3IONS-SCNC: 11 MEQ/L (ref 8–16)
BUN BLDV-MCNC: 54 MG/DL (ref 7–22)
CALCIUM IONIZED: 1.08 MMOL/L (ref 1.12–1.32)
CALCIUM SERPL-MCNC: 7.8 MG/DL (ref 8.5–10.5)
CHLORIDE BLD-SCNC: 99 MEQ/L (ref 98–111)
CO2: 26 MEQ/L (ref 23–33)
CREAT SERPL-MCNC: 2.8 MG/DL (ref 0.4–1.2)
ERYTHROCYTE [DISTWIDTH] IN BLOOD BY AUTOMATED COUNT: 20 % (ref 11.5–14.5)
ERYTHROCYTE [DISTWIDTH] IN BLOOD BY AUTOMATED COUNT: 65.8 FL (ref 35–45)
GFR SERPL CREATININE-BSD FRML MDRD: 22 ML/MIN/1.73M2
GLUCOSE BLD-MCNC: 107 MG/DL (ref 70–108)
GLUCOSE BLD-MCNC: 204 MG/DL (ref 70–108)
GLUCOSE BLD-MCNC: 222 MG/DL (ref 70–108)
GLUCOSE BLD-MCNC: 63 MG/DL (ref 70–108)
GLUCOSE BLD-MCNC: 64 MG/DL (ref 70–108)
GLUCOSE BLD-MCNC: 77 MG/DL (ref 70–108)
HCT VFR BLD CALC: 25 % (ref 42–52)
HEMOGLOBIN: 7.7 GM/DL (ref 14–18)
MCH RBC QN AUTO: 28.9 PG (ref 26–33)
MCHC RBC AUTO-ENTMCNC: 30.8 GM/DL (ref 32.2–35.5)
MCV RBC AUTO: 94 FL (ref 80–94)
PHOSPHORUS: 3.5 MG/DL (ref 2.4–4.7)
PLATELET # BLD: 219 THOU/MM3 (ref 130–400)
PMV BLD AUTO: 9.9 FL (ref 9.4–12.4)
POTASSIUM SERPL-SCNC: 4.6 MEQ/L (ref 3.5–5.2)
RBC # BLD: 2.66 MILL/MM3 (ref 4.7–6.1)
SODIUM BLD-SCNC: 136 MEQ/L (ref 135–145)
WBC # BLD: 8.8 THOU/MM3 (ref 4.8–10.8)

## 2019-02-07 PROCEDURE — 99024 POSTOP FOLLOW-UP VISIT: CPT | Performed by: PHYSICIAN ASSISTANT

## 2019-02-07 PROCEDURE — 85027 COMPLETE CBC AUTOMATED: CPT

## 2019-02-07 PROCEDURE — 92526 ORAL FUNCTION THERAPY: CPT

## 2019-02-07 PROCEDURE — 6370000000 HC RX 637 (ALT 250 FOR IP): Performed by: THORACIC SURGERY (CARDIOTHORACIC VASCULAR SURGERY)

## 2019-02-07 PROCEDURE — 99232 SBSQ HOSP IP/OBS MODERATE 35: CPT | Performed by: INTERNAL MEDICINE

## 2019-02-07 PROCEDURE — 94640 AIRWAY INHALATION TREATMENT: CPT

## 2019-02-07 PROCEDURE — 2060000000 HC ICU INTERMEDIATE R&B

## 2019-02-07 PROCEDURE — 97112 NEUROMUSCULAR REEDUCATION: CPT

## 2019-02-07 PROCEDURE — 36415 COLL VENOUS BLD VENIPUNCTURE: CPT

## 2019-02-07 PROCEDURE — 2580000003 HC RX 258: Performed by: PHYSICIAN ASSISTANT

## 2019-02-07 PROCEDURE — 82948 REAGENT STRIP/BLOOD GLUCOSE: CPT

## 2019-02-07 PROCEDURE — 6360000002 HC RX W HCPCS: Performed by: THORACIC SURGERY (CARDIOTHORACIC VASCULAR SURGERY)

## 2019-02-07 PROCEDURE — 84100 ASSAY OF PHOSPHORUS: CPT

## 2019-02-07 PROCEDURE — 97110 THERAPEUTIC EXERCISES: CPT

## 2019-02-07 PROCEDURE — 97530 THERAPEUTIC ACTIVITIES: CPT

## 2019-02-07 PROCEDURE — 80048 BASIC METABOLIC PNL TOTAL CA: CPT

## 2019-02-07 PROCEDURE — 2709999900 HC NON-CHARGEABLE SUPPLY

## 2019-02-07 PROCEDURE — C1729 CATH, DRAINAGE: HCPCS

## 2019-02-07 PROCEDURE — 71045 X-RAY EXAM CHEST 1 VIEW: CPT

## 2019-02-07 PROCEDURE — 82330 ASSAY OF CALCIUM: CPT

## 2019-02-07 RX ADMIN — HEPARIN SODIUM 5000 UNITS: 5000 INJECTION INTRAVENOUS; SUBCUTANEOUS at 08:31

## 2019-02-07 RX ADMIN — Medication 30 ML: at 08:32

## 2019-02-07 RX ADMIN — METOPROLOL TARTRATE 100 MG: 100 TABLET, FILM COATED ORAL at 20:18

## 2019-02-07 RX ADMIN — ASPIRIN 325 MG: 325 TABLET ORAL at 08:32

## 2019-02-07 RX ADMIN — INSULIN LISPRO 2 UNITS: 100 INJECTION, SOLUTION INTRAVENOUS; SUBCUTANEOUS at 20:37

## 2019-02-07 RX ADMIN — ISODIUM CHLORIDE 3 ML: 0.03 SOLUTION RESPIRATORY (INHALATION) at 18:00

## 2019-02-07 RX ADMIN — FAMOTIDINE 20 MG: 20 TABLET, FILM COATED ORAL at 08:31

## 2019-02-07 RX ADMIN — HEPARIN SODIUM 5000 UNITS: 5000 INJECTION INTRAVENOUS; SUBCUTANEOUS at 18:08

## 2019-02-07 RX ADMIN — HEPARIN SODIUM 5000 UNITS: 5000 INJECTION INTRAVENOUS; SUBCUTANEOUS at 01:00

## 2019-02-07 RX ADMIN — Medication 4 UNITS: at 12:12

## 2019-02-07 RX ADMIN — ISODIUM CHLORIDE 3 ML: 0.03 SOLUTION RESPIRATORY (INHALATION) at 09:14

## 2019-02-07 RX ADMIN — DEXTROSE MONOHYDRATE 12.5 G: 25 INJECTION, SOLUTION INTRAVENOUS at 08:01

## 2019-02-07 RX ADMIN — METOPROLOL TARTRATE 100 MG: 100 TABLET, FILM COATED ORAL at 08:31

## 2019-02-07 ASSESSMENT — PAIN SCALES - GENERAL
PAINLEVEL_OUTOF10: 3
PAINLEVEL_OUTOF10: 0
PAINLEVEL_OUTOF10: 0

## 2019-02-08 ENCOUNTER — APPOINTMENT (OUTPATIENT)
Dept: GENERAL RADIOLOGY | Age: 74
DRG: 216 | End: 2019-02-08
Attending: INTERNAL MEDICINE
Payer: MEDICARE

## 2019-02-08 LAB
ANION GAP SERPL CALCULATED.3IONS-SCNC: 13 MEQ/L (ref 8–16)
BUN BLDV-MCNC: 70 MG/DL (ref 7–22)
CALCIUM IONIZED: 1.08 MMOL/L (ref 1.12–1.32)
CALCIUM SERPL-MCNC: 7.9 MG/DL (ref 8.5–10.5)
CHLORIDE BLD-SCNC: 100 MEQ/L (ref 98–111)
CO2: 24 MEQ/L (ref 23–33)
CREAT SERPL-MCNC: 3.4 MG/DL (ref 0.4–1.2)
ERYTHROCYTE [DISTWIDTH] IN BLOOD BY AUTOMATED COUNT: 19.5 % (ref 11.5–14.5)
ERYTHROCYTE [DISTWIDTH] IN BLOOD BY AUTOMATED COUNT: 64.8 FL (ref 35–45)
GFR SERPL CREATININE-BSD FRML MDRD: 18 ML/MIN/1.73M2
GLUCOSE BLD-MCNC: 101 MG/DL (ref 70–108)
GLUCOSE BLD-MCNC: 119 MG/DL (ref 70–108)
GLUCOSE BLD-MCNC: 204 MG/DL (ref 70–108)
GLUCOSE BLD-MCNC: 275 MG/DL (ref 70–108)
HCT VFR BLD CALC: 26.1 % (ref 42–52)
HEMOGLOBIN: 7.9 GM/DL (ref 14–18)
MCH RBC QN AUTO: 28.9 PG (ref 26–33)
MCHC RBC AUTO-ENTMCNC: 30.3 GM/DL (ref 32.2–35.5)
MCV RBC AUTO: 95.6 FL (ref 80–94)
PHOSPHORUS: 4.8 MG/DL (ref 2.4–4.7)
PLATELET # BLD: 293 THOU/MM3 (ref 130–400)
PMV BLD AUTO: 9.8 FL (ref 9.4–12.4)
POTASSIUM SERPL-SCNC: 4.5 MEQ/L (ref 3.5–5.2)
RBC # BLD: 2.73 MILL/MM3 (ref 4.7–6.1)
SODIUM BLD-SCNC: 137 MEQ/L (ref 135–145)
WBC # BLD: 7.4 THOU/MM3 (ref 4.8–10.8)

## 2019-02-08 PROCEDURE — 97112 NEUROMUSCULAR REEDUCATION: CPT

## 2019-02-08 PROCEDURE — 84100 ASSAY OF PHOSPHORUS: CPT

## 2019-02-08 PROCEDURE — 6360000002 HC RX W HCPCS: Performed by: INTERNAL MEDICINE

## 2019-02-08 PROCEDURE — 6370000000 HC RX 637 (ALT 250 FOR IP): Performed by: THORACIC SURGERY (CARDIOTHORACIC VASCULAR SURGERY)

## 2019-02-08 PROCEDURE — 2709999900 HC NON-CHARGEABLE SUPPLY

## 2019-02-08 PROCEDURE — 36415 COLL VENOUS BLD VENIPUNCTURE: CPT

## 2019-02-08 PROCEDURE — 80048 BASIC METABOLIC PNL TOTAL CA: CPT

## 2019-02-08 PROCEDURE — 2060000000 HC ICU INTERMEDIATE R&B

## 2019-02-08 PROCEDURE — 90935 HEMODIALYSIS ONE EVALUATION: CPT

## 2019-02-08 PROCEDURE — 99232 SBSQ HOSP IP/OBS MODERATE 35: CPT | Performed by: INTERNAL MEDICINE

## 2019-02-08 PROCEDURE — 97530 THERAPEUTIC ACTIVITIES: CPT

## 2019-02-08 PROCEDURE — 36592 COLLECT BLOOD FROM PICC: CPT

## 2019-02-08 PROCEDURE — 71045 X-RAY EXAM CHEST 1 VIEW: CPT

## 2019-02-08 PROCEDURE — 85027 COMPLETE CBC AUTOMATED: CPT

## 2019-02-08 PROCEDURE — 94640 AIRWAY INHALATION TREATMENT: CPT

## 2019-02-08 PROCEDURE — 82948 REAGENT STRIP/BLOOD GLUCOSE: CPT

## 2019-02-08 PROCEDURE — 6360000002 HC RX W HCPCS: Performed by: THORACIC SURGERY (CARDIOTHORACIC VASCULAR SURGERY)

## 2019-02-08 PROCEDURE — 97110 THERAPEUTIC EXERCISES: CPT

## 2019-02-08 PROCEDURE — 82330 ASSAY OF CALCIUM: CPT

## 2019-02-08 PROCEDURE — APPSS30 APP SPLIT SHARED TIME 16-30 MINUTES: Performed by: PHYSICIAN ASSISTANT

## 2019-02-08 RX ORDER — METRONIDAZOLE 500 MG/1
500 TABLET ORAL EVERY 8 HOURS SCHEDULED
Status: DISCONTINUED | OUTPATIENT
Start: 2019-02-08 | End: 2019-02-12

## 2019-02-08 RX ADMIN — METOPROLOL TARTRATE 100 MG: 100 TABLET, FILM COATED ORAL at 12:27

## 2019-02-08 RX ADMIN — INSULIN LISPRO 2 UNITS: 100 INJECTION, SOLUTION INTRAVENOUS; SUBCUTANEOUS at 20:22

## 2019-02-08 RX ADMIN — HEPARIN SODIUM 5000 UNITS: 5000 INJECTION INTRAVENOUS; SUBCUTANEOUS at 00:38

## 2019-02-08 RX ADMIN — ISODIUM CHLORIDE 3 ML: 0.03 SOLUTION RESPIRATORY (INHALATION) at 17:50

## 2019-02-08 RX ADMIN — Medication 30 ML: at 12:27

## 2019-02-08 RX ADMIN — METOPROLOL TARTRATE 100 MG: 100 TABLET, FILM COATED ORAL at 20:22

## 2019-02-08 RX ADMIN — FAMOTIDINE 20 MG: 20 TABLET, FILM COATED ORAL at 12:27

## 2019-02-08 RX ADMIN — Medication: at 18:00

## 2019-02-08 RX ADMIN — DARBEPOETIN ALFA 60 MCG: 60 INJECTION, SOLUTION INTRAVENOUS; SUBCUTANEOUS at 19:14

## 2019-02-08 RX ADMIN — Medication: at 14:00

## 2019-02-08 RX ADMIN — Medication 6 UNITS: at 17:12

## 2019-02-08 RX ADMIN — Medication: at 16:00

## 2019-02-08 RX ADMIN — HEPARIN SODIUM 5000 UNITS: 5000 INJECTION INTRAVENOUS; SUBCUTANEOUS at 17:17

## 2019-02-08 RX ADMIN — METRONIDAZOLE 500 MG: 500 TABLET, FILM COATED ORAL at 20:22

## 2019-02-08 RX ADMIN — Medication: at 20:36

## 2019-02-08 RX ADMIN — METRONIDAZOLE 500 MG: 500 TABLET, FILM COATED ORAL at 17:14

## 2019-02-08 RX ADMIN — ASPIRIN 325 MG: 325 TABLET ORAL at 12:26

## 2019-02-08 ASSESSMENT — PAIN SCALES - GENERAL: PAINLEVEL_OUTOF10: 0

## 2019-02-09 ENCOUNTER — APPOINTMENT (OUTPATIENT)
Dept: GENERAL RADIOLOGY | Age: 74
DRG: 216 | End: 2019-02-09
Attending: INTERNAL MEDICINE
Payer: MEDICARE

## 2019-02-09 LAB
ANION GAP SERPL CALCULATED.3IONS-SCNC: 14 MEQ/L (ref 8–16)
BUN BLDV-MCNC: 51 MG/DL (ref 7–22)
CALCIUM IONIZED: 1.06 MMOL/L (ref 1.12–1.32)
CALCIUM SERPL-MCNC: 7.8 MG/DL (ref 8.5–10.5)
CHLORIDE BLD-SCNC: 95 MEQ/L (ref 98–111)
CO2: 24 MEQ/L (ref 23–33)
CREAT SERPL-MCNC: 3.1 MG/DL (ref 0.4–1.2)
ERYTHROCYTE [DISTWIDTH] IN BLOOD BY AUTOMATED COUNT: 19.1 % (ref 11.5–14.5)
ERYTHROCYTE [DISTWIDTH] IN BLOOD BY AUTOMATED COUNT: 64.3 FL (ref 35–45)
GFR SERPL CREATININE-BSD FRML MDRD: 20 ML/MIN/1.73M2
GLUCOSE BLD-MCNC: 132 MG/DL (ref 70–108)
GLUCOSE BLD-MCNC: 177 MG/DL (ref 70–108)
GLUCOSE BLD-MCNC: 186 MG/DL (ref 70–108)
GLUCOSE BLD-MCNC: 221 MG/DL (ref 70–108)
GLUCOSE BLD-MCNC: 73 MG/DL (ref 70–108)
HCT VFR BLD CALC: 27.5 % (ref 42–52)
HEMOGLOBIN: 8.2 GM/DL (ref 14–18)
MCH RBC QN AUTO: 28.3 PG (ref 26–33)
MCHC RBC AUTO-ENTMCNC: 29.8 GM/DL (ref 32.2–35.5)
MCV RBC AUTO: 94.8 FL (ref 80–94)
PHOSPHORUS: 4.1 MG/DL (ref 2.4–4.7)
PLATELET # BLD: 334 THOU/MM3 (ref 130–400)
PMV BLD AUTO: 9.7 FL (ref 9.4–12.4)
POTASSIUM SERPL-SCNC: 4.9 MEQ/L (ref 3.5–5.2)
RBC # BLD: 2.9 MILL/MM3 (ref 4.7–6.1)
SODIUM BLD-SCNC: 133 MEQ/L (ref 135–145)
WBC # BLD: 10.4 THOU/MM3 (ref 4.8–10.8)

## 2019-02-09 PROCEDURE — 82330 ASSAY OF CALCIUM: CPT

## 2019-02-09 PROCEDURE — 92611 MOTION FLUOROSCOPY/SWALLOW: CPT | Performed by: SPEECH-LANGUAGE PATHOLOGIST

## 2019-02-09 PROCEDURE — 36592 COLLECT BLOOD FROM PICC: CPT

## 2019-02-09 PROCEDURE — 6370000000 HC RX 637 (ALT 250 FOR IP): Performed by: THORACIC SURGERY (CARDIOTHORACIC VASCULAR SURGERY)

## 2019-02-09 PROCEDURE — 80048 BASIC METABOLIC PNL TOTAL CA: CPT

## 2019-02-09 PROCEDURE — 74230 X-RAY XM SWLNG FUNCJ C+: CPT

## 2019-02-09 PROCEDURE — 36415 COLL VENOUS BLD VENIPUNCTURE: CPT

## 2019-02-09 PROCEDURE — 99232 SBSQ HOSP IP/OBS MODERATE 35: CPT | Performed by: INTERNAL MEDICINE

## 2019-02-09 PROCEDURE — 94640 AIRWAY INHALATION TREATMENT: CPT

## 2019-02-09 PROCEDURE — 82948 REAGENT STRIP/BLOOD GLUCOSE: CPT

## 2019-02-09 PROCEDURE — 6360000002 HC RX W HCPCS: Performed by: THORACIC SURGERY (CARDIOTHORACIC VASCULAR SURGERY)

## 2019-02-09 PROCEDURE — 2709999900 HC NON-CHARGEABLE SUPPLY

## 2019-02-09 PROCEDURE — 2060000000 HC ICU INTERMEDIATE R&B

## 2019-02-09 PROCEDURE — 84100 ASSAY OF PHOSPHORUS: CPT

## 2019-02-09 PROCEDURE — 2500000003 HC RX 250 WO HCPCS: Performed by: PHYSICIAN ASSISTANT

## 2019-02-09 PROCEDURE — 85027 COMPLETE CBC AUTOMATED: CPT

## 2019-02-09 PROCEDURE — 71045 X-RAY EXAM CHEST 1 VIEW: CPT

## 2019-02-09 RX ADMIN — METRONIDAZOLE 500 MG: 500 TABLET, FILM COATED ORAL at 05:45

## 2019-02-09 RX ADMIN — ISODIUM CHLORIDE 3 ML: 0.03 SOLUTION RESPIRATORY (INHALATION) at 21:50

## 2019-02-09 RX ADMIN — ASPIRIN 325 MG: 325 TABLET ORAL at 09:45

## 2019-02-09 RX ADMIN — METOPROLOL TARTRATE 100 MG: 100 TABLET, FILM COATED ORAL at 21:02

## 2019-02-09 RX ADMIN — Medication: at 21:07

## 2019-02-09 RX ADMIN — BARIUM SULFATE 30 ML: 0.81 POWDER, FOR SUSPENSION ORAL at 09:02

## 2019-02-09 RX ADMIN — HEPARIN SODIUM 5000 UNITS: 5000 INJECTION INTRAVENOUS; SUBCUTANEOUS at 01:39

## 2019-02-09 RX ADMIN — DOCUSATE SODIUM 100 MG: 50 LIQUID ORAL at 09:45

## 2019-02-09 RX ADMIN — HEPARIN SODIUM 5000 UNITS: 5000 INJECTION INTRAVENOUS; SUBCUTANEOUS at 17:36

## 2019-02-09 RX ADMIN — INSULIN GLARGINE 30 UNITS: 100 INJECTION, SOLUTION SUBCUTANEOUS at 09:46

## 2019-02-09 RX ADMIN — Medication 4 UNITS: at 12:58

## 2019-02-09 RX ADMIN — METRONIDAZOLE 500 MG: 500 TABLET, FILM COATED ORAL at 12:58

## 2019-02-09 RX ADMIN — ISODIUM CHLORIDE 3 ML: 0.03 SOLUTION RESPIRATORY (INHALATION) at 16:45

## 2019-02-09 RX ADMIN — BARIUM SULFATE 20 ML: 400 SUSPENSION ORAL at 09:02

## 2019-02-09 RX ADMIN — METOPROLOL TARTRATE 100 MG: 100 TABLET, FILM COATED ORAL at 09:46

## 2019-02-09 RX ADMIN — METRONIDAZOLE 500 MG: 500 TABLET, FILM COATED ORAL at 21:02

## 2019-02-09 RX ADMIN — FAMOTIDINE 20 MG: 20 TABLET, FILM COATED ORAL at 09:45

## 2019-02-09 RX ADMIN — BARIUM SULFATE 20 ML: 400 PASTE ORAL at 09:03

## 2019-02-09 RX ADMIN — ISODIUM CHLORIDE 3 ML: 0.03 SOLUTION RESPIRATORY (INHALATION) at 12:24

## 2019-02-09 RX ADMIN — Medication 2 UNITS: at 09:46

## 2019-02-09 RX ADMIN — ISODIUM CHLORIDE 3 ML: 0.03 SOLUTION RESPIRATORY (INHALATION) at 07:59

## 2019-02-09 RX ADMIN — HEPARIN SODIUM 5000 UNITS: 5000 INJECTION INTRAVENOUS; SUBCUTANEOUS at 09:45

## 2019-02-09 RX ADMIN — Medication 30 ML: at 09:46

## 2019-02-09 ASSESSMENT — PAIN SCALES - GENERAL
PAINLEVEL_OUTOF10: 0
PAINLEVEL_OUTOF10: 0

## 2019-02-10 ENCOUNTER — APPOINTMENT (OUTPATIENT)
Dept: GENERAL RADIOLOGY | Age: 74
DRG: 216 | End: 2019-02-10
Attending: INTERNAL MEDICINE
Payer: MEDICARE

## 2019-02-10 LAB
ANION GAP SERPL CALCULATED.3IONS-SCNC: 14 MEQ/L (ref 8–16)
BUN BLDV-MCNC: 65 MG/DL (ref 7–22)
CALCIUM IONIZED: 1.08 MMOL/L (ref 1.12–1.32)
CALCIUM SERPL-MCNC: 8 MG/DL (ref 8.5–10.5)
CHLORIDE BLD-SCNC: 97 MEQ/L (ref 98–111)
CO2: 24 MEQ/L (ref 23–33)
CREAT SERPL-MCNC: 4.1 MG/DL (ref 0.4–1.2)
ERYTHROCYTE [DISTWIDTH] IN BLOOD BY AUTOMATED COUNT: 19.3 % (ref 11.5–14.5)
ERYTHROCYTE [DISTWIDTH] IN BLOOD BY AUTOMATED COUNT: 65.3 FL (ref 35–45)
GFR SERPL CREATININE-BSD FRML MDRD: 14 ML/MIN/1.73M2
GLUCOSE BLD-MCNC: 128 MG/DL (ref 70–108)
GLUCOSE BLD-MCNC: 141 MG/DL (ref 70–108)
GLUCOSE BLD-MCNC: 194 MG/DL (ref 70–108)
GLUCOSE BLD-MCNC: 209 MG/DL (ref 70–108)
GLUCOSE BLD-MCNC: 230 MG/DL (ref 70–108)
HCT VFR BLD CALC: 28.1 % (ref 42–52)
HEMOGLOBIN: 8.6 GM/DL (ref 14–18)
MCH RBC QN AUTO: 29 PG (ref 26–33)
MCHC RBC AUTO-ENTMCNC: 30.6 GM/DL (ref 32.2–35.5)
MCV RBC AUTO: 94.6 FL (ref 80–94)
PHOSPHORUS: 5.3 MG/DL (ref 2.4–4.7)
PLATELET # BLD: 358 THOU/MM3 (ref 130–400)
PMV BLD AUTO: 9.9 FL (ref 9.4–12.4)
POTASSIUM SERPL-SCNC: 4.4 MEQ/L (ref 3.5–5.2)
PTH INTACT: 174.3 PG/ML (ref 15–65)
RBC # BLD: 2.97 MILL/MM3 (ref 4.7–6.1)
SCAN OF BLOOD SMEAR: NORMAL
SODIUM BLD-SCNC: 135 MEQ/L (ref 135–145)
VITAMIN D 25-HYDROXY: 15 NG/ML (ref 30–100)
WBC # BLD: 9.1 THOU/MM3 (ref 4.8–10.8)

## 2019-02-10 PROCEDURE — 83970 ASSAY OF PARATHORMONE: CPT

## 2019-02-10 PROCEDURE — 82330 ASSAY OF CALCIUM: CPT

## 2019-02-10 PROCEDURE — 36592 COLLECT BLOOD FROM PICC: CPT

## 2019-02-10 PROCEDURE — 2500000003 HC RX 250 WO HCPCS: Performed by: THORACIC SURGERY (CARDIOTHORACIC VASCULAR SURGERY)

## 2019-02-10 PROCEDURE — 94640 AIRWAY INHALATION TREATMENT: CPT

## 2019-02-10 PROCEDURE — 6370000000 HC RX 637 (ALT 250 FOR IP): Performed by: THORACIC SURGERY (CARDIOTHORACIC VASCULAR SURGERY)

## 2019-02-10 PROCEDURE — 36415 COLL VENOUS BLD VENIPUNCTURE: CPT

## 2019-02-10 PROCEDURE — 2060000000 HC ICU INTERMEDIATE R&B

## 2019-02-10 PROCEDURE — 6360000002 HC RX W HCPCS: Performed by: THORACIC SURGERY (CARDIOTHORACIC VASCULAR SURGERY)

## 2019-02-10 PROCEDURE — 82948 REAGENT STRIP/BLOOD GLUCOSE: CPT

## 2019-02-10 PROCEDURE — 71045 X-RAY EXAM CHEST 1 VIEW: CPT

## 2019-02-10 PROCEDURE — 6370000000 HC RX 637 (ALT 250 FOR IP): Performed by: INTERNAL MEDICINE

## 2019-02-10 PROCEDURE — 99232 SBSQ HOSP IP/OBS MODERATE 35: CPT | Performed by: INTERNAL MEDICINE

## 2019-02-10 PROCEDURE — 80048 BASIC METABOLIC PNL TOTAL CA: CPT

## 2019-02-10 PROCEDURE — 82306 VITAMIN D 25 HYDROXY: CPT

## 2019-02-10 PROCEDURE — 85027 COMPLETE CBC AUTOMATED: CPT

## 2019-02-10 PROCEDURE — 84100 ASSAY OF PHOSPHORUS: CPT

## 2019-02-10 RX ORDER — ERGOCALCIFEROL 1.25 MG/1
50000 CAPSULE ORAL WEEKLY
Status: DISCONTINUED | OUTPATIENT
Start: 2019-02-10 | End: 2019-02-12 | Stop reason: HOSPADM

## 2019-02-10 RX ADMIN — Medication: at 03:40

## 2019-02-10 RX ADMIN — Medication 2 UNITS: at 16:28

## 2019-02-10 RX ADMIN — Medication 4 UNITS: at 12:55

## 2019-02-10 RX ADMIN — METOPROLOL TARTRATE 100 MG: 100 TABLET, FILM COATED ORAL at 22:03

## 2019-02-10 RX ADMIN — METOPROLOL TARTRATE 100 MG: 100 TABLET, FILM COATED ORAL at 08:37

## 2019-02-10 RX ADMIN — METRONIDAZOLE 500 MG: 500 TABLET, FILM COATED ORAL at 22:03

## 2019-02-10 RX ADMIN — Medication: at 22:04

## 2019-02-10 RX ADMIN — FAMOTIDINE 20 MG: 20 TABLET, FILM COATED ORAL at 08:37

## 2019-02-10 RX ADMIN — INSULIN GLARGINE 30 UNITS: 100 INJECTION, SOLUTION SUBCUTANEOUS at 21:32

## 2019-02-10 RX ADMIN — Medication 30 ML: at 08:37

## 2019-02-10 RX ADMIN — ASPIRIN 325 MG: 325 TABLET ORAL at 08:37

## 2019-02-10 RX ADMIN — INSULIN GLARGINE 30 UNITS: 100 INJECTION, SOLUTION SUBCUTANEOUS at 08:37

## 2019-02-10 RX ADMIN — METRONIDAZOLE 500 MG: 500 TABLET, FILM COATED ORAL at 12:18

## 2019-02-10 RX ADMIN — METRONIDAZOLE 500 MG: 500 TABLET, FILM COATED ORAL at 06:25

## 2019-02-10 RX ADMIN — MICONAZOLE NITRATE: 2 POWDER TOPICAL at 21:25

## 2019-02-10 RX ADMIN — HEPARIN SODIUM 5000 UNITS: 5000 INJECTION INTRAVENOUS; SUBCUTANEOUS at 16:20

## 2019-02-10 RX ADMIN — HEPARIN SODIUM 5000 UNITS: 5000 INJECTION INTRAVENOUS; SUBCUTANEOUS at 08:37

## 2019-02-10 RX ADMIN — ACETAMINOPHEN 650 MG: 325 TABLET ORAL at 08:37

## 2019-02-10 RX ADMIN — INSULIN LISPRO 2 UNITS: 100 INJECTION, SOLUTION INTRAVENOUS; SUBCUTANEOUS at 21:33

## 2019-02-10 RX ADMIN — HEPARIN SODIUM 5000 UNITS: 5000 INJECTION INTRAVENOUS; SUBCUTANEOUS at 00:01

## 2019-02-10 RX ADMIN — ERGOCALCIFEROL 50000 UNITS: 1.25 CAPSULE ORAL at 12:18

## 2019-02-10 RX ADMIN — CALCIUM ACETATE 667 MG: 667 CAPSULE ORAL at 16:21

## 2019-02-10 RX ADMIN — CALCIUM ACETATE 667 MG: 667 CAPSULE ORAL at 12:18

## 2019-02-10 RX ADMIN — ISODIUM CHLORIDE 3 ML: 0.03 SOLUTION RESPIRATORY (INHALATION) at 09:06

## 2019-02-10 RX ADMIN — MICONAZOLE NITRATE: 2 POWDER TOPICAL at 16:20

## 2019-02-10 ASSESSMENT — PAIN SCALES - GENERAL
PAINLEVEL_OUTOF10: 0
PAINLEVEL_OUTOF10: 4
PAINLEVEL_OUTOF10: 0

## 2019-02-11 PROBLEM — E43 SEVERE MALNUTRITION (HCC): Status: ACTIVE | Noted: 2019-02-11

## 2019-02-11 LAB
ANION GAP SERPL CALCULATED.3IONS-SCNC: 13 MEQ/L (ref 8–16)
BUN BLDV-MCNC: 85 MG/DL (ref 7–22)
CALCIUM IONIZED: 1.05 MMOL/L (ref 1.12–1.32)
CALCIUM SERPL-MCNC: 7.7 MG/DL (ref 8.5–10.5)
CHLORIDE BLD-SCNC: 97 MEQ/L (ref 98–111)
CO2: 24 MEQ/L (ref 23–33)
CREAT SERPL-MCNC: 4.8 MG/DL (ref 0.4–1.2)
ERYTHROCYTE [DISTWIDTH] IN BLOOD BY AUTOMATED COUNT: 19.2 % (ref 11.5–14.5)
ERYTHROCYTE [DISTWIDTH] IN BLOOD BY AUTOMATED COUNT: 63.7 FL (ref 35–45)
GFR SERPL CREATININE-BSD FRML MDRD: 12 ML/MIN/1.73M2
GLUCOSE BLD-MCNC: 103 MG/DL (ref 70–108)
GLUCOSE BLD-MCNC: 127 MG/DL (ref 70–108)
GLUCOSE BLD-MCNC: 176 MG/DL (ref 70–108)
GLUCOSE BLD-MCNC: 71 MG/DL (ref 70–108)
GLUCOSE BLD-MCNC: 85 MG/DL (ref 70–108)
HCT VFR BLD CALC: 27.9 % (ref 42–52)
HEMOGLOBIN: 8.4 GM/DL (ref 14–18)
HEPATITIS B SURFACE ANTIGEN: NEGATIVE
MCH RBC QN AUTO: 28.5 PG (ref 26–33)
MCHC RBC AUTO-ENTMCNC: 30.1 GM/DL (ref 32.2–35.5)
MCV RBC AUTO: 94.6 FL (ref 80–94)
PHOSPHORUS: 5.6 MG/DL (ref 2.4–4.7)
PLATELET # BLD: 326 THOU/MM3 (ref 130–400)
PMV BLD AUTO: 9.3 FL (ref 9.4–12.4)
POTASSIUM SERPL-SCNC: 4.3 MEQ/L (ref 3.5–5.2)
RBC # BLD: 2.95 MILL/MM3 (ref 4.7–6.1)
SODIUM BLD-SCNC: 134 MEQ/L (ref 135–145)
WBC # BLD: 9.5 THOU/MM3 (ref 4.8–10.8)

## 2019-02-11 PROCEDURE — 2709999900 HC NON-CHARGEABLE SUPPLY

## 2019-02-11 PROCEDURE — 80048 BASIC METABOLIC PNL TOTAL CA: CPT

## 2019-02-11 PROCEDURE — 6370000000 HC RX 637 (ALT 250 FOR IP): Performed by: INTERNAL MEDICINE

## 2019-02-11 PROCEDURE — APPSS30 APP SPLIT SHARED TIME 16-30 MINUTES: Performed by: PHYSICIAN ASSISTANT

## 2019-02-11 PROCEDURE — 6370000000 HC RX 637 (ALT 250 FOR IP): Performed by: THORACIC SURGERY (CARDIOTHORACIC VASCULAR SURGERY)

## 2019-02-11 PROCEDURE — 85027 COMPLETE CBC AUTOMATED: CPT

## 2019-02-11 PROCEDURE — 90935 HEMODIALYSIS ONE EVALUATION: CPT

## 2019-02-11 PROCEDURE — 97530 THERAPEUTIC ACTIVITIES: CPT

## 2019-02-11 PROCEDURE — 82330 ASSAY OF CALCIUM: CPT

## 2019-02-11 PROCEDURE — 84100 ASSAY OF PHOSPHORUS: CPT

## 2019-02-11 PROCEDURE — 6360000002 HC RX W HCPCS: Performed by: THORACIC SURGERY (CARDIOTHORACIC VASCULAR SURGERY)

## 2019-02-11 PROCEDURE — 2060000000 HC ICU INTERMEDIATE R&B

## 2019-02-11 PROCEDURE — 90935 HEMODIALYSIS ONE EVALUATION: CPT | Performed by: INTERNAL MEDICINE

## 2019-02-11 PROCEDURE — 87340 HEPATITIS B SURFACE AG IA: CPT

## 2019-02-11 PROCEDURE — 97110 THERAPEUTIC EXERCISES: CPT

## 2019-02-11 PROCEDURE — 36415 COLL VENOUS BLD VENIPUNCTURE: CPT

## 2019-02-11 PROCEDURE — 94640 AIRWAY INHALATION TREATMENT: CPT

## 2019-02-11 PROCEDURE — 82948 REAGENT STRIP/BLOOD GLUCOSE: CPT

## 2019-02-11 RX ORDER — DRONABINOL 5 MG/1
10 CAPSULE ORAL 2 TIMES DAILY
Status: DISCONTINUED | OUTPATIENT
Start: 2019-02-11 | End: 2019-02-12 | Stop reason: HOSPADM

## 2019-02-11 RX ORDER — CARVEDILOL 3.12 MG/1
3.12 TABLET ORAL 2 TIMES DAILY WITH MEALS
Status: DISCONTINUED | OUTPATIENT
Start: 2019-02-11 | End: 2019-02-12 | Stop reason: HOSPADM

## 2019-02-11 RX ADMIN — CALCIUM ACETATE 667 MG: 667 CAPSULE ORAL at 07:24

## 2019-02-11 RX ADMIN — ACETAMINOPHEN 650 MG: 325 TABLET ORAL at 10:37

## 2019-02-11 RX ADMIN — INSULIN GLARGINE 30 UNITS: 100 INJECTION, SOLUTION SUBCUTANEOUS at 12:41

## 2019-02-11 RX ADMIN — CARVEDILOL 3.12 MG: 3.12 TABLET, FILM COATED ORAL at 22:10

## 2019-02-11 RX ADMIN — CARVEDILOL 3.12 MG: 3.12 TABLET, FILM COATED ORAL at 12:36

## 2019-02-11 RX ADMIN — MICONAZOLE NITRATE: 2 POWDER TOPICAL at 12:21

## 2019-02-11 RX ADMIN — HEPARIN SODIUM 5000 UNITS: 5000 INJECTION INTRAVENOUS; SUBCUTANEOUS at 16:30

## 2019-02-11 RX ADMIN — DRONABINOL 10 MG: 5 CAPSULE ORAL at 22:10

## 2019-02-11 RX ADMIN — DRONABINOL 10 MG: 5 CAPSULE ORAL at 12:39

## 2019-02-11 RX ADMIN — Medication 3 UNITS: at 16:31

## 2019-02-11 RX ADMIN — ISODIUM CHLORIDE 3 ML: 0.03 SOLUTION RESPIRATORY (INHALATION) at 21:46

## 2019-02-11 RX ADMIN — Medication: at 22:24

## 2019-02-11 RX ADMIN — METRONIDAZOLE 500 MG: 500 TABLET, FILM COATED ORAL at 22:10

## 2019-02-11 RX ADMIN — METRONIDAZOLE 500 MG: 500 TABLET, FILM COATED ORAL at 16:30

## 2019-02-11 RX ADMIN — Medication: at 12:21

## 2019-02-11 RX ADMIN — MICONAZOLE NITRATE: 2 POWDER TOPICAL at 22:24

## 2019-02-11 RX ADMIN — METRONIDAZOLE 500 MG: 500 TABLET, FILM COATED ORAL at 06:15

## 2019-02-11 RX ADMIN — ISODIUM CHLORIDE 3 ML: 0.03 SOLUTION RESPIRATORY (INHALATION) at 13:18

## 2019-02-11 RX ADMIN — HEPARIN SODIUM 5000 UNITS: 5000 INJECTION INTRAVENOUS; SUBCUTANEOUS at 01:59

## 2019-02-11 ASSESSMENT — PAIN SCALES - GENERAL
PAINLEVEL_OUTOF10: 0
PAINLEVEL_OUTOF10: 0
PAINLEVEL_OUTOF10: 3
PAINLEVEL_OUTOF10: 0

## 2019-02-12 ENCOUNTER — APPOINTMENT (OUTPATIENT)
Dept: GENERAL RADIOLOGY | Age: 74
DRG: 216 | End: 2019-02-12
Attending: INTERNAL MEDICINE
Payer: MEDICARE

## 2019-02-12 VITALS
DIASTOLIC BLOOD PRESSURE: 60 MMHG | TEMPERATURE: 97.5 F | SYSTOLIC BLOOD PRESSURE: 134 MMHG | RESPIRATION RATE: 18 BRPM | WEIGHT: 149.3 LBS | HEIGHT: 71 IN | OXYGEN SATURATION: 100 % | HEART RATE: 88 BPM | BODY MASS INDEX: 20.9 KG/M2

## 2019-02-12 LAB
ANION GAP SERPL CALCULATED.3IONS-SCNC: 13 MEQ/L (ref 8–16)
BUN BLDV-MCNC: 55 MG/DL (ref 7–22)
CALCIUM IONIZED: 1.09 MMOL/L (ref 1.12–1.32)
CALCIUM SERPL-MCNC: 7.9 MG/DL (ref 8.5–10.5)
CHLORIDE BLD-SCNC: 99 MEQ/L (ref 98–111)
CO2: 24 MEQ/L (ref 23–33)
CREAT SERPL-MCNC: 4.1 MG/DL (ref 0.4–1.2)
ERYTHROCYTE [DISTWIDTH] IN BLOOD BY AUTOMATED COUNT: 19.7 % (ref 11.5–14.5)
ERYTHROCYTE [DISTWIDTH] IN BLOOD BY AUTOMATED COUNT: 66.1 FL (ref 35–45)
GFR SERPL CREATININE-BSD FRML MDRD: 14 ML/MIN/1.73M2
GLUCOSE BLD-MCNC: 117 MG/DL (ref 70–108)
GLUCOSE BLD-MCNC: 118 MG/DL (ref 70–108)
GLUCOSE BLD-MCNC: 119 MG/DL (ref 70–108)
GLUCOSE BLD-MCNC: 192 MG/DL (ref 70–108)
GLUCOSE BLD-MCNC: 63 MG/DL (ref 70–108)
HCT VFR BLD CALC: 29.5 % (ref 42–52)
HEMOGLOBIN: 8.8 GM/DL (ref 14–18)
MCH RBC QN AUTO: 28.3 PG (ref 26–33)
MCHC RBC AUTO-ENTMCNC: 29.8 GM/DL (ref 32.2–35.5)
MCV RBC AUTO: 94.9 FL (ref 80–94)
PHOSPHORUS: 4.8 MG/DL (ref 2.4–4.7)
PLATELET # BLD: 322 THOU/MM3 (ref 130–400)
PMV BLD AUTO: 9.9 FL (ref 9.4–12.4)
POTASSIUM SERPL-SCNC: 4.8 MEQ/L (ref 3.5–5.2)
RBC # BLD: 3.11 MILL/MM3 (ref 4.7–6.1)
SODIUM BLD-SCNC: 136 MEQ/L (ref 135–145)
WBC # BLD: 10.6 THOU/MM3 (ref 4.8–10.8)

## 2019-02-12 PROCEDURE — 6370000000 HC RX 637 (ALT 250 FOR IP): Performed by: THORACIC SURGERY (CARDIOTHORACIC VASCULAR SURGERY)

## 2019-02-12 PROCEDURE — 71046 X-RAY EXAM CHEST 2 VIEWS: CPT

## 2019-02-12 PROCEDURE — 94640 AIRWAY INHALATION TREATMENT: CPT

## 2019-02-12 PROCEDURE — 6370000000 HC RX 637 (ALT 250 FOR IP): Performed by: INTERNAL MEDICINE

## 2019-02-12 PROCEDURE — 99232 SBSQ HOSP IP/OBS MODERATE 35: CPT | Performed by: INTERNAL MEDICINE

## 2019-02-12 PROCEDURE — 82330 ASSAY OF CALCIUM: CPT

## 2019-02-12 PROCEDURE — 6360000002 HC RX W HCPCS: Performed by: THORACIC SURGERY (CARDIOTHORACIC VASCULAR SURGERY)

## 2019-02-12 PROCEDURE — 36415 COLL VENOUS BLD VENIPUNCTURE: CPT

## 2019-02-12 PROCEDURE — APPSS60 APP SPLIT SHARED TIME 46-60 MINUTES: Performed by: PHYSICIAN ASSISTANT

## 2019-02-12 PROCEDURE — 82948 REAGENT STRIP/BLOOD GLUCOSE: CPT

## 2019-02-12 PROCEDURE — 84100 ASSAY OF PHOSPHORUS: CPT

## 2019-02-12 PROCEDURE — 80048 BASIC METABOLIC PNL TOTAL CA: CPT

## 2019-02-12 PROCEDURE — 2709999900 HC NON-CHARGEABLE SUPPLY

## 2019-02-12 PROCEDURE — 85027 COMPLETE CBC AUTOMATED: CPT

## 2019-02-12 PROCEDURE — 92526 ORAL FUNCTION THERAPY: CPT

## 2019-02-12 RX ORDER — CARVEDILOL 3.12 MG/1
3.12 TABLET ORAL 2 TIMES DAILY WITH MEALS
Qty: 60 TABLET | Refills: 3 | Status: ON HOLD | DISCHARGE
Start: 2019-02-12 | End: 2019-02-21 | Stop reason: HOSPADM

## 2019-02-12 RX ORDER — ASPIRIN 325 MG
325 TABLET ORAL DAILY
Qty: 30 TABLET | Refills: 3 | DISCHARGE
Start: 2019-02-12 | End: 2019-02-12 | Stop reason: HOSPADM

## 2019-02-12 RX ORDER — ERGOCALCIFEROL (VITAMIN D2) 1250 MCG
50000 CAPSULE ORAL WEEKLY
Qty: 4 CAPSULE | Refills: 0 | DISCHARGE
Start: 2019-02-17 | End: 2019-04-02

## 2019-02-12 RX ORDER — CLOPIDOGREL BISULFATE 75 MG/1
75 TABLET ORAL DAILY
Qty: 30 TABLET | Refills: 3 | Status: ON HOLD | DISCHARGE
Start: 2019-02-12 | End: 2019-02-19 | Stop reason: HOSPADM

## 2019-02-12 RX ORDER — LISINOPRIL 5 MG/1
2.5 TABLET ORAL DAILY
Qty: 30 TABLET | Refills: 3 | DISCHARGE
Start: 2019-02-12 | End: 2019-09-04 | Stop reason: DRUGHIGH

## 2019-02-12 RX ORDER — INSULIN GLARGINE 100 [IU]/ML
25 INJECTION, SOLUTION SUBCUTANEOUS 2 TIMES DAILY
Qty: 1 VIAL | Refills: 3 | Status: ON HOLD | DISCHARGE
Start: 2019-02-12 | End: 2019-02-19

## 2019-02-12 RX ORDER — ASPIRIN 81 MG/1
81 TABLET ORAL DAILY
Qty: 30 TABLET | Refills: 3 | Status: ON HOLD | DISCHARGE
Start: 2019-02-12 | End: 2019-02-19 | Stop reason: HOSPADM

## 2019-02-12 RX ADMIN — HEPARIN SODIUM 5000 UNITS: 5000 INJECTION INTRAVENOUS; SUBCUTANEOUS at 08:57

## 2019-02-12 RX ADMIN — FAMOTIDINE 20 MG: 20 TABLET, FILM COATED ORAL at 08:25

## 2019-02-12 RX ADMIN — CALCIUM ACETATE 1334 MG: 667 CAPSULE ORAL at 12:19

## 2019-02-12 RX ADMIN — CALCIUM ACETATE 1334 MG: 667 CAPSULE ORAL at 17:30

## 2019-02-12 RX ADMIN — ISODIUM CHLORIDE 3 ML: 0.03 SOLUTION RESPIRATORY (INHALATION) at 09:30

## 2019-02-12 RX ADMIN — ACETAMINOPHEN 650 MG: 325 TABLET ORAL at 12:30

## 2019-02-12 RX ADMIN — DRONABINOL 10 MG: 5 CAPSULE ORAL at 09:11

## 2019-02-12 RX ADMIN — INSULIN GLARGINE 30 UNITS: 100 INJECTION, SOLUTION SUBCUTANEOUS at 08:57

## 2019-02-12 RX ADMIN — ASPIRIN 325 MG: 325 TABLET ORAL at 08:15

## 2019-02-12 RX ADMIN — Medication: at 08:16

## 2019-02-12 RX ADMIN — CARVEDILOL 3.12 MG: 3.12 TABLET, FILM COATED ORAL at 17:30

## 2019-02-12 RX ADMIN — CARVEDILOL 3.12 MG: 3.12 TABLET, FILM COATED ORAL at 08:12

## 2019-02-12 RX ADMIN — MICONAZOLE NITRATE: 2 POWDER TOPICAL at 08:16

## 2019-02-12 RX ADMIN — METRONIDAZOLE 500 MG: 500 TABLET, FILM COATED ORAL at 05:39

## 2019-02-12 RX ADMIN — Medication 3 UNITS: at 12:20

## 2019-02-12 RX ADMIN — HEPARIN SODIUM 5000 UNITS: 5000 INJECTION INTRAVENOUS; SUBCUTANEOUS at 01:57

## 2019-02-12 ASSESSMENT — PAIN SCALES - GENERAL
PAINLEVEL_OUTOF10: 0
PAINLEVEL_OUTOF10: 10
PAINLEVEL_OUTOF10: 0

## 2019-02-12 ASSESSMENT — PAIN DESCRIPTION - PAIN TYPE: TYPE: ACUTE PAIN

## 2019-02-12 ASSESSMENT — PAIN DESCRIPTION - DESCRIPTORS: DESCRIPTORS: SORE

## 2019-02-12 ASSESSMENT — PAIN DESCRIPTION - LOCATION: LOCATION: BUTTOCKS

## 2019-02-12 ASSESSMENT — PAIN DESCRIPTION - ONSET: ONSET: ON-GOING

## 2019-02-12 ASSESSMENT — PAIN DESCRIPTION - FREQUENCY: FREQUENCY: CONTINUOUS

## 2019-02-12 ASSESSMENT — PAIN - FUNCTIONAL ASSESSMENT: PAIN_FUNCTIONAL_ASSESSMENT: ACTIVITIES ARE NOT PREVENTED

## 2019-02-12 ASSESSMENT — PAIN DESCRIPTION - PROGRESSION: CLINICAL_PROGRESSION: NOT CHANGED

## 2019-02-14 ENCOUNTER — HOSPITAL ENCOUNTER (INPATIENT)
Age: 74
LOS: 7 days | Discharge: NURSING, REHAB OR LONG-TERM CARE | DRG: 871 | End: 2019-02-21
Attending: INTERNAL MEDICINE | Admitting: INTERNAL MEDICINE
Payer: MEDICARE

## 2019-02-14 PROBLEM — A41.9 SEPSIS (HCC): Status: ACTIVE | Noted: 2019-02-14

## 2019-02-14 PROBLEM — A41.9 SEVERE SEPSIS (HCC): Status: ACTIVE | Noted: 2019-02-14

## 2019-02-14 PROBLEM — R65.20 SEVERE SEPSIS (HCC): Status: ACTIVE | Noted: 2019-02-14

## 2019-02-14 LAB
ANION GAP SERPL CALCULATED.3IONS-SCNC: 14 MEQ/L (ref 8–16)
BACTERIA: ABNORMAL
BILIRUBIN URINE: ABNORMAL
BLOOD, URINE: ABNORMAL
BUN BLDV-MCNC: 52 MG/DL (ref 7–22)
CALCIUM IONIZED: 1.01 MMOL/L (ref 1.12–1.32)
CALCIUM SERPL-MCNC: 7.6 MG/DL (ref 8.5–10.5)
CASTS: ABNORMAL /LPF
CASTS: ABNORMAL /LPF
CHARACTER, URINE: ABNORMAL
CHLORIDE BLD-SCNC: 106 MEQ/L (ref 98–111)
CLOSTRIDIUM DIFFICILE, PCR: NEGATIVE
CO2: 20 MEQ/L (ref 23–33)
COLOR: ABNORMAL
CREAT SERPL-MCNC: 4.6 MG/DL (ref 0.4–1.2)
CRYSTALS: ABNORMAL
EPITHELIAL CELLS, UA: ABNORMAL /HPF
FLU A ANTIGEN: NEGATIVE
FLU B ANTIGEN: NEGATIVE
GFR SERPL CREATININE-BSD FRML MDRD: 13 ML/MIN/1.73M2
GLUCOSE BLD-MCNC: 128 MG/DL (ref 70–108)
GLUCOSE BLD-MCNC: 174 MG/DL (ref 70–108)
GLUCOSE, URINE: NEGATIVE MG/DL
ICTOTEST: NEGATIVE
KETONES, URINE: NEGATIVE
LACTIC ACID, SEPSIS: 0.8 MMOL/L (ref 0.5–1.9)
LEUKOCYTE ESTERASE, URINE: ABNORMAL
MAGNESIUM: 2.1 MG/DL (ref 1.6–2.4)
MISCELLANEOUS LAB TEST RESULT: ABNORMAL
MRSA SCREEN RT-PCR: POSITIVE
NITRITE, URINE: POSITIVE
PH UA: 5
PHOSPHORUS: 5.9 MG/DL (ref 2.4–4.7)
POTASSIUM SERPL-SCNC: 4.9 MEQ/L (ref 3.5–5.2)
PROTEIN UA: ABNORMAL MG/DL
RBC URINE: ABNORMAL /HPF
RENAL EPITHELIAL, UA: ABNORMAL
SODIUM BLD-SCNC: 140 MEQ/L (ref 135–145)
SPECIFIC GRAVITY UA: 1.02 (ref 1–1.03)
UROBILINOGEN, URINE: 0.2 EU/DL
VANCOMYCIN RESISTANT ENTEROCOCCUS: POSITIVE
WBC UA: ABNORMAL /HPF
YEAST: ABNORMAL

## 2019-02-14 PROCEDURE — 87641 MR-STAPH DNA AMP PROBE: CPT

## 2019-02-14 PROCEDURE — 51702 INSERT TEMP BLADDER CATH: CPT

## 2019-02-14 PROCEDURE — 2000000000 HC ICU R&B

## 2019-02-14 PROCEDURE — 36415 COLL VENOUS BLD VENIPUNCTURE: CPT

## 2019-02-14 PROCEDURE — 82948 REAGENT STRIP/BLOOD GLUCOSE: CPT

## 2019-02-14 PROCEDURE — 6370000000 HC RX 637 (ALT 250 FOR IP): Performed by: INTERNAL MEDICINE

## 2019-02-14 PROCEDURE — 99222 1ST HOSP IP/OBS MODERATE 55: CPT | Performed by: INTERNAL MEDICINE

## 2019-02-14 PROCEDURE — 2500000003 HC RX 250 WO HCPCS: Performed by: INTERNAL MEDICINE

## 2019-02-14 PROCEDURE — 87804 INFLUENZA ASSAY W/OPTIC: CPT

## 2019-02-14 PROCEDURE — 6360000002 HC RX W HCPCS: Performed by: INTERNAL MEDICINE

## 2019-02-14 PROCEDURE — 87040 BLOOD CULTURE FOR BACTERIA: CPT

## 2019-02-14 PROCEDURE — 84100 ASSAY OF PHOSPHORUS: CPT

## 2019-02-14 PROCEDURE — 87081 CULTURE SCREEN ONLY: CPT

## 2019-02-14 PROCEDURE — 82330 ASSAY OF CALCIUM: CPT

## 2019-02-14 PROCEDURE — 87106 FUNGI IDENTIFICATION YEAST: CPT

## 2019-02-14 PROCEDURE — 83605 ASSAY OF LACTIC ACID: CPT

## 2019-02-14 PROCEDURE — 93307 TTE W/O DOPPLER COMPLETE: CPT

## 2019-02-14 PROCEDURE — 81001 URINALYSIS AUTO W/SCOPE: CPT

## 2019-02-14 PROCEDURE — 2709999900 HC NON-CHARGEABLE SUPPLY

## 2019-02-14 PROCEDURE — 87500 VANOMYCIN DNA AMP PROBE: CPT

## 2019-02-14 PROCEDURE — 6370000000 HC RX 637 (ALT 250 FOR IP): Performed by: NURSE PRACTITIONER

## 2019-02-14 PROCEDURE — 83735 ASSAY OF MAGNESIUM: CPT

## 2019-02-14 PROCEDURE — 99291 CRITICAL CARE FIRST HOUR: CPT | Performed by: INTERNAL MEDICINE

## 2019-02-14 PROCEDURE — 87493 C DIFF AMPLIFIED PROBE: CPT

## 2019-02-14 PROCEDURE — 36592 COLLECT BLOOD FROM PICC: CPT

## 2019-02-14 PROCEDURE — 80048 BASIC METABOLIC PNL TOTAL CA: CPT

## 2019-02-14 PROCEDURE — 87086 URINE CULTURE/COLONY COUNT: CPT

## 2019-02-14 PROCEDURE — 2580000003 HC RX 258: Performed by: INTERNAL MEDICINE

## 2019-02-14 PROCEDURE — 87147 CULTURE TYPE IMMUNOLOGIC: CPT

## 2019-02-14 PROCEDURE — 99292 CRITICAL CARE ADDL 30 MIN: CPT | Performed by: INTERNAL MEDICINE

## 2019-02-14 PROCEDURE — 6370000000 HC RX 637 (ALT 250 FOR IP)

## 2019-02-14 RX ORDER — SODIUM CHLORIDE 0.9 % (FLUSH) 0.9 %
10 SYRINGE (ML) INJECTION PRN
Status: DISCONTINUED | OUTPATIENT
Start: 2019-02-14 | End: 2019-02-21 | Stop reason: HOSPADM

## 2019-02-14 RX ORDER — SODIUM CHLORIDE 0.9 % (FLUSH) 0.9 %
10 SYRINGE (ML) INJECTION EVERY 12 HOURS SCHEDULED
Status: DISCONTINUED | OUTPATIENT
Start: 2019-02-14 | End: 2019-02-21 | Stop reason: HOSPADM

## 2019-02-14 RX ORDER — 0.9 % SODIUM CHLORIDE 0.9 %
500 INTRAVENOUS SOLUTION INTRAVENOUS ONCE
Status: COMPLETED | OUTPATIENT
Start: 2019-02-14 | End: 2019-02-14

## 2019-02-14 RX ORDER — INSULIN GLARGINE 100 [IU]/ML
20 INJECTION, SOLUTION SUBCUTANEOUS 2 TIMES DAILY
Status: DISCONTINUED | OUTPATIENT
Start: 2019-02-14 | End: 2019-02-15

## 2019-02-14 RX ORDER — FLUCONAZOLE 2 MG/ML
600 INJECTION, SOLUTION INTRAVENOUS ONCE
Status: COMPLETED | OUTPATIENT
Start: 2019-02-14 | End: 2019-02-14

## 2019-02-14 RX ORDER — NICOTINE POLACRILEX 4 MG
15 LOZENGE BUCCAL PRN
Status: DISCONTINUED | OUTPATIENT
Start: 2019-02-14 | End: 2019-02-21 | Stop reason: HOSPADM

## 2019-02-14 RX ORDER — HEPARIN SODIUM 5000 [USP'U]/ML
5000 INJECTION, SOLUTION INTRAVENOUS; SUBCUTANEOUS EVERY 8 HOURS SCHEDULED
Status: DISCONTINUED | OUTPATIENT
Start: 2019-02-14 | End: 2019-02-15

## 2019-02-14 RX ORDER — INSULIN GLARGINE 100 [IU]/ML
10 INJECTION, SOLUTION SUBCUTANEOUS ONCE
Status: COMPLETED | OUTPATIENT
Start: 2019-02-14 | End: 2019-02-14

## 2019-02-14 RX ORDER — DEXTROSE MONOHYDRATE 25 G/50ML
12.5 INJECTION, SOLUTION INTRAVENOUS PRN
Status: DISCONTINUED | OUTPATIENT
Start: 2019-02-14 | End: 2019-02-21 | Stop reason: HOSPADM

## 2019-02-14 RX ORDER — DEXTROSE MONOHYDRATE 50 MG/ML
100 INJECTION, SOLUTION INTRAVENOUS PRN
Status: DISCONTINUED | OUTPATIENT
Start: 2019-02-14 | End: 2019-02-21 | Stop reason: HOSPADM

## 2019-02-14 RX ADMIN — FLUCONAZOLE 600 MG: 2 INJECTION, SOLUTION INTRAVENOUS at 20:31

## 2019-02-14 RX ADMIN — Medication 2 MCG/MIN: at 14:15

## 2019-02-14 RX ADMIN — Medication 10 ML: at 20:31

## 2019-02-14 RX ADMIN — HEPARIN SODIUM 5000 UNITS: 5000 INJECTION INTRAVENOUS; SUBCUTANEOUS at 15:35

## 2019-02-14 RX ADMIN — HEPARIN SODIUM 5000 UNITS: 5000 INJECTION INTRAVENOUS; SUBCUTANEOUS at 23:05

## 2019-02-14 RX ADMIN — MICONAZOLE NITRATE: 2 POWDER TOPICAL at 20:31

## 2019-02-14 RX ADMIN — Medication 1 UNITS: at 20:31

## 2019-02-14 RX ADMIN — SODIUM CHLORIDE 500 ML: 9 INJECTION, SOLUTION INTRAVENOUS at 17:30

## 2019-02-14 RX ADMIN — PIPERACILLIN SODIUM,TAZOBACTAM SODIUM 2.25 G: 2; .25 INJECTION, POWDER, FOR SOLUTION INTRAVENOUS at 15:51

## 2019-02-14 RX ADMIN — SODIUM CHLORIDE 500 ML: 9 INJECTION, SOLUTION INTRAVENOUS at 15:35

## 2019-02-14 RX ADMIN — INSULIN GLARGINE 10 UNITS: 100 INJECTION, SOLUTION SUBCUTANEOUS at 23:05

## 2019-02-15 PROBLEM — E43 SEVERE MALNUTRITION (HCC): Status: ACTIVE | Noted: 2019-02-15

## 2019-02-15 LAB
ABO: NORMAL
ANION GAP SERPL CALCULATED.3IONS-SCNC: 13 MEQ/L (ref 8–16)
ANISOCYTOSIS: PRESENT
ANTIBODY SCREEN: NORMAL
BASOPHILS # BLD: 0.1 %
BASOPHILS ABSOLUTE: 0 THOU/MM3 (ref 0–0.1)
BUN BLDV-MCNC: 56 MG/DL (ref 7–22)
CALCIUM SERPL-MCNC: 7.5 MG/DL (ref 8.5–10.5)
CHLORIDE BLD-SCNC: 109 MEQ/L (ref 98–111)
CO2: 20 MEQ/L (ref 23–33)
CREAT SERPL-MCNC: 4.6 MG/DL (ref 0.4–1.2)
EOSINOPHIL # BLD: 0 %
EOSINOPHILS ABSOLUTE: 0 THOU/MM3 (ref 0–0.4)
ERYTHROCYTE [DISTWIDTH] IN BLOOD BY AUTOMATED COUNT: 20 % (ref 11.5–14.5)
ERYTHROCYTE [DISTWIDTH] IN BLOOD BY AUTOMATED COUNT: 70.7 FL (ref 35–45)
GFR SERPL CREATININE-BSD FRML MDRD: 13 ML/MIN/1.73M2
GLUCOSE BLD-MCNC: 110 MG/DL (ref 70–108)
GLUCOSE BLD-MCNC: 111 MG/DL (ref 70–108)
GLUCOSE BLD-MCNC: 127 MG/DL (ref 70–108)
GLUCOSE BLD-MCNC: 165 MG/DL (ref 70–108)
GLUCOSE BLD-MCNC: 70 MG/DL (ref 70–108)
GLUCOSE BLD-MCNC: 89 MG/DL (ref 70–108)
HCT VFR BLD CALC: 21.3 % (ref 42–52)
HCT VFR BLD CALC: 24.1 % (ref 42–52)
HCT VFR BLD CALC: 24.1 % (ref 42–52)
HEMOGLOBIN: 6.2 GM/DL (ref 14–18)
HEMOGLOBIN: 7 GM/DL (ref 14–18)
HEMOGLOBIN: 7 GM/DL (ref 14–18)
HEPATITIS B SURFACE ANTIGEN: NEGATIVE
IMMATURE GRANS (ABS): 0.15 THOU/MM3 (ref 0–0.07)
IMMATURE GRANULOCYTES: 0.9 %
LYMPHOCYTES # BLD: 12.7 %
LYMPHOCYTES ABSOLUTE: 2.2 THOU/MM3 (ref 1–4.8)
MCH RBC QN AUTO: 28.7 PG (ref 26–33)
MCHC RBC AUTO-ENTMCNC: 29 GM/DL (ref 32.2–35.5)
MCV RBC AUTO: 98.8 FL (ref 80–94)
MONOCYTES # BLD: 4.2 %
MONOCYTES ABSOLUTE: 0.7 THOU/MM3 (ref 0.4–1.3)
NUCLEATED RED BLOOD CELLS: 0 /100 WBC
PLATELET # BLD: 193 THOU/MM3 (ref 130–400)
PMV BLD AUTO: 10.6 FL (ref 9.4–12.4)
POTASSIUM SERPL-SCNC: 4.6 MEQ/L (ref 3.5–5.2)
RBC # BLD: 2.44 MILL/MM3 (ref 4.7–6.1)
RH FACTOR: NORMAL
SEG NEUTROPHILS: 82.1 %
SEGMENTED NEUTROPHILS ABSOLUTE COUNT: 14.3 THOU/MM3 (ref 1.8–7.7)
SODIUM BLD-SCNC: 142 MEQ/L (ref 135–145)
WBC # BLD: 17.4 THOU/MM3 (ref 4.8–10.8)

## 2019-02-15 PROCEDURE — 6360000002 HC RX W HCPCS: Performed by: INTERNAL MEDICINE

## 2019-02-15 PROCEDURE — 82948 REAGENT STRIP/BLOOD GLUCOSE: CPT

## 2019-02-15 PROCEDURE — 2709999900 HC NON-CHARGEABLE SUPPLY

## 2019-02-15 PROCEDURE — 97167 OT EVAL HIGH COMPLEX 60 MIN: CPT

## 2019-02-15 PROCEDURE — 87340 HEPATITIS B SURFACE AG IA: CPT

## 2019-02-15 PROCEDURE — 85014 HEMATOCRIT: CPT

## 2019-02-15 PROCEDURE — 5A1D70Z PERFORMANCE OF URINARY FILTRATION, INTERMITTENT, LESS THAN 6 HOURS PER DAY: ICD-10-PCS | Performed by: INTERNAL MEDICINE

## 2019-02-15 PROCEDURE — 36592 COLLECT BLOOD FROM PICC: CPT

## 2019-02-15 PROCEDURE — 90935 HEMODIALYSIS ONE EVALUATION: CPT

## 2019-02-15 PROCEDURE — 86923 COMPATIBILITY TEST ELECTRIC: CPT

## 2019-02-15 PROCEDURE — 97163 PT EVAL HIGH COMPLEX 45 MIN: CPT

## 2019-02-15 PROCEDURE — 99232 SBSQ HOSP IP/OBS MODERATE 35: CPT | Performed by: INTERNAL MEDICINE

## 2019-02-15 PROCEDURE — 85025 COMPLETE CBC W/AUTO DIFF WBC: CPT

## 2019-02-15 PROCEDURE — 97530 THERAPEUTIC ACTIVITIES: CPT

## 2019-02-15 PROCEDURE — 2580000003 HC RX 258: Performed by: INTERNAL MEDICINE

## 2019-02-15 PROCEDURE — 86900 BLOOD TYPING SEROLOGIC ABO: CPT

## 2019-02-15 PROCEDURE — 6370000000 HC RX 637 (ALT 250 FOR IP): Performed by: INTERNAL MEDICINE

## 2019-02-15 PROCEDURE — 80048 BASIC METABOLIC PNL TOTAL CA: CPT

## 2019-02-15 PROCEDURE — 2060000000 HC ICU INTERMEDIATE R&B

## 2019-02-15 PROCEDURE — P9016 RBC LEUKOCYTES REDUCED: HCPCS

## 2019-02-15 PROCEDURE — 36415 COLL VENOUS BLD VENIPUNCTURE: CPT

## 2019-02-15 PROCEDURE — 86901 BLOOD TYPING SEROLOGIC RH(D): CPT

## 2019-02-15 PROCEDURE — 86850 RBC ANTIBODY SCREEN: CPT

## 2019-02-15 PROCEDURE — 85018 HEMOGLOBIN: CPT

## 2019-02-15 RX ORDER — 0.9 % SODIUM CHLORIDE 0.9 %
250 INTRAVENOUS SOLUTION INTRAVENOUS ONCE
Status: COMPLETED | OUTPATIENT
Start: 2019-02-15 | End: 2019-02-16

## 2019-02-15 RX ORDER — INSULIN GLARGINE 100 [IU]/ML
15 INJECTION, SOLUTION SUBCUTANEOUS 2 TIMES DAILY
Status: DISCONTINUED | OUTPATIENT
Start: 2019-02-15 | End: 2019-02-17

## 2019-02-15 RX ORDER — FLUCONAZOLE 200 MG/1
200 TABLET ORAL DAILY
Status: DISCONTINUED | OUTPATIENT
Start: 2019-02-15 | End: 2019-02-18

## 2019-02-15 RX ADMIN — Medication 1 UNITS: at 21:04

## 2019-02-15 RX ADMIN — PIPERACILLIN SODIUM,TAZOBACTAM SODIUM 2.25 G: 2; .25 INJECTION, POWDER, FOR SOLUTION INTRAVENOUS at 02:30

## 2019-02-15 RX ADMIN — HEPARIN SODIUM 5000 UNITS: 5000 INJECTION INTRAVENOUS; SUBCUTANEOUS at 15:13

## 2019-02-15 RX ADMIN — INSULIN GLARGINE 15 UNITS: 100 INJECTION, SOLUTION SUBCUTANEOUS at 21:02

## 2019-02-15 RX ADMIN — Medication 10 ML: at 21:09

## 2019-02-15 RX ADMIN — MICONAZOLE NITRATE: 2 POWDER TOPICAL at 21:09

## 2019-02-15 RX ADMIN — PIPERACILLIN SODIUM,TAZOBACTAM SODIUM 2.25 G: 2; .25 INJECTION, POWDER, FOR SOLUTION INTRAVENOUS at 18:51

## 2019-02-15 RX ADMIN — FLUCONAZOLE 200 MG: 200 TABLET ORAL at 19:05

## 2019-02-15 RX ADMIN — Medication 10 ML: at 08:54

## 2019-02-15 RX ADMIN — MICONAZOLE NITRATE: 2 POWDER TOPICAL at 08:53

## 2019-02-15 RX ADMIN — HEPARIN SODIUM 5000 UNITS: 5000 INJECTION INTRAVENOUS; SUBCUTANEOUS at 05:07

## 2019-02-15 ASSESSMENT — PAIN SCALES - WONG BAKER
WONGBAKER_NUMERICALRESPONSE: 4
WONGBAKER_NUMERICALRESPONSE: 6

## 2019-02-15 ASSESSMENT — PAIN DESCRIPTION - LOCATION
LOCATION: BUTTOCKS

## 2019-02-15 ASSESSMENT — PAIN DESCRIPTION - DESCRIPTORS
DESCRIPTORS: ACHING

## 2019-02-15 ASSESSMENT — PAIN DESCRIPTION - PAIN TYPE
TYPE: CHRONIC PAIN

## 2019-02-15 ASSESSMENT — PAIN DESCRIPTION - FREQUENCY
FREQUENCY: CONTINUOUS
FREQUENCY: CONTINUOUS

## 2019-02-15 ASSESSMENT — PAIN SCALES - GENERAL
PAINLEVEL_OUTOF10: 0
PAINLEVEL_OUTOF10: 0

## 2019-02-16 LAB
BASOPHILS # BLD: 0.1 %
BASOPHILS ABSOLUTE: 0 THOU/MM3 (ref 0–0.1)
EOSINOPHIL # BLD: 0.2 %
EOSINOPHILS ABSOLUTE: 0 THOU/MM3 (ref 0–0.4)
ERYTHROCYTE [DISTWIDTH] IN BLOOD BY AUTOMATED COUNT: 18.8 % (ref 11.5–14.5)
ERYTHROCYTE [DISTWIDTH] IN BLOOD BY AUTOMATED COUNT: 63.6 FL (ref 35–45)
GLUCOSE BLD-MCNC: 218 MG/DL (ref 70–108)
GLUCOSE BLD-MCNC: 245 MG/DL (ref 70–108)
GLUCOSE BLD-MCNC: 71 MG/DL (ref 70–108)
HCT VFR BLD CALC: 23.9 % (ref 42–52)
HEMOGLOBIN: 7.2 GM/DL (ref 14–18)
IMMATURE GRANS (ABS): 0.04 THOU/MM3 (ref 0–0.07)
IMMATURE GRANULOCYTES: 0.4 %
LYMPHOCYTES # BLD: 22.2 %
LYMPHOCYTES ABSOLUTE: 2.3 THOU/MM3 (ref 1–4.8)
MCH RBC QN AUTO: 28.9 PG (ref 26–33)
MCHC RBC AUTO-ENTMCNC: 30.1 GM/DL (ref 32.2–35.5)
MCV RBC AUTO: 96 FL (ref 80–94)
MONOCYTES # BLD: 5.6 %
MONOCYTES ABSOLUTE: 0.6 THOU/MM3 (ref 0.4–1.3)
NUCLEATED RED BLOOD CELLS: 0 /100 WBC
PLATELET # BLD: 155 THOU/MM3 (ref 130–400)
PMV BLD AUTO: 10.9 FL (ref 9.4–12.4)
RBC # BLD: 2.49 MILL/MM3 (ref 4.7–6.1)
SEG NEUTROPHILS: 71.5 %
SEGMENTED NEUTROPHILS ABSOLUTE COUNT: 7.5 THOU/MM3 (ref 1.8–7.7)
WBC # BLD: 10.5 THOU/MM3 (ref 4.8–10.8)

## 2019-02-16 PROCEDURE — 51798 US URINE CAPACITY MEASURE: CPT

## 2019-02-16 PROCEDURE — 82948 REAGENT STRIP/BLOOD GLUCOSE: CPT

## 2019-02-16 PROCEDURE — 36415 COLL VENOUS BLD VENIPUNCTURE: CPT

## 2019-02-16 PROCEDURE — 92610 EVALUATE SWALLOWING FUNCTION: CPT

## 2019-02-16 PROCEDURE — 99233 SBSQ HOSP IP/OBS HIGH 50: CPT | Performed by: HOSPITALIST

## 2019-02-16 PROCEDURE — 6360000002 HC RX W HCPCS: Performed by: INTERNAL MEDICINE

## 2019-02-16 PROCEDURE — 36430 TRANSFUSION BLD/BLD COMPNT: CPT

## 2019-02-16 PROCEDURE — 6370000000 HC RX 637 (ALT 250 FOR IP): Performed by: INTERNAL MEDICINE

## 2019-02-16 PROCEDURE — 85025 COMPLETE CBC W/AUTO DIFF WBC: CPT

## 2019-02-16 PROCEDURE — 2580000003 HC RX 258: Performed by: INTERNAL MEDICINE

## 2019-02-16 PROCEDURE — P9016 RBC LEUKOCYTES REDUCED: HCPCS

## 2019-02-16 PROCEDURE — 2580000003 HC RX 258: Performed by: PHYSICIAN ASSISTANT

## 2019-02-16 PROCEDURE — 99232 SBSQ HOSP IP/OBS MODERATE 35: CPT | Performed by: INTERNAL MEDICINE

## 2019-02-16 PROCEDURE — 2709999900 HC NON-CHARGEABLE SUPPLY

## 2019-02-16 PROCEDURE — 51702 INSERT TEMP BLADDER CATH: CPT

## 2019-02-16 PROCEDURE — 2060000000 HC ICU INTERMEDIATE R&B

## 2019-02-16 RX ADMIN — Medication: at 19:54

## 2019-02-16 RX ADMIN — FLUCONAZOLE 200 MG: 200 TABLET ORAL at 09:14

## 2019-02-16 RX ADMIN — MICONAZOLE NITRATE: 2 POWDER TOPICAL at 19:54

## 2019-02-16 RX ADMIN — MICONAZOLE NITRATE: 2 POWDER TOPICAL at 09:15

## 2019-02-16 RX ADMIN — Medication 10 ML: at 19:53

## 2019-02-16 RX ADMIN — INSULIN GLARGINE 15 UNITS: 100 INJECTION, SOLUTION SUBCUTANEOUS at 20:46

## 2019-02-16 RX ADMIN — PIPERACILLIN SODIUM,TAZOBACTAM SODIUM 2.25 G: 2; .25 INJECTION, POWDER, FOR SOLUTION INTRAVENOUS at 16:25

## 2019-02-16 RX ADMIN — Medication 10 ML: at 09:14

## 2019-02-16 RX ADMIN — DARBEPOETIN ALFA 60 MCG: 60 INJECTION, SOLUTION INTRAVENOUS; SUBCUTANEOUS at 20:02

## 2019-02-16 RX ADMIN — PIPERACILLIN SODIUM,TAZOBACTAM SODIUM 2.25 G: 2; .25 INJECTION, POWDER, FOR SOLUTION INTRAVENOUS at 04:06

## 2019-02-16 RX ADMIN — Medication 1 UNITS: at 20:47

## 2019-02-16 RX ADMIN — Medication: at 03:23

## 2019-02-16 RX ADMIN — SODIUM CHLORIDE 250 ML: 9 INJECTION, SOLUTION INTRAVENOUS at 00:25

## 2019-02-16 ASSESSMENT — PAIN SCALES - GENERAL
PAINLEVEL_OUTOF10: 0

## 2019-02-16 ASSESSMENT — PAIN SCALES - WONG BAKER
WONGBAKER_NUMERICALRESPONSE: 6
WONGBAKER_NUMERICALRESPONSE: 6

## 2019-02-17 LAB
ANION GAP SERPL CALCULATED.3IONS-SCNC: 12 MEQ/L (ref 8–16)
BUN BLDV-MCNC: 32 MG/DL (ref 7–22)
CALCIUM SERPL-MCNC: 7.3 MG/DL (ref 8.5–10.5)
CHLORIDE BLD-SCNC: 99 MEQ/L (ref 98–111)
CO2: 23 MEQ/L (ref 23–33)
CREAT SERPL-MCNC: 4.1 MG/DL (ref 0.4–1.2)
ERYTHROCYTE [DISTWIDTH] IN BLOOD BY AUTOMATED COUNT: 18.4 % (ref 11.5–14.5)
ERYTHROCYTE [DISTWIDTH] IN BLOOD BY AUTOMATED COUNT: 63.2 FL (ref 35–45)
FERRITIN: 446 NG/ML (ref 22–322)
GFR SERPL CREATININE-BSD FRML MDRD: 14 ML/MIN/1.73M2
GLUCOSE BLD-MCNC: 121 MG/DL (ref 70–108)
GLUCOSE BLD-MCNC: 122 MG/DL (ref 70–108)
GLUCOSE BLD-MCNC: 70 MG/DL (ref 70–108)
GLUCOSE BLD-MCNC: 73 MG/DL (ref 70–108)
GLUCOSE BLD-MCNC: 73 MG/DL (ref 70–108)
HCT VFR BLD CALC: 26.8 % (ref 42–52)
HEMOGLOBIN: 8 GM/DL (ref 14–18)
IRON SATURATION: 28 % (ref 20–50)
IRON: 25 UG/DL (ref 65–195)
MCH RBC QN AUTO: 28.7 PG (ref 26–33)
MCHC RBC AUTO-ENTMCNC: 29.9 GM/DL (ref 32.2–35.5)
MCV RBC AUTO: 96.1 FL (ref 80–94)
ORGANISM: ABNORMAL
PLATELET # BLD: 154 THOU/MM3 (ref 130–400)
PMV BLD AUTO: 10.8 FL (ref 9.4–12.4)
POTASSIUM SERPL-SCNC: 4.1 MEQ/L (ref 3.5–5.2)
RBC # BLD: 2.79 MILL/MM3 (ref 4.7–6.1)
REASON FOR REJECTION: NORMAL
REJECTED TEST: NORMAL
SODIUM BLD-SCNC: 134 MEQ/L (ref 135–145)
TOTAL IRON BINDING CAPACITY: 90 UG/DL (ref 171–450)
URINE CULTURE, ROUTINE: ABNORMAL
WBC # BLD: 7.6 THOU/MM3 (ref 4.8–10.8)

## 2019-02-17 PROCEDURE — 99232 SBSQ HOSP IP/OBS MODERATE 35: CPT | Performed by: INTERNAL MEDICINE

## 2019-02-17 PROCEDURE — 6360000002 HC RX W HCPCS: Performed by: INTERNAL MEDICINE

## 2019-02-17 PROCEDURE — 6370000000 HC RX 637 (ALT 250 FOR IP): Performed by: INTERNAL MEDICINE

## 2019-02-17 PROCEDURE — 83540 ASSAY OF IRON: CPT

## 2019-02-17 PROCEDURE — 6370000000 HC RX 637 (ALT 250 FOR IP): Performed by: HOSPITALIST

## 2019-02-17 PROCEDURE — 2580000003 HC RX 258: Performed by: INTERNAL MEDICINE

## 2019-02-17 PROCEDURE — 99233 SBSQ HOSP IP/OBS HIGH 50: CPT | Performed by: HOSPITALIST

## 2019-02-17 PROCEDURE — 82728 ASSAY OF FERRITIN: CPT

## 2019-02-17 PROCEDURE — 85027 COMPLETE CBC AUTOMATED: CPT

## 2019-02-17 PROCEDURE — 80048 BASIC METABOLIC PNL TOTAL CA: CPT

## 2019-02-17 PROCEDURE — 36415 COLL VENOUS BLD VENIPUNCTURE: CPT

## 2019-02-17 PROCEDURE — 51798 US URINE CAPACITY MEASURE: CPT

## 2019-02-17 PROCEDURE — 2709999900 HC NON-CHARGEABLE SUPPLY

## 2019-02-17 PROCEDURE — 82948 REAGENT STRIP/BLOOD GLUCOSE: CPT

## 2019-02-17 PROCEDURE — 83550 IRON BINDING TEST: CPT

## 2019-02-17 PROCEDURE — 2060000000 HC ICU INTERMEDIATE R&B

## 2019-02-17 RX ORDER — INSULIN GLARGINE 100 [IU]/ML
18 INJECTION, SOLUTION SUBCUTANEOUS EVERY MORNING
Status: DISCONTINUED | OUTPATIENT
Start: 2019-02-17 | End: 2019-02-21 | Stop reason: HOSPADM

## 2019-02-17 RX ORDER — FERROUS SULFATE 325(65) MG
325 TABLET ORAL 2 TIMES DAILY WITH MEALS
Status: DISCONTINUED | OUTPATIENT
Start: 2019-02-17 | End: 2019-02-21 | Stop reason: HOSPADM

## 2019-02-17 RX ORDER — INSULIN GLARGINE 100 [IU]/ML
18 INJECTION, SOLUTION SUBCUTANEOUS EVERY MORNING
Status: DISCONTINUED | OUTPATIENT
Start: 2019-02-18 | End: 2019-02-17

## 2019-02-17 RX ORDER — CARVEDILOL 3.12 MG/1
3.12 TABLET ORAL 2 TIMES DAILY
Status: DISCONTINUED | OUTPATIENT
Start: 2019-02-17 | End: 2019-02-20

## 2019-02-17 RX ADMIN — MICONAZOLE NITRATE: 2 POWDER TOPICAL at 08:54

## 2019-02-17 RX ADMIN — FERROUS SULFATE TAB 325 MG (65 MG ELEMENTAL FE) 325 MG: 325 (65 FE) TAB at 18:29

## 2019-02-17 RX ADMIN — FERROUS SULFATE TAB 325 MG (65 MG ELEMENTAL FE) 325 MG: 325 (65 FE) TAB at 11:36

## 2019-02-17 RX ADMIN — CARVEDILOL 3.12 MG: 3.12 TABLET, FILM COATED ORAL at 11:36

## 2019-02-17 RX ADMIN — Medication: at 08:54

## 2019-02-17 RX ADMIN — PIPERACILLIN SODIUM,TAZOBACTAM SODIUM 2.25 G: 2; .25 INJECTION, POWDER, FOR SOLUTION INTRAVENOUS at 05:33

## 2019-02-17 RX ADMIN — Medication 10 ML: at 20:49

## 2019-02-17 RX ADMIN — IRON SUCROSE 200 MG: 20 INJECTION, SOLUTION INTRAVENOUS at 11:38

## 2019-02-17 RX ADMIN — INSULIN GLARGINE 18 UNITS: 100 INJECTION, SOLUTION SUBCUTANEOUS at 11:39

## 2019-02-17 RX ADMIN — FLUCONAZOLE 200 MG: 200 TABLET ORAL at 08:58

## 2019-02-17 RX ADMIN — MICONAZOLE NITRATE: 2 POWDER TOPICAL at 20:49

## 2019-02-17 RX ADMIN — Medication 10 ML: at 08:53

## 2019-02-17 RX ADMIN — INSULIN LISPRO 6 UNITS: 100 INJECTION, SOLUTION INTRAVENOUS; SUBCUTANEOUS at 14:17

## 2019-02-17 ASSESSMENT — PAIN SCALES - GENERAL
PAINLEVEL_OUTOF10: 0

## 2019-02-18 LAB
ANION GAP SERPL CALCULATED.3IONS-SCNC: 10 MEQ/L (ref 8–16)
BUN BLDV-MCNC: 38 MG/DL (ref 7–22)
CALCIUM SERPL-MCNC: 7.2 MG/DL (ref 8.5–10.5)
CHLORIDE BLD-SCNC: 98 MEQ/L (ref 98–111)
CO2: 25 MEQ/L (ref 23–33)
CREAT SERPL-MCNC: 4.2 MG/DL (ref 0.4–1.2)
ERYTHROCYTE [DISTWIDTH] IN BLOOD BY AUTOMATED COUNT: 18.2 % (ref 11.5–14.5)
ERYTHROCYTE [DISTWIDTH] IN BLOOD BY AUTOMATED COUNT: 61.9 FL (ref 35–45)
GFR SERPL CREATININE-BSD FRML MDRD: 14 ML/MIN/1.73M2
GLUCOSE BLD-MCNC: 100 MG/DL (ref 70–108)
GLUCOSE BLD-MCNC: 213 MG/DL (ref 70–108)
GLUCOSE BLD-MCNC: 239 MG/DL (ref 70–108)
GLUCOSE BLD-MCNC: 94 MG/DL (ref 70–108)
HCT VFR BLD CALC: 27.4 % (ref 42–52)
HEMOGLOBIN: 8.3 GM/DL (ref 14–18)
MCH RBC QN AUTO: 28.9 PG (ref 26–33)
MCHC RBC AUTO-ENTMCNC: 30.3 GM/DL (ref 32.2–35.5)
MCV RBC AUTO: 95.5 FL (ref 80–94)
MRSA SCREEN: ABNORMAL
ORGANISM: ABNORMAL
PLATELET # BLD: 165 THOU/MM3 (ref 130–400)
PMV BLD AUTO: 10.3 FL (ref 9.4–12.4)
POTASSIUM SERPL-SCNC: 4.6 MEQ/L (ref 3.5–5.2)
RBC # BLD: 2.87 MILL/MM3 (ref 4.7–6.1)
SODIUM BLD-SCNC: 133 MEQ/L (ref 135–145)
WBC # BLD: 6.9 THOU/MM3 (ref 4.8–10.8)

## 2019-02-18 PROCEDURE — 2709999900 HC NON-CHARGEABLE SUPPLY

## 2019-02-18 PROCEDURE — 36415 COLL VENOUS BLD VENIPUNCTURE: CPT

## 2019-02-18 PROCEDURE — 6360000002 HC RX W HCPCS: Performed by: INTERNAL MEDICINE

## 2019-02-18 PROCEDURE — 97530 THERAPEUTIC ACTIVITIES: CPT

## 2019-02-18 PROCEDURE — 2580000003 HC RX 258: Performed by: INTERNAL MEDICINE

## 2019-02-18 PROCEDURE — 85027 COMPLETE CBC AUTOMATED: CPT

## 2019-02-18 PROCEDURE — 80048 BASIC METABOLIC PNL TOTAL CA: CPT

## 2019-02-18 PROCEDURE — 6370000000 HC RX 637 (ALT 250 FOR IP): Performed by: HOSPITALIST

## 2019-02-18 PROCEDURE — 97110 THERAPEUTIC EXERCISES: CPT

## 2019-02-18 PROCEDURE — 99232 SBSQ HOSP IP/OBS MODERATE 35: CPT | Performed by: HOSPITALIST

## 2019-02-18 PROCEDURE — 82948 REAGENT STRIP/BLOOD GLUCOSE: CPT

## 2019-02-18 PROCEDURE — 90935 HEMODIALYSIS ONE EVALUATION: CPT

## 2019-02-18 PROCEDURE — 90935 HEMODIALYSIS ONE EVALUATION: CPT | Performed by: INTERNAL MEDICINE

## 2019-02-18 PROCEDURE — 99223 1ST HOSP IP/OBS HIGH 75: CPT | Performed by: INTERNAL MEDICINE

## 2019-02-18 PROCEDURE — 2060000000 HC ICU INTERMEDIATE R&B

## 2019-02-18 RX ORDER — FLUCONAZOLE 100 MG/1
100 TABLET ORAL DAILY
Status: DISCONTINUED | OUTPATIENT
Start: 2019-02-18 | End: 2019-02-21 | Stop reason: HOSPADM

## 2019-02-18 RX ORDER — TAMSULOSIN HYDROCHLORIDE 0.4 MG/1
0.8 CAPSULE ORAL DAILY
Status: DISCONTINUED | OUTPATIENT
Start: 2019-02-18 | End: 2019-02-21 | Stop reason: HOSPADM

## 2019-02-18 RX ADMIN — FERROUS SULFATE TAB 325 MG (65 MG ELEMENTAL FE) 325 MG: 325 (65 FE) TAB at 17:26

## 2019-02-18 RX ADMIN — Medication 10 ML: at 21:51

## 2019-02-18 RX ADMIN — TAMSULOSIN HYDROCHLORIDE 0.8 MG: 0.4 CAPSULE ORAL at 12:59

## 2019-02-18 RX ADMIN — CARVEDILOL 3.12 MG: 3.12 TABLET, FILM COATED ORAL at 21:50

## 2019-02-18 RX ADMIN — CARVEDILOL 3.12 MG: 3.12 TABLET, FILM COATED ORAL at 13:00

## 2019-02-18 RX ADMIN — MICONAZOLE NITRATE: 2 POWDER TOPICAL at 21:51

## 2019-02-18 RX ADMIN — FLUCONAZOLE 100 MG: 200 TABLET ORAL at 13:00

## 2019-02-18 RX ADMIN — Medication 10 ML: at 12:59

## 2019-02-18 RX ADMIN — Medication 1 UNITS: at 21:50

## 2019-02-18 RX ADMIN — FERROUS SULFATE TAB 325 MG (65 MG ELEMENTAL FE) 325 MG: 325 (65 FE) TAB at 13:00

## 2019-02-18 RX ADMIN — MICONAZOLE NITRATE: 2 POWDER TOPICAL at 13:01

## 2019-02-18 RX ADMIN — IRON SUCROSE 200 MG: 20 INJECTION, SOLUTION INTRAVENOUS at 12:59

## 2019-02-18 ASSESSMENT — PAIN SCALES - GENERAL
PAINLEVEL_OUTOF10: 0
PAINLEVEL_OUTOF10: 0

## 2019-02-19 LAB
ALBUMIN SERPL-MCNC: 2.3 G/DL (ref 3.5–5.1)
ALP BLD-CCNC: 79 U/L (ref 38–126)
ALT SERPL-CCNC: 11 U/L (ref 11–66)
ANION GAP SERPL CALCULATED.3IONS-SCNC: 11 MEQ/L (ref 8–16)
AST SERPL-CCNC: 13 U/L (ref 5–40)
BILIRUB SERPL-MCNC: 0.4 MG/DL (ref 0.3–1.2)
BILIRUBIN DIRECT: < 0.2 MG/DL (ref 0–0.3)
BUN BLDV-MCNC: 20 MG/DL (ref 7–22)
CALCIUM SERPL-MCNC: 7.1 MG/DL (ref 8.5–10.5)
CHLORIDE BLD-SCNC: 96 MEQ/L (ref 98–111)
CO2: 25 MEQ/L (ref 23–33)
CREAT SERPL-MCNC: 2.6 MG/DL (ref 0.4–1.2)
ERYTHROCYTE [DISTWIDTH] IN BLOOD BY AUTOMATED COUNT: 17.6 % (ref 11.5–14.5)
ERYTHROCYTE [DISTWIDTH] IN BLOOD BY AUTOMATED COUNT: 61.1 FL (ref 35–45)
GFR SERPL CREATININE-BSD FRML MDRD: 24 ML/MIN/1.73M2
GLUCOSE BLD-MCNC: 116 MG/DL (ref 70–108)
GLUCOSE BLD-MCNC: 127 MG/DL (ref 70–108)
GLUCOSE BLD-MCNC: 129 MG/DL (ref 70–108)
GLUCOSE BLD-MCNC: 201 MG/DL (ref 70–108)
GLUCOSE BLD-MCNC: 219 MG/DL (ref 70–108)
HCT VFR BLD CALC: 28 % (ref 42–52)
HEMOGLOBIN: 8.4 GM/DL (ref 14–18)
MCH RBC QN AUTO: 28.5 PG (ref 26–33)
MCHC RBC AUTO-ENTMCNC: 30 GM/DL (ref 32.2–35.5)
MCV RBC AUTO: 94.9 FL (ref 80–94)
PLATELET # BLD: 148 THOU/MM3 (ref 130–400)
PMV BLD AUTO: 11.4 FL (ref 9.4–12.4)
POTASSIUM SERPL-SCNC: 4.3 MEQ/L (ref 3.5–5.2)
RBC # BLD: 2.95 MILL/MM3 (ref 4.7–6.1)
SODIUM BLD-SCNC: 132 MEQ/L (ref 135–145)
TOTAL PROTEIN: 5.3 G/DL (ref 6.1–8)
WBC # BLD: 6.3 THOU/MM3 (ref 4.8–10.8)

## 2019-02-19 PROCEDURE — 97530 THERAPEUTIC ACTIVITIES: CPT

## 2019-02-19 PROCEDURE — 6370000000 HC RX 637 (ALT 250 FOR IP): Performed by: INTERNAL MEDICINE

## 2019-02-19 PROCEDURE — 6370000000 HC RX 637 (ALT 250 FOR IP): Performed by: HOSPITALIST

## 2019-02-19 PROCEDURE — 6370000000 HC RX 637 (ALT 250 FOR IP): Performed by: PHYSICIAN ASSISTANT

## 2019-02-19 PROCEDURE — 82948 REAGENT STRIP/BLOOD GLUCOSE: CPT

## 2019-02-19 PROCEDURE — 97110 THERAPEUTIC EXERCISES: CPT

## 2019-02-19 PROCEDURE — 80053 COMPREHEN METABOLIC PANEL: CPT

## 2019-02-19 PROCEDURE — 36415 COLL VENOUS BLD VENIPUNCTURE: CPT

## 2019-02-19 PROCEDURE — 85027 COMPLETE CBC AUTOMATED: CPT

## 2019-02-19 PROCEDURE — 82248 BILIRUBIN DIRECT: CPT

## 2019-02-19 PROCEDURE — 92526 ORAL FUNCTION THERAPY: CPT

## 2019-02-19 PROCEDURE — 99233 SBSQ HOSP IP/OBS HIGH 50: CPT | Performed by: HOSPITALIST

## 2019-02-19 PROCEDURE — 51798 US URINE CAPACITY MEASURE: CPT

## 2019-02-19 PROCEDURE — 2709999900 HC NON-CHARGEABLE SUPPLY

## 2019-02-19 PROCEDURE — 97535 SELF CARE MNGMENT TRAINING: CPT

## 2019-02-19 PROCEDURE — 99232 SBSQ HOSP IP/OBS MODERATE 35: CPT | Performed by: INTERNAL MEDICINE

## 2019-02-19 PROCEDURE — 2580000003 HC RX 258: Performed by: INTERNAL MEDICINE

## 2019-02-19 PROCEDURE — 2060000000 HC ICU INTERMEDIATE R&B

## 2019-02-19 RX ORDER — FERROUS SULFATE 325(65) MG
325 TABLET ORAL 2 TIMES DAILY WITH MEALS
Qty: 30 TABLET | Refills: 3 | Status: SHIPPED | OUTPATIENT
Start: 2019-02-19 | End: 2019-03-06 | Stop reason: ALTCHOICE

## 2019-02-19 RX ORDER — INSULIN GLARGINE 100 [IU]/ML
18 INJECTION, SOLUTION SUBCUTANEOUS
Qty: 1 VIAL | Refills: 3 | Status: SHIPPED | OUTPATIENT
Start: 2019-02-19 | End: 2019-03-06

## 2019-02-19 RX ORDER — ASPIRIN 81 MG/1
81 TABLET ORAL DAILY
Qty: 30 TABLET | Refills: 3 | Status: SHIPPED | OUTPATIENT
Start: 2019-02-19 | End: 2021-01-01

## 2019-02-19 RX ORDER — FLUCONAZOLE 100 MG/1
100 TABLET ORAL DAILY
Qty: 9 TABLET | Refills: 0 | Status: SHIPPED | OUTPATIENT
Start: 2019-02-19 | End: 2019-02-28

## 2019-02-19 RX ORDER — ATORVASTATIN CALCIUM 40 MG/1
40 TABLET, FILM COATED ORAL NIGHTLY
Status: DISCONTINUED | OUTPATIENT
Start: 2019-02-19 | End: 2019-02-21 | Stop reason: HOSPADM

## 2019-02-19 RX ORDER — TAMSULOSIN HYDROCHLORIDE 0.4 MG/1
0.8 CAPSULE ORAL DAILY
Qty: 30 CAPSULE | Refills: 1 | Status: SHIPPED | OUTPATIENT
Start: 2019-02-19 | End: 2019-04-02

## 2019-02-19 RX ADMIN — Medication 10 ML: at 21:05

## 2019-02-19 RX ADMIN — FERROUS SULFATE TAB 325 MG (65 MG ELEMENTAL FE) 325 MG: 325 (65 FE) TAB at 09:25

## 2019-02-19 RX ADMIN — MICONAZOLE NITRATE: 2 POWDER TOPICAL at 21:10

## 2019-02-19 RX ADMIN — INSULIN LISPRO 6 UNITS: 100 INJECTION, SOLUTION INTRAVENOUS; SUBCUTANEOUS at 13:09

## 2019-02-19 RX ADMIN — MICONAZOLE NITRATE: 2 POWDER TOPICAL at 09:31

## 2019-02-19 RX ADMIN — Medication 10 ML: at 09:31

## 2019-02-19 RX ADMIN — FLUCONAZOLE 100 MG: 200 TABLET ORAL at 18:31

## 2019-02-19 RX ADMIN — CARVEDILOL 3.12 MG: 3.12 TABLET, FILM COATED ORAL at 20:55

## 2019-02-19 RX ADMIN — FERROUS SULFATE TAB 325 MG (65 MG ELEMENTAL FE) 325 MG: 325 (65 FE) TAB at 18:31

## 2019-02-19 RX ADMIN — ATORVASTATIN CALCIUM 40 MG: 40 TABLET, FILM COATED ORAL at 20:55

## 2019-02-19 RX ADMIN — TAMSULOSIN HYDROCHLORIDE 0.8 MG: 0.4 CAPSULE ORAL at 09:26

## 2019-02-19 RX ADMIN — Medication 1 UNITS: at 20:54

## 2019-02-19 RX ADMIN — CARVEDILOL 3.12 MG: 3.12 TABLET, FILM COATED ORAL at 09:25

## 2019-02-19 RX ADMIN — INSULIN LISPRO 2 UNITS: 100 INJECTION, SOLUTION INTRAVENOUS; SUBCUTANEOUS at 13:09

## 2019-02-19 ASSESSMENT — PAIN SCALES - GENERAL
PAINLEVEL_OUTOF10: 0

## 2019-02-19 ASSESSMENT — PAIN SCALES - WONG BAKER: WONGBAKER_NUMERICALRESPONSE: 6

## 2019-02-19 ASSESSMENT — PAIN DESCRIPTION - LOCATION: LOCATION: BUTTOCKS

## 2019-02-20 ENCOUNTER — APPOINTMENT (OUTPATIENT)
Dept: GENERAL RADIOLOGY | Age: 74
DRG: 871 | End: 2019-02-20
Attending: INTERNAL MEDICINE
Payer: MEDICARE

## 2019-02-20 PROBLEM — N39.0 SEPSIS DUE TO URINARY TRACT INFECTION (HCC): Status: ACTIVE | Noted: 2019-02-14

## 2019-02-20 LAB
ANION GAP SERPL CALCULATED.3IONS-SCNC: 12 MEQ/L (ref 8–16)
BLOOD CULTURE, ROUTINE: NORMAL
BLOOD CULTURE, ROUTINE: NORMAL
BUN BLDV-MCNC: 27 MG/DL (ref 7–22)
CALCIUM SERPL-MCNC: 7.1 MG/DL (ref 8.5–10.5)
CHLORIDE BLD-SCNC: 94 MEQ/L (ref 98–111)
CO2: 25 MEQ/L (ref 23–33)
CREAT SERPL-MCNC: 2.9 MG/DL (ref 0.4–1.2)
GFR SERPL CREATININE-BSD FRML MDRD: 22 ML/MIN/1.73M2
GLUCOSE BLD-MCNC: 131 MG/DL (ref 70–108)
GLUCOSE BLD-MCNC: 144 MG/DL (ref 70–108)
GLUCOSE BLD-MCNC: 171 MG/DL (ref 70–108)
GLUCOSE BLD-MCNC: 224 MG/DL (ref 70–108)
GLUCOSE BLD-MCNC: 68 MG/DL (ref 70–108)
POTASSIUM SERPL-SCNC: 3.9 MEQ/L (ref 3.5–5.2)
SODIUM BLD-SCNC: 131 MEQ/L (ref 135–145)

## 2019-02-20 PROCEDURE — 82948 REAGENT STRIP/BLOOD GLUCOSE: CPT

## 2019-02-20 PROCEDURE — 80048 BASIC METABOLIC PNL TOTAL CA: CPT

## 2019-02-20 PROCEDURE — 2060000000 HC ICU INTERMEDIATE R&B

## 2019-02-20 PROCEDURE — 2580000003 HC RX 258: Performed by: INTERNAL MEDICINE

## 2019-02-20 PROCEDURE — 36415 COLL VENOUS BLD VENIPUNCTURE: CPT

## 2019-02-20 PROCEDURE — 6370000000 HC RX 637 (ALT 250 FOR IP): Performed by: NURSE PRACTITIONER

## 2019-02-20 PROCEDURE — 99232 SBSQ HOSP IP/OBS MODERATE 35: CPT | Performed by: NURSE PRACTITIONER

## 2019-02-20 PROCEDURE — 97110 THERAPEUTIC EXERCISES: CPT

## 2019-02-20 PROCEDURE — 90935 HEMODIALYSIS ONE EVALUATION: CPT

## 2019-02-20 PROCEDURE — 6370000000 HC RX 637 (ALT 250 FOR IP): Performed by: PHYSICIAN ASSISTANT

## 2019-02-20 PROCEDURE — 6370000000 HC RX 637 (ALT 250 FOR IP): Performed by: INTERNAL MEDICINE

## 2019-02-20 PROCEDURE — 6370000000 HC RX 637 (ALT 250 FOR IP): Performed by: HOSPITALIST

## 2019-02-20 PROCEDURE — 97535 SELF CARE MNGMENT TRAINING: CPT

## 2019-02-20 PROCEDURE — 99232 SBSQ HOSP IP/OBS MODERATE 35: CPT | Performed by: INTERNAL MEDICINE

## 2019-02-20 PROCEDURE — 2709999900 HC NON-CHARGEABLE SUPPLY

## 2019-02-20 PROCEDURE — 71046 X-RAY EXAM CHEST 2 VIEWS: CPT

## 2019-02-20 RX ORDER — CARVEDILOL 6.25 MG/1
6.25 TABLET ORAL 2 TIMES DAILY WITH MEALS
Status: DISCONTINUED | OUTPATIENT
Start: 2019-02-20 | End: 2019-02-21 | Stop reason: HOSPADM

## 2019-02-20 RX ADMIN — INSULIN LISPRO 6 UNITS: 100 INJECTION, SOLUTION INTRAVENOUS; SUBCUTANEOUS at 07:46

## 2019-02-20 RX ADMIN — Medication: at 20:35

## 2019-02-20 RX ADMIN — Medication 10 ML: at 20:35

## 2019-02-20 RX ADMIN — INSULIN LISPRO 6 UNITS: 100 INJECTION, SOLUTION INTRAVENOUS; SUBCUTANEOUS at 18:56

## 2019-02-20 RX ADMIN — INSULIN GLARGINE 18 UNITS: 100 INJECTION, SOLUTION SUBCUTANEOUS at 07:44

## 2019-02-20 RX ADMIN — INSULIN LISPRO 2 UNITS: 100 INJECTION, SOLUTION INTRAVENOUS; SUBCUTANEOUS at 07:45

## 2019-02-20 RX ADMIN — MICONAZOLE NITRATE: 2 POWDER TOPICAL at 20:36

## 2019-02-20 RX ADMIN — Medication 10 ML: at 07:43

## 2019-02-20 RX ADMIN — FERROUS SULFATE TAB 325 MG (65 MG ELEMENTAL FE) 325 MG: 325 (65 FE) TAB at 13:05

## 2019-02-20 RX ADMIN — MICONAZOLE NITRATE: 2 POWDER TOPICAL at 07:44

## 2019-02-20 RX ADMIN — TAMSULOSIN HYDROCHLORIDE 0.8 MG: 0.4 CAPSULE ORAL at 13:04

## 2019-02-20 RX ADMIN — CARVEDILOL 6.25 MG: 6.25 TABLET, FILM COATED ORAL at 17:07

## 2019-02-20 RX ADMIN — CARVEDILOL 3.12 MG: 3.12 TABLET, FILM COATED ORAL at 07:42

## 2019-02-20 RX ADMIN — FERROUS SULFATE TAB 325 MG (65 MG ELEMENTAL FE) 325 MG: 325 (65 FE) TAB at 17:07

## 2019-02-20 RX ADMIN — FLUCONAZOLE 100 MG: 200 TABLET ORAL at 13:05

## 2019-02-20 RX ADMIN — ATORVASTATIN CALCIUM 40 MG: 40 TABLET, FILM COATED ORAL at 20:41

## 2019-02-20 RX ADMIN — INSULIN LISPRO 1 UNITS: 100 INJECTION, SOLUTION INTRAVENOUS; SUBCUTANEOUS at 18:56

## 2019-02-20 ASSESSMENT — PAIN SCALES - GENERAL
PAINLEVEL_OUTOF10: 0

## 2019-02-21 VITALS
SYSTOLIC BLOOD PRESSURE: 143 MMHG | HEART RATE: 97 BPM | HEIGHT: 71 IN | TEMPERATURE: 98.2 F | WEIGHT: 172.1 LBS | RESPIRATION RATE: 18 BRPM | DIASTOLIC BLOOD PRESSURE: 71 MMHG | BODY MASS INDEX: 24.09 KG/M2 | OXYGEN SATURATION: 97 %

## 2019-02-21 LAB
ANION GAP SERPL CALCULATED.3IONS-SCNC: 11 MEQ/L (ref 8–16)
BUN BLDV-MCNC: 18 MG/DL (ref 7–22)
CALCIUM SERPL-MCNC: 7.1 MG/DL (ref 8.5–10.5)
CHLORIDE BLD-SCNC: 98 MEQ/L (ref 98–111)
CO2: 25 MEQ/L (ref 23–33)
CREAT SERPL-MCNC: 2.1 MG/DL (ref 0.4–1.2)
ERYTHROCYTE [DISTWIDTH] IN BLOOD BY AUTOMATED COUNT: 17.2 % (ref 11.5–14.5)
ERYTHROCYTE [DISTWIDTH] IN BLOOD BY AUTOMATED COUNT: 60.4 FL (ref 35–45)
GFR SERPL CREATININE-BSD FRML MDRD: 31 ML/MIN/1.73M2
GLUCOSE BLD-MCNC: 120 MG/DL (ref 70–108)
GLUCOSE BLD-MCNC: 121 MG/DL (ref 70–108)
HCT VFR BLD CALC: 25.7 % (ref 42–52)
HEMOCCULT STL QL: NEGATIVE
HEMOGLOBIN: 7.7 GM/DL (ref 14–18)
MCH RBC QN AUTO: 29.1 PG (ref 26–33)
MCHC RBC AUTO-ENTMCNC: 30 GM/DL (ref 32.2–35.5)
MCV RBC AUTO: 97 FL (ref 80–94)
PLATELET # BLD: 178 THOU/MM3 (ref 130–400)
PMV BLD AUTO: 10.4 FL (ref 9.4–12.4)
POTASSIUM SERPL-SCNC: 3.9 MEQ/L (ref 3.5–5.2)
RBC # BLD: 2.65 MILL/MM3 (ref 4.7–6.1)
SODIUM BLD-SCNC: 134 MEQ/L (ref 135–145)
WBC # BLD: 6.1 THOU/MM3 (ref 4.8–10.8)

## 2019-02-21 PROCEDURE — 85027 COMPLETE CBC AUTOMATED: CPT

## 2019-02-21 PROCEDURE — 82272 OCCULT BLD FECES 1-3 TESTS: CPT

## 2019-02-21 PROCEDURE — 99222 1ST HOSP IP/OBS MODERATE 55: CPT | Performed by: NURSE PRACTITIONER

## 2019-02-21 PROCEDURE — 80048 BASIC METABOLIC PNL TOTAL CA: CPT

## 2019-02-21 PROCEDURE — 2580000003 HC RX 258: Performed by: INTERNAL MEDICINE

## 2019-02-21 PROCEDURE — 6370000000 HC RX 637 (ALT 250 FOR IP): Performed by: HOSPITALIST

## 2019-02-21 PROCEDURE — 82948 REAGENT STRIP/BLOOD GLUCOSE: CPT

## 2019-02-21 PROCEDURE — 36415 COLL VENOUS BLD VENIPUNCTURE: CPT

## 2019-02-21 PROCEDURE — 92523 SPEECH SOUND LANG COMPREHEN: CPT

## 2019-02-21 PROCEDURE — 6370000000 HC RX 637 (ALT 250 FOR IP): Performed by: INTERNAL MEDICINE

## 2019-02-21 PROCEDURE — 51702 INSERT TEMP BLADDER CATH: CPT

## 2019-02-21 PROCEDURE — 99239 HOSP IP/OBS DSCHRG MGMT >30: CPT | Performed by: INTERNAL MEDICINE

## 2019-02-21 PROCEDURE — 2709999900 HC NON-CHARGEABLE SUPPLY

## 2019-02-21 PROCEDURE — 6370000000 HC RX 637 (ALT 250 FOR IP): Performed by: NURSE PRACTITIONER

## 2019-02-21 PROCEDURE — 99232 SBSQ HOSP IP/OBS MODERATE 35: CPT | Performed by: INTERNAL MEDICINE

## 2019-02-21 RX ORDER — CARVEDILOL 6.25 MG/1
6.25 TABLET ORAL 2 TIMES DAILY WITH MEALS
Qty: 60 TABLET | Refills: 3 | DISCHARGE
Start: 2019-02-21 | End: 2019-04-08

## 2019-02-21 RX ORDER — ASPIRIN 81 MG/1
81 TABLET ORAL DAILY
Status: DISCONTINUED | OUTPATIENT
Start: 2019-02-21 | End: 2019-02-21 | Stop reason: HOSPADM

## 2019-02-21 RX ADMIN — INSULIN GLARGINE 18 UNITS: 100 INJECTION, SOLUTION SUBCUTANEOUS at 10:05

## 2019-02-21 RX ADMIN — FERROUS SULFATE TAB 325 MG (65 MG ELEMENTAL FE) 325 MG: 325 (65 FE) TAB at 10:03

## 2019-02-21 RX ADMIN — Medication: at 00:12

## 2019-02-21 RX ADMIN — Medication: at 10:07

## 2019-02-21 RX ADMIN — Medication 10 ML: at 10:07

## 2019-02-21 RX ADMIN — TAMSULOSIN HYDROCHLORIDE 0.8 MG: 0.4 CAPSULE ORAL at 10:03

## 2019-02-21 RX ADMIN — ASPIRIN 81 MG: 81 TABLET, COATED ORAL at 10:03

## 2019-02-21 RX ADMIN — INSULIN LISPRO 6 UNITS: 100 INJECTION, SOLUTION INTRAVENOUS; SUBCUTANEOUS at 10:06

## 2019-02-21 RX ADMIN — MICONAZOLE NITRATE: 2 POWDER TOPICAL at 10:07

## 2019-02-21 RX ADMIN — CARVEDILOL 6.25 MG: 6.25 TABLET, FILM COATED ORAL at 10:03

## 2019-02-21 ASSESSMENT — PAIN SCALES - GENERAL: PAINLEVEL_OUTOF10: 0

## 2019-03-01 LAB
BUN BLDV-MCNC: 33 MG/DL
CALCIUM SERPL-MCNC: 7.6 MG/DL
CHLORIDE BLD-SCNC: 100 MMOL/L
CO2: 29 MMOL/L
CREAT SERPL-MCNC: 2 MG/DL
GFR CALCULATED: 33
GLUCOSE BLD-MCNC: 75 MG/DL
POTASSIUM SERPL-SCNC: 4.6 MMOL/L
SODIUM BLD-SCNC: 135 MMOL/L

## 2019-03-04 ENCOUNTER — TELEPHONE (OUTPATIENT)
Dept: NEPHROLOGY | Age: 74
End: 2019-03-04

## 2019-03-04 LAB
BUN BLDV-MCNC: 31 MG/DL
CALCIUM SERPL-MCNC: 7.8 MG/DL
CHLORIDE BLD-SCNC: 101 MMOL/L
CO2: 28 MMOL/L
CREAT SERPL-MCNC: 1.9 MG/DL
GFR CALCULATED: 35
GLUCOSE BLD-MCNC: 97 MG/DL
POTASSIUM SERPL-SCNC: 4.5 MMOL/L
SODIUM BLD-SCNC: 136 MMOL/L

## 2019-03-05 LAB
BASOPHILS ABSOLUTE: ABNORMAL /ΜL
BASOPHILS ABSOLUTE: ABNORMAL /ΜL
BASOPHILS RELATIVE PERCENT: ABNORMAL %
BASOPHILS RELATIVE PERCENT: ABNORMAL %
EOSINOPHILS ABSOLUTE: ABNORMAL /ΜL
EOSINOPHILS ABSOLUTE: ABNORMAL /ΜL
EOSINOPHILS RELATIVE PERCENT: ABNORMAL %
EOSINOPHILS RELATIVE PERCENT: ABNORMAL %
HCT VFR BLD CALC: 25.5 % (ref 41–53)
HCT VFR BLD CALC: 25.5 % (ref 41–53)
HEMOGLOBIN: 8.2 G/DL (ref 13.5–17.5)
HEMOGLOBIN: 8.2 G/DL (ref 13.5–17.5)
LYMPHOCYTES ABSOLUTE: ABNORMAL /ΜL
LYMPHOCYTES ABSOLUTE: ABNORMAL /ΜL
LYMPHOCYTES RELATIVE PERCENT: ABNORMAL %
LYMPHOCYTES RELATIVE PERCENT: ABNORMAL %
MCH RBC QN AUTO: ABNORMAL PG
MCH RBC QN AUTO: ABNORMAL PG
MCHC RBC AUTO-ENTMCNC: ABNORMAL G/DL
MCHC RBC AUTO-ENTMCNC: ABNORMAL G/DL
MCV RBC AUTO: ABNORMAL FL
MCV RBC AUTO: ABNORMAL FL
MONOCYTES ABSOLUTE: ABNORMAL /ΜL
MONOCYTES ABSOLUTE: ABNORMAL /ΜL
MONOCYTES RELATIVE PERCENT: ABNORMAL %
MONOCYTES RELATIVE PERCENT: ABNORMAL %
NEUTROPHILS ABSOLUTE: ABNORMAL /ΜL
NEUTROPHILS ABSOLUTE: ABNORMAL /ΜL
NEUTROPHILS RELATIVE PERCENT: ABNORMAL %
NEUTROPHILS RELATIVE PERCENT: ABNORMAL %
PHOSPHORUS: 3.8 MG/DL
PLATELET # BLD: 203 K/ΜL
PLATELET # BLD: 203 K/ΜL
PMV BLD AUTO: ABNORMAL FL
PMV BLD AUTO: ABNORMAL FL
RBC # BLD: 2.82 10^6/ΜL
RBC # BLD: 2.82 10^6/ΜL
WBC # BLD: 5.3 10^3/ML
WBC # BLD: 5.3 10^3/ML

## 2019-03-06 ENCOUNTER — OFFICE VISIT (OUTPATIENT)
Dept: NEPHROLOGY | Age: 74
End: 2019-03-06
Payer: MEDICARE

## 2019-03-06 ENCOUNTER — PROCEDURE VISIT (OUTPATIENT)
Dept: UROLOGY | Age: 74
End: 2019-03-06
Payer: MEDICARE

## 2019-03-06 VITALS
SYSTOLIC BLOOD PRESSURE: 133 MMHG | BODY MASS INDEX: 27.06 KG/M2 | WEIGHT: 194 LBS | OXYGEN SATURATION: 100 % | DIASTOLIC BLOOD PRESSURE: 71 MMHG | HEART RATE: 91 BPM

## 2019-03-06 VITALS — SYSTOLIC BLOOD PRESSURE: 134 MMHG | HEART RATE: 80 BPM | TEMPERATURE: 97.9 F | DIASTOLIC BLOOD PRESSURE: 76 MMHG

## 2019-03-06 DIAGNOSIS — N18.30 CKD (CHRONIC KIDNEY DISEASE), STAGE III (HCC): ICD-10-CM

## 2019-03-06 DIAGNOSIS — R60.9 PERIPHERAL EDEMA: ICD-10-CM

## 2019-03-06 DIAGNOSIS — N18.30 ANEMIA IN STAGE 3 CHRONIC KIDNEY DISEASE (HCC): ICD-10-CM

## 2019-03-06 DIAGNOSIS — I10 ESSENTIAL HYPERTENSION: ICD-10-CM

## 2019-03-06 DIAGNOSIS — D63.1 ANEMIA IN STAGE 3 CHRONIC KIDNEY DISEASE (HCC): ICD-10-CM

## 2019-03-06 DIAGNOSIS — R33.9 RETENTION OF URINE: Primary | ICD-10-CM

## 2019-03-06 DIAGNOSIS — N17.0 ACUTE RENAL FAILURE WITH TUBULAR NECROSIS (HCC): Primary | ICD-10-CM

## 2019-03-06 LAB
BASOPHILS ABSOLUTE: ABNORMAL /ΜL
BASOPHILS RELATIVE PERCENT: ABNORMAL %
BUN BLDV-MCNC: 33 MG/DL
CALCIUM SERPL-MCNC: 7.7 MG/DL
CHLORIDE BLD-SCNC: 102 MMOL/L
CO2: 29 MMOL/L
CREAT SERPL-MCNC: 1.8 MG/DL
EOSINOPHILS ABSOLUTE: ABNORMAL /ΜL
EOSINOPHILS RELATIVE PERCENT: ABNORMAL %
GFR CALCULATED: 37
GLUCOSE BLD-MCNC: 155 MG/DL
HCT VFR BLD CALC: 25.7 % (ref 41–53)
HEMOGLOBIN: 8.1 G/DL (ref 13.5–17.5)
LYMPHOCYTES ABSOLUTE: ABNORMAL /ΜL
LYMPHOCYTES RELATIVE PERCENT: ABNORMAL %
MCH RBC QN AUTO: ABNORMAL PG
MCHC RBC AUTO-ENTMCNC: ABNORMAL G/DL
MCV RBC AUTO: ABNORMAL FL
MONOCYTES ABSOLUTE: ABNORMAL /ΜL
MONOCYTES RELATIVE PERCENT: ABNORMAL %
NEUTROPHILS ABSOLUTE: ABNORMAL /ΜL
NEUTROPHILS RELATIVE PERCENT: ABNORMAL %
PLATELET # BLD: 230 K/ΜL
PMV BLD AUTO: ABNORMAL FL
POTASSIUM SERPL-SCNC: 5.3 MMOL/L
RBC # BLD: 2.86 10^6/ΜL
SODIUM BLD-SCNC: 134 MMOL/L
WBC # BLD: 5.9 10^3/ML

## 2019-03-06 PROCEDURE — 52000 CYSTOURETHROSCOPY: CPT | Performed by: UROLOGY

## 2019-03-06 PROCEDURE — G8427 DOCREV CUR MEDS BY ELIG CLIN: HCPCS | Performed by: INTERNAL MEDICINE

## 2019-03-06 PROCEDURE — 99213 OFFICE O/P EST LOW 20 MIN: CPT | Performed by: UROLOGY

## 2019-03-06 PROCEDURE — 3017F COLORECTAL CA SCREEN DOC REV: CPT | Performed by: INTERNAL MEDICINE

## 2019-03-06 PROCEDURE — G8598 ASA/ANTIPLAT THER USED: HCPCS | Performed by: INTERNAL MEDICINE

## 2019-03-06 PROCEDURE — 1036F TOBACCO NON-USER: CPT | Performed by: INTERNAL MEDICINE

## 2019-03-06 PROCEDURE — 4040F PNEUMOC VAC/ADMIN/RCVD: CPT | Performed by: INTERNAL MEDICINE

## 2019-03-06 PROCEDURE — 1123F ACP DISCUSS/DSCN MKR DOCD: CPT | Performed by: INTERNAL MEDICINE

## 2019-03-06 PROCEDURE — 99213 OFFICE O/P EST LOW 20 MIN: CPT | Performed by: INTERNAL MEDICINE

## 2019-03-06 PROCEDURE — 1101F PT FALLS ASSESS-DOCD LE1/YR: CPT | Performed by: INTERNAL MEDICINE

## 2019-03-06 PROCEDURE — 1111F DSCHRG MED/CURRENT MED MERGE: CPT | Performed by: INTERNAL MEDICINE

## 2019-03-06 PROCEDURE — G8419 CALC BMI OUT NRM PARAM NOF/U: HCPCS | Performed by: INTERNAL MEDICINE

## 2019-03-06 PROCEDURE — G8484 FLU IMMUNIZE NO ADMIN: HCPCS | Performed by: INTERNAL MEDICINE

## 2019-03-06 RX ORDER — ATORVASTATIN CALCIUM 20 MG/1
40 TABLET, FILM COATED ORAL
COMMUNITY
End: 2019-04-02 | Stop reason: SDUPTHER

## 2019-03-06 ASSESSMENT — ENCOUNTER SYMPTOMS
SHORTNESS OF BREATH: 0
BACK PAIN: 0
CHEST TIGHTNESS: 0
COLOR CHANGE: 0
EYE PAIN: 0
DIARRHEA: 0
EYE ITCHING: 0
NAUSEA: 0

## 2019-03-07 ENCOUNTER — HOSPITAL ENCOUNTER (OUTPATIENT)
Age: 74
Discharge: HOME | End: 2019-03-07
Payer: MEDICARE

## 2019-03-07 ENCOUNTER — OFFICE VISIT (OUTPATIENT)
Dept: CARDIOTHORACIC SURGERY | Age: 74
End: 2019-03-07

## 2019-03-07 VITALS
DIASTOLIC BLOOD PRESSURE: 65 MMHG | SYSTOLIC BLOOD PRESSURE: 106 MMHG | WEIGHT: 194 LBS | BODY MASS INDEX: 27.77 KG/M2 | HEIGHT: 70 IN | HEART RATE: 87 BPM

## 2019-03-07 DIAGNOSIS — Z95.1 S/P CABG (CORONARY ARTERY BYPASS GRAFT): Primary | ICD-10-CM

## 2019-03-07 DIAGNOSIS — Z95.1 S/P CABG (CORONARY ARTERY BYPASS GRAFT): ICD-10-CM

## 2019-03-07 LAB
EKG ATRIAL RATE: 91 BPM
EKG P AXIS: 91 DEGREES
EKG P-R INTERVAL: 164 MS
EKG Q-T INTERVAL: 388 MS
EKG QRS DURATION: 90 MS
EKG QTC CALCULATION (BAZETT): 477 MS
EKG R AXIS: 80 DEGREES
EKG T AXIS: -162 DEGREES
EKG VENTRICULAR RATE: 91 BPM

## 2019-03-07 PROCEDURE — 93010 ELECTROCARDIOGRAM REPORT: CPT | Performed by: INTERNAL MEDICINE

## 2019-03-07 PROCEDURE — APPSS30 APP SPLIT SHARED TIME 16-30 MINUTES: Performed by: PHYSICIAN ASSISTANT

## 2019-03-07 PROCEDURE — 99024 POSTOP FOLLOW-UP VISIT: CPT | Performed by: PHYSICIAN ASSISTANT

## 2019-03-07 PROCEDURE — 93005 ELECTROCARDIOGRAM TRACING: CPT

## 2019-03-08 LAB
FERRITIN: 358 NG/ML (ref 18–300)
IRON SATURATION: 11
IRON: 19
PHOSPHORUS: 3.9 MG/DL
PTH INTACT: 79
TOTAL IRON BINDING CAPACITY: 179

## 2019-03-11 ENCOUNTER — TELEPHONE (OUTPATIENT)
Dept: UROLOGY | Age: 74
End: 2019-03-11

## 2019-03-11 NOTE — TELEPHONE ENCOUNTER
[facility] [phone] called stating the posey catheter had to be reinserted for retention on 03/06/2019. Does he need to be seen before his scheduled appointment in April? He is taking the flomax. Please advise. Thank you.

## 2019-03-14 RX ORDER — SODIUM CHLORIDE 450 MG/100ML
INJECTION, SOLUTION INTRAVENOUS CONTINUOUS
Status: CANCELLED | OUTPATIENT
Start: 2019-03-14

## 2019-03-14 RX ORDER — CLINDAMYCIN PHOSPHATE 600 MG/50ML
600 INJECTION INTRAVENOUS
Status: CANCELLED | OUTPATIENT
Start: 2019-03-14

## 2019-03-15 ENCOUNTER — HOSPITAL ENCOUNTER (OUTPATIENT)
Dept: INTERVENTIONAL RADIOLOGY/VASCULAR | Age: 74
Discharge: HOME | End: 2019-03-15
Payer: MEDICARE

## 2019-03-15 ENCOUNTER — TELEPHONE (OUTPATIENT)
Dept: NEPHROLOGY | Age: 74
End: 2019-03-15

## 2019-03-15 VITALS
SYSTOLIC BLOOD PRESSURE: 143 MMHG | RESPIRATION RATE: 16 BRPM | DIASTOLIC BLOOD PRESSURE: 82 MMHG | HEART RATE: 96 BPM | TEMPERATURE: 97.8 F | OXYGEN SATURATION: 96 %

## 2019-03-15 DIAGNOSIS — N18.6 ESRD (END STAGE RENAL DISEASE) (HCC): ICD-10-CM

## 2019-03-15 PROCEDURE — 2709999900 HC NON-CHARGEABLE SUPPLY

## 2019-03-15 PROCEDURE — 2500000003 HC RX 250 WO HCPCS: Performed by: RADIOLOGY

## 2019-03-15 PROCEDURE — 36589 REMOVAL TUNNELED CV CATH: CPT

## 2019-03-15 PROCEDURE — 2580000003 HC RX 258: Performed by: RADIOLOGY

## 2019-03-15 PROCEDURE — 2500000003 HC RX 250 WO HCPCS

## 2019-03-15 RX ORDER — FERROUS SULFATE 325(65) MG
325 TABLET ORAL 2 TIMES DAILY
COMMUNITY
End: 2019-11-26 | Stop reason: SDUPTHER

## 2019-03-15 RX ORDER — CLINDAMYCIN PHOSPHATE 600 MG/50ML
600 INJECTION INTRAVENOUS
Status: COMPLETED | OUTPATIENT
Start: 2019-03-15 | End: 2019-03-15

## 2019-03-15 RX ORDER — SODIUM CHLORIDE 450 MG/100ML
INJECTION, SOLUTION INTRAVENOUS CONTINUOUS
Status: DISCONTINUED | OUTPATIENT
Start: 2019-03-15 | End: 2019-03-16 | Stop reason: HOSPADM

## 2019-03-15 RX ADMIN — SODIUM CHLORIDE: 4.5 INJECTION, SOLUTION INTRAVENOUS at 09:53

## 2019-03-15 RX ADMIN — CLINDAMYCIN PHOSPHATE 600 MG: 600 INJECTION, SOLUTION INTRAVENOUS at 09:57

## 2019-03-15 ASSESSMENT — PAIN - FUNCTIONAL ASSESSMENT: PAIN_FUNCTIONAL_ASSESSMENT: 0-10

## 2019-03-15 ASSESSMENT — PAIN SCALES - GENERAL: PAINLEVEL_OUTOF10: 0

## 2019-03-21 ENCOUNTER — OFFICE VISIT (OUTPATIENT)
Dept: CARDIOLOGY CLINIC | Age: 74
End: 2019-03-21
Payer: MEDICARE

## 2019-03-21 ENCOUNTER — TELEPHONE (OUTPATIENT)
Dept: CARDIOTHORACIC SURGERY | Age: 74
End: 2019-03-21

## 2019-03-21 VITALS
HEART RATE: 94 BPM | SYSTOLIC BLOOD PRESSURE: 134 MMHG | BODY MASS INDEX: 30.99 KG/M2 | OXYGEN SATURATION: 96 % | DIASTOLIC BLOOD PRESSURE: 64 MMHG | WEIGHT: 216 LBS

## 2019-03-21 DIAGNOSIS — I50.23 CHF (CONGESTIVE HEART FAILURE), NYHA CLASS III, ACUTE ON CHRONIC, SYSTOLIC (HCC): Primary | ICD-10-CM

## 2019-03-21 LAB
ANION GAP SERPL CALCULATED.3IONS-SCNC: 11 MEQ/L (ref 8–16)
BUN BLDV-MCNC: 35 MG/DL (ref 7–22)
CALCIUM SERPL-MCNC: 8.1 MG/DL (ref 8.5–10.5)
CHLORIDE BLD-SCNC: 100 MEQ/L (ref 98–111)
CO2: 26 MEQ/L (ref 23–33)
CREAT SERPL-MCNC: 1.6 MG/DL (ref 0.4–1.2)
GFR SERPL CREATININE-BSD FRML MDRD: 43 ML/MIN/1.73M2
GLUCOSE BLD-MCNC: 173 MG/DL (ref 70–108)
POTASSIUM SERPL-SCNC: 4.3 MEQ/L (ref 3.5–5.2)
PRO-BNP: ABNORMAL PG/ML (ref 0–900)
SODIUM BLD-SCNC: 137 MEQ/L (ref 135–145)

## 2019-03-21 PROCEDURE — 1111F DSCHRG MED/CURRENT MED MERGE: CPT | Performed by: NURSE PRACTITIONER

## 2019-03-21 PROCEDURE — 1101F PT FALLS ASSESS-DOCD LE1/YR: CPT | Performed by: NURSE PRACTITIONER

## 2019-03-21 PROCEDURE — G8427 DOCREV CUR MEDS BY ELIG CLIN: HCPCS | Performed by: NURSE PRACTITIONER

## 2019-03-21 PROCEDURE — 96374 THER/PROPH/DIAG INJ IV PUSH: CPT | Performed by: NURSE PRACTITIONER

## 2019-03-21 PROCEDURE — 3017F COLORECTAL CA SCREEN DOC REV: CPT | Performed by: NURSE PRACTITIONER

## 2019-03-21 PROCEDURE — 4040F PNEUMOC VAC/ADMIN/RCVD: CPT | Performed by: NURSE PRACTITIONER

## 2019-03-21 PROCEDURE — G8417 CALC BMI ABV UP PARAM F/U: HCPCS | Performed by: NURSE PRACTITIONER

## 2019-03-21 PROCEDURE — G8598 ASA/ANTIPLAT THER USED: HCPCS | Performed by: NURSE PRACTITIONER

## 2019-03-21 PROCEDURE — 1123F ACP DISCUSS/DSCN MKR DOCD: CPT | Performed by: NURSE PRACTITIONER

## 2019-03-21 PROCEDURE — 1036F TOBACCO NON-USER: CPT | Performed by: NURSE PRACTITIONER

## 2019-03-21 PROCEDURE — 99214 OFFICE O/P EST MOD 30 MIN: CPT | Performed by: NURSE PRACTITIONER

## 2019-03-21 PROCEDURE — G8484 FLU IMMUNIZE NO ADMIN: HCPCS | Performed by: NURSE PRACTITIONER

## 2019-03-21 RX ORDER — TORSEMIDE 20 MG/1
20 TABLET ORAL 2 TIMES DAILY
Qty: 30 TABLET | Refills: 3
Start: 2019-03-21 | End: 2019-04-03

## 2019-03-21 RX ORDER — FUROSEMIDE 10 MG/ML
60 INJECTION INTRAMUSCULAR; INTRAVENOUS ONCE
Status: COMPLETED | OUTPATIENT
Start: 2019-03-21 | End: 2019-03-21

## 2019-03-21 RX ORDER — POTASSIUM CHLORIDE 20 MEQ/1
40 TABLET, EXTENDED RELEASE ORAL ONCE
Status: COMPLETED | OUTPATIENT
Start: 2019-03-21 | End: 2019-03-21

## 2019-03-21 RX ADMIN — FUROSEMIDE 60 MG: 10 INJECTION INTRAMUSCULAR; INTRAVENOUS at 10:19

## 2019-03-21 RX ADMIN — POTASSIUM CHLORIDE 40 MEQ: 20 TABLET, EXTENDED RELEASE ORAL at 10:20

## 2019-03-21 ASSESSMENT — ENCOUNTER SYMPTOMS
NAUSEA: 0
SHORTNESS OF BREATH: 1
WHEEZING: 0
VOMITING: 0
COUGH: 0
ABDOMINAL DISTENTION: 1

## 2019-03-22 ENCOUNTER — TELEPHONE (OUTPATIENT)
Dept: CARDIOLOGY CLINIC | Age: 74
End: 2019-03-22

## 2019-03-22 LAB
BUN BLDV-MCNC: 36 MG/DL
CALCIUM SERPL-MCNC: 8.4 MG/DL
CHLORIDE BLD-SCNC: 101 MMOL/L
CO2: 28 MMOL/L
CREAT SERPL-MCNC: 1.8 MG/DL
GFR CALCULATED: 37
GLUCOSE BLD-MCNC: 118 MG/DL
POTASSIUM SERPL-SCNC: 4.4 MMOL/L
SODIUM BLD-SCNC: 138 MMOL/L

## 2019-03-25 ENCOUNTER — TELEPHONE (OUTPATIENT)
Dept: CARDIOLOGY CLINIC | Age: 74
End: 2019-03-25

## 2019-03-25 NOTE — TELEPHONE ENCOUNTER
Nurse Uche Louise at [facility] called  Patient wt was up during the weekend and house Dr. Javier James Metolazone 5 mg Sat, Sun, Mon 1/2 hr prior to am torsemide.  And decreased to 2.5 metolazone daily now on  And BMP in AM

## 2019-03-26 LAB
BUN BLDV-MCNC: 43 MG/DL
CALCIUM SERPL-MCNC: 7.9 MG/DL
CHLORIDE BLD-SCNC: 95 MMOL/L
CO2: 33 MMOL/L
CREAT SERPL-MCNC: 1.9 MG/DL
GFR CALCULATED: 35
GLUCOSE BLD-MCNC: 117 MG/DL
POTASSIUM SERPL-SCNC: 2.9 MMOL/L
SODIUM BLD-SCNC: 138 MMOL/L

## 2019-03-26 RX ORDER — POTASSIUM CHLORIDE 20 MEQ/1
40 TABLET, EXTENDED RELEASE ORAL 2 TIMES DAILY
COMMUNITY
End: 2019-04-24 | Stop reason: DRUGHIGH

## 2019-03-26 NOTE — TELEPHONE ENCOUNTER
Nurse Racheal Rao notified and verbalized understanding with read back  Wt 212 today Friday was 26  Sob is almost gone has some SOB with exertion   2+ pitting from umbilicus down [phone]

## 2019-03-26 NOTE — TELEPHONE ENCOUNTER
Sodium 138  mmol/L Final 03/26/2019 12:00 AM Unknown   Chloride 95Low   mmol/L Final 03/26/2019 12:00 AM Unknown   Potassium 2.9Low   mmol/L Final 03/26/2019 12:00 AM Unknown   BUN 43High   mg/dL Final 03/26/2019 12:00 AM Unknown   CREATININE 1.9High    Final 03/26/2019 12:00 AM Unknown   Glucose 117High   mg/dL Final 03/26/2019 12:00 AM Unknown   CO2 33  mmol/L Final 03/26/2019 12:00 AM Unknown   Calcium 7.9Low   mg/dL Final 03/26/2019 12:00 AM Unknown   Gfr Calculated 35Low

## 2019-03-27 LAB
BUN BLDV-MCNC: 41 MG/DL
CALCIUM SERPL-MCNC: 8 MG/DL
CHLORIDE BLD-SCNC: 95 MMOL/L
CO2: 32 MMOL/L
CREAT SERPL-MCNC: 1.8 MG/DL
GFR CALCULATED: 37
GLUCOSE BLD-MCNC: 170 MG/DL
POTASSIUM SERPL-SCNC: 3.2 MMOL/L
SODIUM BLD-SCNC: 138 MMOL/L

## 2019-03-29 NOTE — TELEPHONE ENCOUNTER
Verbal order to [facility] for Kcl 40x1, continue 40 BID.   Recheck BMP on Monday

## 2019-04-02 ENCOUNTER — TELEPHONE (OUTPATIENT)
Dept: CARDIOLOGY CLINIC | Age: 74
End: 2019-04-02

## 2019-04-02 LAB
BUN BLDV-MCNC: 49 MG/DL
CALCIUM SERPL-MCNC: 8.2 MG/DL
CHLORIDE BLD-SCNC: 96 MMOL/L
CO2: 32 MMOL/L
CREAT SERPL-MCNC: 1.9 MG/DL
GFR CALCULATED: 35
GLUCOSE BLD-MCNC: 70 MG/DL
POTASSIUM SERPL-SCNC: 4.6 MMOL/L
SODIUM BLD-SCNC: 138 MMOL/L

## 2019-04-02 RX ORDER — TAMSULOSIN HYDROCHLORIDE 0.4 MG/1
0.4 CAPSULE ORAL DAILY
COMMUNITY
End: 2019-04-08

## 2019-04-02 RX ORDER — ATORVASTATIN CALCIUM 40 MG/1
40 TABLET, FILM COATED ORAL DAILY
COMMUNITY
End: 2020-06-15 | Stop reason: SDUPTHER

## 2019-04-03 ENCOUNTER — OFFICE VISIT (OUTPATIENT)
Dept: NEPHROLOGY | Age: 74
End: 2019-04-03
Payer: MEDICARE

## 2019-04-03 VITALS
DIASTOLIC BLOOD PRESSURE: 83 MMHG | HEART RATE: 95 BPM | SYSTOLIC BLOOD PRESSURE: 139 MMHG | OXYGEN SATURATION: 96 % | BODY MASS INDEX: 31.57 KG/M2 | WEIGHT: 220 LBS

## 2019-04-03 DIAGNOSIS — E11.21 DIABETIC NEPHROPATHY ASSOCIATED WITH TYPE 2 DIABETES MELLITUS (HCC): ICD-10-CM

## 2019-04-03 DIAGNOSIS — I10 ESSENTIAL HYPERTENSION: ICD-10-CM

## 2019-04-03 DIAGNOSIS — E87.70 HYPERVOLEMIA DUE TO CONGESTIVE HEART FAILURE (HCC): ICD-10-CM

## 2019-04-03 DIAGNOSIS — I50.22 CHRONIC SYSTOLIC (CONGESTIVE) HEART FAILURE (HCC): ICD-10-CM

## 2019-04-03 DIAGNOSIS — N18.30 CKD (CHRONIC KIDNEY DISEASE), STAGE III (HCC): Primary | ICD-10-CM

## 2019-04-03 DIAGNOSIS — I50.9 HYPERVOLEMIA DUE TO CONGESTIVE HEART FAILURE (HCC): ICD-10-CM

## 2019-04-03 PROCEDURE — 1123F ACP DISCUSS/DSCN MKR DOCD: CPT | Performed by: INTERNAL MEDICINE

## 2019-04-03 PROCEDURE — 4040F PNEUMOC VAC/ADMIN/RCVD: CPT | Performed by: INTERNAL MEDICINE

## 2019-04-03 PROCEDURE — G8598 ASA/ANTIPLAT THER USED: HCPCS | Performed by: INTERNAL MEDICINE

## 2019-04-03 PROCEDURE — G8427 DOCREV CUR MEDS BY ELIG CLIN: HCPCS | Performed by: INTERNAL MEDICINE

## 2019-04-03 PROCEDURE — 99214 OFFICE O/P EST MOD 30 MIN: CPT | Performed by: INTERNAL MEDICINE

## 2019-04-03 PROCEDURE — 3046F HEMOGLOBIN A1C LEVEL >9.0%: CPT | Performed by: INTERNAL MEDICINE

## 2019-04-03 PROCEDURE — 2022F DILAT RTA XM EVC RTNOPTHY: CPT | Performed by: INTERNAL MEDICINE

## 2019-04-03 PROCEDURE — 3017F COLORECTAL CA SCREEN DOC REV: CPT | Performed by: INTERNAL MEDICINE

## 2019-04-03 PROCEDURE — 1036F TOBACCO NON-USER: CPT | Performed by: INTERNAL MEDICINE

## 2019-04-03 PROCEDURE — G8417 CALC BMI ABV UP PARAM F/U: HCPCS | Performed by: INTERNAL MEDICINE

## 2019-04-03 RX ORDER — HYDROPHILIC CREAM
PASTE (GRAM) TOPICAL DAILY PRN
COMMUNITY
End: 2020-03-11

## 2019-04-03 RX ORDER — BUMETANIDE 2 MG/1
2 TABLET ORAL 2 TIMES DAILY
Qty: 60 TABLET | Refills: 3
Start: 2019-04-03 | End: 2019-11-26 | Stop reason: SDUPTHER

## 2019-04-03 NOTE — PROGRESS NOTES
[facility]    [facility], [address]   [place] [facility]  [phone]  Progress Note  4/3/2019 9:48 AM      Pt Name:    Jaycee Lennox  Birthdate:    [birth year]  Primary Care Physician:  Raysa Roberts     Chief Complaint:   Chief Complaint   Patient presents with    Follow-up     ckd iii        Background Information/Interval History:   71 yo white male with hx HTN, CKD III, CAD s/p CABG, MVR, DM, hx smoking who is here for follow-up. He has had difficult course lately which started in Dec 2018 with cardiac arrest s/p intervention complicated by FATEMEH requiring dialysis. He subsequently had another cardiac arrest and had CABG on urgent basis along with MVR. He continued on dialysis (was started Dec 2018). He was continued on dialysis until Feb 2019. His tunneled catheter was subsequently removed. He is here for 1 month follow-up. He reports leg swelling. Following with urology closely for BPH and urinary retention. He has chronic posey catheter. No chest pain or shortness of breath. He is here with wife. He is short of breath with exertion and reports weight gain. Wife does report that she thinks he has been gaining a lot of weight. He is taking coreg, flomax, demadex, K-dur 40 meq po BID.  Also lisinopril      Past History:  Past Medical History:   Diagnosis Date    CAD (coronary artery disease)     CHF (congestive heart failure) (HCC)     Chronic kidney disease     History of blood transfusion     Hyperlipidemia     Hypertension     Proteinuria     Type II or unspecified type diabetes mellitus without mention of complication, not stated as uncontrolled      Past Surgical History:   Procedure Laterality Date    CARDIAC SURGERY      COLONOSCOPY  2019    at [address] GRAFT N/A 1/28/2019    CABG X3 MVR / MAZE performed by Harshad Almodovar MD at [address] [facility] 1/21/2019    EGD DIAGNOSTIC ONLY clinical course. Total time spent examining patient, reviewing chart, placing orders greater than 35 min. More than half of this time spent directly counseling patient and family members face-to-face including wife and daughter. Orders Placed This Encounter   Procedures    Basic Metabolic Panel    Hemoglobin and Hematocrit, Blood    Iron    Ferritin    IRON SATURATION    Iron Binding Capacity     Return in about 2 weeks (around 4/17/2019).     Wm Romero MD  [facility]

## 2019-04-05 ENCOUNTER — OFFICE VISIT (OUTPATIENT)
Dept: UROLOGY | Age: 74
End: 2019-04-05
Payer: MEDICARE

## 2019-04-05 VITALS
WEIGHT: 220.02 LBS | HEIGHT: 71 IN | SYSTOLIC BLOOD PRESSURE: 128 MMHG | BODY MASS INDEX: 30.8 KG/M2 | DIASTOLIC BLOOD PRESSURE: 74 MMHG

## 2019-04-05 DIAGNOSIS — R33.9 RETENTION OF URINE: Primary | ICD-10-CM

## 2019-04-05 PROCEDURE — 99213 OFFICE O/P EST LOW 20 MIN: CPT | Performed by: UROLOGY

## 2019-04-05 PROCEDURE — G8427 DOCREV CUR MEDS BY ELIG CLIN: HCPCS | Performed by: UROLOGY

## 2019-04-05 PROCEDURE — 1123F ACP DISCUSS/DSCN MKR DOCD: CPT | Performed by: UROLOGY

## 2019-04-05 PROCEDURE — 3017F COLORECTAL CA SCREEN DOC REV: CPT | Performed by: UROLOGY

## 2019-04-05 PROCEDURE — G8417 CALC BMI ABV UP PARAM F/U: HCPCS | Performed by: UROLOGY

## 2019-04-05 PROCEDURE — G8598 ASA/ANTIPLAT THER USED: HCPCS | Performed by: UROLOGY

## 2019-04-05 PROCEDURE — 4040F PNEUMOC VAC/ADMIN/RCVD: CPT | Performed by: UROLOGY

## 2019-04-05 PROCEDURE — 1036F TOBACCO NON-USER: CPT | Performed by: UROLOGY

## 2019-04-05 ASSESSMENT — ENCOUNTER SYMPTOMS
ABDOMINAL PAIN: 0
NAUSEA: 0
SHORTNESS OF BREATH: 0
CHEST TIGHTNESS: 0
FACIAL SWELLING: 0
BACK PAIN: 0
EYE REDNESS: 0
EYE PAIN: 0
COLOR CHANGE: 0

## 2019-04-05 NOTE — PROGRESS NOTES
[address] [place] [address] Marisol Villanueva 85  Dept: [phone]  Loc: [phone]  Visit Date: 2019    Subjective:      Patient ID: Nino Schilling 72 y.o. male 1945    Chief Complaint   Patient presents with    Urinary Retention       HPI    Past Medical History:   Diagnosis Date    CAD (coronary artery disease)     CHF [facility]congestive heart failure) ([facility] [address]     Chronic kidney disease     History of blood transfusion     Hyperlipidemia     Hypertension     Proteinuria     Type II or unspecified type diabetes mellitus without mention of complication, not stated as uncontrolled        Social History     Socioeconomic History    Marital status:      Spouse name: Not on file    Number of children: Not on file    Years of education: Not on file    Highest education level: Not on file   Occupational History    Not on file   Social Needs    Financial resource strain: Not on file    Food insecurity:     Worry: Not on file     Inability: Not on file    Transportation needs:     Medical: Not on file     Non-medical: Not on file   Tobacco Use    Smoking status: Former Smoker     Packs/day: 0.25     Types: Cigarettes     Last attempt to quit: 1976     Years since quittin.2    Smokeless tobacco: Former User   Substance and Sexual Activity    Alcohol use: No     Alcohol/week: 0.0 oz    Drug use: Not on file    Sexual activity: Not on file   Lifestyle    Physical activity:     Days per week: Not on file     Minutes per session: Not on file    Stress: Not on file   Relationships    Social connections:     Talks on phone: Not on file     Gets together: Not on file     Attends [employer] service: Not on file     Active member of club or organization: Not on file     Attends meetings of clubs or organizations: Not on file     Relationship status: Not on file    Intimate partner violence:     Fear of current or ex partner: Not on file     Emotionally abused: Not on file     Physically abused: Not on file     Forced sexual activity: Not on file   Other Topics Concern    Not on file   Social History Narrative    Not on file       Family History   Problem Relation Age of Onset    Cancer Mother         breast    Diabetes Mother     Heart Disease Father     Diabetes Father     Cancer Maternal Grandmother         leukemia       Past Surgical History:   Procedure Laterality Date    CARDIAC SURGERY      [facility]COPY  2019    [facility] N/A 1/28/2019    CABG X3 MVR / MAZE performed by Deion Leonard MD at [address] [place] 1/21/2019    EGD DIAGNOSTIC ONLY performed by Radha Hurley MD at [facility]       Allergies   Allergen Reactions    Atorvastatin      Other reaction(s): Myalgia, ON NURSING HOME REPORT         Current Outpatient Medications:     zinc oxide (TRIAD HYDROPHILIC) PSTE paste, Apply topically daily, Disp: , Rfl:     bumetanide (BUMEX) 2 MG tablet, Take 1 tablet by mouth 2 times daily, Disp: 60 tablet, Rfl: 3    atorvastatin (LIPITOR) 40 MG tablet, Take 40 mg by mouth daily, Disp: , Rfl:     tamsulosin (FLOMAX) 0.4 MG capsule, Take 0.4 mg by mouth daily, Disp: , Rfl:     potassium chloride (KLOR-CON M) 20 MEQ extended release tablet, Take 40 mEq by mouth 2 times daily, Disp: , Rfl:     ferrous sulfate 325 (65 Fe) MG tablet, Take 325 mg by mouth 2 times daily, Disp: , Rfl:     insulin aspart (NOVOLOG) 100 UNIT/ML injection vial, Inject into the skin 3 times daily (before meals), Disp: , Rfl:     carvedilol (COREG) 6.25 MG tablet, Take 1 tablet by mouth 2 times daily (with meals) . hold if SBP less than 110 mm hg or HR less than 60, Disp: 60 tablet, Rfl: 3    miconazole (MICOTIN) 2 % powder, Apply topically 2 times daily. , Disp: 45 g, Rfl: 1    aspirin EC 81 MG EC tablet, Take 1 tablet by mouth daily, Disp: 30 tablet, Rfl:

## 2019-04-08 ENCOUNTER — OFFICE VISIT (OUTPATIENT)
Dept: CARDIOLOGY CLINIC | Age: 74
End: 2019-04-08
Payer: MEDICARE

## 2019-04-08 VITALS
DIASTOLIC BLOOD PRESSURE: 81 MMHG | WEIGHT: 217 LBS | HEART RATE: 100 BPM | OXYGEN SATURATION: 94 % | BODY MASS INDEX: 30.27 KG/M2 | SYSTOLIC BLOOD PRESSURE: 169 MMHG

## 2019-04-08 DIAGNOSIS — I50.23 CHF (CONGESTIVE HEART FAILURE), NYHA CLASS III, ACUTE ON CHRONIC, SYSTOLIC (HCC): Primary | ICD-10-CM

## 2019-04-08 LAB
ANION GAP SERPL CALCULATED.3IONS-SCNC: 11 MEQ/L (ref 8–16)
BUN BLDV-MCNC: 43 MG/DL (ref 7–22)
CALCIUM SERPL-MCNC: 8.5 MG/DL (ref 8.5–10.5)
CHLORIDE BLD-SCNC: 99 MEQ/L (ref 98–111)
CO2: 29 MEQ/L (ref 23–33)
CREAT SERPL-MCNC: 1.7 MG/DL (ref 0.4–1.2)
GFR SERPL CREATININE-BSD FRML MDRD: 40 ML/MIN/1.73M2
GLUCOSE BLD-MCNC: 199 MG/DL (ref 70–108)
POTASSIUM SERPL-SCNC: 4.8 MEQ/L (ref 3.5–5.2)
PRO-BNP: ABNORMAL PG/ML (ref 0–900)
SODIUM BLD-SCNC: 139 MEQ/L (ref 135–145)

## 2019-04-08 PROCEDURE — 1123F ACP DISCUSS/DSCN MKR DOCD: CPT | Performed by: NURSE PRACTITIONER

## 2019-04-08 PROCEDURE — G8598 ASA/ANTIPLAT THER USED: HCPCS | Performed by: NURSE PRACTITIONER

## 2019-04-08 PROCEDURE — 99214 OFFICE O/P EST MOD 30 MIN: CPT | Performed by: NURSE PRACTITIONER

## 2019-04-08 PROCEDURE — G8417 CALC BMI ABV UP PARAM F/U: HCPCS | Performed by: NURSE PRACTITIONER

## 2019-04-08 PROCEDURE — 3017F COLORECTAL CA SCREEN DOC REV: CPT | Performed by: NURSE PRACTITIONER

## 2019-04-08 PROCEDURE — 96374 THER/PROPH/DIAG INJ IV PUSH: CPT | Performed by: NURSE PRACTITIONER

## 2019-04-08 PROCEDURE — 4040F PNEUMOC VAC/ADMIN/RCVD: CPT | Performed by: NURSE PRACTITIONER

## 2019-04-08 PROCEDURE — 1036F TOBACCO NON-USER: CPT | Performed by: NURSE PRACTITIONER

## 2019-04-08 PROCEDURE — G8428 CUR MEDS NOT DOCUMENT: HCPCS | Performed by: NURSE PRACTITIONER

## 2019-04-08 PROCEDURE — 36415 COLL VENOUS BLD VENIPUNCTURE: CPT | Performed by: NURSE PRACTITIONER

## 2019-04-08 RX ORDER — METOLAZONE 2.5 MG/1
TABLET ORAL
Qty: 30 TABLET | Refills: 3
Start: 2019-04-08 | End: 2019-04-24 | Stop reason: DRUGHIGH

## 2019-04-08 RX ORDER — ISOSORBIDE DINITRATE 10 MG/1
10 TABLET ORAL 2 TIMES DAILY
Qty: 90 TABLET | Refills: 3
Start: 2019-04-08 | End: 2019-11-26

## 2019-04-08 RX ORDER — FUROSEMIDE 10 MG/ML
80 INJECTION INTRAMUSCULAR; INTRAVENOUS ONCE
Status: COMPLETED | OUTPATIENT
Start: 2019-04-08 | End: 2019-04-08

## 2019-04-08 RX ORDER — CARVEDILOL 6.25 MG/1
12.5 TABLET ORAL 2 TIMES DAILY WITH MEALS
Qty: 60 TABLET | Refills: 3
Start: 2019-04-08 | End: 2019-07-15

## 2019-04-08 RX ORDER — POTASSIUM CHLORIDE 20 MEQ/1
40 TABLET, EXTENDED RELEASE ORAL ONCE
Status: COMPLETED | OUTPATIENT
Start: 2019-04-08 | End: 2019-04-08

## 2019-04-08 RX ADMIN — FUROSEMIDE 80 MG: 10 INJECTION INTRAMUSCULAR; INTRAVENOUS at 12:33

## 2019-04-08 RX ADMIN — POTASSIUM CHLORIDE 40 MEQ: 20 TABLET, EXTENDED RELEASE ORAL at 12:34

## 2019-04-08 ASSESSMENT — ENCOUNTER SYMPTOMS
NAUSEA: 0
SHORTNESS OF BREATH: 1
COUGH: 0
VOMITING: 0
WHEEZING: 0
ABDOMINAL DISTENTION: 0

## 2019-04-08 NOTE — PATIENT INSTRUCTIONS
Continue:  · Continue current medications  · Daily weights and record  · Fluid restriction of 2 Liters per day  · Limit sodium in diet to around 8854-5162 mg/day  · Monitor BP  · Activity as tolerated     Call the Heart Failure Clinic for any of the following symptoms: [phone]  Weight gain of 2-3 pounds in 1 day or 5 pounds in 1 week  Increased shortness of breath  Shortness of breath while laying down  Cough  Chest pain  Swelling in feet, ankles or legs  Tenderness or bloating in the abdomen  Fatigue   Decreased appetite or nausea   Confusion     See NH orders

## 2019-04-08 NOTE — PROGRESS NOTES
Venipuncture obtained from [address] [place] [address]. Patient tolerated procedure without complications or complaints. IV lasix given per [facility] discontinued. No bleeding or bruising noted.

## 2019-04-08 NOTE — PROGRESS NOTES
Heart Failure Clinic       Visit Date: 4/8/2019  Cardiologist:  Dr. Rocael Vega  Primary Care Physician: Dr. Terrance Rodriguez is a 72 y.o. male who presents today for:  Chief Complaint   Patient presents with    Congestive Heart Failure       HPI:   Yoli Magaña is a 72 y.o. male who presents to the office for a follow up patient visit in the heart failure clinic. Accompanied by wife  HX:  ICM (EF 25%), Cardiac arrest in Dec and Jan. - CABG x 3V, MV repair, MAZE - 1/28/19-Dr Sandeep Wakefield, DM, HTN, HLD, CKD III Jacqueline Kelly, did require Hemo x 2 months), Anemia, Former smoker     Last hospital admission r/t Heart Failure:   Jan 2019. Lifevest on  IV Diureses last OV - 3/21- then NH doctor had him on Metolazone x 1 week - dropped 10#. Weights have now been creeping back up. Valeta Libel switched to Bumex from [place] last week. Concerns today: Looks little better than last OV - however still SOB w/ talking, significant leg edema. Patient denies any improvement in symptoms since last visit. In wheelchair predominantly - walked 160 ft this AM w/ therapy. Diet: Low sodium diet - however family notes served hotdogs, soups, etc. At NH     Patient has:  Chest Pain: No  Worsening SOB: Same, no real improvement  Difficulty sleeping: Yes  Orthopnea/PND: Yes - gets SOB even with laying flat for few minutes to boost in bed  Edema: Yes   Fatigue:  Yes  Abdominal bloating: No  Appetite: Fair  Cough: No  Any extra diuretic use: No  Weight gain: Yes  Compliant checking home weight: Yes  Compliant checking blood pressure: Yes 110-140s  Any refills on CHF medications needed at this time: No    Past Medical History:   Diagnosis Date    CAD (coronary artery disease)     CHF (congestive heart failure) (HCC)     Chronic kidney disease     History of blood transfusion     Hyperlipidemia     Hypertension     Proteinuria     Type II or unspecified type diabetes mellitus without mention of complication, not stated as uncontrolled      Past Surgical History:   Procedure Laterality Date    CARDIAC SURGERY      COLONOSCOPY  2019    at [address] [facility] CORONARY ARTERY BYPASS GRAFT N/A 2019    CABG X3 MVR / MAZE performed by George Lombardo MD at [facility] ENDOSCOPY N/A 2019    EGD DIAGNOSTIC ONLY performed by Melonie Chowdary MD at [facility]     Family History   Problem Relation Age of Onset    Cancer Mother         breast    Diabetes Mother     Heart Disease Father     Diabetes Father     Cancer Maternal Grandmother         leukemia     Social History     Tobacco Use    Smoking status: Former Smoker     Packs/day: 0.25     Types: Cigarettes     Last attempt to quit: 1976     Years since quittin.2    Smokeless tobacco: Former User   Substance Use Topics    Alcohol use: No     Alcohol/week: 0.0 oz     Current Outpatient Medications   Medication Sig Dispense Refill    insulin glargine (BASAGLAR KWIKPEN) 100 UNIT/ML injection pen Inject 18 Units into the skin daily      metolazone (ZAROXOLYN) 2.5 MG tablet Every MWF 30 tablet 3    carvedilol (COREG) 6.25 MG tablet Take 2 tablets by mouth 2 times daily (with meals) . hold if SBP less than 110 mm hg or HR less than 60 60 tablet 3    isosorbide dinitrate (ISORDIL) 10 MG tablet Take 1 tablet by mouth 2 times daily 90 tablet 3    Mirabegron ER (MYRBETRIQ) 25 MG TB24 Take 1 tablet by mouth daily 30 tablet 3    zinc oxide (TRIAD HYDROPHILIC) PSTE paste Apply topically daily      bumetanide (BUMEX) 2 MG tablet Take 1 tablet by mouth 2 times daily 60 tablet 3    atorvastatin (LIPITOR) 40 MG tablet Take 40 mg by mouth daily      potassium chloride (KLOR-CON M) 20 MEQ extended release tablet Take 40 mEq by mouth 2 times daily      ferrous sulfate 325 (65 Fe) MG tablet Take 325 mg by mouth 2 times daily      insulin aspart (NOVOLOG) 100 UNIT/ML injection vial Inject 5 Units into the skin 3 times daily (before meals)       miconazole (MICOTIN) 2 % powder Apply topically 2 times daily. 45 g 1    aspirin EC 81 MG EC tablet Take 1 tablet by mouth daily 30 tablet 3    lisinopril (PRINIVIL;ZESTRIL) 5 MG tablet Take 0.5 tablets by mouth daily Hold for SBP less than 110 30 tablet 3    acetaminophen (TYLENOL) 325 MG tablet Take 650 mg by mouth every 4 hours as needed for Pain      nitroGLYCERIN (NITROSTAT) 0.4 MG SL tablet Place 0.4 mg under the tongue every 5 minutes as needed for Chest pain up to max of 3 total doses. If no relief after 1 dose, call 911.  latanoprost (XALATAN) 0.005 % ophthalmic solution       BD ULTRA-FINE MICRO PEN NEEDLE 32G X 6 MM MISC        No current facility-administered medications for this visit. Allergies   Allergen Reactions    Atorvastatin      Other reaction(s): Myalgia, ON NURSING HOME REPORT       SUBJECTIVE:   Review of Systems   Constitutional: Positive for fatigue. Negative for activity change, appetite change and fever. HENT: Negative for congestion. Respiratory: Positive for shortness of breath. Negative for cough and wheezing. Cardiovascular: Positive for leg swelling. Negative for chest pain and palpitations. Gastrointestinal: Negative for abdominal distention, nausea and vomiting. Genitourinary: Negative for difficulty urinating. Musculoskeletal: Positive for gait problem. Skin: Negative for pallor. Neurological: Negative for dizziness. Psychiatric/Behavioral: Negative for agitation and sleep disturbance. OBJECTIVE:   Today's Vitals:  BP (!) 169/81   Pulse 100   Wt 217 lb (98.4 kg)   SpO2 94%   BMI 30.27 kg/m²     Physical Exam   Constitutional: He is oriented to person, place, and time. He appears well-developed and well-nourished. No distress. HENT:   Head: Normocephalic and atraumatic. Eyes: Conjunctivae are normal.   Neck: Normal range of motion. Neck supple.    No JVD   Cardiovascular: Normal rate, regular rhythm and normal heart normal with normal leaflet separation.      Pulmonic Valve   The pulmonic valve leaflets exhibited normal thickness, no calcification,   and normal cuspal separation.      Left Atrium   The left atrium is Mild to moderate dilated.      Left Ventricle   Left ventricle size is normal.   Mildly increased left ventricle wall thickness.   Akinetic motion of the anteroseptal wall noted in the left ventricle.   Severly hypokinetic motion of the rest of the LV walls noted   Systolic function was severely reduced.   Ejection fraction is visually estimated at 25%.      Right Atrium   Right atrial size was normal.      Right Ventricle   The right ventricular size was normal with normal systolic function and   wall thickness.      Pericardial Effusion   The pericardium was normal in appearance with no evidence of a pericardial   effusion.      Pleural Effusion   No evidence of pleural effusion.      Aorta / Great Vessels   -Aortic root dimension within normal limits.   -The Pulmonary artery is within normal limits.   -IVC size is within normal limits with normal respiratory phasic changes.   Results reviewed:  BNP: No results found for: BNP  CBC:   Lab Results   Component Value Date    WBC 5.9 03/06/2019    RBC 2.86 03/06/2019    HGB 8.1 03/06/2019    HCT 25.7 03/06/2019     03/06/2019     CMP:    Lab Results   Component Value Date     04/08/2019    K 4.8 04/08/2019    K 4.0 01/23/2019    CL 99 04/08/2019    CO2 29 04/08/2019    BUN 43 04/08/2019    CREATININE 1.7 04/08/2019    LABGLOM 40 04/08/2019    GLUCOSE 199 04/08/2019    CALCIUM 8.5 04/08/2019     Hepatic Function Panel:    Lab Results   Component Value Date    ALKPHOS 79 02/19/2019    ALT 11 02/19/2019    AST 13 02/19/2019    PROT 5.3 02/19/2019    BILITOT 0.4 02/19/2019    BILIDIR <0.2 02/19/2019    LABALBU 2.3 02/19/2019     Magnesium:    Lab Results   Component Value Date    MG 2.1 02/14/2019     PT/INR:    Lab Results   Component Value Date    INR 1.08 02/05/2019     Lipids:    Lab Results   Component Value Date    TRIG 61 01/18/2019    HDL 35 01/18/2019    LDLCALC 54 01/18/2019       ASSESSMENT AND PLAN:   The patient's condition/symptoms are stable but noted fluid overload. Diagnosis Orders   1. CHF (congestive heart failure), NYHA class III, acute on chronic, systolic (HCC)  Brain Natriuretic Peptide    Basic Metabolic Panel       Continue:  GDMT (Guideline directed medical therapy)    ACE/ARB/ARNI - Lisinopril 2.5/day   BB - Coreg 6.25 BID -increase to 12.5 BID   Diuretic - Bumex 2 BID, Kcl 40 BID - add Metolazone 2.5 MWF    Hydralazine/Isos. - Add Isordil 10 BID    Aldosterone- None  Continue Current medications    Pt with very little improvement from last OV on 3/21. THEN - Diuresed approx 10# w/ Lasix 60 IV and Metolazone x 1 week - fluid/weight has since crept back up. Pt denies any improvement. Anum Jimenez changed to Bumex last week, no decrease in weight noted. TODAY - Check BMP and BNP. Lasix 80 IV x 1 given. Kcl 40 po x 1. Metolazone 2.5 x 3 days then MWF routine. BMP next Monday. ACE wrap legs to thigh. Start low dose vasodilator- Isordil and increase Coreg. NH to call w/ update this Thursday. F/U w/ Carmela Romero next week. F/U [facility] 4/24  F/U HF clinic 5/6    · Daily weights  · Fluid restriction of 2 Liters per day  · Limit sodium in diet to around 3309-5302 mg/day  · Monitor BP  · Activity as tolerated     Patient was instructed to call the [facility] for any changes in symptoms as noted in AVS.      Return in about 1 month (around 5/6/2019). or sooner if needed     Patient given educational materials - see patient instructions. We discussed the importance of weighing oneself and recording daily. We also discussed the importance of a low sodium diet, higher sodium foods to avoid and better low sodium food options.    Patient verbalizes understanding of plan of care using teach back method, and is agreeable to the treatment plan.        Electronically signed by SILVIA Walker CNP on 4/8/2019 at 1:03 PM

## 2019-04-10 ENCOUNTER — OFFICE VISIT (OUTPATIENT)
Dept: CARDIOTHORACIC SURGERY | Age: 74
End: 2019-04-10

## 2019-04-10 VITALS
HEART RATE: 64 BPM | WEIGHT: 217 LBS | DIASTOLIC BLOOD PRESSURE: 63 MMHG | HEIGHT: 71 IN | BODY MASS INDEX: 30.38 KG/M2 | SYSTOLIC BLOOD PRESSURE: 102 MMHG

## 2019-04-10 DIAGNOSIS — Z98.890 H/O MAZE PROCEDURE: ICD-10-CM

## 2019-04-10 DIAGNOSIS — Z95.2 H/O MITRAL VALVE REPLACEMENT: ICD-10-CM

## 2019-04-10 DIAGNOSIS — Z95.1 HX OF CABG: ICD-10-CM

## 2019-04-10 PROCEDURE — 99024 POSTOP FOLLOW-UP VISIT: CPT | Performed by: PHYSICIAN ASSISTANT

## 2019-04-10 RX ORDER — GLUCOSAMINE/MSM/CHONDROITIN A 500-83-400
TABLET ORAL DAILY
COMMUNITY

## 2019-04-15 LAB
BUN BLDV-MCNC: 40 MG/DL
CALCIUM SERPL-MCNC: 8 MG/DL
CHLORIDE BLD-SCNC: 98 MMOL/L
CO2: 36 MMOL/L
CREAT SERPL-MCNC: 1.5 MG/DL
GFR CALCULATED: 46
GLUCOSE BLD-MCNC: 141 MG/DL
POTASSIUM SERPL-SCNC: 3.8 MMOL/L
SODIUM BLD-SCNC: 142 MMOL/L

## 2019-04-16 ENCOUNTER — OFFICE VISIT (OUTPATIENT)
Dept: CARDIOLOGY CLINIC | Age: 74
End: 2019-04-16
Payer: MEDICARE

## 2019-04-16 VITALS
BODY MASS INDEX: 26.78 KG/M2 | DIASTOLIC BLOOD PRESSURE: 72 MMHG | HEART RATE: 80 BPM | WEIGHT: 192 LBS | SYSTOLIC BLOOD PRESSURE: 124 MMHG

## 2019-04-16 DIAGNOSIS — E11.9 INSULIN DEPENDENT TYPE 2 DIABETES MELLITUS (HCC): ICD-10-CM

## 2019-04-16 DIAGNOSIS — Z95.2 H/O MITRAL VALVE REPLACEMENT: ICD-10-CM

## 2019-04-16 DIAGNOSIS — Z95.1 HX OF CABG: ICD-10-CM

## 2019-04-16 DIAGNOSIS — Z86.74 HISTORY OF CARDIAC ARREST: ICD-10-CM

## 2019-04-16 DIAGNOSIS — I25.810 CORONARY ARTERY DISEASE INVOLVING CORONARY BYPASS GRAFT OF NATIVE HEART WITHOUT ANGINA PECTORIS: ICD-10-CM

## 2019-04-16 DIAGNOSIS — I10 ESSENTIAL HYPERTENSION, BENIGN: ICD-10-CM

## 2019-04-16 DIAGNOSIS — Z98.890 H/O MAZE PROCEDURE: ICD-10-CM

## 2019-04-16 DIAGNOSIS — I21.4 NSTEMI (NON-ST ELEVATED MYOCARDIAL INFARCTION) (HCC): Primary | ICD-10-CM

## 2019-04-16 DIAGNOSIS — Z79.4 INSULIN DEPENDENT TYPE 2 DIABETES MELLITUS (HCC): ICD-10-CM

## 2019-04-16 DIAGNOSIS — I25.5 ISCHEMIC CARDIOMYOPATHY: ICD-10-CM

## 2019-04-16 DIAGNOSIS — E78.5 HYPERLIPIDEMIA, UNSPECIFIED HYPERLIPIDEMIA TYPE: ICD-10-CM

## 2019-04-16 PROCEDURE — 1123F ACP DISCUSS/DSCN MKR DOCD: CPT | Performed by: NURSE PRACTITIONER

## 2019-04-16 PROCEDURE — 2022F DILAT RTA XM EVC RTNOPTHY: CPT | Performed by: NURSE PRACTITIONER

## 2019-04-16 PROCEDURE — 99213 OFFICE O/P EST LOW 20 MIN: CPT | Performed by: NURSE PRACTITIONER

## 2019-04-16 PROCEDURE — G8417 CALC BMI ABV UP PARAM F/U: HCPCS | Performed by: NURSE PRACTITIONER

## 2019-04-16 PROCEDURE — 1036F TOBACCO NON-USER: CPT | Performed by: NURSE PRACTITIONER

## 2019-04-16 PROCEDURE — 4040F PNEUMOC VAC/ADMIN/RCVD: CPT | Performed by: NURSE PRACTITIONER

## 2019-04-16 PROCEDURE — G8598 ASA/ANTIPLAT THER USED: HCPCS | Performed by: NURSE PRACTITIONER

## 2019-04-16 PROCEDURE — G8428 CUR MEDS NOT DOCUMENT: HCPCS | Performed by: NURSE PRACTITIONER

## 2019-04-16 PROCEDURE — 3046F HEMOGLOBIN A1C LEVEL >9.0%: CPT | Performed by: NURSE PRACTITIONER

## 2019-04-16 PROCEDURE — 3017F COLORECTAL CA SCREEN DOC REV: CPT | Performed by: NURSE PRACTITIONER

## 2019-04-16 RX ORDER — TAMSULOSIN HYDROCHLORIDE 0.4 MG/1
0.4 CAPSULE ORAL DAILY
COMMUNITY
End: 2020-03-11 | Stop reason: SDUPTHER

## 2019-04-16 RX ORDER — TIMOLOL MALEATE 5 MG/ML
1 SOLUTION/ DROPS OPHTHALMIC DAILY
COMMUNITY
End: 2021-01-01

## 2019-04-16 NOTE — PROGRESS NOTES
Pt here for fu [facility]  zoll life vest     Pt denies chest pain, dizziness, heart palpitations     Pt continues with sob if laying flat, swelling in bilateral legs and feet, is better

## 2019-04-16 NOTE — PROGRESS NOTES
[facility] PROFESSIONAL SERVICES  [facility]   [address]   [address] [address]   Dept: [phone]   Dept Fax: [phone]: [phone]      Chief Complaint   Patient presents with    Follow-Up from Hospital      hx of cabg    Hypertension     F/U from hospitalization in patient with history of 3V CABG / MVR/ Maze procedure 1/28/19, ICM with EF 25%, ESRD with hemodialysis in past, and 3-4 episodes of cardiac arrest. He is currently living in the [facility] and his spouse is here with him today. She states he is slowly but surely doing better. Denies chest pain, palpitations,  lightheadedness, dizziness or syncope. He sleeps in a recliner which he has done even prior to CABG and hospitalization. He has sob with lying completely flat and sleeps in a recliner. He states overall his sob is less and his swelling in his BLE is much less. His legs are wrapped today. He is participating in therapy, able to wear shoes, lift his legs and his weight is down according to his daily weight log from the [facility]. He has been on metolazone with improvement and being followed by CHF clinic. He is not on dialysis and is making urine and has posey in place. He has his lifevest on. His lungs are clear with no crackles.      Cardiologist:  Dr. Evonne Fierro:   No fever, no chills, No fatigue or weight loss  Pulmonary:    No dyspnea, no wheezing  Cardiac:    Denies recent chest pain   GI:     No nausea or vomiting, no abdominal pain  Neuro:    No dizziness or light headedness  Musculoskeletal:  No recent active issues  Extremities:   + BLE edema, good peripheral pulses      Past Medical History:   Diagnosis Date    CAD (coronary artery disease)     CHF (congestive heart failure) (HCC)     Chronic kidney disease     History of blood transfusion     Hyperlipidemia     Hypertension     Proteinuria     Type II or unspecified type diabetes mellitus without mention of complication, not as needed for Pain      nitroGLYCERIN (NITROSTAT) 0.4 MG SL tablet Place 0.4 mg under the tongue every 5 minutes as needed for Chest pain up to max of 3 total doses. If no relief after 1 dose, call 911.  BD ULTRA-FINE MICRO PEN NEEDLE 32G X 6 MM MISC       latanoprost (XALATAN) 0.005 % ophthalmic solution        No current facility-administered medications for this visit.         Social History     Socioeconomic History    Marital status:      Spouse name: None    Number of children: None    Years of education: None    Highest education level: None   Occupational History    None   Social Needs    Financial resource strain: None    Food insecurity:     Worry: None     Inability: None    Transportation needs:     Medical: None     Non-medical: None   Tobacco Use    Smoking status: Former Smoker     Packs/day: 0.25     Types: Cigarettes     Last attempt to quit: 1976     Years since quittin.2    Smokeless tobacco: Former User   Substance and Sexual Activity    Alcohol use: No     Alcohol/week: 0.0 oz    Drug use: None    Sexual activity: None   Lifestyle    Physical activity:     Days per week: None     Minutes per session: None    Stress: None   Relationships    Social connections:     Talks on phone: None     Gets together: None     Attends Scientologist service: None     Active member of club or organization: None     Attends meetings of clubs or organizations: None     Relationship status: None    Intimate partner violence:     Fear of current or ex partner: None     Emotionally abused: None     Physically abused: None     Forced sexual activity: None   Other Topics Concern    None   Social History Narrative    None       Family History   Problem Relation Age of Onset    Cancer Mother         breast    Diabetes Mother     Heart Disease Father     Diabetes Father     Cancer Maternal Grandmother         leukemia       Blood pressure 124/72, pulse 80, weight 192 lb (87.1 kg).    General:   Chronically ill appearing male in no acute distress  Lungs:   Clear to auscultation  Heart:    Normal S1 S2, No murmur, rubs, or gallops  Abdomen:   Soft, non tender, no organomegalies, positive bowel sounds  Extremities:   +2 BLE  edema, no cyanosis, good peripheral pulses  Neurological:   Awake, alert, oriented. No obvious focal deficits  Musculoskeletal:  No obvious deformities    EKG: None today    Limited Echo: 2/14/19  Summary   Left ventricle size is normal.   Mildly increased left ventricle wall thickness.   Akinetic motion of the anteroseptal wall noted in the left ventricle.   Severly hypokinetic motion of the rest of the LV walls noted   Systolic function was severely reduced.   Ejection fraction is visually estimated at 25%.   The left atrium is Mild to moderate dilated.      Signature      ----------------------------------------------------------------   Electronically signed by Tati Lemus MD (Interpreting   physician) on 02/14/2019 at 05:57 PM       Diagnosis Orders   1. NSTEMI (non-ST elevated myocardial infarction) ([facility]     2. Coronary artery disease involving coronary bypass graft of native heart without angina pectoris     3. Hx of CABG     4. H/O maze procedure     5. H/O mitral valve replacement     6. Hyperlipidemia, unspecified hyperlipidemia type     7. Essential hypertension, benign     8. Ischemic cardiomyopathy     9. History of cardiac arrest     10. Insulin dependent type 2 diabetes mellitus ([facility]         No orders of the defined types were placed in this encounter. Stable cardiac wise. Fluid overload improved. Weight / edema/ symptoms decreased. Has f/u echo 5/15/19 and will follow with Dr. Mehnaz Singh regarding need for ICD  Continue CHF clinic f/U as scheduled     Discussed use, benefit, and side effects of prescribed medications. All patient questions answered. Pt voiced understanding. Instructed to continue current medications, diet and exercise.  Continue risk factor modification and medical management. Patient agreed with treatment plan. Follow up as directed.     Educated on signs/symptoms of heart failure and to report to healthcare provider if:  Weight gain of 2-3 pounds in 1 day or 5 pounds in 1 week  Increased shortness of breath  Shortness of breath while laying down  Cough  Chest pain  Swelling in feet, ankles or legs  Tenderness or bloating in the abdomen  Fatigue   Decreased appetite or nausea   Confusion       Continue Dr Chris Ba current treatment plan  Follow up with Dr William Vinson as scheduled or sooner if needed

## 2019-04-22 LAB
BASOPHILS ABSOLUTE: ABNORMAL /ΜL
BASOPHILS RELATIVE PERCENT: 0.4 %
EOSINOPHILS ABSOLUTE: ABNORMAL /ΜL
EOSINOPHILS RELATIVE PERCENT: 0.6 %
FERRITIN: 209 NG/ML (ref 18–300)
HCT VFR BLD CALC: 30 % (ref 41–53)
HEMOGLOBIN: 9.3 G/DL (ref 13.5–17.5)
IRON: 23
LYMPHOCYTES ABSOLUTE: 1.3 /ΜL
LYMPHOCYTES RELATIVE PERCENT: 19.6 %
MCH RBC QN AUTO: ABNORMAL PG
MCHC RBC AUTO-ENTMCNC: ABNORMAL G/DL
MCV RBC AUTO: ABNORMAL FL
MONOCYTES ABSOLUTE: 0.6 /ΜL
MONOCYTES RELATIVE PERCENT: 9.9 %
NEUTROPHILS ABSOLUTE: 4.5 /ΜL
NEUTROPHILS RELATIVE PERCENT: 69.5 %
PLATELET # BLD: 256 K/ΜL
PMV BLD AUTO: ABNORMAL FL
RBC # BLD: 3.56 10^6/ΜL
WBC # BLD: 6.4 10^3/ML

## 2019-04-24 ENCOUNTER — OFFICE VISIT (OUTPATIENT)
Dept: NEPHROLOGY | Age: 74
End: 2019-04-24
Payer: MEDICARE

## 2019-04-24 VITALS
WEIGHT: 215 LBS | OXYGEN SATURATION: 93 % | SYSTOLIC BLOOD PRESSURE: 137 MMHG | HEART RATE: 97 BPM | BODY MASS INDEX: 29.99 KG/M2 | DIASTOLIC BLOOD PRESSURE: 79 MMHG

## 2019-04-24 DIAGNOSIS — N18.30 CKD (CHRONIC KIDNEY DISEASE), STAGE III (HCC): ICD-10-CM

## 2019-04-24 DIAGNOSIS — I50.22 CHRONIC SYSTOLIC (CONGESTIVE) HEART FAILURE (HCC): ICD-10-CM

## 2019-04-24 DIAGNOSIS — E87.70 HYPERVOLEMIA DUE TO CONGESTIVE HEART FAILURE (HCC): Primary | ICD-10-CM

## 2019-04-24 DIAGNOSIS — E87.6 HYPOKALEMIA: ICD-10-CM

## 2019-04-24 DIAGNOSIS — I50.9 HYPERVOLEMIA DUE TO CONGESTIVE HEART FAILURE (HCC): Primary | ICD-10-CM

## 2019-04-24 PROCEDURE — G8427 DOCREV CUR MEDS BY ELIG CLIN: HCPCS | Performed by: INTERNAL MEDICINE

## 2019-04-24 PROCEDURE — 3017F COLORECTAL CA SCREEN DOC REV: CPT | Performed by: INTERNAL MEDICINE

## 2019-04-24 PROCEDURE — 1123F ACP DISCUSS/DSCN MKR DOCD: CPT | Performed by: INTERNAL MEDICINE

## 2019-04-24 PROCEDURE — G8417 CALC BMI ABV UP PARAM F/U: HCPCS | Performed by: INTERNAL MEDICINE

## 2019-04-24 PROCEDURE — 4040F PNEUMOC VAC/ADMIN/RCVD: CPT | Performed by: INTERNAL MEDICINE

## 2019-04-24 PROCEDURE — G8598 ASA/ANTIPLAT THER USED: HCPCS | Performed by: INTERNAL MEDICINE

## 2019-04-24 PROCEDURE — 99214 OFFICE O/P EST MOD 30 MIN: CPT | Performed by: INTERNAL MEDICINE

## 2019-04-24 PROCEDURE — 1036F TOBACCO NON-USER: CPT | Performed by: INTERNAL MEDICINE

## 2019-04-24 RX ORDER — METOLAZONE 2.5 MG/1
2.5 TABLET ORAL DAILY
Qty: 30 TABLET | Refills: 3 | Status: ON HOLD
Start: 2019-04-24 | End: 2019-04-29 | Stop reason: SDUPTHER

## 2019-04-24 RX ORDER — POTASSIUM CHLORIDE 20 MEQ/1
40 TABLET, EXTENDED RELEASE ORAL 3 TIMES DAILY
Qty: 90 TABLET | Refills: 3 | Status: SHIPPED
Start: 2019-04-24 | End: 2019-07-15

## 2019-04-24 NOTE — PROGRESS NOTES
[facility]    [facility], [address]   [place] [facility]  [phone]  Progress Note  4/24/2019 9:50 AM      Pt Name:    Miguel Gutierrez  Birthdate:    [birth year]  Primary Care Physician:  Daren Banks     Chief Complaint:   Chief Complaint   Patient presents with    Follow-up     ckd iii        Background Information/Interval History:   70 yo white male with hx HTN, CKD III, CAD s/p CABG, MVR, DM, hx smoking who is here for follow-up. He has had difficult course lately which started in Dec 2018 with cardiac arrest s/p intervention complicated by FATEMEH requiring dialysis. He subsequently had another cardiac arrest and had CABG on urgent basis along with MVR. He continued on dialysis (was started Dec 2018). He was continued on dialysis until Feb 2019. His tunneled catheter was subsequently removed. Following with urology closely for BPH and urinary retention. He has chronic posey catheter. I saw him about 2 weeks ago. Patient was not responding to demadex and was changed to bumex 2 mg po BID. He is here for follow-up. Feels very weak and tired. Says he is still edematous. Says he eats whatever he gets at [facility]. He says he prefers low salt diet. He says BS are good. He is taking coreg, flomax, K-dur 40 meq po BID.  Also lisinopril      Past History:  Past Medical History:   Diagnosis Date    CAD (coronary artery disease)     CHF (congestive heart failure) (HCC)     Chronic kidney disease     History of blood transfusion     Hyperlipidemia     Hypertension     Proteinuria     Type II or unspecified type diabetes mellitus without mention of complication, not stated as uncontrolled      Past Surgical History:   Procedure Laterality Date    CARDIAC SURGERY      COLONOSCOPY  2019    at [address] [facility] BY[facility] GRAFT N/A 1/28/2019    CABG X3 MVR / MAZE performed by Hernesto Love MD at [address] [place] [address] N/A 1/21/2019 EGD DIAGNOSTIC ONLY performed by Elieser Ruelas MD at [facility]        VITALS:  /79 (Site: Left Lower Arm, Position: Sitting, Cuff Size: Medium Adult)   Pulse 97   Wt 215 lb (97.5 kg)   SpO2 93%   BMI 29.99 kg/m²   Wt Readings from Last 3 Encounters:   04/24/19 215 lb (97.5 kg)   04/16/19 192 lb (87.1 kg)   04/10/19 217 lb (98.4 kg)     Body mass index is 29.99 kg/m². General Appearance: alert and cooperative with exam, appears comfortable, no distress  Oral: moist oral mucus membranes  Neck: No jugular venous distention  Lungs: Air entry B/L, no crackles or rales, no use of accessory muscles  Heart: S1, S2 heard  GI: soft, non-tender, no guarding  Extremities: B/L 2-3+ edema all the way upto thighs     Medications:  Current Outpatient Medications   Medication Sig Dispense Refill    Povidone-Iodine Scrub Sponge (BETADINE SWAB AID) 10 % SWAB Apply topically      tamsulosin (FLOMAX) 0.4 MG capsule Take 0.4 mg by mouth daily      NONFORMULARY Mulugeta-bacit-poly-lidocaine creame 4 % topical as prn  for pain      timolol (TIMOPTIC) 0.5 % ophthalmic solution Place 1 drop into both eyes daily      Coenzyme Q10 (COQ-10) 50 MG CAPS Take by mouth      insulin glargine (BASAGLAR KWIKPEN) 100 UNIT/ML injection pen Inject 18 Units into the skin daily      metolazone (ZAROXOLYN) 2.5 MG tablet Every MWF 30 tablet 3    carvedilol (COREG) 6.25 MG tablet Take 2 tablets by mouth 2 times daily (with meals) . hold if SBP less than 110 mm hg or HR less than 60 60 tablet 3    isosorbide dinitrate (ISORDIL) 10 MG tablet Take 1 tablet by mouth 2 times daily 90 tablet 3    Mirabegron ER (MYRBETRIQ) 25 MG TB24 Take 1 tablet by mouth daily 30 tablet 3    zinc oxide (TRIAD HYDROPHILIC) PSTE paste Apply topically daily      bumetanide (BUMEX) 2 MG tablet Take 1 tablet by mouth 2 times daily 60 tablet 3    atorvastatin (LIPITOR) 40 MG tablet Take 40 mg by mouth daily      potassium chloride (KLOR-CON M) 20 MEQ extended release tablet Take 40 mEq by mouth 2 times daily      ferrous sulfate 325 (65 Fe) MG tablet Take 325 mg by mouth 2 times daily      insulin aspart (NOVOLOG) 100 UNIT/ML injection vial Inject 5 Units into the skin 3 times daily (before meals) Also sliding scale      miconazole (MICOTIN) 2 % powder Apply topically 2 times daily. 45 g 1    aspirin EC 81 MG EC tablet Take 1 tablet by mouth daily 30 tablet 3    lisinopril (PRINIVIL;ZESTRIL) 5 MG tablet Take 0.5 tablets by mouth daily Hold for SBP less than 110 30 tablet 3    acetaminophen (TYLENOL) 325 MG tablet Take 650 mg by mouth every 4 hours as needed for Pain      nitroGLYCERIN (NITROSTAT) 0.4 MG SL tablet Place 0.4 mg under the tongue every 5 minutes as needed for Chest pain up to max of 3 total doses. If no relief after 1 dose, call 911.  BD ULTRA-FINE MICRO PEN NEEDLE 32G X 6 MM MISC       latanoprost (XALATAN) 0.005 % ophthalmic solution        No current facility-administered medications for this visit. Laboratory & Diagnostics:  9729: GA: R 10.5, L 10.1, 1.4 cm left kidney cyst  Creat 1.8, K 3.9, UACR ~ 500 mg/g  March 4 2019: Creat 1.9, K 4.5, Ca 7.8, Hb 8.2  April 2019: K 4.6, creat 1.9, ca 8.2, Hb 8.1    April 15, 2019: K 3.8, creat 1.5, Hb 9.3, ferritin ~ 200     Impression/Plan:   1. Volume overload: secondary to chronic systolic congestive heart failure with 25% and also has diastolic dysfunction  2. CKD III: CKD is multifactorial from diabetic nephropathy, ishcemic injuries, chronic cardiorenal syndrome. 3. Chronic systolic CHF: on coreg, bumex, lisinopril. He has life vest on. 4. Anemia in CKD  5. HTN: on coreg and lisinopril. Stable, advised low salt diet  6  Proteinuria secondary to diabetic nephropathy  6. Left kidney cyst  7. Recent CABG  8. Urinary retention: has posey in place, following with urology. 9. Hypokalemia: He is K-dur 40 meq po BID- increase to K-dur 40 meq po TID. Patient remains fluid overloaded.  Will increase metolazone to 5 mg po daily for 3 days then 2.5 mg po daily for now. Check BMP tomorrow. May need to increase potassium. I advised wife to call my office in 2 days and give me update. I advised him to get admitted in the hospital but he does not want to go to hospital yet. He says he will try higher dose of diuretics. I encouraged him but he declined. He says he will think about it. Check BMP tomorrow. I had long discussion with patient and his wife. Orders Placed This Encounter   Procedures    Basic Metabolic Panel    Magnesium     Return in about 2 weeks (around 5/8/2019).     Aries Russell MD  [facility]

## 2019-04-25 ENCOUNTER — APPOINTMENT (OUTPATIENT)
Dept: GENERAL RADIOLOGY | Age: 74
DRG: 291 | End: 2019-04-25
Payer: MEDICARE

## 2019-04-25 ENCOUNTER — HOSPITAL ENCOUNTER (INPATIENT)
Age: 74
LOS: 4 days | Discharge: HOME | DRG: 291 | End: 2019-04-29
Attending: EMERGENCY MEDICINE | Admitting: FAMILY MEDICINE
Payer: MEDICARE

## 2019-04-25 DIAGNOSIS — I50.21 ACUTE SYSTOLIC CONGESTIVE HEART FAILURE (HCC): Primary | ICD-10-CM

## 2019-04-25 DIAGNOSIS — I25.810 CORONARY ARTERY DISEASE INVOLVING CORONARY BYPASS GRAFT OF NATIVE HEART WITHOUT ANGINA PECTORIS: ICD-10-CM

## 2019-04-25 DIAGNOSIS — N17.0 ARF (ACUTE RENAL FAILURE) WITH TUBULAR NECROSIS (HCC): ICD-10-CM

## 2019-04-25 DIAGNOSIS — I50.9 HEART FAILURE EXACERBATED BY SOTALOL (HCC): ICD-10-CM

## 2019-04-25 DIAGNOSIS — Z95.1 HX OF CABG: ICD-10-CM

## 2019-04-25 LAB
ALBUMIN SERPL-MCNC: 3.2 G/DL (ref 3.5–5.1)
ALP BLD-CCNC: 97 U/L (ref 38–126)
ALT SERPL-CCNC: 34 U/L (ref 11–66)
ANION GAP SERPL CALCULATED.3IONS-SCNC: 11 MEQ/L (ref 8–16)
AST SERPL-CCNC: 19 U/L (ref 5–40)
BASOPHILS # BLD: 0.3 %
BASOPHILS ABSOLUTE: 0 THOU/MM3 (ref 0–0.1)
BILIRUB SERPL-MCNC: 0.3 MG/DL (ref 0.3–1.2)
BILIRUBIN DIRECT: < 0.2 MG/DL (ref 0–0.3)
BUN BLDV-MCNC: 46 MG/DL (ref 7–22)
BUN BLDV-MCNC: 49 MG/DL
CALCIUM SERPL-MCNC: 8.5 MG/DL (ref 8.5–10.5)
CALCIUM SERPL-MCNC: 8.8 MG/DL
CHLORIDE BLD-SCNC: 98 MEQ/L (ref 98–111)
CHLORIDE BLD-SCNC: 98 MMOL/L
CO2: 32 MEQ/L (ref 23–33)
CO2: 36 MMOL/L
CREAT SERPL-MCNC: 1.8 MG/DL
CREAT SERPL-MCNC: 1.8 MG/DL (ref 0.4–1.2)
EKG ATRIAL RATE: 84 BPM
EKG P AXIS: 77 DEGREES
EKG P-R INTERVAL: 154 MS
EKG Q-T INTERVAL: 380 MS
EKG QRS DURATION: 92 MS
EKG QTC CALCULATION (BAZETT): 449 MS
EKG R AXIS: 58 DEGREES
EKG T AXIS: 156 DEGREES
EKG VENTRICULAR RATE: 84 BPM
EOSINOPHIL # BLD: 0.6 %
EOSINOPHILS ABSOLUTE: 0 THOU/MM3 (ref 0–0.4)
ERYTHROCYTE [DISTWIDTH] IN BLOOD BY AUTOMATED COUNT: 17.8 % (ref 11.5–14.5)
ERYTHROCYTE [DISTWIDTH] IN BLOOD BY AUTOMATED COUNT: 55.9 FL (ref 35–45)
GFR CALCULATED: 37
GFR SERPL CREATININE-BSD FRML MDRD: 37 ML/MIN/1.73M2
GLUCOSE BLD-MCNC: 101 MG/DL
GLUCOSE BLD-MCNC: 117 MG/DL (ref 70–108)
GLUCOSE BLD-MCNC: 221 MG/DL (ref 70–108)
HCT VFR BLD CALC: 33.1 % (ref 42–52)
HEMOGLOBIN: 9.6 GM/DL (ref 14–18)
IMMATURE GRANS (ABS): 0.03 THOU/MM3 (ref 0–0.07)
IMMATURE GRANULOCYTES: 0.4 %
LIPASE: 38 U/L (ref 5.6–51.3)
LYMPHOCYTES # BLD: 25.4 %
LYMPHOCYTES ABSOLUTE: 1.7 THOU/MM3 (ref 1–4.8)
MAGNESIUM: 1.8 MG/DL
MAGNESIUM: 2 MG/DL (ref 1.6–2.4)
MCH RBC QN AUTO: 25.3 PG (ref 26–33)
MCHC RBC AUTO-ENTMCNC: 29 GM/DL (ref 32.2–35.5)
MCV RBC AUTO: 87.3 FL (ref 80–94)
MONOCYTES # BLD: 8.6 %
MONOCYTES ABSOLUTE: 0.6 THOU/MM3 (ref 0.4–1.3)
MRSA SCREEN RT-PCR: POSITIVE
NUCLEATED RED BLOOD CELLS: 0 /100 WBC
OSMOLALITY CALCULATION: 294.2 MOSMOL/KG (ref 275–300)
PLATELET # BLD: 263 THOU/MM3 (ref 130–400)
PMV BLD AUTO: 10.6 FL (ref 9.4–12.4)
POTASSIUM SERPL-SCNC: 3.8 MEQ/L (ref 3.5–5.2)
POTASSIUM SERPL-SCNC: 4 MMOL/L
PRO-BNP: ABNORMAL PG/ML (ref 0–900)
RBC # BLD: 3.79 MILL/MM3 (ref 4.7–6.1)
SEG NEUTROPHILS: 64.7 %
SEGMENTED NEUTROPHILS ABSOLUTE COUNT: 4.4 THOU/MM3 (ref 1.8–7.7)
SODIUM BLD-SCNC: 140 MMOL/L
SODIUM BLD-SCNC: 141 MEQ/L (ref 135–145)
TOTAL PROTEIN: 6.9 G/DL (ref 6.1–8)
TROPONIN T: 0.05 NG/ML
VANCOMYCIN RESISTANT ENTEROCOCCUS: POSITIVE
WBC # BLD: 6.8 THOU/MM3 (ref 4.8–10.8)

## 2019-04-25 PROCEDURE — 84484 ASSAY OF TROPONIN QUANT: CPT

## 2019-04-25 PROCEDURE — 87147 CULTURE TYPE IMMUNOLOGIC: CPT

## 2019-04-25 PROCEDURE — 6360000002 HC RX W HCPCS: Performed by: FAMILY MEDICINE

## 2019-04-25 PROCEDURE — 6370000000 HC RX 637 (ALT 250 FOR IP): Performed by: FAMILY MEDICINE

## 2019-04-25 PROCEDURE — 83735 ASSAY OF MAGNESIUM: CPT

## 2019-04-25 PROCEDURE — 71045 X-RAY EXAM CHEST 1 VIEW: CPT

## 2019-04-25 PROCEDURE — 83880 ASSAY OF NATRIURETIC PEPTIDE: CPT

## 2019-04-25 PROCEDURE — 2500000003 HC RX 250 WO HCPCS: Performed by: FAMILY MEDICINE

## 2019-04-25 PROCEDURE — 80076 HEPATIC FUNCTION PANEL: CPT

## 2019-04-25 PROCEDURE — 2060000000 HC ICU INTERMEDIATE R&B

## 2019-04-25 PROCEDURE — 87081 CULTURE SCREEN ONLY: CPT

## 2019-04-25 PROCEDURE — 99223 1ST HOSP IP/OBS HIGH 75: CPT | Performed by: FAMILY MEDICINE

## 2019-04-25 PROCEDURE — 85025 COMPLETE CBC W/AUTO DIFF WBC: CPT

## 2019-04-25 PROCEDURE — 80048 BASIC METABOLIC PNL TOTAL CA: CPT

## 2019-04-25 PROCEDURE — 82948 REAGENT STRIP/BLOOD GLUCOSE: CPT

## 2019-04-25 PROCEDURE — 99285 EMERGENCY DEPT VISIT HI MDM: CPT

## 2019-04-25 PROCEDURE — 2709999900 HC NON-CHARGEABLE SUPPLY

## 2019-04-25 PROCEDURE — 83690 ASSAY OF LIPASE: CPT

## 2019-04-25 PROCEDURE — 2500000003 HC RX 250 WO HCPCS: Performed by: EMERGENCY MEDICINE

## 2019-04-25 PROCEDURE — 2580000003 HC RX 258: Performed by: FAMILY MEDICINE

## 2019-04-25 PROCEDURE — 36415 COLL VENOUS BLD VENIPUNCTURE: CPT

## 2019-04-25 PROCEDURE — 87500 VANOMYCIN DNA AMP PROBE: CPT

## 2019-04-25 PROCEDURE — 87641 MR-STAPH DNA AMP PROBE: CPT

## 2019-04-25 PROCEDURE — 96374 THER/PROPH/DIAG INJ IV PUSH: CPT

## 2019-04-25 PROCEDURE — 93005 ELECTROCARDIOGRAM TRACING: CPT | Performed by: EMERGENCY MEDICINE

## 2019-04-25 RX ORDER — SODIUM CHLORIDE 0.9 % (FLUSH) 0.9 %
10 SYRINGE (ML) INJECTION EVERY 12 HOURS SCHEDULED
Status: DISCONTINUED | OUTPATIENT
Start: 2019-04-25 | End: 2019-04-29 | Stop reason: HOSPADM

## 2019-04-25 RX ORDER — BUMETANIDE 0.25 MG/ML
2 INJECTION, SOLUTION INTRAMUSCULAR; INTRAVENOUS EVERY 8 HOURS SCHEDULED
Status: DISCONTINUED | OUTPATIENT
Start: 2019-04-25 | End: 2019-04-27

## 2019-04-25 RX ORDER — POTASSIUM CHLORIDE 20 MEQ/1
40 TABLET, EXTENDED RELEASE ORAL 3 TIMES DAILY
Status: DISCONTINUED | OUTPATIENT
Start: 2019-04-25 | End: 2019-04-29 | Stop reason: HOSPADM

## 2019-04-25 RX ORDER — ONDANSETRON 2 MG/ML
4 INJECTION INTRAMUSCULAR; INTRAVENOUS EVERY 6 HOURS PRN
Status: DISCONTINUED | OUTPATIENT
Start: 2019-04-25 | End: 2019-04-29 | Stop reason: HOSPADM

## 2019-04-25 RX ORDER — LATANOPROST 50 UG/ML
1 SOLUTION/ DROPS OPHTHALMIC NIGHTLY
Status: DISCONTINUED | OUTPATIENT
Start: 2019-04-25 | End: 2019-04-29 | Stop reason: HOSPADM

## 2019-04-25 RX ORDER — ASPIRIN 81 MG/1
81 TABLET ORAL DAILY
Status: DISCONTINUED | OUTPATIENT
Start: 2019-04-26 | End: 2019-04-29 | Stop reason: HOSPADM

## 2019-04-25 RX ORDER — TAMSULOSIN HYDROCHLORIDE 0.4 MG/1
0.4 CAPSULE ORAL NIGHTLY
Status: DISCONTINUED | OUTPATIENT
Start: 2019-04-25 | End: 2019-04-29 | Stop reason: HOSPADM

## 2019-04-25 RX ORDER — CARVEDILOL 6.25 MG/1
12.5 TABLET ORAL 2 TIMES DAILY WITH MEALS
Status: DISCONTINUED | OUTPATIENT
Start: 2019-04-25 | End: 2019-04-29 | Stop reason: HOSPADM

## 2019-04-25 RX ORDER — ACETAMINOPHEN 325 MG/1
650 TABLET ORAL EVERY 4 HOURS PRN
Status: DISCONTINUED | OUTPATIENT
Start: 2019-04-25 | End: 2019-04-29 | Stop reason: HOSPADM

## 2019-04-25 RX ORDER — HEPARIN SODIUM 5000 [USP'U]/ML
5000 INJECTION, SOLUTION INTRAVENOUS; SUBCUTANEOUS EVERY 8 HOURS SCHEDULED
Status: DISCONTINUED | OUTPATIENT
Start: 2019-04-25 | End: 2019-04-29 | Stop reason: HOSPADM

## 2019-04-25 RX ORDER — TIMOLOL MALEATE 5 MG/ML
1 SOLUTION/ DROPS OPHTHALMIC DAILY
Status: DISCONTINUED | OUTPATIENT
Start: 2019-04-26 | End: 2019-04-29 | Stop reason: HOSPADM

## 2019-04-25 RX ORDER — LISINOPRIL 2.5 MG/1
2.5 TABLET ORAL DAILY
Status: CANCELLED | OUTPATIENT
Start: 2019-04-25

## 2019-04-25 RX ORDER — BUMETANIDE 0.25 MG/ML
2 INJECTION, SOLUTION INTRAMUSCULAR; INTRAVENOUS ONCE
Status: COMPLETED | OUTPATIENT
Start: 2019-04-25 | End: 2019-04-25

## 2019-04-25 RX ORDER — FERROUS SULFATE 325(65) MG
325 TABLET ORAL 2 TIMES DAILY WITH MEALS
Status: DISCONTINUED | OUTPATIENT
Start: 2019-04-26 | End: 2019-04-29 | Stop reason: HOSPADM

## 2019-04-25 RX ORDER — NITROGLYCERIN 0.4 MG/1
0.4 TABLET SUBLINGUAL EVERY 5 MIN PRN
Status: DISCONTINUED | OUTPATIENT
Start: 2019-04-25 | End: 2019-04-29 | Stop reason: HOSPADM

## 2019-04-25 RX ORDER — ATORVASTATIN CALCIUM 40 MG/1
40 TABLET, FILM COATED ORAL NIGHTLY
Status: DISCONTINUED | OUTPATIENT
Start: 2019-04-25 | End: 2019-04-29 | Stop reason: HOSPADM

## 2019-04-25 RX ORDER — INSULIN GLARGINE 100 [IU]/ML
15 INJECTION, SOLUTION SUBCUTANEOUS DAILY
Status: DISCONTINUED | OUTPATIENT
Start: 2019-04-26 | End: 2019-04-29 | Stop reason: HOSPADM

## 2019-04-25 RX ORDER — METOLAZONE 2.5 MG/1
2.5 TABLET ORAL DAILY
Status: CANCELLED | OUTPATIENT
Start: 2019-04-25

## 2019-04-25 RX ORDER — SODIUM CHLORIDE 0.9 % (FLUSH) 0.9 %
10 SYRINGE (ML) INJECTION PRN
Status: DISCONTINUED | OUTPATIENT
Start: 2019-04-25 | End: 2019-04-29 | Stop reason: HOSPADM

## 2019-04-25 RX ORDER — ISOSORBIDE DINITRATE 10 MG/1
10 TABLET ORAL 2 TIMES DAILY
Status: DISCONTINUED | OUTPATIENT
Start: 2019-04-25 | End: 2019-04-29 | Stop reason: HOSPADM

## 2019-04-25 RX ADMIN — INSULIN LISPRO 1 UNITS: 100 INJECTION, SOLUTION INTRAVENOUS; SUBCUTANEOUS at 22:48

## 2019-04-25 RX ADMIN — ATORVASTATIN CALCIUM 40 MG: 40 TABLET, FILM COATED ORAL at 21:42

## 2019-04-25 RX ADMIN — CARVEDILOL 12.5 MG: 6.25 TABLET, FILM COATED ORAL at 21:42

## 2019-04-25 RX ADMIN — Medication 10 ML: at 21:46

## 2019-04-25 RX ADMIN — BUMETANIDE 2 MG: 0.25 INJECTION INTRAMUSCULAR; INTRAVENOUS at 18:07

## 2019-04-25 RX ADMIN — ISOSORBIDE DINITRATE 10 MG: 10 TABLET ORAL at 21:42

## 2019-04-25 RX ADMIN — HEPARIN SODIUM 5000 UNITS: 5000 INJECTION INTRAVENOUS; SUBCUTANEOUS at 21:33

## 2019-04-25 RX ADMIN — MICONAZOLE NITRATE: 2 POWDER TOPICAL at 21:42

## 2019-04-25 RX ADMIN — TAMSULOSIN HYDROCHLORIDE 0.4 MG: 0.4 CAPSULE ORAL at 21:42

## 2019-04-25 RX ADMIN — LATANOPROST 1 DROP: 50 SOLUTION OPHTHALMIC at 21:42

## 2019-04-25 RX ADMIN — POTASSIUM CHLORIDE 40 MEQ: 20 TABLET, EXTENDED RELEASE ORAL at 21:42

## 2019-04-25 RX ADMIN — BUMETANIDE 2 MG: 0.25 INJECTION INTRAMUSCULAR; INTRAVENOUS at 21:39

## 2019-04-25 ASSESSMENT — ENCOUNTER SYMPTOMS
ABDOMINAL DISTENTION: 0
SORE THROAT: 0
BLOOD IN STOOL: 0
BACK PAIN: 0
EYE ITCHING: 0
DIARRHEA: 0
NAUSEA: 0
VOMITING: 0
PHOTOPHOBIA: 0
SHORTNESS OF BREATH: 1
EYE PAIN: 0
TROUBLE SWALLOWING: 0
EYE DISCHARGE: 0
CHOKING: 0
EYE REDNESS: 0
WHEEZING: 0
VOICE CHANGE: 0
CHEST TIGHTNESS: 0
RHINORRHEA: 0
COUGH: 0
CONSTIPATION: 0
ABDOMINAL PAIN: 0
SINUS PRESSURE: 0

## 2019-04-25 ASSESSMENT — PAIN SCALES - GENERAL
PAINLEVEL_OUTOF10: 0
PAINLEVEL_OUTOF10: 0

## 2019-04-25 NOTE — H&P
History & Physical        Patient:  Farideh Anthony  YOB: 1945    MRN: [id number]     Acct: [de-identified]    PCP: Rahul Mullen    Date of Admission: 4/25/2019    Date of Service: Pt seen/examined on 04/25/19  and Admitted to Inpatient with expected LOS greater than two midnights due to medical therapy. Chief Complaint:      History Of Present Illness:    69 y.o. male who presented to [facility] from his nursing home 2/2 to leg swelling. Pt saw Dr. Sandra Solomon yesterday in his office, and was told he would most likely need to come to the hospital.  Pt's leg sweeling has only worsened so he came to the ED. He is currently at [facility]. Pt has also been having a cough and difficult lying down flat. Pt has a complicated medical history as in Jan 2019 pt underwent a mitral valve replacement and bypass. During his hospitalization, he went into cardiac arrest.  His EF is noted to be 25 % and pt is wearing a life vest.      Past Medical History:          Diagnosis Date    CAD (coronary artery disease)     CHF (congestive heart failure) (HCC)     Chronic kidney disease     History of blood transfusion     Hyperlipidemia     Hypertension     Proteinuria     Type II or unspecified type diabetes mellitus without mention of complication, not stated as uncontrolled        Past Surgical History:          Procedure Laterality Date    CARDIAC SURGERY      COLONOSCOPY  2019    at [address] [facility] GRAFT N/A 1/28/2019    CABG X3 MVR / MAZE performed by Sukhi Cyr MD at [address] N/A 1/21/2019    EGD DIAGNOSTIC ONLY performed by Jolene Berry MD at [address] [facility]       Medications Prior to Admission:      Prior to Admission medications    Medication Sig Start Date End Date Taking?  Authorizing Provider   potassium chloride (KLOR-CON M) 20 MEQ extended release tablet Take 2 tablets by mouth 3 times daily MG tablet Take 0.5 tablets by mouth daily Hold for SBP less than 110 2/12/19   Codie Gaona MD   acetaminophen (TYLENOL) 325 MG tablet Take 650 mg by mouth every 4 hours as needed for Pain    Historical Provider, MD   nitroGLYCERIN (NITROSTAT) 0.4 MG SL tablet Place 0.4 mg under the tongue every 5 minutes as needed for Chest pain up to max of 3 total doses. If no relief after 1 dose, call 911. Historical Provider, MD   BD ULTRA-FINE MICRO PEN NEEDLE 32G X 6 MM MISC  7/9/18   Historical Provider, MD   latanoprost (XALATAN) 0.005 % ophthalmic solution  9/11/17   Historical Provider, MD       Allergies:  Atorvastatin    Social History:      The patient currently lives at [facility]:   reports that he quit smoking about 43 years ago. His smoking use included cigarettes. He smoked 0.25 packs per day. He has quit using smokeless tobacco.  ETOH:   reports that he does not drink alcohol. Family History:    Reviewed in detail and negative for DM, CAD, Cancer, CVA. Positive as follows:        Problem Relation Age of Onset    Cancer Mother         breast    Diabetes Mother     Heart Disease Father     Diabetes Father     Cancer Maternal Grandmother         leukemia       Diet:  No diet orders on file    REVIEW OF SYSTEMS:   Pertinent positives as noted in the HPI. All other systems reviewed and negative. PHYSICAL EXAM:    BP (!) 164/82   Pulse 86   Temp 97.6 °F (36.4 °C) (Oral)   Resp 26   Ht 5' 11\" (1.803 m)   Wt 209 lb (94.8 kg)   SpO2 95%   BMI 29.15 kg/m²     General appearance:  No apparent distress, appears stated age and cooperative. HEENT:  Normal cephalic, atraumatic without obvious deformity. Pupils equal, round, and reactive to light. Extra ocular muscles intact. Conjunctivae/corneas clear. Neck: Supple, with full range of motion. No jugular venous distention. Trachea midline. Respiratory:   Air entry clear but dimisished  Cardiovascular:  S1S2 (+), (+) murmur  Abdomen: Soft, lisinopril and metolozone  - Pt will be started on bumex 2 mg IV Q 8 hrs  - We will also continue with coreg  - DW, strict I/Os  - Per ECHO (2/14/2019):  Left ventricle size is normal.  Mildly increased left ventricle wall thickness. Akinetic motion of the anteroseptal wall noted in the left ventricle. Severly hypokinetic motion of the rest of the LV walls noted  Systolic function was severely reduced. Ejection fraction is visually estimated at 25%. The left atrium is Mild to moderate dilated. - Life vest intact    2)CKD stage 3  - Cr seems to be around baseline    3) HLD  - continue with Lipitor    4)DM2  - Continue on Lantus and SSI. - Pt takes Lantus 18 units at home. I restarted him on 15 units of Lantus. We can always uptitrate as neeed    5) BPH  - Continue with Flomax    6) Deconditioing  - PT ordered    Thank you Nae Castrejon for the opportunity to be involved in this patient's care.     Electronically signed by Jennifer Suazo MD on 4/25/2019 at 6:21 PM

## 2019-04-25 NOTE — PROGRESS NOTES
Pharmacy Medication History Note      List of current medications patient is taking is complete. Source of information: Patient, Nursing Home MAR    Changes made to medication list:  Medications removed (include reason, ex. therapy complete or physician discontinued):  None    Medications added/doses adjusted:  Latanoprost 1 drop both eyes daily  Coenzyme Q 10 50 mg daily     Other notes (ex. Recent course of antibiotics, Coumadin dosing):  Denies use of other OTC or herbal medications.       Allergies reviewed      Electronically signed by Rosalinda Osler on 4/25/2019 at 7:21 PM

## 2019-04-25 NOTE — ED PROVIDER NOTES
[facility]  eMERGENCY dEPARTMENT eNCOUnter          CHIEF COMPLAINT       Chief Complaint   Patient presents with    Leg Swelling       Nurses Notes reviewed and I agreeexcept as noted in the HPI. HISTORY OF PRESENT ILLNESS    Theodore Ross is a 69 y.o. male who presents to the Emergency Department with a medical history of CAD, CHF, HLD, HTN, and DM for the evaluation of lower extremity swelling. Patient was being evaluated by Dr. Elise De Los Santos (GI) today when it was recommended that he come to the ED due to his presenting lower extremity swelling. The patient states to have chronic shortness of breath due to his medical history but denies any recent change with his dyspnea. He denies any cough or chest pain. His cardiologist is Dr. Tone Singh. The patient's nephrologist is Dr. Melani Marie. Patient had a CABG x3 and mitral valve replacement/maze procedure completed on 1-28-19 by Dr. Abigail Hathaway (cardiothoracic surgery). The patient reports no additional symptoms or complaints at this time. The HPI was provided by the patient. REVIEW OF SYSTEMS     Review of Systems   Constitutional: Negative for activity change, appetite change, diaphoresis, fatigue and unexpected weight change. HENT: Negative for congestion, ear discharge, ear pain, hearing loss, rhinorrhea, sinus pressure, sore throat, trouble swallowing and voice change. Eyes: Negative for photophobia, pain, discharge, redness and itching. Respiratory: Positive for shortness of breath (chronic). Negative for cough, choking, chest tightness and wheezing. Cardiovascular: Positive for leg swelling. Negative for chest pain and palpitations. Gastrointestinal: Negative for abdominal distention, abdominal pain, blood in stool, constipation, diarrhea, nausea and vomiting. Endocrine: Negative for polydipsia, polyphagia and polyuria.    Genitourinary: Negative for decreased urine volume, difficulty urinating, dysuria, enuresis, frequency, hematuria and urgency. Musculoskeletal: Negative for arthralgias, back pain, gait problem, myalgias, neck pain and neck stiffness. Skin: Negative for pallor and rash. Allergic/Immunologic: Negative for immunocompromised state. Neurological: Negative for dizziness, tremors, seizures, syncope, facial asymmetry, weakness, light-headedness, numbness and headaches. Hematological: Negative for adenopathy. Does not bruise/bleed easily. Psychiatric/Behavioral: Negative for agitation, hallucinations and suicidal ideas. The patient is not nervous/anxious. PAST MEDICAL HISTORY    has a past medical history of CAD (coronary artery disease), CHF (congestive heart failure) ([facility] [address] Chronic kidney disease, History of blood transfusion, Hyperlipidemia, Hypertension, Proteinuria, and Type II or unspecified type diabetes mellitus without mention of complication, not stated as uncontrolled. SURGICALHISTORY      has a past surgical history that includes Colonoscopy (2019); Upper gastrointestinal endoscopy (N/A, 1/21/2019); Coronary artery bypass graft (N/A, 1/28/2019); Cardiac surgery; and Tonsillectomy. CURRENT MEDICATIONS       Current Discharge Medication List      CONTINUE these medications which have NOT CHANGED    Details   potassium chloride (KLOR-CON M) 20 MEQ extended release tablet Take 2 tablets by mouth 3 times daily  Qty: 90 tablet, Refills: 3      Povidone-Iodine Scrub Sponge (BETADINE SWAB AID) 10 % SWAB Apply topically      tamsulosin (FLOMAX) 0.4 MG capsule Take 0.4 mg by mouth daily      NONFORMULARY Mulugeta-bacit-poly-lidocaine creame 4 % topical as prn  for pain      timolol (TIMOPTIC) 0.5 % ophthalmic solution Place 1 drop into both eyes daily      Coenzyme Q10 (COQ-10) 50 MG CAPS Take by mouth daily       insulin glargine (BASAGLAR KWIKPEN) 100 UNIT/ML injection pen Inject 18 Units into the skin daily      carvedilol (COREG) 6.25 MG tablet Take 2 tablets by mouth 2 times daily (with meals) . hold if SBP less Heart Disease in his father. SOCIAL HISTORY       Social History     Socioeconomic History    Marital status:      Spouse name: Not on file    Number of children: Not on file    Years of education: Not on file    Highest education level: Not on file   Occupational History    Not on file   Social Needs    Financial resource strain: Not on file    Food insecurity:     Worry: Not on file     Inability: Not on file    Transportation needs:     Medical: Not on file     Non-medical: Not on file   Tobacco Use    Smoking status: Former Smoker     Packs/day: 0.25     Types: Cigarettes     Last attempt to quit: 1976     Years since quittin.2    Smokeless tobacco: Former User   Substance and Sexual Activity    Alcohol use: No     Alcohol/week: 0.0 oz    Drug use: Not on file    Sexual activity: Not on file   Lifestyle    Physical activity:     Days per week: Not on file     Minutes per session: Not on file    Stress: Not on file   Relationships    Social connections:     Talks on phone: Not on file     Gets together: Not on file     Attends Latter day service: Not on file     Active member of club or organization: Not on file     Attends meetings of clubs or organizations: Not on file     Relationship status: Not on file    Intimate partner violence:     Fear of current or ex partner: Not on file     Emotionally abused: Not on file     Physically abused: Not on file     Forced sexual activity: Not on file   Other Topics Concern    Not on file   Social History Narrative    Not on file       PHYSICAL EXAM     INITIAL VITALS:  height is 5' 11\" (1.803 m) and weight is 205 lb 1.6 oz (93 kg). His oral temperature is 97.7 °F (36.5 °C). His blood pressure is 125/68 and his pulse is 99. His respiration is 25 and oxygen saturation is 91%. Physical Exam   Constitutional: He is oriented to person, place, and time. He appears well-developed. He appears cachectic. No distress.    HENT:   Head: Normocephalic and atraumatic. Right Ear: External ear normal.   Left Ear: External ear normal.   Nose: Nose normal.   Mouth/Throat: Oropharynx is clear and moist. No oropharyngeal exudate. Eyes: Pupils are equal, round, and reactive to light. Conjunctivae and EOM are normal. Right eye exhibits no discharge. Left eye exhibits no discharge. No scleral icterus. Neck: Normal range of motion. Neck supple. No JVD present. No tracheal deviation present. Cardiovascular: Normal rate, regular rhythm, normal heart sounds and intact distal pulses. Exam reveals no gallop and no friction rub. No murmur heard. Pulmonary/Chest: Effort normal and breath sounds normal. He has no wheezes. He has no rales. Patient is currently wearing a life vest.    Abdominal: Soft. Bowel sounds are normal. He exhibits no mass. There is no tenderness. There is no rebound and no guarding. Musculoskeletal: Normal range of motion. He exhibits no edema or tenderness. Right lower leg: He exhibits swelling. He exhibits no tenderness. Left lower leg: He exhibits swelling. He exhibits no tenderness. Patient's bilateral lower extremities present with associated weeping. Lymphadenopathy:     He has no cervical adenopathy. Neurological: He is alert and oriented to person, place, and time. He has normal reflexes. He displays normal reflexes. No cranial nerve deficit. He exhibits normal muscle tone. Coordination normal.   Skin: Skin is warm and dry. No rash noted. He is not diaphoretic. Psychiatric: He has a normal mood and affect. His behavior is normal. Judgment and thought content normal.   Nursing note and vitals reviewed.         DIFFERENTIALDIAGNOSIS:   Differential diagnoses were discussed extensively with thepatient and family including but no limited to CHF exacerbation, ACS, hypoalbuminemia, fluid overload     DIAGNOSTIC RESULTS     EKG: All EKG's are interpreted by the Emergency Department Physician who either signs or Co-signs this chart in the absence of a cardiologist.  EKG interpreted by Jackie Neely DO:    Collection Time Result Time Ventricular Rate Atrial Rate P-R Interval QRS Duration Q-T Interval   04/25/19 16:12:39 04/25/19 16:19:55 84 84 154 92 380       Collection Time Result Time QTc Calculation (Bazett) P Axis R Axis T Axis   04/25/19 16:12:39 04/25/19 16:19:55 449 77 58 156       Preliminary result                Narrative:    Sinus rhythm with marked sinus arrhythmia  Nonspecific T wave abnormality  Abnormal ECG  When compared with ECG of 07-MAR-2019              RADIOLOGY:non-plain film images(s) such as CT, Ultrasound and MRI are read by the radiologist.    XR CHEST PORTABLE   Final Result   Bilateral pleural effusions with airspace opacity suggesting atelectasis or infiltrate. Correlate for CHF. **This report has been created using voice recognition software. It may contain minor errors which are inherent in voice recognition technology. **      Final report electronically signed by Dr. Familia Turcios on 4/25/2019 4:47 PM          LABS:   Labs Reviewed   CBC WITH AUTO DIFFERENTIAL - Abnormal; Notable for the following components:       Result Value    RBC 3.79 (*)     Hemoglobin 9.6 (*)     Hematocrit 33.1 (*)     MCH 25.3 (*)     MCHC 29.0 (*)     RDW-CV 17.8 (*)     RDW-SD 55.9 (*)     All other components within normal limits   BASIC METABOLIC PANEL - Abnormal; Notable for the following components:    Glucose 117 (*)     BUN 46 (*)     CREATININE 1.8 (*)     All other components within normal limits   TROPONIN - Abnormal; Notable for the following components:    Troponin T 0.048 (*)     All other components within normal limits   BRAIN NATRIURETIC PEPTIDE - Abnormal; Notable for the following components:    Pro-BNP 54725.0 (*)     All other components within normal limits   HEPATIC FUNCTION PANEL - Abnormal; Notable for the following components:    Alb 3.2 (*)     All other components within normal limits   GLOMERULAR FILTRATION RATE, ESTIMATED - Abnormal; Notable for the following components:    Est, Glom Filt Rate 37 (*)     All other components within normal limits   LIPASE   MAGNESIUM   ANION GAP   OSMOLALITY   BASIC METABOLIC PANEL   MAGNESIUM   LIPID PANEL   CBC WITH AUTO DIFFERENTIAL       EMERGENCY DEPARTMENT COURSE:   Vitals:    Vitals:    04/25/19 1712 04/25/19 1811 04/25/19 1849 04/1945   BP: (!) 157/70 (!) 164/82 (!) 157/80 125/68   Pulse: 87 86 87 99   Resp: 25 26 21 25   Temp:    97.7 °F (36.5 °C)   TempSrc:    Oral   SpO2: 93% 95%  91%   Weight:    205 lb 1.6 oz (93 kg)   Height:    5' 11\" (1.803 m)     4:52 PM: The patient was seen and evaluated. Appropriate labs were ordered and reviewed. The patient was seen and evaluated within the ED today for the evaluation of lower extremity swelling. The patient presented in no acute distress. Physical exam revealed bilateral lower extremity swelling and associated weeping. Creatinine, BUN, and Troponin [place] [address]. Mg is normal. Portable chest XR reveals bilateral pleural effusions with airspace opacity suggesting atelectasis or infiltrate. EKG is normal. Dr. Joel Tapia  (nephrology) was consulted and recommended that the patient be started on 2 mg IV bumex and admitted for further evaluation and work up. The patient was treated with Bumex while in the ED. I observed the patient's condition to remain stable during the duration of their stay. Due to the patient's presenting symptoms and work up I recommended admission and he was amenable to my decision. The case was discussed with Dr. Dinah Mcdowell (internal medicine) who graciously accepts the patient for admission and further evaluation. A consult was placed for Dr. Joel Tapia (nephrology) to evaluate the patient once admitted to internal medicine.       CRITICAL CARE:   None     CONSULTS:  Dr. Joel Tapia  (nephrology) - recommended that the patient be started on 2 mg IV bumex and admitted for further evaluation and work up. Dr. Melody Reid (internal medicine) - graciously accepts the patient for admission and further evaluation/work-up. PROCEDURES:  None    FINAL IMPRESSION      1. Acute systolic congestive heart failure ([address]    2. Hx of CABG    3. Coronary artery disease involving coronary bypass graft of native heart without angina pectoris          DISPOSITION/PLAN   Admission    PATIENT REFERRED TO:  Ehsan Albain  [address] Jessica Liu 23             DISCHARGE MEDICATIONS:  Current Discharge Medication List          (Please note that portions of this note were completed with a voice recognition program.  Efforts were made to edit thedictations but occasionally words are mis-transcribed.)    Scribe:  Terry Waller 4/25/19 4:52 PM Scribing for and in the presence of Lydia Mistry DO. Scribe: Terry Waller 4/25/19 4:52 PM    Provider:  I personally performed the services described in the documentation, reviewed and edited the documentation which was dictated to the scribe inmy presence, and it accurately records my words and actions.     Lydia Mistry DO 4/25/19 8:25 PM       Lydia Mistry DO  04/25/19 2026

## 2019-04-25 NOTE — ED NOTES
Patient resting on cart with wife at bedside. Patient updated on plan of care at this time. Denies any needs at this time.      Pankaj Alvarenga RN  04/25/19 0305

## 2019-04-25 NOTE — ED NOTES
Patient resting on cart, warm blankets provided. Patient denies any other needs at this time.      Vignesh Stevenson RN  04/25/19 0597

## 2019-04-26 LAB
ANION GAP SERPL CALCULATED.3IONS-SCNC: 13 MEQ/L (ref 8–16)
BASOPHILS # BLD: 0.3 %
BASOPHILS ABSOLUTE: 0 THOU/MM3 (ref 0–0.1)
BUN BLDV-MCNC: 47 MG/DL (ref 7–22)
CALCIUM SERPL-MCNC: 7.8 MG/DL (ref 8.5–10.5)
CHLORIDE BLD-SCNC: 99 MEQ/L (ref 98–111)
CHOLESTEROL, TOTAL: 97 MG/DL (ref 100–199)
CO2: 29 MEQ/L (ref 23–33)
CREAT SERPL-MCNC: 1.7 MG/DL (ref 0.4–1.2)
CREATININE URINE: 22.5 MG/DL
EOSINOPHIL # BLD: 0.5 %
EOSINOPHILS ABSOLUTE: 0 THOU/MM3 (ref 0–0.4)
ERYTHROCYTE [DISTWIDTH] IN BLOOD BY AUTOMATED COUNT: 17.9 % (ref 11.5–14.5)
ERYTHROCYTE [DISTWIDTH] IN BLOOD BY AUTOMATED COUNT: 58 FL (ref 35–45)
GFR SERPL CREATININE-BSD FRML MDRD: 40 ML/MIN/1.73M2
GLUCOSE BLD-MCNC: 187 MG/DL (ref 70–108)
GLUCOSE BLD-MCNC: 206 MG/DL (ref 70–108)
GLUCOSE BLD-MCNC: 323 MG/DL (ref 70–108)
HCT VFR BLD CALC: 29.3 % (ref 42–52)
HDLC SERPL-MCNC: 34 MG/DL
HEMOGLOBIN: 8.6 GM/DL (ref 14–18)
IMMATURE GRANS (ABS): 0.03 THOU/MM3 (ref 0–0.07)
IMMATURE GRANULOCYTES: 0.5 %
LDL CHOLESTEROL CALCULATED: 40 MG/DL
LYMPHOCYTES # BLD: 17.4 %
LYMPHOCYTES ABSOLUTE: 1.1 THOU/MM3 (ref 1–4.8)
MAGNESIUM: 1.8 MG/DL (ref 1.6–2.4)
MCH RBC QN AUTO: 26 PG (ref 26–33)
MCHC RBC AUTO-ENTMCNC: 29.4 GM/DL (ref 32.2–35.5)
MCV RBC AUTO: 88.5 FL (ref 80–94)
MONOCYTES # BLD: 9.5 %
MONOCYTES ABSOLUTE: 0.6 THOU/MM3 (ref 0.4–1.3)
NUCLEATED RED BLOOD CELLS: 0 /100 WBC
PLATELET # BLD: 226 THOU/MM3 (ref 130–400)
PMV BLD AUTO: 9.5 FL (ref 9.4–12.4)
POTASSIUM SERPL-SCNC: 3.8 MEQ/L (ref 3.5–5.2)
PROT/CREAT RATIO, UR: 0.4
PROTEIN, URINE: 8.9 MG/DL
RBC # BLD: 3.31 MILL/MM3 (ref 4.7–6.1)
SEG NEUTROPHILS: 71.8 %
SEGMENTED NEUTROPHILS ABSOLUTE COUNT: 4.7 THOU/MM3 (ref 1.8–7.7)
SODIUM BLD-SCNC: 141 MEQ/L (ref 135–145)
TRIGL SERPL-MCNC: 116 MG/DL (ref 0–199)
WBC # BLD: 6.6 THOU/MM3 (ref 4.8–10.8)

## 2019-04-26 PROCEDURE — 36415 COLL VENOUS BLD VENIPUNCTURE: CPT

## 2019-04-26 PROCEDURE — 80048 BASIC METABOLIC PNL TOTAL CA: CPT

## 2019-04-26 PROCEDURE — 82570 ASSAY OF URINE CREATININE: CPT

## 2019-04-26 PROCEDURE — 97166 OT EVAL MOD COMPLEX 45 MIN: CPT

## 2019-04-26 PROCEDURE — 6370000000 HC RX 637 (ALT 250 FOR IP): Performed by: FAMILY MEDICINE

## 2019-04-26 PROCEDURE — 97535 SELF CARE MNGMENT TRAINING: CPT

## 2019-04-26 PROCEDURE — 97110 THERAPEUTIC EXERCISES: CPT

## 2019-04-26 PROCEDURE — 93010 ELECTROCARDIOGRAM REPORT: CPT | Performed by: INTERNAL MEDICINE

## 2019-04-26 PROCEDURE — 2709999900 HC NON-CHARGEABLE SUPPLY

## 2019-04-26 PROCEDURE — 6370000000 HC RX 637 (ALT 250 FOR IP): Performed by: INTERNAL MEDICINE

## 2019-04-26 PROCEDURE — 97161 PT EVAL LOW COMPLEX 20 MIN: CPT

## 2019-04-26 PROCEDURE — 99233 SBSQ HOSP IP/OBS HIGH 50: CPT | Performed by: HOSPITALIST

## 2019-04-26 PROCEDURE — 83735 ASSAY OF MAGNESIUM: CPT

## 2019-04-26 PROCEDURE — 85025 COMPLETE CBC W/AUTO DIFF WBC: CPT

## 2019-04-26 PROCEDURE — 2500000003 HC RX 250 WO HCPCS: Performed by: FAMILY MEDICINE

## 2019-04-26 PROCEDURE — 82948 REAGENT STRIP/BLOOD GLUCOSE: CPT

## 2019-04-26 PROCEDURE — 2580000003 HC RX 258: Performed by: FAMILY MEDICINE

## 2019-04-26 PROCEDURE — 84156 ASSAY OF PROTEIN URINE: CPT

## 2019-04-26 PROCEDURE — 2060000000 HC ICU INTERMEDIATE R&B

## 2019-04-26 PROCEDURE — 6360000002 HC RX W HCPCS: Performed by: FAMILY MEDICINE

## 2019-04-26 PROCEDURE — 99222 1ST HOSP IP/OBS MODERATE 55: CPT | Performed by: INTERNAL MEDICINE

## 2019-04-26 PROCEDURE — 80061 LIPID PANEL: CPT

## 2019-04-26 RX ORDER — NICOTINE POLACRILEX 4 MG
15 LOZENGE BUCCAL PRN
Status: DISCONTINUED | OUTPATIENT
Start: 2019-04-26 | End: 2019-04-29 | Stop reason: HOSPADM

## 2019-04-26 RX ORDER — METOLAZONE 5 MG/1
10 TABLET ORAL ONCE
Status: COMPLETED | OUTPATIENT
Start: 2019-04-26 | End: 2019-04-26

## 2019-04-26 RX ORDER — DEXTROSE MONOHYDRATE 25 G/50ML
12.5 INJECTION, SOLUTION INTRAVENOUS PRN
Status: DISCONTINUED | OUTPATIENT
Start: 2019-04-26 | End: 2019-04-29 | Stop reason: HOSPADM

## 2019-04-26 RX ORDER — DEXTROSE MONOHYDRATE 50 MG/ML
100 INJECTION, SOLUTION INTRAVENOUS PRN
Status: DISCONTINUED | OUTPATIENT
Start: 2019-04-26 | End: 2019-04-29 | Stop reason: HOSPADM

## 2019-04-26 RX ADMIN — BUMETANIDE 2 MG: 0.25 INJECTION INTRAMUSCULAR; INTRAVENOUS at 21:29

## 2019-04-26 RX ADMIN — FERROUS SULFATE TAB 325 MG (65 MG ELEMENTAL FE) 325 MG: 325 (65 FE) TAB at 09:33

## 2019-04-26 RX ADMIN — POTASSIUM CHLORIDE 40 MEQ: 20 TABLET, EXTENDED RELEASE ORAL at 14:28

## 2019-04-26 RX ADMIN — HEPARIN SODIUM 5000 UNITS: 5000 INJECTION INTRAVENOUS; SUBCUTANEOUS at 21:30

## 2019-04-26 RX ADMIN — TIMOLOL MALEATE 1 DROP: 5 SOLUTION OPHTHALMIC at 09:33

## 2019-04-26 RX ADMIN — INSULIN LISPRO 1 UNITS: 100 INJECTION, SOLUTION INTRAVENOUS; SUBCUTANEOUS at 09:35

## 2019-04-26 RX ADMIN — CARVEDILOL 12.5 MG: 6.25 TABLET, FILM COATED ORAL at 18:09

## 2019-04-26 RX ADMIN — Medication 10 ML: at 09:33

## 2019-04-26 RX ADMIN — MICONAZOLE NITRATE: 2 POWDER TOPICAL at 21:30

## 2019-04-26 RX ADMIN — HEPARIN SODIUM 5000 UNITS: 5000 INJECTION INTRAVENOUS; SUBCUTANEOUS at 14:28

## 2019-04-26 RX ADMIN — POTASSIUM CHLORIDE 40 MEQ: 20 TABLET, EXTENDED RELEASE ORAL at 21:30

## 2019-04-26 RX ADMIN — POTASSIUM CHLORIDE 40 MEQ: 20 TABLET, EXTENDED RELEASE ORAL at 09:33

## 2019-04-26 RX ADMIN — Medication 10 ML: at 21:31

## 2019-04-26 RX ADMIN — INSULIN LISPRO 4 UNITS: 100 INJECTION, SOLUTION INTRAVENOUS; SUBCUTANEOUS at 12:43

## 2019-04-26 RX ADMIN — INSULIN LISPRO 2 UNITS: 100 INJECTION, SOLUTION INTRAVENOUS; SUBCUTANEOUS at 21:30

## 2019-04-26 RX ADMIN — ASPIRIN 81 MG: 81 TABLET, COATED ORAL at 09:33

## 2019-04-26 RX ADMIN — ATORVASTATIN CALCIUM 40 MG: 40 TABLET, FILM COATED ORAL at 21:30

## 2019-04-26 RX ADMIN — INSULIN GLARGINE 15 UNITS: 100 INJECTION, SOLUTION SUBCUTANEOUS at 09:34

## 2019-04-26 RX ADMIN — TAMSULOSIN HYDROCHLORIDE 0.4 MG: 0.4 CAPSULE ORAL at 21:30

## 2019-04-26 RX ADMIN — BUMETANIDE 2 MG: 0.25 INJECTION INTRAMUSCULAR; INTRAVENOUS at 14:28

## 2019-04-26 RX ADMIN — BUMETANIDE 2 MG: 0.25 INJECTION INTRAMUSCULAR; INTRAVENOUS at 05:30

## 2019-04-26 RX ADMIN — MICONAZOLE NITRATE: 2 POWDER TOPICAL at 09:35

## 2019-04-26 RX ADMIN — HEPARIN SODIUM 5000 UNITS: 5000 INJECTION INTRAVENOUS; SUBCUTANEOUS at 05:23

## 2019-04-26 RX ADMIN — CARVEDILOL 12.5 MG: 6.25 TABLET, FILM COATED ORAL at 09:33

## 2019-04-26 RX ADMIN — ISOSORBIDE DINITRATE 10 MG: 10 TABLET ORAL at 09:33

## 2019-04-26 RX ADMIN — METOLAZONE 10 MG: 5 TABLET ORAL at 14:28

## 2019-04-26 RX ADMIN — LATANOPROST 1 DROP: 50 SOLUTION OPHTHALMIC at 21:30

## 2019-04-26 RX ADMIN — FERROUS SULFATE TAB 325 MG (65 MG ELEMENTAL FE) 325 MG: 325 (65 FE) TAB at 18:09

## 2019-04-26 RX ADMIN — ISOSORBIDE DINITRATE 10 MG: 10 TABLET ORAL at 21:30

## 2019-04-26 ASSESSMENT — PAIN SCALES - GENERAL
PAINLEVEL_OUTOF10: 0
PAINLEVEL_OUTOF10: 0

## 2019-04-26 NOTE — PROGRESS NOTES
[facility] 62  INPATIENT OCCUPATIONAL THERAPY  [facility] ICU STEPDOWN TELEMETRY 4K  EVALUATION    Time:  Time In: 910  Time Out: 0940  Timed Code Treatment Minutes: 15 Minutes  Minutes: 30          Date: 2019  Patient Name: Declan Haskins,   Gender: male      MRN: [id number]  : 1945  (69 y.o.)  Referring Practitioner: Rain Mehta MD  Diagnosis: heart failure exacerbated by solatol  Additional Pertinent Hx: Per H&P: 69 y.o. male who presented to [address] [place] [address] from his nursing home  to leg swelling. Pt saw Dr. Jones Hall yesterday in his office, and was told he would most likely need to come to the hospital.  Pt's leg sweeling has only worsened so he came to the ED. Pt has a complicated medical history as in 2019 pt underwent a mitral valve replacement and bypass.  During his hospitalization, he went into cardiac arrest.  His EF is noted to be 25 % and pt is wearing a life vest.      Restrictions/Precautions:  General Precautions, Fall Risk, Contact Precautions(MRSA, VRE)                    Other position/activity restrictions: R foot drop, lifevest       Past Medical History:   Diagnosis Date    CAD (coronary artery disease)     CHF (congestive heart failure) (HCC)     Chronic kidney disease     History of blood transfusion     Hyperlipidemia     Hypertension     Proteinuria     Type II or unspecified type diabetes mellitus without mention of complication, not stated as uncontrolled      Past Surgical History:   Procedure Laterality Date    CARDIAC SURGERY      COLONOSCOPY  2019    at [address] GRAFT N/A 2019    CABG X3 MVR / MAZE performed by Kimmie Johnson MD at [address] [place] 2019    EGD DIAGNOSTIC ONLY performed by Salazar Da Silva MD at [facility]           Subjective  Chart Reviewed: Yes(H&P, orders, progress notes)  Patient assessed for rehabilitation services?: Yes  Family / Caregiver Present: Yes(wife)    Subjective: Pt seated on BSC in bathroom upon arrival, with RN present. Pt agreeable to OT session and RN ok'd. Comments: Pt familiar to this therapist from previous admissions. General:       Vision: Impaired  Vision Exceptions: Wears glasses at all times    Hearing: Within functional limits         Pain:  Pain Assessment  Patient Currently in Pain: Denies       Social/Functional History:  Lives With: Other (comment)  Type of Home: Facility([facility]; has been in/out of hospital/F since 12/18)  Home Equipment: Rolling walker, Hospital bed(needs mattress for hospital bed yet)     Bathroom Equipment: Commode       ADL Assistance: Needs assistance  Homemaking Assistance: Needs assistance       Ambulation Assistance: Needs assistance  Transfer Assistance: Needs assistance          Additional Comments: Pt has been in/out of ECF/hospital since initial cardiac arrest in 12/18. Pt reported was still participating in therapy daily at [facility] [place] and had progressed functional mobility to 180 ft with assist X1 and RW. Pt continues to require assist for ADLs at [facility] [place]. Pt reported has R AFO ordered for foot drop, although has not received yet. Wife reported therapy days ran out on 4/25 and plans were to discharge home with [facility] therapies. Objective        Overall Cognitive Status: Exceptions  Problem Solving: Decreased awareness of errors;Assistance required to identify errors made  Cognition Comment: slow processing at times; attempts to complete tasks before OTR ready              Observation: lifevest, tele, posey    Observation/Palpation  Observation: lifevest, tele, posey                 LUE AROM (degrees)  LUE AROM : WFL  LUE General AROM: through observation          RUE AROM (degrees)  RUE AROM : WFL  RUE General AROM: through observation    RUE Tone: Normotonic  LUE Tone: Normotonic    Movements Are Fluid And Coordinated: Yes               ADL  LE Dressing:  Moderate assistance(pt required assist to thread brief, pants over feet, pull up to knees, and thread catheter through; once standing pt was able to complete 1-2 hand release from RW to pull up over hips )  Toileting: Maximum assistance(max assist for hygiene after bm; pt declined attempting to complete)     Bed mobility  Sit to Supine: Minimal assistance(for bringing BLEs into bed; increased assistance required due to BLE edema)    Transfers  Stand to sit: Contact guard assistance(to EOB)  Toilet Transfers  Toilet - Technique: Ambulating  Equipment Used: Standard bedside commode(placed over toilet)  Toilet Transfer: Contact guard assistance  Toilet Transfers Comments: Completed X2 trials; minimal cues for safe hand placement/technique    Balance  Sitting Balance: Stand by assistance  Standing Balance: Contact guard assistance     Time: X2 minutes X2 trials  Activity: toileting task; pulling pants/brief over hips  Comment: Static stand with BUE supported on RW during hygiene task while OTR completed. Pt was able to progress to unilateral release from RW to pull up clothing items over hips. Pt then able to progress to bilateral release from RW without LOB, although did continue to require CGA-min assist for dynamic standing balance     Functional Mobility  Functional - Mobility Device: Rolling Walker  Activity: To/from bathroom  Assist Level: Minimal assistance  Functional Mobility Comments: Cues for upright gaze/posture, awareness to R foot to make sure fully clearing from floor when stepping forward. Required manual assist to maneuver RW X1 occasion to maneuver around R foot. Pt required moderate cuing for widening step length and GERDA, as initially took steps close together and was almost stepping 1 foot on top of the other with decreased awareness.       Activity Tolerance:  Activity Tolerance: Patient limited by fatigue  Activity Tolerance: Minimal cuing required for modified LB dressing technique due to increased weakness

## 2019-04-26 NOTE — CONSULTS
[address] [place] [address]                                  CONSULTATION    PATIENT NAME: Manuela Goins                        :        1945  MED REC NO:   [id number]                           ROOM:       0022  ACCOUNT NO:   [de-identified]                           ADMIT DATE: 2019  PROVIDER:     MESERET Santiago Gilberto DATE:  2019    REQUESTING PROVIDER:  Dr. Connor Chapman and Dr. Raquel De Los Santos. REASON FOR CONSULTATION:  Volume overload and diuretic management. HISTORY OF PRESENT ILLNESS:  This is a 74-year-old pleasant white male  with history of chronic kidney disease, stage 3 with underlying diabetic  nephropathy as well as a chronic cardiorenal syndrome who has underlying  chronic congestive heart failure with ejection fraction of 25% along  with diastolic dysfunction of the left ventricle. The patient has  history of coronary artery disease and underwent coronary artery bypass  graft by Dr. Jose Wagner in 2019. Prior to him needing CABG, he  unfortunately had several cardiac arrest.  He was previously on dialysis  treatments for acute kidney injury which happened at outside hospital  back in 2018. Fortunately he was able to come off dialysis and his  last dialysis treatment was in 2019. He has been following with me  for chronic kidney disease, stage 3 management. He also has a LifeVest  in place secondary to systolic cardiomyopathy. He also underwent mitral  valve surgery. He has history of diabetes and previous smoking. The  patient had been on Bumex, the dose of which was recently increased to 2  mg twice a day. Prior to this, he was on Demadex. However, the patient  continued to have worsening lower extremity edema and shortness of  breath. I saw him in my office 2 days ago and recommended that he be  admitted directly to the hospital from my office but he declined.   He  wanted to continue with oral diuretics. Even though I strongly  recommended IV diuretics, the patient had declined admission at that  time. I did start him on metolazone 5 mg daily. The patient, however,  continued to have worsening symptoms. Yesterday he went to Dr. Vj Louis  office for followup and decided to come to the emergency room  thereafter. In the emergency room, he was evaluated. He was noted to  be in fluid overload. He was noted to have pitting edema all the way up  to the thighs. He was admitted to the hospitalist services and  yesterday I started him on Bumex 2 mg every 8 hours. The patient was  seen and examined by me earlier this morning. At the time of my  evaluation, the patient is reporting to be feeling better. He does  appear to be edematous, but not as much as he was 3 days ago. He denies  any chest pain or any shortness of breath. He reports generalized  weakness. Reports fatigue and nausea at times. Otherwise denies any  fever or chills. No vomiting or diarrhea at this time. Again, he does  report some nausea but that seems to have also improved. Denies any  dysuria or any gross hematuria. Denies any dizziness or  lightheadedness. PAST MEDICAL HISTORY:  Again is significant for coronary artery disease;  coronary artery bypass graft; history of chronic systolic congestive  heart failure; chronic kidney disease, stage III; diabetic nephropathy;  history of urinary retention with chronic Salinas catheter; kidney cyst;  history of a mitral valve surgery. PAST SURGICAL HISTORY:  Significant for mitral valve surgery as well as  coronary artery bypass graft done back in 01/2019, tonsillectomy. FAMILY HISTORY:  Significant for diabetes and breast cancer as well as  heart disease. SOCIAL HISTORY:  The patient used to smoke quarter pack per day for few  years, quit in 1976. Denies any alcohol or illicit drug abuse.     HOME MEDICATIONS:  Include potassium, insulin, Coreg, Bumex 2 mg twice a  day, Lipitor, ferrous sulfate, metolazone, lisinopril. REVIEW OF SYSTEM:  Positive for shortness of breath, some nausea,  bilateral lower extremity edema, weight gain. Denies any fever or  chills. No headaches, no blurry vision, no chest pain. No cough or any  sputum production. Denies any vomiting or diarrhea. Denies any gross  hematuria. He has a Salinas catheter in place. Denies any dizziness or  lightheadedness. No palpitations. No numbness or any tingling. All  other review of systems were reviewed and negative. CURRENT MEDICATIONS:  Include aspirin, atorvastatin, Coreg, ferrous  sulfate, insulin, potassium chloride, Flomax, timolol, zinc oxide,  Bumex. ALLERGIES:  The patient is allergic to ATORVASTATIN. PHYSICAL EXAMINATION:  VITAL SIGNS:  Blood pressure is 142/69 with a heart rate of 83,  temperature is 97.6, respiratory rate is 18, pulse oximetry is 99%. GENERAL:  He is awake, alert. He is cooperative. He does not appear to  be in any acute distress. He does appear to be somewhat chronically ill  appearing. HEENT:  Reveals some pallor conjunctivae without any icteric sclera. Oral cavity appears moist.  NECK:  Supple without any jugular venous distention. LUNGS:  Air entry diminished at the bases without any use of accessory  muscles. HEART:  S1, S2.  ABDOMEN:  Soft without any tenderness. EXTREMITIES:  He has bilateral lower extremity 2+ pitting edema all the  way up to thighs. SKIN:  Did not reveal any evidence of maculopapular rash. MUSCULOSKELETAL:  He moves all four extremities. NEUROLOGIC:  No slurred speech. No facial drooping. PSYCH:  Appears to have normal affect and mood. LABORATORY DATA:  Blood work shows a white count of 6.6, hemoglobin of  8.6, platelet count of 337. Sodium of 141, potassium 3.8, chloride of  99, bicarb of 29, BUN of 47, creatinine of 1.7, calcium of 7.8,  magnesium of 1.8, glucose of 206. Serum albumin of 3.2.   The patient's  old records were reviewed. Echocardiogram in 02/2019 showed ejection  fraction of 25%. Urine protein-creatinine ratio back in 12/2018 showed  1.3 gm of proteinuria per gram of creatinine. Ultrasound showed right kidney of 11.1 cm, left kidney of 11.4 cm with  Bosniak 1 cyst seen back in 10/2018. Chest x-ray done on admission shows bilateral pleural effusions with  opacity suggesting atelectasis. In summary, a 29-year-old male with history of chronic systolic  congestive heart failure with ejection fraction of 25%; coronary artery  disease, status post coronary artery bypass graft; diabetic nephropathy;  chronic kidney disease, stage 3, who was admitted with complaints of  shortness of breath progressively worsening associated with some nausea  and bilateral lower extremity swelling. He was admitted with a  diagnosis of acute-on-chronic systolic congestive heart failure. ASSESSMENT/PLAN:  1. Hypervolemia secondary to congestive heart failure of systolic  dysfunction. The patient essentially being admitted for  acute-on-chronic systolic congestive heart failure exacerbation. At  this time, my recommendation would be to continue with Bumex 2 mg every  8 hours to optimize his volume status. We will place him on a low-salt  and a fluid-restricted diet. We will employ metolazone for sequential  nephron blockade. Continue with IV Bumex for now. The patient has  responded overnight with diuretics. He is urinating well. Continue  with IV diuretics for now. We may also need to consider IV Dobutrex and  possibly Cardiology evaluation. 2.  Chronic kidney disease, stage 3, multifactorial secondary to  diabetic nephropathy as well as chronic cardiorenal syndrome. Continue  with IV diuretics to optimize his volume status. Monitor his renal  function. Monitor his electrolytes closely while he is on IV diuretics.   3.  Coronary artery disease, status post coronary artery bypass graft in  01/2019.  4.  History of diabetic nephropathy. 5.  Urinary retention, now has chronic Salinas catheter. 6.  Insulin-dependent diabetes mellitus with diabetic nephropathy. Again at this time, plan is to continue with IV Bumex and metolazone,  keep him on low-salt and fluid-restricted diet. Consider Cardiology  evaluation. He currently has a LifeVest in place due to systolic  cardiomyopathy. .  Plan of care was reviewed with the patient, discussed  with the nursing staff in the room. Nephrology will follow.     Thank you for allowing me to participate in the care of this patient        Eduin Morse M.D.    D: 04/26/2019 15:02:05       T: 04/26/2019 15:04:34     [email or web address]  Job#: [id number]     Doc#: [id number]    CC:

## 2019-04-26 NOTE — PLAN OF CARE
Problem: Falls - Risk of:  Goal: Will remain free from falls  Description  Will remain free from falls  Outcome: Ongoing  Note:   No falls this shift. Patient up to chair and walks to bathroom with one assist, walker and gait belt. Nonskid footwear on. Bed and chair alarms activated when appropriate. Problem: Falls - Risk of:  Goal: Absence of physical injury  Description  Absence of physical injury  Outcome: Ongoing  Note:   No physical injury this shift. Safe environment maintained. Problem: Nutrition  Goal: Optimal nutrition therapy  4/26/2019 1138 by Zeynep Valerio RN  Outcome: Ongoing  Note:   Patient tolerating a low salt diet. Problem: Risk for Impaired Skin Integrity  Goal: Tissue integrity - skin and mucous membranes  Description  Structural intactness and normal physiological function of skin and  mucous membranes. Outcome: Ongoing  Note:   Mucous membranes intact, pink, moist. See LDA for wound documentation. Care plan reviewed with patient and wife. Patient and wife verbalize understanding of the plan of care and contribute to goal setting.

## 2019-04-26 NOTE — PROGRESS NOTES
Hospitalist Progress Note    Patient:  Kyara Luque      Unit/Bed:4K-22/022-A    YOB: 1945    MRN: [id number]       Acct: [de-identified]     PCP: Caitlin James    Date of Admission: 4/25/2019    Active Hospital Problems    Diagnosis Date Noted    Heart failure exacerbated by sotalol [facility]) [I50.9] 04/25/2019       Assessment/Plan:    - Acute on chronic Systolic & diastolic CHF exacerbation/severely reduced LVEF: proBnP if anything trending down since previous admission. Started on bumex 2 mg IB BID which will continue for now. Daily Wt, fluid/salt restriction, continue w/ cardiac meds. Pt will benefit from F/U w/ CHF clinic to minimize readmissions for diuresis. - CKD stage III: Cr 1.7. Stable. Will monitor    - DM II: in 100s. CCM    - OT/PT to see    - chronic indwelling catheter           Expected discharge date:  1-2 days         Disposition:      [] Home                             [] TCU                             [] Rehab                             [] Psych                             [x] SNF                             [] [facility]                             [] Other-    Chief Complaint:   Chief Complaint   Patient presents with    Leg Swelling       Hospital Course: Patient was seen, examined and the medical chart was reviewed thoroughly today. In summary, 69 y.o.male admitted overnight for acute decompensated CHF. Subjective (past 24 hours):   feeling better. Denies CP.      Medications:  Reviewed    Infusion Medications    dextrose       Scheduled Medications    aspirin EC  81 mg Oral Daily    atorvastatin  40 mg Oral Nightly    carvedilol  12.5 mg Oral BID WC    ferrous sulfate  325 mg Oral BID WC    insulin glargine  15 Units Subcutaneous Daily    isosorbide dinitrate  10 mg Oral BID    latanoprost  1 drop Both Eyes Nightly    miconazole   Topical BID    Mirabegron ER  1 tablet Oral Daily    potassium chloride  40 mEq Oral TID    tamsulosin  0.4 mg Oral Nightly    timolol  1 drop Both Eyes Daily    zinc oxide   Topical Daily    sodium chloride flush  10 mL Intravenous 2 times per day    heparin (porcine)  5,000 Units Subcutaneous 3 times per day    insulin lispro  0-6 Units Subcutaneous TID     insulin lispro  0-3 Units Subcutaneous Nightly    bumetanide  2 mg Intravenous 3 times per day     PRN Meds: glucose, dextrose, glucagon (rDNA), dextrose, acetaminophen, nitroGLYCERIN, sodium chloride flush, magnesium hydroxide, ondansetron      Intake/Output Summary (Last 24 hours) at 4/26/2019 0802  Last data filed at 4/26/2019 0315  Gross per 24 hour   Intake 520 ml   Output 1725 ml   Net -1205 ml       Diet:  DIET LOW SODIUM 2 GM; Exam:  /60   Pulse 82   Temp 98 °F (36.7 °C) (Oral)   Resp 18   Ht 5' 11\" (1.803 m)   Wt 205 lb 0.4 oz (93 kg)   SpO2 96%   BMI 28.60 kg/m²     General appearance: A&O x3, Not ill or toxic, in no apparent distress  Neck: Supple, no JVD, no carotid bruits  Heart: Regular rhythm, bradycardic, normal S1 and S2, no rubs, murmurs or gallops  Lungs: decreased A/E at bases, no rales, rhonchi, wheezes  Abdomen: soft, non-tender, non-distended, no bruits, no masses. Salinas in-situ  Extremities: no clubbing, cyanosis or edema  Neurologic: alert and oriented x 3, cranial nerves 2-12 grossly intact, motor and sensory intact, moving all extremities  Skin: +++ bilat. pitting edema  Capillary Refill: Brisk,< 3 seconds   Peripheral Pulses: +2 palpable, equal bilaterally           Labs:   Recent Labs     04/25/19  1622 04/26/19  0525   WBC 6.8 6.6   HGB 9.6* 8.6*   HCT 33.1* 29.3*    226     Recent Labs     04/25/19 04/25/19  1622 04/26/19  0525    141 141   K 4.0 3.8 3.8   CL 98 98 99   CO2 36 32 29   BUN 49 46* 47*   CREATININE 1.8 1.8* 1.7*   CALCIUM 8.8 8.5 7.8*     Recent Labs     04/25/19  1622   AST 19   ALT 34   BILIDIR <0.2   BILITOT 0.3   ALKPHOS 97     No results for input(s): INR in the last 72 hours.   No results for input(s): Willia Beverage in the last 72 hours. Urinalysis:      Lab Results   Component Value Date    NITRU POSITIVE 02/14/2019    WBCUA 25-50 02/14/2019    BACTERIA NONE 02/14/2019    RBCUA 3-5 02/14/2019    BLOODU MODERATE 02/14/2019    SPECGRAV 1.018 02/14/2019    GLUCOSEU negative 12/13/2018       Radiology:  Xr Chest Portable    Result Date: 4/25/2019  PROCEDURE: XR CHEST PORTABLE CLINICAL INFORMATION: SOB. COMPARISON: February 20, 2019 TECHNIQUE: Portable FINDINGS: Bilateral pleural effusions with airspace opacity suggesting atelectasis or infiltrate. Median sternotomy wires. Multiple overlying electronic devices and wires. No acute osseous findings. No cardiomegaly. Bilateral pleural effusions with airspace opacity suggesting atelectasis or infiltrate. Correlate for CHF. **This report has been created using voice recognition software. It may contain minor errors which are inherent in voice recognition technology. ** Final report electronically signed by Dr. Galindo Current on 4/25/2019 4:47 PM      Diet: DIET LOW SODIUM 2 GM;    DVT prophylaxis: [] Lovenox                                 [] SCDs                                 [x] SQ Heparin                                 [] Encourage ambulation           [] Already on Anticoagulation       Code Status: Full Code      Active Hospital Problems    Diagnosis Date Noted    Heart failure exacerbated by sotalol [facility]) [I50.9] 04/25/2019           Patient and wife were updated about the treatment plan, all the questions and concerns were addressed.         Electronically signed by Marcela Grissom MD on 4/26/2019 at 8:02 AM

## 2019-04-26 NOTE — PROGRESS NOTES
Present to pt's room for \"DTPI buttocks and blisters to bilateral shins\". Pt in bed with nurse at bedside. Pt has sweatpants and depends on and it appears as if pants are adding pressure to posey catheter tubing into edematous legs. Asked pt if sweat pants and depends could be removed to prevent any skin breakdown. Pt agrees. Pt on moisture wicking chux. Pt has scattered small open areas to bilateral lower legs where it appears that blisters from fluid overload deroofed. Pt has 4 plus pitting edema noted to BLE. Cleansed wounds with normal saline and pat dry with clean gauze. Applied bordered foams to open areas. Would not recommend compression at this time. Change foams every 3 days and as needed for increased drainage. Encourage pt to keep legs elevated for edema control to BLE. Pt heels offloaded with pillows. May need to use offloading boots if pillows do not adequately offload heels from surface. Pt has open diabetic ulcer noted to right great toe. Betadine and gauze applied. Taped in place. Staff to change daily. Pt rolled to right side to assess bilateral buttocks. Pt noted to have life vest intact. Vest wires intact and moved to decrease pressure in same area on skin. No break down noted to skin under vest. Bilateral buttocks noted to have non blanchable purple skin with small open pin point areas to right buttock/sacral areas. Evolving DTPI prior to hospitalization vs chronic staining with MASD. Recommend zinc barrier cream to bilateral buttocks. Pt on [employer] dream air support surface with alternating pressure and microclimate control. Pt bed in low, call light in reach. Bilateral buttocks: DTPI evolving into stage 2 pressure injury to right buttock and DTPI bilateral buttocks vs chronic staining with MASD. No drainage noted. María wound dry and intact. Right great toe diabetic ulcer: 1cm x 1.5cm  X 0.3cm. 90% yellow, 10% black. Scant serous drainage. No odor.        Right lower leg deroofed blister: 0.8 cm x 0.1 cm x 0.1 cm. 90% yellow, 10% pink. Small serous yellow drainage. NO odor. María wound edema, dry, pink. Left lower leg deroofed blister: 1.5 cm x 1.2cm x 0.1cm. 90% yellow, 10% pink. No odor. María wound edema, dry, pink.

## 2019-04-26 NOTE — PROGRESS NOTES
Nutrition Assessment    Type and Reason for Visit: Initial, Consult(Heart Failure diet guidelines)    Nutrition Recommendations: Recommend MVI. Continue current diet. Consider add FR as pt receives at [facility]. Nutrition Assessment:   Pt. nutritionally compromised AEB wounds. At risk for further nutrition compromise r/t increased nutrient needs for wound healing; underlying medical condition (hx CABG 1/28/19, DM, CKD, CHF). Will monitor oral intake for tolerance and adequacy vs need for ONS. Reviewed low sodium diet; provided written information, RD name and number. Confirmed pt's diet with ECF (receives Cardiac, 2 gram, no added sugar diet with 1500 ml FR). Will monitor wound care evaluation vs need for additional nutrition interventions. Malnutrition Assessment:  · Malnutrition Status: No malnutrition    Nutrition Risk Level:  Moderate    Nutrient Needs:  · Estimated Daily Total Kcal: 2673-9903 kcals (20-25 kcals/kgm wt of 93 kgm)  · Estimated Daily Protein (g): 55-62 gm (0.7-0.8 gm/kgm IBW of 78 kgm)    Nutrition Diagnosis:   · Problem: Increased nutrient needs  · Etiology: related to Increased demand for energy/nutrients     Signs and symptoms:  as evidenced by Presence of wounds    Objective Information:  · Nutrition-Focused Physical Findings: denies any trouble with chewing/swallowing; 4/26: Glucose 206, BUN 40, Cr 1.8; Rx includes Bumex, Insulin  · Wound Type: Deep Tissue Injury, Wound Consult Pending(open, seeping blisters bilateral shins, DTI/old scarring on buttocks)  · Current Nutrition Therapies:  · Oral Diet Orders: 2gm Sodium, Carb Control 4 Carbs/Meal   · Oral Diet intake: %  · Oral Nutrition Supplement (ONS) Orders: None  · Anthropometric Measures:  · Ht: 5' 11\" (180.3 cm)   · Current Body Wt: 205 lb 0.4 oz (93 kg)(4/26, +3, +4 edema)  · Admission Body Wt: 205 lb 1.6 oz (93 kg)(4/26, +3, +4 edema)  · Usual Body Wt: (per pt ~200#, per ERm: 9/21/18: 205# 11.2 oz, 1/18/19: 211#

## 2019-04-26 NOTE — PROGRESS NOTES
[address] [facility] PHYSICAL THERAPY  EVALUATION  [facility] ICU STEPDOWN TELEMETRY 4K - 4K-22/022-A    Time In: 1185  Time Out: 1557  Timed Code Treatment Minutes: 15 Minutes  Minutes: 30          Date: 2019  Patient Name: Flakita Whitney,  Gender:  male        MRN: [id number]  : 1945  (69 y.o.)      Referring Practitioner: Charmaine Garber MD  Diagnosis: Heart failure exacerbated by sotalol  Additional Pertinent Hx: Per ED note, pt is a 69 y.o. male who presents to the Emergency Department with a medical history of CAD, CHF, HLD, HTN, and DM for the evaluation of lower extremity swelling. Patient was being evaluated by Dr. Ronal Lucero (GI) today when it was recommended that he come to the ED due to his presenting lower extremity swelling. The patient states to have chronic shortness of breath due to his medical history but denies any recent change with his dyspnea. He denies any cough or chest pain. His cardiologist is Dr. Cassandra Acharya. The patient's nephrologist is Dr. Juliano Harmon. Patient had a CABG x3 and mitral valve replacement/maze procedure completed on 19 by Dr. Mark Alves (cardiothoracic surgery).       Past Medical History:   Diagnosis Date    CAD (coronary artery disease)     CHF (congestive heart failure) (HCC)     Chronic kidney disease     History of blood transfusion     Hyperlipidemia     Hypertension     Proteinuria     Type II or unspecified type diabetes mellitus without mention of complication, not stated as uncontrolled      Past Surgical History:   Procedure Laterality Date    CARDIAC SURGERY      COLONOSCOPY      at [address] CORONARY ARTERY BYPASS GRAFT N/A 2019    CAB[facility] MVR / [facility]    EGD DIAGNOSTIC ONLY performed by Cresencio Garcia MD at [address] End[facility]       Restrictions/Precautions:  General Precautions, Fall Risk, Contact Precautions         Other position/activity restrictions: R foot drop, lifevest       Subjective:  Chart Reviewed: Yes  Patient assessed for rehabilitation services?: Yes  Subjective: Pt sitting up on edge of bed and agrees to therapy. General:  Overall Orientation Status: Within Functional Limits    Vision: Impaired  Vision Exceptions: Wears glasses at all times    Hearing: Within functional limits       Pain:  Denies. Social/Functional History:    Lives With: Other (comment)  Type of Home: Facility([facility]; has been in/out of hospital/ECF since 12/18)  Home Equipment: Rolling walker, Hospital bed(needs mattress for hospital bed yet)     Bathroom Equipment: Commode       ADL Assistance: Needs assistance  Homemaking Assistance: Needs assistance  Ambulation Assistance: Needs assistance  Transfer Assistance: Needs assistance          Additional Comments: Pt has been in/out of ECF/hospital since initial cardiac arrest in 12/18. Pt reported was still participating in therapy daily at [facility] and had progressed functional mobility to 180 ft with assist X1 and RW. Pt continues to require assist for ADLs at [facility]. Pt reported has R AFO ordered for foot drop, although has not received yet. Wife reported therapy days ran out on 4/25 and plans were to discharge home with [facility] therapies. Objective:     RLE AROM: Exceptions  RLE General AROM: foot drop, otherwise WFL     LLE AROM : WFL       Strength RLE: Exception  Comment: ankle DF 1/5; otherwise grossly 4-/5    Strength LLE: WFL  Comment: grossly 4/5         Sensation  Overall Sensation Status: WFL       Supine to Sit: (pt already sitting up.  Expect CGA to Min A per discussion.)    Transfers  Sit to Stand: Contact guard assistance  Stand to sit: Contact guard assistance       Ambulation 1  Surface: level tile  Device: Rolling Walker  Assistance: Contact guard assistance  Quality of Gait: slower pace, downward gaze, shorter steplengths, limited foot clearance  Distance: 25 ft Training, Home Exercise Program, Safety Education & Training, Patient/Caregiver Education & Training    Goals:  Patient goals : go home  Short term goals  Time Frame for Short term goals: 2 weeks  Short term goal 1: Pt to be Supervision for supine <> sit to get in/out of bed  Short term goal 2: Pt to be Supervision for sit <> stand to get up to ambulate  Short term goal 3: Pt to ambulate > 100 ft with RW with supervision for household distances  Long term goals  Time Frame for Long term goals : not set due to short ELOS    Evaluation Complexity: Based on the findings of patient history, examination, clinical presentation, and decision making during this evaluation, the evaluation of Scott Conn is of low complexity. AM-PAC Inpatient Mobility without Stair Climbing Raw Score : 15  AM-PAC Inpatient without Stair Climbing T-Scale Score : 43.03  Mobility Inpatient CMS 0-100% Score: 47.43  Mobility Inpatient without Stair CMS G-Code Modifier : ANASTASIA Lynch, Haroon Anderson 8

## 2019-04-27 LAB
ANION GAP SERPL CALCULATED.3IONS-SCNC: 8 MEQ/L (ref 8–16)
BUN BLDV-MCNC: 44 MG/DL (ref 7–22)
CALCIUM SERPL-MCNC: 7.9 MG/DL (ref 8.5–10.5)
CHLORIDE BLD-SCNC: 97 MEQ/L (ref 98–111)
CO2: 32 MEQ/L (ref 23–33)
CREAT SERPL-MCNC: 1.8 MG/DL (ref 0.4–1.2)
GFR SERPL CREATININE-BSD FRML MDRD: 37 ML/MIN/1.73M2
GLUCOSE BLD-MCNC: 101 MG/DL (ref 70–108)
GLUCOSE BLD-MCNC: 113 MG/DL (ref 70–108)
GLUCOSE BLD-MCNC: 145 MG/DL (ref 70–108)
GLUCOSE BLD-MCNC: 193 MG/DL (ref 70–108)
GLUCOSE BLD-MCNC: 193 MG/DL (ref 70–108)
MAGNESIUM: 1.8 MG/DL (ref 1.6–2.4)
PHOSPHORUS: 3.4 MG/DL (ref 2.4–4.7)
POTASSIUM SERPL-SCNC: 3.9 MEQ/L (ref 3.5–5.2)
PRO-BNP: ABNORMAL PG/ML (ref 0–900)
SODIUM BLD-SCNC: 137 MEQ/L (ref 135–145)

## 2019-04-27 PROCEDURE — 82948 REAGENT STRIP/BLOOD GLUCOSE: CPT

## 2019-04-27 PROCEDURE — 2709999900 HC NON-CHARGEABLE SUPPLY

## 2019-04-27 PROCEDURE — 6360000002 HC RX W HCPCS: Performed by: FAMILY MEDICINE

## 2019-04-27 PROCEDURE — 6370000000 HC RX 637 (ALT 250 FOR IP): Performed by: FAMILY MEDICINE

## 2019-04-27 PROCEDURE — 36415 COLL VENOUS BLD VENIPUNCTURE: CPT

## 2019-04-27 PROCEDURE — 2500000003 HC RX 250 WO HCPCS: Performed by: HOSPITALIST

## 2019-04-27 PROCEDURE — 2580000003 HC RX 258: Performed by: FAMILY MEDICINE

## 2019-04-27 PROCEDURE — 83880 ASSAY OF NATRIURETIC PEPTIDE: CPT

## 2019-04-27 PROCEDURE — 83735 ASSAY OF MAGNESIUM: CPT

## 2019-04-27 PROCEDURE — 99232 SBSQ HOSP IP/OBS MODERATE 35: CPT | Performed by: HOSPITALIST

## 2019-04-27 PROCEDURE — 2060000000 HC ICU INTERMEDIATE R&B

## 2019-04-27 PROCEDURE — 80048 BASIC METABOLIC PNL TOTAL CA: CPT

## 2019-04-27 PROCEDURE — 2500000003 HC RX 250 WO HCPCS: Performed by: FAMILY MEDICINE

## 2019-04-27 PROCEDURE — 84100 ASSAY OF PHOSPHORUS: CPT

## 2019-04-27 PROCEDURE — 99232 SBSQ HOSP IP/OBS MODERATE 35: CPT | Performed by: INTERNAL MEDICINE

## 2019-04-27 RX ORDER — BUMETANIDE 0.25 MG/ML
2 INJECTION, SOLUTION INTRAMUSCULAR; INTRAVENOUS EVERY 12 HOURS
Status: DISCONTINUED | OUTPATIENT
Start: 2019-04-27 | End: 2019-04-28

## 2019-04-27 RX ADMIN — INSULIN GLARGINE 15 UNITS: 100 INJECTION, SOLUTION SUBCUTANEOUS at 09:15

## 2019-04-27 RX ADMIN — HEPARIN SODIUM 5000 UNITS: 5000 INJECTION INTRAVENOUS; SUBCUTANEOUS at 22:16

## 2019-04-27 RX ADMIN — ISOSORBIDE DINITRATE 10 MG: 10 TABLET ORAL at 09:17

## 2019-04-27 RX ADMIN — BUMETANIDE 2 MG: 0.25 INJECTION INTRAMUSCULAR; INTRAVENOUS at 17:37

## 2019-04-27 RX ADMIN — POTASSIUM CHLORIDE 40 MEQ: 20 TABLET, EXTENDED RELEASE ORAL at 22:25

## 2019-04-27 RX ADMIN — ISOSORBIDE DINITRATE 10 MG: 10 TABLET ORAL at 22:23

## 2019-04-27 RX ADMIN — CARVEDILOL 12.5 MG: 6.25 TABLET, FILM COATED ORAL at 09:17

## 2019-04-27 RX ADMIN — HEPARIN SODIUM 5000 UNITS: 5000 INJECTION INTRAVENOUS; SUBCUTANEOUS at 13:19

## 2019-04-27 RX ADMIN — CARVEDILOL 12.5 MG: 6.25 TABLET, FILM COATED ORAL at 17:36

## 2019-04-27 RX ADMIN — BUMETANIDE 2 MG: 0.25 INJECTION INTRAMUSCULAR; INTRAVENOUS at 05:35

## 2019-04-27 RX ADMIN — INSULIN LISPRO 1 UNITS: 100 INJECTION, SOLUTION INTRAVENOUS; SUBCUTANEOUS at 13:19

## 2019-04-27 RX ADMIN — Medication 10 ML: at 22:16

## 2019-04-27 RX ADMIN — FERROUS SULFATE TAB 325 MG (65 MG ELEMENTAL FE) 325 MG: 325 (65 FE) TAB at 17:36

## 2019-04-27 RX ADMIN — POTASSIUM CHLORIDE 40 MEQ: 20 TABLET, EXTENDED RELEASE ORAL at 09:17

## 2019-04-27 RX ADMIN — TAMSULOSIN HYDROCHLORIDE 0.4 MG: 0.4 CAPSULE ORAL at 22:15

## 2019-04-27 RX ADMIN — MICONAZOLE NITRATE: 2 POWDER TOPICAL at 22:23

## 2019-04-27 RX ADMIN — ATORVASTATIN CALCIUM 40 MG: 40 TABLET, FILM COATED ORAL at 22:15

## 2019-04-27 RX ADMIN — INSULIN LISPRO 1 UNITS: 100 INJECTION, SOLUTION INTRAVENOUS; SUBCUTANEOUS at 22:18

## 2019-04-27 RX ADMIN — ASPIRIN 81 MG: 81 TABLET, COATED ORAL at 09:17

## 2019-04-27 RX ADMIN — MICONAZOLE NITRATE: 2 POWDER TOPICAL at 09:16

## 2019-04-27 RX ADMIN — TIMOLOL MALEATE 1 DROP: 5 SOLUTION OPHTHALMIC at 09:16

## 2019-04-27 RX ADMIN — FERROUS SULFATE TAB 325 MG (65 MG ELEMENTAL FE) 325 MG: 325 (65 FE) TAB at 09:17

## 2019-04-27 RX ADMIN — INSULIN LISPRO 1 UNITS: 100 INJECTION, SOLUTION INTRAVENOUS; SUBCUTANEOUS at 17:37

## 2019-04-27 RX ADMIN — POTASSIUM CHLORIDE 40 MEQ: 20 TABLET, EXTENDED RELEASE ORAL at 13:21

## 2019-04-27 RX ADMIN — HEPARIN SODIUM 5000 UNITS: 5000 INJECTION INTRAVENOUS; SUBCUTANEOUS at 05:35

## 2019-04-27 RX ADMIN — LATANOPROST 1 DROP: 50 SOLUTION OPHTHALMIC at 22:26

## 2019-04-27 RX ADMIN — Medication 10 ML: at 09:19

## 2019-04-27 ASSESSMENT — PAIN SCALES - GENERAL
PAINLEVEL_OUTOF10: 0

## 2019-04-27 NOTE — PLAN OF CARE
Problem: Falls - Risk of:  Goal: Will remain free from falls  Description  Will remain free from falls  4/26/2019 2252 by Santhosh Tobin RN  Outcome: Ongoing  Note:   No falls this shift. Hourly rounding in effect. Bed alarm on. Chair alarm utilized when in chair. Problem: Falls - Risk of:  Goal: Absence of physical injury  Description  Absence of physical injury  4/26/2019 2252 by Santhosh Tobin RN  Outcome: Ongoing  Note:   No injury this shift. Patient instructed on use of call light. Wife at bedside. Problem: Nutrition  Goal: Optimal nutrition therapy  4/26/2019 2252 by Santhosh Tobin RN  Outcome: Ongoing  Note:   Tolerating PO diet well. On fluid restriction. Problem: Risk for Impaired Skin Integrity  Goal: Tissue integrity - skin and mucous membranes  Description  Structural intactness and normal physiological function of skin and  mucous membranes. 4/26/2019 2252 by Santhosh Tobin RN  Outcome: Ongoing  Note:   No new skin breakdown this shift. Multiple skin injuries. Patient repositioned every two hours. Problem: Serum Glucose Level - Abnormal:  Goal: Ability to maintain appropriate glucose levels will improve  Description  Ability to maintain appropriate glucose levels will improve  Outcome: Ongoing  Note:   On SSI. Chems checked AC & HS. Problem: Infection - Methicillin-Resistant Staphylococcus Aureus Infection:  Goal: Absence of methicillin-resistant Staphylococcus aureus infection  Description  Absence of methicillin-resistant Staphylococcus aureus infection  Outcome: Ongoing  Note:   In contact precautions for VRE and MRSA. Care plan reviewed with patient and family. Patient and family verbalize understanding of the plan of care and contribute to goal setting.

## 2019-04-27 NOTE — PLAN OF CARE
Problem: Nutrition  Goal: Optimal nutrition therapy  Outcome: Met This Shift  Note:   Patient on low sodium, carb control, cardiac diet with 1800 mL fluid restriction. Patient tolerating well. Problem: Falls - Risk of:  Goal: Will remain free from falls  Description  Will remain free from falls  Outcome: Ongoing  Note:   Bed in lowest position, call light in reach, side rails up x 2, non-skid slippers on, bed/chair alarm active. No falls during shift, at this time. Patient ambulates with 1 assist and a walker. PT/OT on.      Problem: Risk for Impaired Skin Integrity  Goal: Tissue integrity - skin and mucous membranes  Description  Structural intactness and normal physiological function of skin and  mucous membranes. Outcome: Ongoing  Note:   Patient has chronic skin issues along with edema - see flowsheet for details. Problem: Serum Glucose Level - Abnormal:  Goal: Ability to maintain appropriate glucose levels will improve  Description  Ability to maintain appropriate glucose levels will improve  Outcome: Ongoing  Note:   Patient is a chem ACHS - see labs and MAR for details. Insulin given per order. Problem: Infection - Methicillin-Resistant Staphylococcus Aureus Infection:  Goal: Absence of methicillin-resistant Staphylococcus aureus infection  Description  Absence of methicillin-resistant Staphylococcus aureus infection  Outcome: Ongoing  Note:   Patient admitted (+) MRSA, VRE     Problem: Falls - Risk of:  Goal: Absence of physical injury  Description  Absence of physical injury  Outcome: Completed     Care plan reviewed with patient and wife. Patient and wife verbalize understanding of the plan of care and contribute to goal setting.

## 2019-04-27 NOTE — PROGRESS NOTES
Renal Progress Note    Assessment and Plan: 1. Stage 3 B chronic kidney disease   2. Volume overload  3. Cardiomyopathy with low EF  4. Hypertension primary  5. DM 2  6. Anemia of chronic disease   PLAN:  Reviewed labs   Reviewed medications   Metolazone 5 mg po once   AM labs   Will follow       Patient Active Problem List:     Type 2 diabetes mellitus with hypoglycemia without coma, with long-term current use of insulin ([facility])     Essential hypertension, benign     Hyperlipidemia     Cardiac arrest ([address]     NSTEMI (non-ST elevated myocardial infarction) ([address]     History of cardiac arrest     Uncontrolled type 2 diabetes mellitus with hyperglycemia (HCC)     ESRD on dialysis ([address]     Abnormal EKG     Hypocalcemia     Anemia     ARF (acute renal failure) with tubular necrosis ([facility])     Dependence on intermittent renal dialysis ([facility])     Other fluid overload     Other acute kidney failure (HCC)     CKD (chronic kidney disease), stage III ([facility])     Coronary artery disease involving native coronary artery of native heart without angina pectoris     Non-rheumatic mitral regurgitation     Encounter for continuous venovenous hemodiafiltration (CVVHD) ([facility])     Hyperkalemia     Hypomagnesemia     Hypermagnesemia     FATEMEH (acute kidney injury) ([address]     Severe malnutrition ([facility])     Hyperphosphatemia     Sepsis ([address]     Sepsis due to urinary tract infection ([address]     Septic shock (HCC)     Urinary tract infection with hematuria     Urinary retention     Leucocytosis     Severe malnutrition (HCC)     Anemia in chronic kidney disease     Ischemic cardiomyopathy     Hx of CABG     H/O maze procedure     H/O mitral valve replacement     Hypotension     Coronary artery disease involving coronary bypass graft of native heart without angina pectoris     ESRD on hemodialysis ([address]     Hyponatremia     Heart failure exacerbated by sotalol ([address]     Acute on chronic systolic (congestive) heart failure (HCC)      Subjective:   Admit Date: 4/25/2019    Interval History:   Seeing for chronic kidney disease  Awake and alert   Has no new complaint  Spouse at bedside   Stable blood pressure   Good urine output       Medications:   Scheduled Meds:   aspirin EC  81 mg Oral Daily    atorvastatin  40 mg Oral Nightly    carvedilol  12.5 mg Oral BID WC    ferrous sulfate  325 mg Oral BID     insulin glargine  15 Units Subcutaneous Daily    isosorbide dinitrate  10 mg Oral BID    latanoprost  1 drop Both Eyes Nightly    miconazole   Topical BID    Mirabegron ER  1 tablet Oral Daily    potassium chloride  40 mEq Oral TID    tamsulosin  0.4 mg Oral Nightly    timolol  1 drop Both Eyes Daily    zinc oxide   Topical Daily    sodium chloride flush  10 mL Intravenous 2 times per day    heparin (porcine)  5,000 Units Subcutaneous 3 times per day    insulin lispro  0-6 Units Subcutaneous TID     insulin lispro  0-3 Units Subcutaneous Nightly    bumetanide  2 mg Intravenous 3 times per day     Continuous Infusions:   dextrose         CBC:   Recent Labs     04/25/19 1622 04/26/19  0525   WBC 6.8 6.6   HGB 9.6* 8.6*    226     CMP:    Recent Labs     04/25/19 1622 04/26/19  0525 04/27/19  0549    141 137   K 3.8 3.8 3.9   CL 98 99 97*   CO2 32 29 32   BUN 46* 47* 44*   CREATININE 1.8* 1.7* 1.8*   GLUCOSE 117* 206* 101   CALCIUM 8.5 7.8* 7.9*   LABGLOM 37* 40* 37*     Troponin: No results for input(s): TROPONINI in the last 72 hours. BNP: No results for input(s): BNP in the last 72 hours. INR: No results for input(s): INR in the last 72 hours. Lipids:   Recent Labs     04/25/19 1622 04/26/19  0525   CHOL  --  97*   TRIG  --  116   HDL  --  34   LIPASE 38.0  --      Liver:   Recent Labs     04/25/19 1622   AST 19   ALT 34   ALKPHOS 97   PROT 6.9   LABALBU 3.2*   BILITOT 0.3     Iron:  No results for input(s): IRONS, FERRITIN in the last 72 hours.     Invalid input(s): LABIRONS    Objective:   Vitals: BP (!) 135/111   Pulse 87

## 2019-04-27 NOTE — PROGRESS NOTES
Hospitalist Progress Note    Patient:  Declan Haskins      Unit/Bed:4K-22/022-A    YOB: 1945    MRN: [id number]       Acct: [de-identified]     PCP: Bhavik Arana    Date of Admission: 4/25/2019    Active Hospital Problems    Diagnosis Date Noted    Acute on chronic systolic (congestive) heart failure (HCC) [I50.23]     Heart failure exacerbated by sotalol ([address] [I50.9] 04/25/2019       Assessment/Plan:    - Acute on chronic Systolic & diastolic CHF exacerbation/severely reduced LVEF: proBnP if anything trending down since previous admission. Started on bumex 2 mg IB BID which will continue for now. Daily Wt, fluid/salt restriction, continue w/ cardiac meds. Pt will benefit from F/U w/ CHF clinic to minimize readmissions for diuresis. 4/28: diuresing well. Net Wt loss 4 Ibs. Also given Metolazone 5 mg once by nephro today. Decreased IV bumex dose to BID from TID. Will aim to switch back to PO tomorrow and possibly discharge    - CKD stage III: Cr 1.7. Stable. Will monitor  4/28: 1.8 today. Will monitor    - DM II: in 100s. CCM    - OT/PT following    - chronic indwelling catheter     - Seen by dietitian        Expected discharge date:  1-2 days         Disposition:      [] Home                             [] TCU                             [] Rehab                             [] Psych                             [x] SNF                             [] [place]                             [] Other-    Chief Complaint:   Chief Complaint   Patient presents with    Leg Swelling       Hospital Course: Patient was seen, examined and the medical chart was reviewed thoroughly today. In summary, 69 y.o.male admitted overnight for acute decompensated CHF. Subjective (past 24 hours):   feeling better.  Denies CP.     4/27: no new issues    Medications:  Reviewed    Infusion Medications    dextrose       Scheduled Medications    bumetanide  2 mg Intravenous Q12H    aspirin EC  81 mg Oral Daily    atorvastatin  40 mg Oral Nightly    carvedilol  12.5 mg Oral BID     ferrous sulfate  325 mg Oral BID     insulin glargine  15 Units Subcutaneous Daily    isosorbide dinitrate  10 mg Oral BID    latanoprost  1 drop Both Eyes Nightly    miconazole   Topical BID    Mirabegron ER  1 tablet Oral Daily    potassium chloride  40 mEq Oral TID    tamsulosin  0.4 mg Oral Nightly    timolol  1 drop Both Eyes Daily    zinc oxide   Topical Daily    sodium chloride flush  10 mL Intravenous 2 times per day    heparin (porcine)  5,000 Units Subcutaneous 3 times per day    insulin lispro  0-6 Units Subcutaneous TID     insulin lispro  0-3 Units Subcutaneous Nightly     PRN Meds: glucose, dextrose, glucagon (rDNA), dextrose, acetaminophen, nitroGLYCERIN, sodium chloride flush, magnesium hydroxide, ondansetron      Intake/Output Summary (Last 24 hours) at 4/27/2019 1319  Last data filed at 4/27/2019 0919  Gross per 24 hour   Intake 1125 ml   Output 1950 ml   Net -825 ml       Diet:  DIET LOW SODIUM 2 GM; Carb Control: 4 carb choices (60 gms)/meal; 1800 ml; Cardiac    Exam:  /74   Pulse 76   Temp 97.7 °F (36.5 °C) (Oral)   Resp 19   Ht 5' 11\" (1.803 m)   Wt 205 lb 1.6 oz (93 kg)   SpO2 98%   BMI 28.61 kg/m²     General appearance: A&O x3, Not ill or toxic, in no apparent distress  Neck: Supple, no JVD, no carotid bruits  Heart: Regular rhythm, bradycardic, normal S1 and S2, no rubs, murmurs or gallops  Lungs: decreased A/E at bases, no rales, rhonchi, wheezes  Abdomen: soft, non-tender, non-distended, no bruits, no masses. [place] in-situ  Extremities: no clubbing, cyanosis or edema  Neurologic: alert and oriented x 3, cranial nerves 2-12 grossly intact, motor and sensory intact, moving all extremities  Skin: +++ bilat.  pitting edema improving  Capillary Refill: Brisk,< 3 seconds   Peripheral Pulses: +2 palpable, equal bilaterally           Labs:   Recent Labs     04/25/19  6582  Heart failure exacerbated by sotalol [facility]) [I50.9] 04/25/2019           Patient and wife were updated about the treatment plan, all the questions and concerns were addressed.         Electronically signed by Bon Terrell MD on 4/27/2019 at 1:19 PM

## 2019-04-28 LAB
ANION GAP SERPL CALCULATED.3IONS-SCNC: 13 MEQ/L (ref 8–16)
BUN BLDV-MCNC: 48 MG/DL (ref 7–22)
CALCIUM SERPL-MCNC: 7.9 MG/DL (ref 8.5–10.5)
CHLORIDE BLD-SCNC: 95 MEQ/L (ref 98–111)
CO2: 31 MEQ/L (ref 23–33)
CREAT SERPL-MCNC: 1.8 MG/DL (ref 0.4–1.2)
GFR SERPL CREATININE-BSD FRML MDRD: 37 ML/MIN/1.73M2
GLUCOSE BLD-MCNC: 110 MG/DL (ref 70–108)
GLUCOSE BLD-MCNC: 156 MG/DL (ref 70–108)
GLUCOSE BLD-MCNC: 169 MG/DL (ref 70–108)
GLUCOSE BLD-MCNC: 212 MG/DL (ref 70–108)
GLUCOSE BLD-MCNC: 93 MG/DL (ref 70–108)
MAGNESIUM: 1.8 MG/DL (ref 1.6–2.4)
MRSA SCREEN: NORMAL
POTASSIUM SERPL-SCNC: 3.6 MEQ/L (ref 3.5–5.2)
SODIUM BLD-SCNC: 139 MEQ/L (ref 135–145)

## 2019-04-28 PROCEDURE — 99232 SBSQ HOSP IP/OBS MODERATE 35: CPT | Performed by: HOSPITALIST

## 2019-04-28 PROCEDURE — 99231 SBSQ HOSP IP/OBS SF/LOW 25: CPT | Performed by: INTERNAL MEDICINE

## 2019-04-28 PROCEDURE — 2500000003 HC RX 250 WO HCPCS: Performed by: HOSPITALIST

## 2019-04-28 PROCEDURE — 2060000000 HC ICU INTERMEDIATE R&B

## 2019-04-28 PROCEDURE — 6370000000 HC RX 637 (ALT 250 FOR IP): Performed by: FAMILY MEDICINE

## 2019-04-28 PROCEDURE — 82948 REAGENT STRIP/BLOOD GLUCOSE: CPT

## 2019-04-28 PROCEDURE — 6360000002 HC RX W HCPCS: Performed by: FAMILY MEDICINE

## 2019-04-28 PROCEDURE — 2580000003 HC RX 258: Performed by: FAMILY MEDICINE

## 2019-04-28 PROCEDURE — 80048 BASIC METABOLIC PNL TOTAL CA: CPT

## 2019-04-28 PROCEDURE — 83735 ASSAY OF MAGNESIUM: CPT

## 2019-04-28 PROCEDURE — 36415 COLL VENOUS BLD VENIPUNCTURE: CPT

## 2019-04-28 PROCEDURE — 2709999900 HC NON-CHARGEABLE SUPPLY

## 2019-04-28 PROCEDURE — 6370000000 HC RX 637 (ALT 250 FOR IP): Performed by: HOSPITALIST

## 2019-04-28 RX ORDER — BUMETANIDE 1 MG/1
2 TABLET ORAL 2 TIMES DAILY
Status: DISCONTINUED | OUTPATIENT
Start: 2019-04-28 | End: 2019-04-29 | Stop reason: HOSPADM

## 2019-04-28 RX ADMIN — HEPARIN SODIUM 5000 UNITS: 5000 INJECTION INTRAVENOUS; SUBCUTANEOUS at 15:58

## 2019-04-28 RX ADMIN — TIMOLOL MALEATE 1 DROP: 5 SOLUTION OPHTHALMIC at 08:29

## 2019-04-28 RX ADMIN — HEPARIN SODIUM 5000 UNITS: 5000 INJECTION INTRAVENOUS; SUBCUTANEOUS at 05:46

## 2019-04-28 RX ADMIN — FERROUS SULFATE TAB 325 MG (65 MG ELEMENTAL FE) 325 MG: 325 (65 FE) TAB at 15:59

## 2019-04-28 RX ADMIN — MICONAZOLE NITRATE: 2 POWDER TOPICAL at 08:29

## 2019-04-28 RX ADMIN — Medication 10 ML: at 21:13

## 2019-04-28 RX ADMIN — POTASSIUM CHLORIDE 40 MEQ: 20 TABLET, EXTENDED RELEASE ORAL at 08:29

## 2019-04-28 RX ADMIN — POTASSIUM CHLORIDE 40 MEQ: 20 TABLET, EXTENDED RELEASE ORAL at 15:59

## 2019-04-28 RX ADMIN — ATORVASTATIN CALCIUM 40 MG: 40 TABLET, FILM COATED ORAL at 21:12

## 2019-04-28 RX ADMIN — MICONAZOLE NITRATE: 2 POWDER TOPICAL at 21:12

## 2019-04-28 RX ADMIN — TAMSULOSIN HYDROCHLORIDE 0.4 MG: 0.4 CAPSULE ORAL at 21:12

## 2019-04-28 RX ADMIN — POTASSIUM CHLORIDE 40 MEQ: 20 TABLET, EXTENDED RELEASE ORAL at 21:12

## 2019-04-28 RX ADMIN — INSULIN LISPRO 1 UNITS: 100 INJECTION, SOLUTION INTRAVENOUS; SUBCUTANEOUS at 22:05

## 2019-04-28 RX ADMIN — INSULIN LISPRO 1 UNITS: 100 INJECTION, SOLUTION INTRAVENOUS; SUBCUTANEOUS at 18:42

## 2019-04-28 RX ADMIN — ISOSORBIDE DINITRATE 10 MG: 10 TABLET ORAL at 08:29

## 2019-04-28 RX ADMIN — BUMETANIDE 2 MG: 0.25 INJECTION INTRAMUSCULAR; INTRAVENOUS at 05:49

## 2019-04-28 RX ADMIN — CARVEDILOL 12.5 MG: 6.25 TABLET, FILM COATED ORAL at 15:59

## 2019-04-28 RX ADMIN — INSULIN LISPRO 2 UNITS: 100 INJECTION, SOLUTION INTRAVENOUS; SUBCUTANEOUS at 12:43

## 2019-04-28 RX ADMIN — FERROUS SULFATE TAB 325 MG (65 MG ELEMENTAL FE) 325 MG: 325 (65 FE) TAB at 08:29

## 2019-04-28 RX ADMIN — INSULIN GLARGINE 15 UNITS: 100 INJECTION, SOLUTION SUBCUTANEOUS at 08:28

## 2019-04-28 RX ADMIN — ISOSORBIDE DINITRATE 10 MG: 10 TABLET ORAL at 21:12

## 2019-04-28 RX ADMIN — CARVEDILOL 12.5 MG: 6.25 TABLET, FILM COATED ORAL at 08:29

## 2019-04-28 RX ADMIN — LATANOPROST 1 DROP: 50 SOLUTION OPHTHALMIC at 22:10

## 2019-04-28 RX ADMIN — Medication 10 ML: at 08:32

## 2019-04-28 RX ADMIN — ASPIRIN 81 MG: 81 TABLET, COATED ORAL at 08:29

## 2019-04-28 RX ADMIN — BUMETANIDE 2 MG: 1 TABLET ORAL at 18:42

## 2019-04-28 ASSESSMENT — PAIN SCALES - GENERAL
PAINLEVEL_OUTOF10: 0
PAINLEVEL_OUTOF10: 0

## 2019-04-28 NOTE — PROGRESS NOTES
Renal Progress Note    Assessment and Plan: 1. Stage 3 B chronic kidney disease stable  2. Volume overload better  3. Cardiomyopathy with low EF  4. Hypertension primarycontrolled  5. DM 2  6. Anemia of chronic disease   PLAN:  Reviewed labs   Reviewed medications   No metolazone today  AM labs   Will follow       Patient Active Problem List:     Type 2 diabetes mellitus with hypoglycemia without coma, with long-term current use of insulin (HCC)     Essential hypertension, benign     Hyperlipidemia     Cardiac arrest ([address]     NSTEMI (non-ST elevated myocardial infarction) ([address]     History of cardiac arrest     Uncontrolled type 2 diabetes mellitus with hyperglycemia ([facility])     ESRD on dialysis ([address]     Abnormal EKG     Hypocalcemia     Anemia     ARF (acute renal failure) with tubular necrosis ([facility])     Dependence on intermittent renal dialysis ([facility])     Other fluid overload     Other acute kidney failure ([facility])     CKD (chronic kidney disease), stage III ([facility])     Coronary artery disease involving native coronary artery of native heart without angina pectoris     Non-rheumatic mitral regurgitation     Encounter for continuous venovenous hemodiafiltration (CVVHD) ([facility])     Hyperkalemia     Hypomagnesemia     Hypermagnesemia     FATEMEH (acute kidney injury) ([address]     Severe malnutrition ([facility])     Hyperphosphatemia     Sepsis ([address]     Sepsis due to urinary tract infection ([address]     Septic shock ([facility])     Urinary tract infection with hematuria     Urinary retention     Leucocytosis     Severe malnutrition ([facility])     Anemia in chronic kidney disease     Ischemic cardiomyopathy     Hx of CABG     H/O maze procedure     H/O mitral valve replacement     Hypotension     Coronary artery disease involving coronary bypass graft of native heart without angina pectoris     ESRD on hemodialysis ([address]     Hyponatremia     Heart failure exacerbated by sotalol ([facility])     Acute on chronic systolic (congestive) heart failure [facility])      Subjective:   Admit Date: 4/25/2019    Interval History:   Seeing for chronic kidney disease  Comfortably asleep  Stable blood pressure   Good urine output       Medications:   Scheduled Meds:   bumetanide  2 mg Intravenous Q12H    aspirin EC  81 mg Oral Daily    atorvastatin  40 mg Oral Nightly    carvedilol  12.5 mg Oral BID WC    ferrous sulfate  325 mg Oral BID     insulin glargine  15 Units Subcutaneous Daily    isosorbide dinitrate  10 mg Oral BID    latanoprost  1 drop Both Eyes Nightly    miconazole   Topical BID    Mirabegron ER  1 tablet Oral Daily    potassium chloride  40 mEq Oral TID    tamsulosin  0.4 mg Oral Nightly    timolol  1 drop Both Eyes Daily    zinc oxide   Topical Daily    sodium chloride flush  10 mL Intravenous 2 times per day    heparin (porcine)  5,000 Units Subcutaneous 3 times per day    insulin lispro  0-6 Units Subcutaneous TID     insulin lispro  0-3 Units Subcutaneous Nightly     Continuous Infusions:   dextrose         CBC:   Recent Labs     04/25/19  1622 04/26/19  0525   WBC 6.8 6.6   HGB 9.6* 8.6*    226     CMP:    Recent Labs     04/26/19  0525 04/27/19  0549 04/28/19  0550    137 139   K 3.8 3.9 3.6   CL 99 97* 95*   CO2 29 32 31   BUN 47* 44* 48*   CREATININE 1.7* 1.8* 1.8*   GLUCOSE 206* 101 93   CALCIUM 7.8* 7.9* 7.9*   LABGLOM 40* 37* 37*     Troponin: No results for input(s): TROPONINI in the last 72 hours. BNP: No results for input(s): BNP in the last 72 hours. INR: No results for input(s): INR in the last 72 hours. Lipids:   Recent Labs     04/25/19  1622 04/26/19  0525   CHOL  --  97*   TRIG  --  116   HDL  --  34   LIPASE 38.0  --      Liver:   Recent Labs     04/25/19 1622   AST 19   ALT 34   ALKPHOS 97   PROT 6.9   LABALBU 3.2*   BILITOT 0.3     Iron:  No results for input(s): IRONS, FERRITIN in the last 72 hours.     Invalid input(s): LABIRONS    Objective:   Vitals: BP (!) 147/56   Pulse 84   Temp 97.5 °F (36.4 °C) (Oral)   Resp 16   Ht 5' 11\" (1.803 m)   Wt 206 lb 14.4 oz (93.8 kg)   SpO2 98%   BMI 28.86 kg/m²    Wt Readings from Last 3 Encounters:   04/28/19 206 lb 14.4 oz (93.8 kg)   04/25/19 209 lb (94.8 kg)   04/24/19 215 lb (97.5 kg)      24HR INTAKE/OUTPUT:      Intake/Output Summary (Last 24 hours) at 4/28/2019 0836  Last data filed at 4/28/2019 6423  Gross per 24 hour   Intake 960 ml   Output 2300 ml   Net -1340 ml       Constitutional:  Sleeping  Skin:normal   HEENT:Pupils are reactive . Throat is clear   Neck:supple with no thyromegaly  Cardiovascular:  S1, S2 without murmur  Respiratory:  Decreased breath sound and life vest noted   Abdomen: +bs, soft,   Ext: + LE edema  Musculoskeletal:Intact  Neuro:Deferred      Electronically signed by José Bang MD on 4/28/2019 at 8:36 AM

## 2019-04-28 NOTE — PROGRESS NOTES
dextrose       Scheduled Medications    bumetanide  2 mg Oral BID    aspirin EC  81 mg Oral Daily    atorvastatin  40 mg Oral Nightly    carvedilol  12.5 mg Oral BID WC    ferrous sulfate  325 mg Oral BID     insulin glargine  15 Units Subcutaneous Daily    isosorbide dinitrate  10 mg Oral BID    latanoprost  1 drop Both Eyes Nightly    miconazole   Topical BID    Mirabegron ER  1 tablet Oral Daily    potassium chloride  40 mEq Oral TID    tamsulosin  0.4 mg Oral Nightly    timolol  1 drop Both Eyes Daily    zinc oxide   Topical Daily    sodium chloride flush  10 mL Intravenous 2 times per day    heparin (porcine)  5,000 Units Subcutaneous 3 times per day    insulin lispro  0-6 Units Subcutaneous TID WC    insulin lispro  0-3 Units Subcutaneous Nightly     PRN Meds: glucose, dextrose, glucagon (rDNA), dextrose, acetaminophen, nitroGLYCERIN, sodium chloride flush, magnesium hydroxide, ondansetron      Intake/Output Summary (Last 24 hours) at 4/28/2019 0843  Last data filed at 4/28/2019 9947  Gross per 24 hour   Intake 960 ml   Output 2300 ml   Net -1340 ml       Diet:  DIET LOW SODIUM 2 GM; Carb Control: 4 carb choices (60 gms)/meal; 1800 ml; Cardiac    Exam:  BP (!) 147/56   Pulse 84   Temp 97.5 °F (36.4 °C) (Oral)   Resp 16   Ht 5' 11\" (1.803 m)   Wt 206 lb 14.4 oz (93.8 kg)   SpO2 98%   BMI 28.86 kg/m²     General appearance: A&O x3, Not ill or toxic, in no apparent distress  Neck: Supple, no JVD, no carotid bruits  Heart: Regular rhythm, bradycardic, normal S1 and S2, no rubs, murmurs or gallops  Lungs: decreased A/E at bases, no rales, rhonchi, wheezes  Abdomen: soft, non-tender, non-distended, no bruits, no masses. [place] in-situ  Extremities: no clubbing, cyanosis or edema  Neurologic: alert and oriented x 3, cranial nerves 2-12 grossly intact, motor and sensory intact, moving all extremities  Skin: +++ bilat.  pitting edema improving  Capillary Refill: Brisk,< 3 seconds   Peripheral Pulses: +2 palpable, equal bilaterally           Labs:   Recent Labs     04/25/19  1622 04/26/19  0525   WBC 6.8 6.6   HGB 9.6* 8.6*   HCT 33.1* 29.3*    226     Recent Labs     04/26/19  0525 04/27/19  0549 04/28/19  0550    137 139   K 3.8 3.9 3.6   CL 99 97* 95*   CO2 29 32 31   BUN 47* 44* 48*   CREATININE 1.7* 1.8* 1.8*   CALCIUM 7.8* 7.9* 7.9*   PHOS  --  3.4  --      Recent Labs     04/25/19  1622   AST 19   ALT 34   BILIDIR <0.2   BILITOT 0.3   ALKPHOS 97     No results for input(s): INR in the last 72 hours. No results for input(s): Diane Halon in the last 72 hours. Urinalysis:      Lab Results   Component Value Date    NITRU POSITIVE 02/14/2019    WBCUA 25-50 02/14/2019    BACTERIA NONE 02/14/2019    RBCUA 3-5 02/14/2019    BLOODU MODERATE 02/14/2019    SPECGRAV 1.018 02/14/2019    GLUCOSEU negative 12/13/2018       Radiology:  Xr Chest Portable    Result Date: 4/25/2019  PROCEDURE: XR CHEST PORTABLE CLINICAL INFORMATION: SOB. COMPARISON: February 20, 2019 TECHNIQUE: Portable FINDINGS: Bilateral pleural effusions with airspace opacity suggesting atelectasis or infiltrate. Median sternotomy wires. Multiple overlying electronic devices and wires. No acute osseous findings. No cardiomegaly. Bilateral pleural effusions with airspace opacity suggesting atelectasis or infiltrate. Correlate for CHF. **This report has been created using voice recognition software. It may contain minor errors which are inherent in voice recognition technology. ** Final report electronically signed by Dr. Arian Chi on 4/25/2019 4:47 PM      Diet: DIET LOW SODIUM 2 GM; Carb Control: 4 carb choices (60 gms)/meal; 1800 ml; Cardiac    DVT prophylaxis: [] Lovenox                                 [] SCDs                                 [x] SQ Heparin                                 [] Encourage ambulation           [] Already on Anticoagulation       Code Status: Full Code      Active Hospital Problems

## 2019-04-28 NOTE — PLAN OF CARE
Problem: Falls - Risk of:  Goal: Will remain free from falls  Description  Will remain free from falls  4/27/2019 2250 by Oliver Disla RN  Outcome: Ongoing  Note:   Patient is absent of falls this shift. Problem: Nutrition  Goal: Optimal nutrition therapy  4/27/2019 2250 by Oliver Disla RN  Outcome: Ongoing  Note:   Patient understands and has had optimal therapy. Problem: Risk for Impaired Skin Integrity  Goal: Tissue integrity - skin and mucous membranes  Description  Structural intactness and normal physiological function of skin and  mucous membranes. 4/27/2019 2250 by Oliver Disla RN  Outcome: Ongoing  Note:   Patient is being encouraged to turn and shift while in chair. Patient has redness to bottom that is blancheable chair cushion in place. Will continue to monitor. Problem: Serum Glucose Level - Abnormal:  Goal: Ability to maintain appropriate glucose levels will improve  Description  Ability to maintain appropriate glucose levels will improve  4/27/2019 2250 by Oliver Disla RN  Note:   Continuing to monitor patients glucose levels and giving coverage per [facility] Problem: Infection - Methicillin-Resistant Staphylococcus Aureus Infection:  Goal: Absence of methicillin-resistant Staphylococcus aureus infection  Description  Absence of methicillin-resistant Staphylococcus aureus infection  4/27/2019 2250 by Oliver Disla RN  Outcome: Ongoing  Note:   Patient remains in contact precautions per order. Care plan reviewed with patient. Patient verbalize understanding of the plan of care and contribute to goal setting.

## 2019-04-29 VITALS
HEIGHT: 71 IN | SYSTOLIC BLOOD PRESSURE: 114 MMHG | BODY MASS INDEX: 28.5 KG/M2 | RESPIRATION RATE: 25 BRPM | TEMPERATURE: 97.5 F | HEART RATE: 70 BPM | WEIGHT: 203.6 LBS | DIASTOLIC BLOOD PRESSURE: 58 MMHG | OXYGEN SATURATION: 95 %

## 2019-04-29 LAB
ANION GAP SERPL CALCULATED.3IONS-SCNC: 12 MEQ/L (ref 8–16)
BUN BLDV-MCNC: 48 MG/DL (ref 7–22)
CALCIUM SERPL-MCNC: 8.2 MG/DL (ref 8.5–10.5)
CHLORIDE BLD-SCNC: 94 MEQ/L (ref 98–111)
CO2: 32 MEQ/L (ref 23–33)
CREAT SERPL-MCNC: 1.8 MG/DL (ref 0.4–1.2)
GFR SERPL CREATININE-BSD FRML MDRD: 37 ML/MIN/1.73M2
GLUCOSE BLD-MCNC: 107 MG/DL (ref 70–108)
GLUCOSE BLD-MCNC: 142 MG/DL (ref 70–108)
GLUCOSE BLD-MCNC: 91 MG/DL (ref 70–108)
MAGNESIUM: 1.8 MG/DL (ref 1.6–2.4)
POTASSIUM SERPL-SCNC: 3.8 MEQ/L (ref 3.5–5.2)
PRO-BNP: ABNORMAL PG/ML (ref 0–900)
SODIUM BLD-SCNC: 138 MEQ/L (ref 135–145)

## 2019-04-29 PROCEDURE — 83880 ASSAY OF NATRIURETIC PEPTIDE: CPT

## 2019-04-29 PROCEDURE — 99232 SBSQ HOSP IP/OBS MODERATE 35: CPT | Performed by: HOSPITALIST

## 2019-04-29 PROCEDURE — 6370000000 HC RX 637 (ALT 250 FOR IP): Performed by: FAMILY MEDICINE

## 2019-04-29 PROCEDURE — 97110 THERAPEUTIC EXERCISES: CPT

## 2019-04-29 PROCEDURE — 80048 BASIC METABOLIC PNL TOTAL CA: CPT

## 2019-04-29 PROCEDURE — 82948 REAGENT STRIP/BLOOD GLUCOSE: CPT

## 2019-04-29 PROCEDURE — 2709999900 HC NON-CHARGEABLE SUPPLY

## 2019-04-29 PROCEDURE — 6370000000 HC RX 637 (ALT 250 FOR IP): Performed by: HOSPITALIST

## 2019-04-29 PROCEDURE — 83735 ASSAY OF MAGNESIUM: CPT

## 2019-04-29 PROCEDURE — 99232 SBSQ HOSP IP/OBS MODERATE 35: CPT | Performed by: INTERNAL MEDICINE

## 2019-04-29 PROCEDURE — 6360000002 HC RX W HCPCS: Performed by: FAMILY MEDICINE

## 2019-04-29 PROCEDURE — 36415 COLL VENOUS BLD VENIPUNCTURE: CPT

## 2019-04-29 PROCEDURE — 97116 GAIT TRAINING THERAPY: CPT

## 2019-04-29 RX ORDER — METOLAZONE 2.5 MG/1
2.5 TABLET ORAL EVERY OTHER DAY
Qty: 30 TABLET | Refills: 1 | Status: SHIPPED | OUTPATIENT
Start: 2019-04-29 | End: 2019-06-21 | Stop reason: DRUGHIGH

## 2019-04-29 RX ORDER — METOLAZONE 2.5 MG/1
2.5 TABLET ORAL DAILY
Qty: 30 TABLET | Refills: 1 | Status: SHIPPED | OUTPATIENT
Start: 2019-04-29 | End: 2019-04-29 | Stop reason: SDUPTHER

## 2019-04-29 RX ADMIN — TIMOLOL MALEATE 1 DROP: 5 SOLUTION OPHTHALMIC at 08:19

## 2019-04-29 RX ADMIN — BUMETANIDE 2 MG: 1 TABLET ORAL at 08:18

## 2019-04-29 RX ADMIN — MICONAZOLE NITRATE: 2 POWDER TOPICAL at 08:19

## 2019-04-29 RX ADMIN — HEPARIN SODIUM 5000 UNITS: 5000 INJECTION INTRAVENOUS; SUBCUTANEOUS at 06:17

## 2019-04-29 RX ADMIN — POTASSIUM CHLORIDE 40 MEQ: 20 TABLET, EXTENDED RELEASE ORAL at 13:58

## 2019-04-29 RX ADMIN — INSULIN LISPRO 1 UNITS: 100 INJECTION, SOLUTION INTRAVENOUS; SUBCUTANEOUS at 12:46

## 2019-04-29 RX ADMIN — FERROUS SULFATE TAB 325 MG (65 MG ELEMENTAL FE) 325 MG: 325 (65 FE) TAB at 08:18

## 2019-04-29 RX ADMIN — CARVEDILOL 12.5 MG: 6.25 TABLET, FILM COATED ORAL at 08:18

## 2019-04-29 RX ADMIN — POTASSIUM CHLORIDE 40 MEQ: 20 TABLET, EXTENDED RELEASE ORAL at 08:18

## 2019-04-29 RX ADMIN — INSULIN GLARGINE 15 UNITS: 100 INJECTION, SOLUTION SUBCUTANEOUS at 08:18

## 2019-04-29 RX ADMIN — ISOSORBIDE DINITRATE 10 MG: 10 TABLET ORAL at 08:18

## 2019-04-29 RX ADMIN — ASPIRIN 81 MG: 81 TABLET, COATED ORAL at 08:18

## 2019-04-29 ASSESSMENT — PAIN SCALES - GENERAL
PAINLEVEL_OUTOF10: 0

## 2019-04-29 NOTE — PROGRESS NOTES
A home oxygen evaluation has been completed. [x]Patient is an inpatient. It is expected that the patient will be discharged within the next 48 hours. Qualified provider to write order for home prescription if patient qualifies. Social service/care managers will arrange for home oxygen. If patient is active, arrange for Home Medical supplier to assess for Oxygen Conserving Device per pulse oximetry. []Patient is an outpatient. Results will be faxed to the ordering provider. Qualified provider to write order for home prescription if patient qualifies and arranges for home oxygen. Patient was found on room air . SpO2 was 93 % on room air at rest. Patients SpO2 was 89% or above and did not qualify for home oxygen. Patient was walked for 6 minutes. SpO2 was 90 % during walking. Patients SpO2 was 89% or above and did not qualify for home oxygen. Patient does not have a positive pressure airway device at home. Patient is not  diagnosed with Obstructive Sleep Apnea. Patient will not be set up/instructed on a nocturnal study. Results will be given to qualified provider. Note: For any SpO2 at 92% see policy and procedure for possible qualifications.

## 2019-04-29 NOTE — PLAN OF CARE
Problem: Falls - Risk of:  Goal: Patient had a fall this shift while up in chair. Will remain free from falls  Outcome: Ongoing  Note:   Patient is free from falls this shift. Problem: Nutrition  Goal: Optimal nutrition therapy  Outcome: Ongoing  Note:   Patient has optimal nutrition this shift. Problem: Risk for Impaired Skin Integrity  Goal: Tissue integrity - skin and mucous membranes  Description  Structural intactness and normal physiological function of skin and  mucous membranes. Outcome: Ongoing  Note:   Patient has redness and DTI on coccyx and buttocks. Having patient shift weight in chair. Patient says that he is not going to go in bed tonight and wants to remain in chair. Problem: Serum Glucose Level - Abnormal:  Goal: Ability to maintain appropriate glucose levels will improve  Description  Ability to maintain appropriate glucose levels will improve  Outcome: Ongoing  Note:   Monitoring patients glucose this shift. Problem: Infection - Methicillin-Resistant Staphylococcus Aureus Infection:  Goal: Absence of methicillin-resistant Staphylococcus aureus infection  Description  Absence of methicillin-resistant Staphylococcus aureus infection  Outcome: Ongoing  Note:   Patient remains in contact isolation. Precautions are being taken as not to spread the infection. Care plan reviewed with patient and wife. Patient and wife verbalize understanding of the plan of care and contribute to goal setting.

## 2019-04-29 NOTE — PROGRESS NOTES
 carvedilol  12.5 mg Oral BID     ferrous sulfate  325 mg Oral BID     insulin glargine  15 Units Subcutaneous Daily    isosorbide dinitrate  10 mg Oral BID    latanoprost  1 drop Both Eyes Nightly    miconazole   Topical BID    Mirabegron ER  1 tablet Oral Daily    potassium chloride  40 mEq Oral TID    tamsulosin  0.4 mg Oral Nightly    timolol  1 drop Both Eyes Daily    zinc oxide   Topical Daily    sodium chloride flush  10 mL Intravenous 2 times per day    heparin (porcine)  5,000 Units Subcutaneous 3 times per day    insulin lispro  0-6 Units Subcutaneous TID     insulin lispro  0-3 Units Subcutaneous Nightly     Meds prn: glucose, dextrose, glucagon (rDNA), dextrose, acetaminophen, nitroGLYCERIN, sodium chloride flush, magnesium hydroxide, ondansetron       Impression and Plan:  1. CKD III with volume overload secondary to acute on chronic systolic congestive heart failure exacerbation  - clinically improving, LE edema better  - diuresed well, lost 6 lbs  - continue with diuretics  - patient being discharged today, recommended oral bumex and metolazone  - fluid and salt restriction  - BMP next week, see me in 3-4 weeks. 2. Fluid overload: improved  3. Acute on chronic systolic congestive heart failure: as above  4. Hx CAD s/p CABG and MVR  5. Diabetic nephropathy  6. Chronic urinary retention: has taty  7.  IDDM    D/W patient and RN  D/W hospitalist    Alpa Samuels MD  [facility]

## 2019-04-29 NOTE — PROGRESS NOTES
Post-Fall Assessment  Date of Fall:   04/28/2019   Time of Fall:   2115   Yes No N/A Comment   Was Patient on [place] Program? [x] [] []    Was the Fall Witnessed? [x] [] []    Were Clothes a Factor? [] [x] []    Was Patient Wearing Corrective Footwear? [] [] [x]    Other Environmental Factors Involved? [x] [] []      Description of Fall  Who found the patient: witnessed by his wife beside him and Jeffrey Galloway, [address] [place] [address]  Where was the patient at the time of the fall:  Chair  Brief description of fall: Patient was sitting in chair with foot rest up. Patient leaned forward and the footrest went down and patient went forward on the floor with out hitting his head. Patient comments regarding fall: That it was his fault and the chairs and the chair need to be thrown in the garbage. Medications potentially contributing to fall risk (such as Sedatives, Hypnotics, Antihypertensives, Narcotics, Psychotropics, Anticonvulsants):   no    Patient Assessment of Injury    (Please document Vital Signs in Doc Flowsheet)     Yes No   Patient hit his/her head [] [x]   Patient is taking an anticoagulant [x] []   CT of Head requested [] [x]     Neurological Assessment Protocol: If the patient has hit his/her head during this fall,   a Neurological Assessment must be completed every 2 hours for 12 hours;   every 3 hours for 24 hours;   then every 4 hours for 24 hours. Document in Doc Flowsheets. Neurological Assessment Protocol initiated  No,  If the patient did not hit his/her head during this fall, monitor vital signs every 8 hours. Notify the physician within 24 hours and document. Physician Notification  Please document under Provider Notification group within the Assessment (Complex Assessment) template of Doc Flowsheets.      Physician notified yes    Pharmacy notified yes Time Notified: 2137    House Supervisor/Clinical Manager Notified:   yes  Date:   04/28/19 Time:   2366    Family Member Notified:   Wife in room sitting by patient when it happened and witnessed the fall.      Date:   04/28/19 Time:   2115

## 2019-04-29 NOTE — DISCHARGE SUMMARY
Hospital Medicine Discharge Summary      Patient Identification:   Devan Evans   :   MRN: [id number]   Account: [de-identified]      Patient's PCP: Harry Palma Date: 2019     Discharge Date:   2019    Admitting Physician: No admitting provider for patient encounter. Discharge Physician: Bon Terrell MD     Discharge Diagnoses: Active Hospital Problems    Diagnosis Date Noted    Acute on chronic combined systolic and diastolic CHF, NYHA class 2 ([facility] [address] [I50.43]     Heart failure exacerbated by sotalol ([facility] [address] [I50.9] 2019       The patient was seen and examined on day of discharge and this discharge summary is in conjunction with any daily progress note from day of discharge. Hospital Course:   Devan Evans is a 69 y.o. male admitted to [facility] on 2019 for acute decompensated CHF. Assessment/Plan:    - Acute on chronic Systolic & diastolic CHF exacerbation/severely reduced LVEF: proBnP if anything trending down since previous admission. Started on bumex 2 mg IB BID which will continue for now. Daily Wt, fluid/salt restriction, continue w/ cardiac meds. Pt will benefit from F/U w/ CHF clinic to minimize readmissions for diuresis.     : diuresing well. Net Wt loss 4 Ibs. Also given Metolazone 5 mg once by nephro today. Decreased IV bumex dose to BID from TID. Will aim to switch back to PO tomorrow and possibly discharge       ; switched bumex to 2 mg BID. Edema imrpoving. +++ U/O    : continues to diurese well. Added Metolazone every other day at discharge. F/U w/ CHF clinic arranged. Pt understands and appreciates that diuretic management is a dynamic process. Regimen dose to be readjusted based on volume status assessment, daily wt and serum Cr.     - CKD stage III: Cr 1.7. Stable. Will monitor  : 1.8 today. Will monitor  : Cr stable at 1.8  : stable at 1.8     - DM II: in 100s.  CCM     - OT/PT following     - chronic indwelling catheter      - Seen by dietitian      Exam:     Vitals:  Vitals:    04/28/19 2119 04/29/19 0015 04/29/19 0230 04/29/19 0240   BP: (!) 158/64 (!) 155/72  (!) 119/53   Pulse: 88 83  84   Resp: 20 23 24   Temp: 97.5 °F (36.4 °C) 97.6 °F (36.4 °C)  97.4 °F (36.3 °C)   TempSrc: Oral Oral  Oral   SpO2: 95% 99%  96%   Weight:   203 lb 9.6 oz (92.4 kg)    Height:         Weight: Weight: 203 lb 9.6 oz (92.4 kg)     24 hour intake/output:    Intake/Output Summary (Last 24 hours) at 4/29/2019 0736  Last data filed at 4/29/2019 0240  Gross per 24 hour   Intake 750 ml   Output 2275 ml   Net -1525 ml           General appearance: A&O x3, Not ill or toxic, in no apparent distress  Neck: Supple, no JVD, no carotid bruits  Heart: Regular rhythm, bradycardic, normal S1 and S2, no rubs, murmurs or gallops  Lungs: decreased A/E at bases, no rales, rhonchi, wheezes  Abdomen: soft, non-tender, non-distended, no bruits, no masses. [place] in-situ  Extremities: no clubbing, cyanosis or edema  Neurologic: alert and oriented x 3, cranial nerves 2-12 grossly intact, motor and sensory intact, moving all extremities  Skin: +++ bilat. pitting edema improving  Capillary Refill: Brisk,< 3 seconds   Peripheral Pulses: +2 palpable, equal bilaterally              Labs: For convenience and continuity at follow-up the following most recent labs are provided:      CBC:    Lab Results   Component Value Date    WBC 6.6 04/26/2019    HGB 8.6 04/26/2019    HCT 29.3 04/26/2019     04/26/2019       Renal:    Lab Results   Component Value Date     04/29/2019    K 3.8 04/29/2019    K 4.0 01/23/2019    CL 94 04/29/2019    CO2 32 04/29/2019    BUN 48 04/29/2019    CREATININE 1.8 04/29/2019    CALCIUM 8.2 04/29/2019    PHOS 3.4 04/27/2019         Significant Diagnostic Studies    Radiology:   XR CHEST PORTABLE   Final Result   Bilateral pleural effusions with airspace opacity suggesting atelectasis or infiltrate.

## 2019-04-29 NOTE — DISCHARGE INSTR - DIET
 Good nutrition is important when healing from an illness, injury, or surgery. Follow any nutrition recommendations given to you during your hospital stay.  If you were given an oral nutrition supplement while in the hospital, continue to take this supplement at home. You can take it with meals, in-between meals, and/or before bedtime. These supplements can be purchased at most local grocery stores, pharmacies, and chain super-stores.  If you have any questions about your diet or nutrition, call the hospital and ask for the dietitian. Low Sodium Diet    Sodium is a mineral found in table salt and sea salt. It is found in some foods naturally, and in additives or preservatives. Too much sodium intake can cause fluid retention and high blood pressure. Sodium intake should not exceed 2300 mg per day. Keep in mind that a teaspoon of salt is equal to 2300 mg of sodium. Salt and Sodium Savers       Use less sodium in cooking. Try citrus fruits, vinegar, black pepper, herbs, or spices to add flavor to foods because they are very low in sodium. When choosing flavorings and seasonings, make sure that your choices do not contain the words salt or sodium. For example, choose onion powder instead of onion salt, and do not choose seasoning salt. Use sodium free herb blend seasonings instead of seasonings that contain salt or sodium.  Add less sodium at the table. It may seem difficult at first, but try to use other seasonings such as pepper rather than salt at the table. Condiments and toppings such as ketchup, pickles, olives, soy sauce, and teriyaki sauce are high sodium condiments and should be used according to your specific sodium limits. Discuss this with your dietitian.  Ask your doctor before using a salt-substitute. Salt substitutes that look and taste like salt usually contain potassium.   Too much potassium can cause serious problems for people who have certain medical conditions. Some medications can require a low potassium intake.  Read food labels. Reading food labels will show you how much sodium is in the food you eat. Be sure to pay attention to serving size and servings per container. Food labels will help you compare two choices and choose the lower sodium choice.  Shop smart. When shopping, look for reduced-sodium or no-added salt on the food label. Be sure to choose lower sodium versions of canned soup, tomato sauce, and canned vegetables. If unable to buy low sodium versions, drain and rinse canned foods under running water to remove excess sodium.  Snack smart. Select fresh fruit, fresh vegetables, and unsalted versions of chips, crackers, pretzels, or nuts as snacks.  Meats and cheese vary in sodium content. Eat fresh meats, chicken, and fish instead of canned or processed items. Limit intake of lunchmeats, or cured and smoked meats such as ham, best, brats, or sausage. Cheese is generally a high sodium food choice, but processed cheeses tend to be the highest.       Be selective when eating out. When eating out, ask for food to be prepared without added salt. Also, ask for dressings or condiments on the side so you can control the amount you eat.    2 gram sodium limit and 2000 mL fluid restriction. Healthy Eating habits help your heart health. Below are general guidlelines for heart healthy choices:    Eat fruits and vegetables. They are loaded with vitamins, minerals and fiber. Eat a variety every day. .   Eat a variety of whole grain products every day. They contain a lot of fiber and nutrients. Examples of whole grains include oats, whole wheat bread, and brown rice. Eat fish at least 2 times each week. Oily fish, which contain omega-3 fatty acids, are best for your heart.  These fish include tuna, salmon, mackerel, lake trout, herring, and sardines. Limit saturated fat and cholesterol. To limit saturated fats and cholesterol, try to choose the following foods:   Lean meats and meat alternatives (chicken, fish, turkey, beans, and nuts)  Nonfat and low-fat dairy products (skim or 1% milk, low fat yogurt)  Unsaturated fats, like canola and olive oils instead of saturated fats, such as butter  Read food labels and limit the amount of trans fat you eat. Trans fat raises cholesterol. It is found in many processed foods made with shortening or with partially hydrogenated vegetable oils. These foods include cookies, crackers, chips, and many snack foods. Choose snacks with \"0\" grams of Trans fat. Choose healthy fats. Unsaturated fats, such as olive, canola and peanut oils, and nuts are part of a healthy diet. But all fats are high in calories, so limit your serving sizes. Limit salt/sodium. Choose and prepare foods with little or no salt. Use pepper or . Dash for added flavor. Limit high sodium foods like canned soups, processed meats and cheeses, and salty snack foods. Limit beverages and food with added sugars (such as regular pop, sweetened iced tea, desserts and sweets). These foods are loaded with calories but provide very little nutrients. If you drink alcohol, drink in moderation. Limit alcohol intake to 2 drinks a day for men and 1 drink a day for women. Check with your Doctor first.      Ashley Sorto are being placed on a diabetic carb counting diet. Eating healthy is the first step in controlling diabetes    Here's how to get started. ... Eat 3 meals a day. Eat your meals at the same time each day and do not skip meals. Eat about the same amount of food each day. Limit sugar and sweets. Eat less candy, desserts, pastries and jelly. Limit intake of regular pop, sugary beverages and fruit juice. Drink sugar free beverages such as diet pop, water, Crystal Light, and unsweetened tea instead.   Use Equal or Sweet-n-Low

## 2019-04-29 NOTE — CARE COORDINATION
4/26/19, 9:11 AM    DISCHARGE BARRIERS    Full assessment deferred as patient is from [facility] and plans to return. Spoke with Pat at [facility]. Patient's insurance was recently cut off and he was about to leave facility. Patient would have to private pay to go back. JOEY spoke with patient, he is unsure what he wants to do. He would like SW to speak with his spouse first. JOEY called spouse Dexter Laguerre, no answer, left a voicemail.
4/29/19, 1:30 PM  Patient and wife have chosen [facility]. SW made referral to them, spoke to [employer] [facility]. SW faxed chart information to them at [phone] and asked the nurse to fax the AVS to the same number   Discharge plan discussed by  and . Discharge plan reviewed with patient/ family. Patient/ family verbalize understanding of discharge plan and are in agreement with plan. Understanding was demonstrated using the teach back method.      Services After Discharge  Services At/After Discharge: Nursing Services, Skilled Therapy([facility])   IMM Letter  IMM Letter given to Patient/Family/Significant other/Guardian/POA/by[de-identified]   IMM Letter date given[de-identified] 04/29/19  IMM Letter time given[de-identified] 0124     4/29/19, 1:37 PM    DISCHARGE BARRIERS      SW updated Pat with [facility] of the above plan
4/29/19, 9:44 AM    DISCHARGE BARRIERS       SW spoke to patient and his wife also present. JOEY also spoke to admissions staff, Leonard Peterson, with [facility]. PTA the patient was planning to stay at the [facility] and pay privately. He now feels he can return home. Their home is one story with basement. He does not need to use the basement. His wife is able to drive and take care of all housekeeping. They have a daughter near by who could assist. He has a hospital bed, walker and life vest at home. He was never on home o2. He never had HH. He has a bath tub shower.   JOEY provided them with a list for [facility]
Other (Comment)([facility])   Support Systems:  None  Current Services PTA:     Potential Assistance Needed:  N/A  Potential Assistance Purchasing Medications:  No  Does patient want to participate in local refill/ meds to beds program?  No  Type of Home Care Services:  None  Patient expects to be discharged to:  [facility]  Expected Discharge date:  04/28/19  Follow Up Appointment: Best Day/ Time: Thursday AM    Discharge Plan: plans return [facility] (precert), therapy following     Evaluation: yes

## 2019-04-29 NOTE — PROGRESS NOTES
[facility] ICU STEPDOWN TELEMETRY 4K - 4K-22/022-A    Time In: 8243  Time Out: 1022  Timed Code Treatment Minutes: 44 Minutes  Minutes: 39          Date: 2019  Patient Name: Arun Kim,  Gender:  male        MRN: [id number]  : 1945  (69 y.o.)     Referring Practitioner: Renetta Suazo MD  Diagnosis: Heart failure exacerbated by sotalol  Additional Pertinent Hx: Per ED note, pt is a 69 y.o. male who presents to the Emergency Department with a medical history of CAD, CHF, HLD, HTN, and DM for the evaluation of lower extremity swelling. Patient was being evaluated by Dr. Daria Braga (GI) today when it was recommended that he come to the ED due to his presenting lower extremity swelling. The patient states to have chronic shortness of breath due to his medical history but denies any recent change with his dyspnea. He denies any cough or chest pain. His cardiologist is Dr. Juliet Pizarro. The patient's nephrologist is Dr. Miguel Hunter. Patient had a CABG x3 and mitral valve replacement/maze procedure completed on 19 by Dr. Lanre Edwards (cardiothoracic surgery).       Past Medical History:   Diagnosis Date    CAD (coronary artery disease)     CHF (congestive heart failure) (HCC)     Chronic kidney disease     History of blood transfusion     Hyperlipidemia     Hypertension     Proteinuria     Type II or unspecified type diabetes mellitus without mention of complication, not stated as uncontrolled      Past Surgical History:   Procedure Laterality Date    CARDIAC SURGERY      COLONOSCOPY      at [address]  CORONARY ARTERY BYPASS GRAFT N/A 2019    CABG X3 MVR / MAZE performed by Ilir Sotelo MD at [facility] N/A 2019    EGD DIAGNOSTIC ONLY performed by Екатерина De La Trore MD at [facility]       Restrictions/Precautions:  General Precautions, Fall Risk, Contact Precautions Other position/activity restrictions: R foot drop, lifevest       Prior Level of Function:  ADL Assistance: Needs assistance  Homemaking Assistance: Needs assistance  Ambulation Assistance: Needs assistance  Transfer Assistance: Needs assistance  Additional Comments: Pt has been in/out of ECF/hospital since initial cardiac arrest in 12/18. Pt reported was still participating in therapy daily at [facility] and had progressed functional mobility to 180 ft with assist X1 and RW. Pt continues to require assist for ADLs at [facility]. Pt reported has R AFO ordered for foot drop, although has not received yet. Wife reported therapy days ran out on 4/25 and plans were to discharge home with [facility].      Subjective:     Subjective: pt up in chair on arrival, pts spouse present and supportive we discussed going back home and safety concerns, nursing wanting pt off O2 for session and nursing wanting to monitor it, he did drop a few times but nursing recommended to keep him off O2 pt given cues for deep breathing, noted edema at right ankle and cont to have foot drop pt is in the process of getting AFO did apply ace wrap for df assist     Pain:   .  Pain Assessment  Pain Level: 0       Social/Functional:  Lives With: Other (comment)  Type of Home: Facility([facility]; has been in/out of hospital/ECF since 12/18)  Home Equipment: Rolling walker, Hospital bed(needs mattress for hospital bed yet)     Objective:       Transfers  Sit to Stand: Contact guard assistance(from chair and w/c occ cues for hand placement )  Stand to sit: Contact guard assistance(cues for backing all the way up to surface )       Ambulation 1  Device: Rolling Walker  Assistance: Contact guard assistance  Quality of Gait: slow dewey and slighlty flexed posture pt demonstrated excesive hip flexion at right LE in order to clear foot due to drop foot, did follow w/ chair and monitored O2 sats gave standing rest breaks   Distance: 100x1    Stairs  # Steps : 2  Rails: Right ascending(and left hand hold assist )  Assistance: Minimal assistance  Comment: due to multi lines from heart monitor pt didn't have a pocket monitor he ascended forward and down retro he only has one rail at home and offered hand hold assist on opp side, educated wife to have assist of another person                         Exercises:  Exercises  Comments: pt completed ankle pumps does have some active movement and followed with heelcord stretch, heelslides, hip abd/add, quad sets all reclined in chair, seated hip flexion w/ min assist at right LE, long arc quads, toe raises with lt manual resistance at knees x 10 reps each            Activity Tolerance:  Activity Tolerance: Patient limited by fatigue;Patient limited by endurance    Assessment: Body structures, Functions, Activity limitations: Decreased functional mobility , Decreased endurance, Decreased balance, Decreased strength  Assessment: pt tolerated session fair, he required hands on assist with mobility and transfers and demonstrated generalzied weakness in LEs, he would benefit from cont skilled therapy   Prognosis: Good     REQUIRES PT FOLLOW UP: Yes    Discharge Recommendations:  Discharge Recommendations: (pt planning home w/ spouse and home health therapy)    Patient Education:  Patient Education: safety with steps at home, need for 24 hour hands on assist at discharge     Equipment Recommendations: Other: monitor for needs    Safety:  Type of devices:  All fall risk precautions in place, Left in chair, Call light within reach, Chair alarm in place, Gait belt, Patient at risk for falls, Nurse notified    Plan:  Times per week: 5x GM  Times per day: Daily  Specific instructions for Next Treatment: ther ex, ther act, gait trng, functional moblity  Current Treatment Recommendations: Strengthening, Gait Training, Balance Training, Functional Mobility Training, Endurance Training, Transfer Training, Home Exercise Program, Safety Education & Training, Patient/Caregiver Education & Training    Goals:  Patient goals : go home    Short term goals  Time Frame for Short term goals: 2 weeks  Short term goal 1: Pt to be Supervision for supine <> sit to get in/out of bed  Short term goal 2: Pt to be Supervision for sit <> stand to get up to ambulate  Short term goal 3: Pt to ambulate > 100 ft with RW with supervision for household distances    Long term goals  Time Frame for Long term goals : not set due to short ELOS  If patient is discharged prior to progress note completion, this note is to serve as the discharge note with all goals being unmet unless indicated otherwise.

## 2019-04-30 ENCOUNTER — TELEPHONE (OUTPATIENT)
Dept: CARDIOLOGY CLINIC | Age: 74
End: 2019-04-30

## 2019-04-30 NOTE — TELEPHONE ENCOUNTER
Verbal order Marga Guillory CNP  Metolazone 5 mg today and tomorrow   Start metolazone 2.5 mg every other on Saturday   Apply mepilex and wrap bilateral legs foot to thigh with Ace wrap on in am off in pm    Nurse Deneen Moreno notified and verbalized understanding with read back

## 2019-05-06 ENCOUNTER — OFFICE VISIT (OUTPATIENT)
Dept: CARDIOLOGY CLINIC | Age: 74
End: 2019-05-06
Payer: MEDICARE

## 2019-05-06 VITALS
SYSTOLIC BLOOD PRESSURE: 120 MMHG | DIASTOLIC BLOOD PRESSURE: 70 MMHG | WEIGHT: 199 LBS | OXYGEN SATURATION: 97 % | BODY MASS INDEX: 27.75 KG/M2 | HEART RATE: 80 BPM

## 2019-05-06 DIAGNOSIS — I50.22 CHF (CONGESTIVE HEART FAILURE), NYHA CLASS III, CHRONIC, SYSTOLIC (HCC): Primary | ICD-10-CM

## 2019-05-06 LAB
ANION GAP SERPL CALCULATED.3IONS-SCNC: 11 MEQ/L (ref 8–16)
BUN BLDV-MCNC: 54 MG/DL (ref 7–22)
CALCIUM SERPL-MCNC: 8.3 MG/DL (ref 8.5–10.5)
CHLORIDE BLD-SCNC: 95 MEQ/L (ref 98–111)
CO2: 31 MEQ/L (ref 23–33)
CREAT SERPL-MCNC: 2.1 MG/DL (ref 0.4–1.2)
GFR SERPL CREATININE-BSD FRML MDRD: 31 ML/MIN/1.73M2
GLUCOSE BLD-MCNC: 241 MG/DL (ref 70–108)
POTASSIUM SERPL-SCNC: 4.2 MEQ/L (ref 3.5–5.2)
SODIUM BLD-SCNC: 137 MEQ/L (ref 135–145)

## 2019-05-06 PROCEDURE — 4040F PNEUMOC VAC/ADMIN/RCVD: CPT | Performed by: NURSE PRACTITIONER

## 2019-05-06 PROCEDURE — G8598 ASA/ANTIPLAT THER USED: HCPCS | Performed by: NURSE PRACTITIONER

## 2019-05-06 PROCEDURE — G8427 DOCREV CUR MEDS BY ELIG CLIN: HCPCS | Performed by: NURSE PRACTITIONER

## 2019-05-06 PROCEDURE — 1123F ACP DISCUSS/DSCN MKR DOCD: CPT | Performed by: NURSE PRACTITIONER

## 2019-05-06 PROCEDURE — G8417 CALC BMI ABV UP PARAM F/U: HCPCS | Performed by: NURSE PRACTITIONER

## 2019-05-06 PROCEDURE — 3017F COLORECTAL CA SCREEN DOC REV: CPT | Performed by: NURSE PRACTITIONER

## 2019-05-06 PROCEDURE — 1036F TOBACCO NON-USER: CPT | Performed by: NURSE PRACTITIONER

## 2019-05-06 PROCEDURE — 36415 COLL VENOUS BLD VENIPUNCTURE: CPT | Performed by: NURSE PRACTITIONER

## 2019-05-06 PROCEDURE — 99213 OFFICE O/P EST LOW 20 MIN: CPT | Performed by: NURSE PRACTITIONER

## 2019-05-06 PROCEDURE — 1111F DSCHRG MED/CURRENT MED MERGE: CPT | Performed by: NURSE PRACTITIONER

## 2019-05-06 RX ORDER — NITROGLYCERIN 0.4 MG/1
0.4 TABLET SUBLINGUAL EVERY 5 MIN PRN
Qty: 25 TABLET | Refills: 1 | Status: SHIPPED | OUTPATIENT
Start: 2019-05-06 | End: 2021-01-01 | Stop reason: SDUPTHER

## 2019-05-06 ASSESSMENT — ENCOUNTER SYMPTOMS
SHORTNESS OF BREATH: 1
ABDOMINAL DISTENTION: 0
COUGH: 0

## 2019-05-06 NOTE — PATIENT INSTRUCTIONS
Continue:  · Continue current medications  · Daily weights and record  · Fluid restriction of 2 Liters per day  · Limit sodium in diet to around 7541-7385 mg/day  · Monitor BP  · Activity as tolerated     Call the Heart Failure Clinic for any of the following symptoms: [phone]  Weight gain of 2-3 pounds in 1 day or 5 pounds in 1 week  Increased shortness of breath  Shortness of breath while laying down  Cough  Chest pain  Swelling in feet, ankles or legs  Tenderness or bloating in the abdomen  Fatigue   Decreased appetite or nausea   Confusion     CALL clinic if weights increase, symptoms develop.

## 2019-05-06 NOTE — PROGRESS NOTES
Heart Failure Clinic       Visit Date: 5/6/2019  Cardiologist:  Dr. Melonie Squires  Primary Care Physician: Dr. Yury Felix is a 69 y.o. male who presents today for:  Chief Complaint   Patient presents with    Congestive Heart Failure       HPI:   Whitney Claudio is a 69 y.o. male who presents to the office for a follow up patient visit in the heart failure clinic. Accompanied by wife, daughter  HX: ICM (EF 25%), Cardiac arrest in Dec and Jan. - CABG x 3V, MV repair, MAZE - 1/28/19-Dr Segovia Anchors, DM, HTN, HLD, CKD III (Keila Villarreal, did require Hemo x 2 months), Anemia, Former smoker     Last hospital admission r/t Heart Failure:  4/25-4/29 = CHF exac. Diuresed approx 5#. BNP actually down from previous. Was at [address] [facility] [facility] prior to admit. Concerns today:  Back home now, improving. Weight continues decrease, down to 199# - was 217# last month at [address] Using walker get around house - 20ft or so. Had called last week with clarification of Metolazone - had 2 days at 5 mg then decreased to 2.5 QOD per discharge orders. Activity: slowly improving.    Diet: wife watching salt and fluid very closely    Patient has:  Chest Pain: No  Worsening SOB: No  Difficulty sleeping: No = using lift chair now, awaiting hospital bed  Orthopnea/PND: No  Edema: Much better  Fatigue: Ongoing  Abdominal bloating: No  Appetite: Good  Cough: No  Any extra diuretic use: No  Weight gain: No - going down  Compliant checking home weight: Yes - down 6# from last week  Compliant checking blood pressure: Yes - 140s  Any refills on CHF medications needed at this time: No    Past Medical History:   Diagnosis Date    CAD (coronary artery disease)     CHF (congestive heart failure) (HCC)     Chronic kidney disease     History of blood transfusion     Hyperlipidemia     Hypertension     Proteinuria     Type II or unspecified type diabetes mellitus without mention of complication, not stated as uncontrolled      Past Surgical History:   Procedure Laterality Date    CARDIAC SURGERY      COLONOSCOPY  2019    at [address] GRAFT N/A 2019    CABG X3 MVR / MAZE performed by Kianna Crespo MD at [address] [facility] N/A 2019    EGD DIAGNOSTIC ONLY performed by Ava Hintno MD at [facility]     Family History   Problem Relation Age of Onset    Cancer Mother         breast    Diabetes Mother     Heart Disease Father     Diabetes Father     Cancer Maternal Grandmother         leukemia     Social History     Tobacco Use    Smoking status: Former Smoker     Packs/day: 0.25     Types: Cigarettes     Last attempt to quit: 1976     Years since quittin.3    Smokeless tobacco: Former User   Substance Use Topics    Alcohol use: No     Alcohol/week: 0.0 oz     Current Outpatient Medications   Medication Sig Dispense Refill    nitroGLYCERIN (NITROSTAT) 0.4 MG SL tablet Place 1 tablet under the tongue every 5 minutes as needed for Chest pain 25 tablet 1    metolazone (ZAROXOLYN) 2.5 MG tablet Take 1 tablet by mouth every other day Take 5 mg metolazone daily for 2 days then take 2.5 mg po daily 30 tablet 1    potassium chloride (KLOR-CON M) 20 MEQ extended release tablet Take 2 tablets by mouth 3 times daily 90 tablet 3    Povidone-Iodine Scrub Sponge (BETADINE SWAB AID) 10 % SWAB Apply topically      tamsulosin (FLOMAX) 0.4 MG capsule Take 0.4 mg by mouth daily      NONFORMULARY Mulugeta-bacit-poly-lidocaine creame 4 % topical as prn  for pain      timolol (TIMOPTIC) 0.5 % ophthalmic solution Place 1 drop into both eyes daily      Coenzyme Q10 (COQ-10) 50 MG CAPS Take by mouth daily       insulin glargine (BASAGLAR KWIKPEN) 100 UNIT/ML injection pen Inject 18 Units into the skin daily      carvedilol (COREG) 6.25 MG tablet Take 2 tablets by mouth 2 times daily (with meals) . hold if SBP less than 110 mm hg or HR less than 60 60 tablet 3    isosorbide dinitrate results found for: BNP  CBC:   Lab Results   Component Value Date    WBC 6.6 04/26/2019    RBC 3.31 04/26/2019    HGB 8.6 04/26/2019    HCT 29.3 04/26/2019     04/26/2019     CMP:    Lab Results   Component Value Date     05/06/2019    K 4.2 05/06/2019    K 4.0 01/23/2019    CL 95 05/06/2019    CO2 31 05/06/2019    BUN 54 05/06/2019    CREATININE 2.1 05/06/2019    LABGLOM 31 05/06/2019    GLUCOSE 241 05/06/2019    CALCIUM 8.3 05/06/2019     Hepatic Function Panel:    Lab Results   Component Value Date    ALKPHOS 97 04/25/2019    ALT 34 04/25/2019    AST 19 04/25/2019    PROT 6.9 04/25/2019    BILITOT 0.3 04/25/2019    BILIDIR <0.2 04/25/2019    LABALBU 3.2 04/25/2019     Magnesium:    Lab Results   Component Value Date    MG 1.8 04/29/2019     PT/INR:    Lab Results   Component Value Date    INR 1.08 02/05/2019     Lipids:    Lab Results   Component Value Date    TRIG 116 04/26/2019    HDL 34 04/26/2019    LDLCALC 40 04/26/2019       ASSESSMENT AND PLAN:   The patient's condition/symptoms are Stable. Diagnosis Orders   1. CHF (congestive heart failure), NYHA class III, chronic, systolic (HCC)         Continue:  GDMT (Guideline directed medical therapy)    ACE/ARB/ARNI - Lisinopril 2.5/day   BB - Coreg 12.5 BID   Diuretic - Bumex 2 BID, Kcl 40 TID, Metolazone 2.5 qod   Hydralazine/Isos. - Isordil 10    Aldosterone- None  Continue Current medications  Check BMP today. Follow up in clinic in 2 weeks. High risk for re-exacerbation or FATEMEH (has hx of, dialysis in past). Monitor closely. Addendum:  Creat with slight increase 2.1 from 1.8 - did have 2 straight days Metolazone 5 mg within last week, now 2.5 QOD. Recheck BMP in 1 week. Monitor urine output and signs dehydration.     · Daily weights  · Fluid restriction of 2 Liters per day  · Limit sodium in diet to around 2944-3423 mg/day  · Monitor BP  · Activity as tolerated     Patient was instructed to call the Heart Failure Clinic for any changes in symptoms as noted in AVS.      Return in about 2 weeks (around 5/20/2019). or sooner if needed     Patient given educational materials - see patient instructions. We discussed the importance of weighing oneself and recording daily. We also discussed the importance of a low sodium diet, higher sodium foods to avoid and better low sodium food options. Patient verbalizes understanding of plan of care using teach back method, and is agreeable to the treatment plan.        Electronically signed by SILVIA Damon CNP on 5/7/2019 at 1:56 PM

## 2019-05-06 NOTE — PROGRESS NOTES
Venipuncture obtained from [address] [place] [address]. Patient tolerated procedure without complications or complaints.

## 2019-05-07 ENCOUNTER — TELEPHONE (OUTPATIENT)
Dept: CARDIOLOGY CLINIC | Age: 74
End: 2019-05-07

## 2019-05-07 DIAGNOSIS — I50.9 HEART FAILURE EXACERBATED BY SOTALOL (HCC): Primary | ICD-10-CM

## 2019-05-07 NOTE — TELEPHONE ENCOUNTER
----- Message from SILVIA Mckeon CNP sent at 5/6/2019  4:49 PM EDT -----  Creatinine is little elevated, may be d/t extra Metolazone last week. Ensure Metolazone 2.5 QOD and recheck BMP next Monday.

## 2019-05-08 ENCOUNTER — OFFICE VISIT (OUTPATIENT)
Dept: UROLOGY | Age: 74
End: 2019-05-08
Payer: MEDICARE

## 2019-05-08 VITALS
WEIGHT: 199 LBS | SYSTOLIC BLOOD PRESSURE: 130 MMHG | HEIGHT: 71 IN | DIASTOLIC BLOOD PRESSURE: 62 MMHG | BODY MASS INDEX: 27.86 KG/M2

## 2019-05-08 DIAGNOSIS — R33.9 RETENTION OF URINE: Primary | ICD-10-CM

## 2019-05-08 PROCEDURE — 1123F ACP DISCUSS/DSCN MKR DOCD: CPT | Performed by: UROLOGY

## 2019-05-08 PROCEDURE — 51700 IRRIGATION OF BLADDER: CPT | Performed by: UROLOGY

## 2019-05-08 PROCEDURE — 1111F DSCHRG MED/CURRENT MED MERGE: CPT | Performed by: UROLOGY

## 2019-05-08 PROCEDURE — G8598 ASA/ANTIPLAT THER USED: HCPCS | Performed by: UROLOGY

## 2019-05-08 PROCEDURE — 4040F PNEUMOC VAC/ADMIN/RCVD: CPT | Performed by: UROLOGY

## 2019-05-08 PROCEDURE — 1036F TOBACCO NON-USER: CPT | Performed by: UROLOGY

## 2019-05-08 PROCEDURE — 99212 OFFICE O/P EST SF 10 MIN: CPT | Performed by: UROLOGY

## 2019-05-08 PROCEDURE — G8427 DOCREV CUR MEDS BY ELIG CLIN: HCPCS | Performed by: UROLOGY

## 2019-05-08 PROCEDURE — G8417 CALC BMI ABV UP PARAM F/U: HCPCS | Performed by: UROLOGY

## 2019-05-08 PROCEDURE — 3017F COLORECTAL CA SCREEN DOC REV: CPT | Performed by: UROLOGY

## 2019-05-08 NOTE — PROGRESS NOTES
With catheter in place 175 cc water instilled into bladder. Balloon deflated, catheter removed without difficulty. Pt then had a severe bladder spasm. The catheter was forced out and urine went all over the table and floor. I was unable to catch or measure what was expelled. Patient was instructed to drink plenty of water and notify the office if he is unable to void in 4-6 hours.

## 2019-05-10 ENCOUNTER — OFFICE VISIT (OUTPATIENT)
Dept: NEPHROLOGY | Age: 74
End: 2019-05-10
Payer: MEDICARE

## 2019-05-10 VITALS
OXYGEN SATURATION: 98 % | DIASTOLIC BLOOD PRESSURE: 74 MMHG | BODY MASS INDEX: 26.99 KG/M2 | HEART RATE: 87 BPM | SYSTOLIC BLOOD PRESSURE: 140 MMHG | WEIGHT: 193.5 LBS

## 2019-05-10 DIAGNOSIS — N18.30 CKD (CHRONIC KIDNEY DISEASE), STAGE III (HCC): Primary | ICD-10-CM

## 2019-05-10 DIAGNOSIS — I50.22 CHRONIC SYSTOLIC (CONGESTIVE) HEART FAILURE (HCC): ICD-10-CM

## 2019-05-10 PROCEDURE — 1036F TOBACCO NON-USER: CPT | Performed by: INTERNAL MEDICINE

## 2019-05-10 PROCEDURE — 4040F PNEUMOC VAC/ADMIN/RCVD: CPT | Performed by: INTERNAL MEDICINE

## 2019-05-10 PROCEDURE — 99213 OFFICE O/P EST LOW 20 MIN: CPT | Performed by: INTERNAL MEDICINE

## 2019-05-10 PROCEDURE — G8417 CALC BMI ABV UP PARAM F/U: HCPCS | Performed by: INTERNAL MEDICINE

## 2019-05-10 PROCEDURE — G8427 DOCREV CUR MEDS BY ELIG CLIN: HCPCS | Performed by: INTERNAL MEDICINE

## 2019-05-10 PROCEDURE — 1111F DSCHRG MED/CURRENT MED MERGE: CPT | Performed by: INTERNAL MEDICINE

## 2019-05-10 PROCEDURE — 1123F ACP DISCUSS/DSCN MKR DOCD: CPT | Performed by: INTERNAL MEDICINE

## 2019-05-10 PROCEDURE — 3017F COLORECTAL CA SCREEN DOC REV: CPT | Performed by: INTERNAL MEDICINE

## 2019-05-10 PROCEDURE — G8598 ASA/ANTIPLAT THER USED: HCPCS | Performed by: INTERNAL MEDICINE

## 2019-05-10 NOTE — PROGRESS NOTES
[facility]    [address], [address]   [facility], [address]  [phone]  Progress Note  5/10/2019 12:16 PM      Pt Name:    Lum Lesches  Birthdate:    [birth year]  Primary Care Physician:  Cris Marroquin     Chief Complaint:   Chief Complaint   Patient presents with    Follow-up     Hypervolemia due to congestive heart failure         Background Information/Interval History:   68 yo white male with hx HTN, CKD III, CAD s/p CABG, MVR, DM, hx smoking who is here for follow-up. He has had difficult course lately which started in Dec 2018 with cardiac arrest s/p intervention complicated by FATEMEH requiring dialysis. He subsequently had another cardiac arrest and had CABG on urgent basis along with MVR. He continued on dialysis (was started Dec 2018).  He was continued on dialysis until Feb 2019. His tunneled catheter was subsequently removed. Following with urology closely for BPH and urinary retention. He has chronic posey catheter. He was recently admitted with CHF exacerbation. He has been to urology and had his posey removed per them. He is voiding well. Also following with cardiology. No chest pain or shortness of breath. Feels okay. Has chronic leg swelling. He is taking coreg, flomax, K-dur 40 meq po TID.  On metolazone 2.5 mg po every other day, on lisinopril with holding parameters.  Also taking bumex 2 mg po BID     Past History:  Past Medical History:   Diagnosis Date    CAD (coronary artery disease)     CHF (congestive heart failure) (HCC)     Chronic kidney disease     History of blood transfusion     Hyperlipidemia     Hypertension     Proteinuria     Type II or unspecified type diabetes mellitus without mention of complication, not stated as uncontrolled      Past Surgical History:   Procedure Laterality Date    CARDIAC SURGERY      COLONOSCOPY  2019    at [address] GRAFT N/A 1/28/2019    CABG X3 MVR / MAZE performed by Elliot Fay Silvestre Feliciano MD at [address] N/A 1/21/2019    EGD DIAGNOSTIC ONLY performed by Herberth Veloz MD at [facility]        VITALS:  BP (!) 140/74 (Site: Left Upper Arm, Position: Sitting, Cuff Size: Large Adult)   Pulse 87   Wt 193 lb 8 oz (87.8 kg)   SpO2 98%   BMI 26.99 kg/m²   Wt Readings from Last 3 Encounters:   05/10/19 193 lb 8 oz (87.8 kg)   05/08/19 199 lb (90.3 kg)   05/06/19 199 lb (90.3 kg)     Body mass index is 26.99 kg/m². General Appearance: alert and cooperative with exam, appears comfortable, no distress  Oral: moist oral mucus membranes  Neck: No jugular venous distention  Lungs: Air entry B/L, no crackles or rales, no use of accessory muscles  Heart: S1, S2 heard  GI: soft, non-tender, no guarding  Extremities: ++chronic B/L leg edema     Medications:  Current Outpatient Medications   Medication Sig Dispense Refill    nitroGLYCERIN (NITROSTAT) 0.4 MG SL tablet Place 1 tablet under the tongue every 5 minutes as needed for Chest pain 25 tablet 1    metolazone (ZAROXOLYN) 2.5 MG tablet Take 1 tablet by mouth every other day Take 5 mg metolazone daily for 2 days then take 2.5 mg po daily 30 tablet 1    potassium chloride (KLOR-CON M) 20 MEQ extended release tablet Take 2 tablets by mouth 3 times daily 90 tablet 3    Povidone-Iodine Scrub Sponge (BETADINE SWAB AID) 10 % SWAB Apply topically      tamsulosin (FLOMAX) 0.4 MG capsule Take 0.4 mg by mouth daily      NONFORMULARY Mulugeta-bacit-poly-lidocaine creame 4 % topical as prn  for pain      timolol (TIMOPTIC) 0.5 % ophthalmic solution Place 1 drop into both eyes daily      Coenzyme Q10 (COQ-10) 50 MG CAPS Take by mouth daily       insulin glargine (BASAGLAR KWIKPEN) 100 UNIT/ML injection pen Inject 18 Units into the skin daily      carvedilol (COREG) 6.25 MG tablet Take 2 tablets by mouth 2 times daily (with meals) . hold if SBP less than 110 mm hg or HR less than 60 60 tablet 3    III: CKD is multifactorial from diabetic nephropathy, ishcemic injuries, chronic cardiorenal syndrome. Creat little higher than baseline but overall still stable. 3. Chronic systolic CHF as above: on coreg, bumex, lisinopril. He is also on metolazone. 4. Anemia in CKD  5. HTN: on coreg and lisinopril  6  Proteinuria secondary to diabetic nephropathy  7. Left kidney cyst  8. Recent CABG    Repeat labs in 2 weeks to ensure renal function and lytes are stable  D/w daughter and wife.      Orders Placed This Encounter   Procedures    Basic Metabolic Panel    Magnesium    Hemoglobin and Hematocrit, Blood     Return in about 6 weeks (around 6/21/2019).       Cinthia Spencer MD  [facility]

## 2019-05-14 LAB
BUN BLDV-MCNC: 55 MG/DL
CALCIUM SERPL-MCNC: 9.1 MG/DL
CHLORIDE BLD-SCNC: 99 MMOL/L
CO2: 32 MMOL/L
CREAT SERPL-MCNC: 2.14 MG/DL
GFR CALCULATED: 31
GLUCOSE BLD-MCNC: 119 MG/DL
POTASSIUM SERPL-SCNC: 4 MMOL/L
SODIUM BLD-SCNC: 140 MMOL/L

## 2019-05-15 ENCOUNTER — HOSPITAL ENCOUNTER (OUTPATIENT)
Dept: NON INVASIVE DIAGNOSTICS | Age: 74
Discharge: HOME | End: 2019-05-15
Payer: MEDICARE

## 2019-05-15 ENCOUNTER — OFFICE VISIT (OUTPATIENT)
Dept: UROLOGY | Age: 74
End: 2019-05-15
Payer: MEDICARE

## 2019-05-15 VITALS
DIASTOLIC BLOOD PRESSURE: 66 MMHG | HEIGHT: 71 IN | SYSTOLIC BLOOD PRESSURE: 118 MMHG | WEIGHT: 190 LBS | BODY MASS INDEX: 26.6 KG/M2

## 2019-05-15 DIAGNOSIS — I50.23 CHF (CONGESTIVE HEART FAILURE), NYHA CLASS III, ACUTE ON CHRONIC, SYSTOLIC (HCC): ICD-10-CM

## 2019-05-15 DIAGNOSIS — N40.0 HYPERTROPHY OF PROSTATE WITHOUT URINARY OBSTRUCTION: Primary | ICD-10-CM

## 2019-05-15 DIAGNOSIS — R33.9 INCOMPLETE BLADDER EMPTYING: ICD-10-CM

## 2019-05-15 LAB
BILIRUBIN, POC: NORMAL
BLOOD URINE, POC: NORMAL
CLARITY, POC: CLEAR
COLOR, POC: YELLOW
GLUCOSE URINE, POC: NORMAL
KETONES, POC: NORMAL
LEUKOCYTE EST, POC: NORMAL
NITRITE, POC: POSITIVE
PH, POC: 6.5
POST VOID RESIDUAL (PVR): 197 ML
PROTEIN, POC: NORMAL
SPECIFIC GRAVITY, POC: 1.01
UROBILINOGEN, POC: 0.2

## 2019-05-15 PROCEDURE — 93308 TTE F-UP OR LMTD: CPT

## 2019-05-15 PROCEDURE — 1036F TOBACCO NON-USER: CPT | Performed by: UROLOGY

## 2019-05-15 PROCEDURE — 51798 US URINE CAPACITY MEASURE: CPT | Performed by: UROLOGY

## 2019-05-15 PROCEDURE — G8417 CALC BMI ABV UP PARAM F/U: HCPCS | Performed by: UROLOGY

## 2019-05-15 PROCEDURE — 4040F PNEUMOC VAC/ADMIN/RCVD: CPT | Performed by: UROLOGY

## 2019-05-15 PROCEDURE — 99213 OFFICE O/P EST LOW 20 MIN: CPT | Performed by: UROLOGY

## 2019-05-15 PROCEDURE — G8427 DOCREV CUR MEDS BY ELIG CLIN: HCPCS | Performed by: UROLOGY

## 2019-05-15 PROCEDURE — G8598 ASA/ANTIPLAT THER USED: HCPCS | Performed by: UROLOGY

## 2019-05-15 PROCEDURE — 1123F ACP DISCUSS/DSCN MKR DOCD: CPT | Performed by: UROLOGY

## 2019-05-15 PROCEDURE — 81003 URINALYSIS AUTO W/O SCOPE: CPT | Performed by: UROLOGY

## 2019-05-15 PROCEDURE — 3017F COLORECTAL CA SCREEN DOC REV: CPT | Performed by: UROLOGY

## 2019-05-15 PROCEDURE — 1111F DSCHRG MED/CURRENT MED MERGE: CPT | Performed by: UROLOGY

## 2019-05-15 NOTE — PROGRESS NOTES
Subjective:      Patient ID: Rebel Baca is a 69 y.o. male. HPI patient returns today for a follow-up regarding previous urinary retention. As his catheter was removed a week ago he is voiding. The home health nurse did not have to come in and catheterize. He has nocturia X2, mild urgency. He denies urge incontinence, dysuria or hematuria. His PVR today is 190 mL. He was 150 mL last week. He is on Flomax 0.4 mg daily as well as Myrbetriq 25 mg daily. His congestive heart failure is markedly improved. He had an echocardiogram today, the results of which are pending. Review of Systems    Objective:   Physical Exam thin, chronically ill appearing male, alert and oriented ×3. Assessment:      BPH with obstruction. Residual urine volume 150-190 ML. Plan:      continue Flomax. He will discontinue the Myrbetriq and see if he does well without. It is too expensive so if he needs an OAB medication will have to be an anticholinergic class medication . Return to the [facility] clinic in 2 months. He knows to call if his feet thinks she has a urinary tract infection or urinary retention.   The patient and the family are aware that his residual urine volume can lead to recurrent UTI, hematuria, bladder calculi, etc.        Nicole Dove MD

## 2019-05-17 ENCOUNTER — TELEPHONE (OUTPATIENT)
Dept: UROLOGY | Age: 74
End: 2019-05-17

## 2019-05-17 NOTE — TELEPHONE ENCOUNTER
The wife returned the call to the office and was advised of the message from Dr Irma Matos to have a posey inserted by home health or the home health nurse can do when necessary I/o catheterizations or    to come into the office. The wife stated she would call home health to see what services they provide.

## 2019-05-17 NOTE — TELEPHONE ENCOUNTER
Patient's wife called in he was in Wednesday for retention. He is still in retention. . Barely emptying but denies pain. No burning, fever, chills or vomiting.  Please advise   Thank so much

## 2019-05-17 NOTE — TELEPHONE ENCOUNTER
If they still have home health nurses, a home health nurse needs to come to the house and insert a [place] catheter, or if he would prefer, the home health nurse can do when necessary I/o catheterizations. It simply depends on if a home health nurse can offer the services. Otherwise, he needs to come in to have a Salinas catheter placed here.

## 2019-05-20 ENCOUNTER — OFFICE VISIT (OUTPATIENT)
Dept: CARDIOLOGY CLINIC | Age: 74
End: 2019-05-20
Payer: MEDICARE

## 2019-05-20 VITALS
HEART RATE: 81 BPM | BODY MASS INDEX: 27.75 KG/M2 | DIASTOLIC BLOOD PRESSURE: 62 MMHG | WEIGHT: 199 LBS | OXYGEN SATURATION: 98 % | SYSTOLIC BLOOD PRESSURE: 112 MMHG

## 2019-05-20 DIAGNOSIS — I50.22 CHF (CONGESTIVE HEART FAILURE), NYHA CLASS III, CHRONIC, SYSTOLIC (HCC): Primary | ICD-10-CM

## 2019-05-20 PROCEDURE — 1111F DSCHRG MED/CURRENT MED MERGE: CPT | Performed by: NURSE PRACTITIONER

## 2019-05-20 PROCEDURE — G8417 CALC BMI ABV UP PARAM F/U: HCPCS | Performed by: NURSE PRACTITIONER

## 2019-05-20 PROCEDURE — 99213 OFFICE O/P EST LOW 20 MIN: CPT | Performed by: NURSE PRACTITIONER

## 2019-05-20 PROCEDURE — G8598 ASA/ANTIPLAT THER USED: HCPCS | Performed by: NURSE PRACTITIONER

## 2019-05-20 PROCEDURE — 1036F TOBACCO NON-USER: CPT | Performed by: NURSE PRACTITIONER

## 2019-05-20 PROCEDURE — G8427 DOCREV CUR MEDS BY ELIG CLIN: HCPCS | Performed by: NURSE PRACTITIONER

## 2019-05-20 PROCEDURE — 1123F ACP DISCUSS/DSCN MKR DOCD: CPT | Performed by: NURSE PRACTITIONER

## 2019-05-20 PROCEDURE — 3017F COLORECTAL CA SCREEN DOC REV: CPT | Performed by: NURSE PRACTITIONER

## 2019-05-20 PROCEDURE — 4040F PNEUMOC VAC/ADMIN/RCVD: CPT | Performed by: NURSE PRACTITIONER

## 2019-05-20 ASSESSMENT — ENCOUNTER SYMPTOMS
COUGH: 0
ABDOMINAL DISTENTION: 0
SHORTNESS OF BREATH: 1

## 2019-05-20 NOTE — PROGRESS NOTES
Laterality Date    CARDIAC SURGERY      COLONOSCOPY  2019    at [facility] GRAFT N/A 2019    CABG X3 MVR / MAZE performed by Mohinder Duffy MD at [facility] ENDOSCOPY N/A 2019    EGD DIAGNOSTIC ONLY performed by Tex Dyer MD at [facility]     Family History   Problem Relation Age of Onset    Cancer Mother         breast    Diabetes Mother     Heart Disease Father     Diabetes Father     Cancer Maternal Grandmother         leukemia     Social History     Tobacco Use    Smoking status: Former Smoker     Packs/day: 0.25     Types: Cigarettes     Last attempt to quit: 1976     Years since quittin.3    Smokeless tobacco: Former User   Substance Use Topics    Alcohol use: No     Alcohol/week: 0.0 oz     Current Outpatient Medications   Medication Sig Dispense Refill    Insulin Glargine (LANTUS SC) Inject 18 mg into the skin      metolazone (ZAROXOLYN) 2.5 MG tablet Take 1 tablet by mouth every other day Take 5 mg metolazone daily for 2 days then take 2.5 mg po daily 30 tablet 1    potassium chloride (KLOR-CON M) 20 MEQ extended release tablet Take 2 tablets by mouth 3 times daily 90 tablet 3    tamsulosin (FLOMAX) 0.4 MG capsule Take 0.4 mg by mouth daily      NONFORMULARY Mulugeta-bacit-poly-lidocaine creame 4 % topical as prn  for pain      timolol (TIMOPTIC) 0.5 % ophthalmic solution Place 1 drop into both eyes daily      Coenzyme Q10 (COQ-10) 50 MG CAPS Take by mouth daily       carvedilol (COREG) 6.25 MG tablet Take 2 tablets by mouth 2 times daily (with meals) . hold if SBP less than 110 mm hg or HR less than 60 60 tablet 3    isosorbide dinitrate (ISORDIL) 10 MG tablet Take 1 tablet by mouth 2 times daily 90 tablet 3    zinc oxide (TRIAD HYDROPHILIC) PSTE paste Apply topically daily      bumetanide (BUMEX) 2 MG tablet Take 1 tablet by mouth 2 times daily 60 tablet 3    atorvastatin (LIPITOR) 40 MG tablet Take 40 mg by mouth daily      ferrous sulfate 325 (65 Fe) MG tablet Take 325 mg by mouth 2 times daily      miconazole (MICOTIN) 2 % powder Apply topically 2 times daily. 45 g 1    lisinopril (PRINIVIL;ZESTRIL) 5 MG tablet Take 0.5 tablets by mouth daily Hold for SBP less than 110 30 tablet 3    acetaminophen (TYLENOL) 325 MG tablet Take 650 mg by mouth every 4 hours as needed for Pain      BD ULTRA-FINE MICRO PEN NEEDLE 32G X 6 MM MISC       latanoprost (XALATAN) 0.005 % ophthalmic solution Place 1 drop into both eyes nightly       nitroGLYCERIN (NITROSTAT) 0.4 MG SL tablet Place 1 tablet under the tongue every 5 minutes as needed for Chest pain 25 tablet 1    Povidone-Iodine Scrub Sponge (BETADINE SWAB AID) 10 % SWAB Apply topically      Mirabegron ER (MYRBETRIQ) 25 MG TB24 Take 1 tablet by mouth daily 30 tablet 3    insulin aspart (NOVOLOG) 100 UNIT/ML injection vial Inject 5 Units into the skin 3 times daily (before meals) Also sliding scale      aspirin EC 81 MG EC tablet Take 1 tablet by mouth daily 30 tablet 3     No current facility-administered medications for this visit. Allergies   Allergen Reactions    Atorvastatin      Other reaction(s): Myalgia, ON NURSING HOME REPORT       SUBJECTIVE:   Review of Systems   Constitutional: Positive for fatigue (improving). Negative for activity change and appetite change. Respiratory: Positive for shortness of breath (SHANNON - some bad days). Negative for cough. Cardiovascular: Positive for leg swelling. Negative for chest pain and palpitations. Gastrointestinal: Negative for abdominal distention. Neurological: Negative for weakness, light-headedness and headaches. Hematological: Negative for adenopathy. Psychiatric/Behavioral: Negative for sleep disturbance.        OBJECTIVE:   Today's Vitals:  /62 (Site: Left Upper Arm)   Pulse 81   Wt 199 lb (90.3 kg)   SpO2 98%   BMI 27.75 kg/m²     Physical Exam   Constitutional: He

## 2019-05-20 NOTE — PATIENT INSTRUCTIONS
Continue:  · Continue current medications  · Daily weights and record  · Fluid restriction of 2 Liters per day  · Limit sodium in diet to around 7285-6139 mg/day  · Monitor BP  · Activity as tolerated     Call the [facility] for any of the following symptoms: [phone]  Weight gain of 2-3 pounds in 1 day or 5 pounds in 1 week  Increased shortness of breath  Shortness of breath while laying down  Cough  Chest pain  Swelling in feet, ankles or legs  Tenderness or bloating in the abdomen  Fatigue   Decreased appetite or nausea   Confusion     Get blood work in 2 weeks.

## 2019-05-21 ENCOUNTER — TELEPHONE (OUTPATIENT)
Dept: UROLOGY | Age: 74
End: 2019-05-21

## 2019-05-21 RX ORDER — POVIDONE-IODINE 10 MG/ML
CLOTH TOPICAL
Qty: 1 EACH | Refills: 3 | Status: SHIPPED | OUTPATIENT
Start: 2019-05-21 | End: 2020-03-11

## 2019-05-21 NOTE — TELEPHONE ENCOUNTER
Jessica from [facility] states they will need a prescription for the  16 FR catheter and supplies for betadine sent to the pharmacy. I changed the pharmacy information in the computer. Please order. Thank you.

## 2019-05-21 NOTE — TELEPHONE ENCOUNTER
The order was faxed to [facility] at [phone]

## 2019-06-06 ENCOUNTER — TELEPHONE (OUTPATIENT)
Dept: CARDIOLOGY CLINIC | Age: 74
End: 2019-06-06

## 2019-06-11 ENCOUNTER — TELEPHONE (OUTPATIENT)
Dept: CARDIOLOGY CLINIC | Age: 74
End: 2019-06-11

## 2019-06-19 ENCOUNTER — TELEPHONE (OUTPATIENT)
Dept: CARDIOTHORACIC SURGERY | Age: 74
End: 2019-06-19

## 2019-06-19 ENCOUNTER — TELEPHONE (OUTPATIENT)
Dept: CARDIOLOGY CLINIC | Age: 74
End: 2019-06-19

## 2019-06-19 NOTE — TELEPHONE ENCOUNTER
MARYCHUY for patient to call our office back. Received form from Maryanne Busch. Dr. Michelle Barton signed and we faxed back. Is patient wearing life vest?  We need to move appt with Dr. Michelle Barton up. Can patient be seen next week? Patient had repeat echo done 5/15/2019.

## 2019-06-19 NOTE — TELEPHONE ENCOUNTER
Patient's wife called requesting information as to whether the patient's exposure to agent orange attributed to his valve issue. Please advise.   We can call patient back

## 2019-06-20 NOTE — TELEPHONE ENCOUNTER
Called patient's wife Idalia Cruz and told her that agent orange did not attribute to her 's condition. She verbalized understanding.

## 2019-06-20 NOTE — TELEPHONE ENCOUNTER
Spoke with pt , pt states is not wearing life vest turned in about a month ago. Appt made 6/27/19 at 12:15 p.m. With Dr. Melonie Squires.

## 2019-06-21 ENCOUNTER — OFFICE VISIT (OUTPATIENT)
Dept: NEPHROLOGY | Age: 74
End: 2019-06-21
Payer: MEDICARE

## 2019-06-21 VITALS
OXYGEN SATURATION: 96 % | DIASTOLIC BLOOD PRESSURE: 68 MMHG | HEART RATE: 76 BPM | WEIGHT: 172.6 LBS | BODY MASS INDEX: 24.07 KG/M2 | SYSTOLIC BLOOD PRESSURE: 133 MMHG

## 2019-06-21 DIAGNOSIS — I50.22 CHRONIC SYSTOLIC (CONGESTIVE) HEART FAILURE (HCC): ICD-10-CM

## 2019-06-21 DIAGNOSIS — N18.30 CKD (CHRONIC KIDNEY DISEASE), STAGE III (HCC): Primary | ICD-10-CM

## 2019-06-21 DIAGNOSIS — E87.6 HYPOKALEMIA: ICD-10-CM

## 2019-06-21 PROCEDURE — G8420 CALC BMI NORM PARAMETERS: HCPCS | Performed by: INTERNAL MEDICINE

## 2019-06-21 PROCEDURE — 3017F COLORECTAL CA SCREEN DOC REV: CPT | Performed by: INTERNAL MEDICINE

## 2019-06-21 PROCEDURE — 1123F ACP DISCUSS/DSCN MKR DOCD: CPT | Performed by: INTERNAL MEDICINE

## 2019-06-21 PROCEDURE — G8427 DOCREV CUR MEDS BY ELIG CLIN: HCPCS | Performed by: INTERNAL MEDICINE

## 2019-06-21 PROCEDURE — G8598 ASA/ANTIPLAT THER USED: HCPCS | Performed by: INTERNAL MEDICINE

## 2019-06-21 PROCEDURE — 1036F TOBACCO NON-USER: CPT | Performed by: INTERNAL MEDICINE

## 2019-06-21 PROCEDURE — 4040F PNEUMOC VAC/ADMIN/RCVD: CPT | Performed by: INTERNAL MEDICINE

## 2019-06-21 PROCEDURE — 99213 OFFICE O/P EST LOW 20 MIN: CPT | Performed by: INTERNAL MEDICINE

## 2019-06-21 RX ORDER — METOLAZONE 2.5 MG/1
2.5 TABLET ORAL
Qty: 30 TABLET | Refills: 3
Start: 2019-06-21 | End: 2019-07-15

## 2019-06-21 NOTE — PROGRESS NOTES
[facility]    [facility], [address]   [place] [facility]  [phone]  Progress Note  6/21/2019 11:43 AM      Pt Name:    Flavio Perez  Birthdate:    [birth year]  Primary Care Physician:  Cristina Ocasio     Chief Complaint:   Chief Complaint   Patient presents with    Follow-up     ckd iii        Background Information/Interval History:   72 yo white male with hx HTN, CKD III, CAD s/p CABG, MVR, DM, hx smoking who is here for follow-up. He has had difficult course lately which started in Dec 2018 with cardiac arrest s/p intervention complicated by FATEMEH requiring dialysis. He subsequently had another cardiac arrest and had CABG on urgent basis along with MVR. He continued on dialysis (was started Dec 2018).  He was continued on dialysis until Feb 2019. Following with urology closely for BPH and urinary retention. He had chronic posey catheter which was subsequently removed per urology. He is following with [facility]. Today he is here for 6 weeks follow-up. Reports he is doing better. No shortness of breath with rest but has some with exertion.  No chest pain.  He is taking coreg, flomax, K-dur 40 meq po TID. On metolazone 2.5 mg po every other day, on lisinopril with holding parameters.  Also taking bumex 2 mg po BID. Reports improvement in leg swelling. Here with his wife. He is now at home. He was in hospital in first week of June. Recent echo showed improvement in EF to 35-40%. He no longer has posey catheter.       Past History:  Past Medical History:   Diagnosis Date    CAD (coronary artery disease)     CHF (congestive heart failure) (HCC)     Chronic kidney disease     History of blood transfusion     Hyperlipidemia     Hypertension     Proteinuria     Type II or unspecified type diabetes mellitus without mention of complication, not stated as uncontrolled      Past Surgical History:   Procedure Laterality Date    CARDIAC SURGERY      [facility]  [facility]    CORONARY ARTERY BYPASS GRAFT N/A 1/28/2019    CABG X3 MVR / MAZE performed by Marine Belcher MD at [address] N/A 1/21/2019    EGD DIAGNOSTIC ONLY performed by Sindi Prasad MD at [facility]        VITALS:  /68 (Site: Left Upper Arm, Position: Sitting, Cuff Size: Medium Adult)   Pulse 76   Wt 172 lb 9.6 oz (78.3 kg)   SpO2 96%   BMI 24.07 kg/m²   Wt Readings from Last 3 Encounters:   06/21/19 172 lb 9.6 oz (78.3 kg)   05/20/19 199 lb (90.3 kg)   05/15/19 190 lb (86.2 kg)     Body mass index is 24.07 kg/m². General Appearance: alert and cooperative with exam, appears comfortable, no distress  Oral: moist oral mucus membranes  Neck: No jugular venous distention  Lungs: Air entry B/L, no crackles or rales, no use of accessory muscles  Heart: S1, S2 heard  GI: soft, non-tender, no guarding  Extremities: No sig LE edema, no edema noted. Medications:  Current Outpatient Medications   Medication Sig Dispense Refill    Povidone-Iodine 10 % PADS Please dispense 1 box of 10% iodine prep pads to pt with 3 refills. Will use PRN.  1 each 3    Insulin Glargine (LANTUS SC) Inject 18 mg into the skin      nitroGLYCERIN (NITROSTAT) 0.4 MG SL tablet Place 1 tablet under the tongue every 5 minutes as needed for Chest pain 25 tablet 1    metolazone (ZAROXOLYN) 2.5 MG tablet Take 1 tablet by mouth every other day Take 5 mg metolazone daily for 2 days then take 2.5 mg po daily 30 tablet 1    potassium chloride (KLOR-CON M) 20 MEQ extended release tablet Take 2 tablets by mouth 3 times daily 90 tablet 3    tamsulosin (FLOMAX) 0.4 MG capsule Take 0.4 mg by mouth daily      NONFORMULARY Mulugeta-bacit-poly-lidocaine creame 4 % topical as prn  for pain      timolol (TIMOPTIC) 0.5 % ophthalmic solution Place 1 drop into both eyes daily      Coenzyme Q10 (COQ-10) 50 MG CAPS Take by mouth daily       carvedilol (COREG) 6.25 MG tablet Take 2 tablets by mouth 2 times daily (with meals) . hold if SBP less than 110 mm hg or HR less than 60 60 tablet 3    isosorbide dinitrate (ISORDIL) 10 MG tablet Take 1 tablet by mouth 2 times daily 90 tablet 3    zinc oxide (TRIAD HYDROPHILIC) PSTE paste Apply topically daily as needed       bumetanide (BUMEX) 2 MG tablet Take 1 tablet by mouth 2 times daily 60 tablet 3    atorvastatin (LIPITOR) 40 MG tablet Take 40 mg by mouth daily      ferrous sulfate 325 (65 Fe) MG tablet Take 325 mg by mouth 2 times daily      miconazole (MICOTIN) 2 % powder Apply topically 2 times daily. 45 g 1    aspirin EC 81 MG EC tablet Take 1 tablet by mouth daily 30 tablet 3    lisinopril (PRINIVIL;ZESTRIL) 5 MG tablet Take 0.5 tablets by mouth daily Hold for SBP less than 110 30 tablet 3    acetaminophen (TYLENOL) 325 MG tablet Take 650 mg by mouth every 4 hours as needed for Pain      BD ULTRA-FINE MICRO PEN NEEDLE 32G X 6 MM MISC       latanoprost (XALATAN) 0.005 % ophthalmic solution Place 1 drop into both eyes nightly       Catheters (CATHETER NELATION STRAIGHT TIP) MISC 16 Fr Intermittent Catheter with Water Soluble Jelly 30 each 5     No current facility-administered medications for this visit. Laboratory & Diagnostics:  3732: OO: R 10.5, L 10.1, 1.4 cm left kidney cyst  Creat 1.8, K 3.9, UACR ~ 500 mg/g  March 4 2019: Creat 1.9, K 4.5, Ca 7.8, Hb 8.2  April 2019: K 4.6, creat 1.9, ca 8.2, Hb 8.1     April 15, 2019: K 3.8, creat 1.5, Hb 9.3, ferritin ~ 200  May 2019: K 4.2, creat 2.1  June 2019: K 3.6, Hb 8.6     Impression/Plan:   1. Volume overload: secondary to chronic systolic congestive heart failure  and also has diastolic dysfunction. Continue with bumex and metolazone. Clinically looking very good. But no new labs available since he was discharged from [place]. He has lost about 20 lbs. EF has improved recently. 2. Hypokalemia: due to diuretics. K was low first week of June but repeat was 3.6.  He was at [facility]. No repeat labs were done. I am going to order BMP today. He agreed with this plan. 3. CKD III: CKD is multifactorial from diabetic nephropathy, ischemic injuries, chronic cardiorenal syndrome. check labs. 3. Chronic systolic CHF as above: on coreg, bumex, lisinopril. He is also on metolazone. 4. Anemia in CKD. Check CBC  5. HTN: on coreg and lisinopril. BP is stable at home per wife. 6  Proteinuria secondary to diabetic nephropathy: on lisinopril. 7. Left kidney cyst  8. CAD s/p CABG recently    Has lost about 20lbs. He is on metolazone 2.5 mg every other day. Will change to metolazone three times per week. Check BMP, magnesium  and CBC now. Need to monitor H/H    Orders Placed This Encounter   Procedures    Basic Metabolic Panel    CBC    Basic Metabolic Panel    Magnesium     Return in about 3 months (around 9/21/2019).       Elaine Harris MD  [facility]

## 2019-06-24 LAB
BASOPHILS ABSOLUTE: ABNORMAL /ΜL
BASOPHILS RELATIVE PERCENT: ABNORMAL %
BUN BLDV-MCNC: 58 MG/DL
CALCIUM SERPL-MCNC: 9 MG/DL
CHLORIDE BLD-SCNC: 100 MMOL/L
CO2: 31 MMOL/L
CREAT SERPL-MCNC: 2.57 MG/DL
EOSINOPHILS ABSOLUTE: ABNORMAL /ΜL
EOSINOPHILS RELATIVE PERCENT: ABNORMAL %
GFR CALCULATED: 25
GLUCOSE BLD-MCNC: 116 MG/DL
HCT VFR BLD CALC: 35.8 % (ref 41–53)
HEMOGLOBIN: 10.9 G/DL (ref 13.5–17.5)
LYMPHOCYTES ABSOLUTE: ABNORMAL /ΜL
LYMPHOCYTES RELATIVE PERCENT: ABNORMAL %
MAGNESIUM: 2 MG/DL
MCH RBC QN AUTO: ABNORMAL PG
MCHC RBC AUTO-ENTMCNC: ABNORMAL G/DL
MCV RBC AUTO: ABNORMAL FL
MONOCYTES ABSOLUTE: ABNORMAL /ΜL
MONOCYTES RELATIVE PERCENT: ABNORMAL %
NEUTROPHILS ABSOLUTE: ABNORMAL /ΜL
NEUTROPHILS RELATIVE PERCENT: ABNORMAL %
PLATELET # BLD: 277 K/ΜL
PMV BLD AUTO: ABNORMAL FL
POTASSIUM SERPL-SCNC: 4.1 MMOL/L
RBC # BLD: 4.38 10^6/ΜL
SODIUM BLD-SCNC: 138 MMOL/L
WBC # BLD: 6.9 10^3/ML

## 2019-06-25 ENCOUNTER — TELEPHONE (OUTPATIENT)
Dept: NEPHROLOGY | Age: 74
End: 2019-06-25

## 2019-06-25 DIAGNOSIS — N18.30 CKD (CHRONIC KIDNEY DISEASE), STAGE III (HCC): Primary | ICD-10-CM

## 2019-06-25 NOTE — TELEPHONE ENCOUNTER
----- Message from Apolonia Ryan MD sent at 6/24/2019  8:37 PM EDT -----  Also reduce metolazone 2.5 mg on Monday and Fridays

## 2019-06-25 NOTE — TELEPHONE ENCOUNTER
----- Message from Gil Lemus MD sent at 6/24/2019  8:35 PM EDT -----  Creatinine little higher  K is okay  Reduce K-dur 40 meq po BID  Repeat BMP in 7-10 days.

## 2019-06-27 ENCOUNTER — OFFICE VISIT (OUTPATIENT)
Dept: CARDIOLOGY CLINIC | Age: 74
End: 2019-06-27
Payer: MEDICARE

## 2019-06-27 VITALS
WEIGHT: 174.2 LBS | SYSTOLIC BLOOD PRESSURE: 104 MMHG | BODY MASS INDEX: 24.39 KG/M2 | HEART RATE: 64 BPM | DIASTOLIC BLOOD PRESSURE: 62 MMHG | HEIGHT: 71 IN

## 2019-06-27 DIAGNOSIS — Z98.890 H/O MAZE PROCEDURE: ICD-10-CM

## 2019-06-27 DIAGNOSIS — Z95.2 H/O MITRAL VALVE REPLACEMENT: ICD-10-CM

## 2019-06-27 DIAGNOSIS — I25.5 ISCHEMIC CARDIOMYOPATHY: ICD-10-CM

## 2019-06-27 DIAGNOSIS — Z95.1 HX OF CABG: Primary | ICD-10-CM

## 2019-06-27 PROCEDURE — 1036F TOBACCO NON-USER: CPT | Performed by: INTERNAL MEDICINE

## 2019-06-27 PROCEDURE — G8598 ASA/ANTIPLAT THER USED: HCPCS | Performed by: INTERNAL MEDICINE

## 2019-06-27 PROCEDURE — 4040F PNEUMOC VAC/ADMIN/RCVD: CPT | Performed by: INTERNAL MEDICINE

## 2019-06-27 PROCEDURE — G8420 CALC BMI NORM PARAMETERS: HCPCS | Performed by: INTERNAL MEDICINE

## 2019-06-27 PROCEDURE — G8427 DOCREV CUR MEDS BY ELIG CLIN: HCPCS | Performed by: INTERNAL MEDICINE

## 2019-06-27 PROCEDURE — 3017F COLORECTAL CA SCREEN DOC REV: CPT | Performed by: INTERNAL MEDICINE

## 2019-06-27 PROCEDURE — 1123F ACP DISCUSS/DSCN MKR DOCD: CPT | Performed by: INTERNAL MEDICINE

## 2019-06-27 PROCEDURE — 99213 OFFICE O/P EST LOW 20 MIN: CPT | Performed by: INTERNAL MEDICINE

## 2019-06-27 NOTE — PROGRESS NOTES
[address] [facility] CARDIOLOGY  [address]  [address] [place] [address] [address] [address]  Dept: [phone]  Dept Fax: [phone]  Loc: [phone]    Visit Date: 6/27/2019    Mr. Sadiq Grant is a 69 y.o. male  who presented for:  Chief Complaint   Patient presents with    Follow-up    Results     echo       HPI:   HPI   68 yo M s/p CABG/MVR/Maze 1/2019 who presents for follow-up. Mild periorbital edema. Follows with Dr. Ciarra Madrigal for CKD. Had admission for NYHA III-IV CHF 4/2019. Tolerating ASA, statin, Bumex, Coreg, Isordil, Lisinopril, and Metolazone. BP and HR well controlled. Allergy to atorvastatin but tolerating currently. He is making urine. EF improved post cardiac surgery up to 35-40%. He did have a cardiac arrest in the setting of CHF and MR/CAD. No ICD. ECG with SR with QRS 92 ms. Currently, no chest pain, angina, SHANNON, orthopnea, PND, sob at rest, palpitations, or syncope. LE has improved greatly since diuretic dosing has been titrated. Current Outpatient Medications:     metolazone (ZAROXOLYN) 2.5 MG tablet, Take 1 tablet by mouth three times a week On Monday/Wed/Fridays (Patient taking differently: Take 2.5 mg by mouth three times a week On Monday and Fridays), Disp: 30 tablet, Rfl: 3    Povidone-Iodine 10 % PADS, Please dispense 1 box of 10% iodine prep pads to pt with 3 refills. Will use PRN. , Disp: 1 each, Rfl: 3    Catheters (CATHETER NELATION STRAIGHT TIP) MISC, 16 Fr Intermittent Catheter with Water Soluble Jelly, Disp: 30 each, Rfl: 5    Insulin Glargine (LANTUS SC), Inject 18 mg into the skin, Disp: , Rfl:     nitroGLYCERIN (NITROSTAT) 0.4 MG SL tablet, Place 1 tablet under the tongue every 5 minutes as needed for Chest pain, Disp: 25 tablet, Rfl: 1    potassium chloride (KLOR-CON M) 20 MEQ extended release tablet, Take 2 tablets by mouth 3 times daily (Patient taking differently: Take 40 mEq by mouth 2 times daily ), Disp: 90 tablet, Rfl: 3    tamsulosin (FLOMAX) 0.4 MG capsule, Take 0.4 mg by mouth daily, Disp: , Rfl:     NONFORMULARY, Mulugeta-bacit-poly-lidocaine creame 4 % topical as prn  for pain, Disp: , Rfl:     timolol (TIMOPTIC) 0.5 % ophthalmic solution, Place 1 drop into both eyes daily, Disp: , Rfl:     Coenzyme Q10 (COQ-10) 50 MG CAPS, Take by mouth daily , Disp: , Rfl:     carvedilol (COREG) 6.25 MG tablet, Take 2 tablets by mouth 2 times daily (with meals) . hold if SBP less than 110 mm hg or HR less than 60, Disp: 60 tablet, Rfl: 3    isosorbide dinitrate (ISORDIL) 10 MG tablet, Take 1 tablet by mouth 2 times daily, Disp: 90 tablet, Rfl: 3    zinc oxide (TRIAD HYDROPHILIC) PSTE paste, Apply topically daily as needed , Disp: , Rfl:     bumetanide (BUMEX) 2 MG tablet, Take 1 tablet by mouth 2 times daily, Disp: 60 tablet, Rfl: 3    atorvastatin (LIPITOR) 40 MG tablet, Take 40 mg by mouth daily, Disp: , Rfl:     ferrous sulfate 325 (65 Fe) MG tablet, Take 325 mg by mouth 2 times daily, Disp: , Rfl:     miconazole (MICOTIN) 2 % powder, Apply topically 2 times daily. , Disp: 45 g, Rfl: 1    aspirin EC 81 MG EC tablet, Take 1 tablet by mouth daily, Disp: 30 tablet, Rfl: 3    lisinopril (PRINIVIL;ZESTRIL) 5 MG tablet, Take 0.5 tablets by mouth daily Hold for SBP less than 110, Disp: 30 tablet, Rfl: 3    BD ULTRA-FINE MICRO PEN NEEDLE 32G X 6 MM MISC, , Disp: , Rfl:     latanoprost (XALATAN) 0.005 % ophthalmic solution, Place 1 drop into both eyes nightly , Disp: , Rfl:     acetaminophen (TYLENOL) 325 MG tablet, Take 650 mg by mouth every 4 hours as needed for Pain, Disp: , Rfl:     Past Medical History  Suman Torres  has a past medical history of CAD (coronary artery disease), CHF (congestive heart failure) ([address] Chronic kidney disease, History of blood transfusion, Hyperlipidemia, Hypertension, Proteinuria, and Type II or unspecified type diabetes mellitus without mention of complication, not stated as uncontrolled.     Social History  Susu Rodríguez  reports that he quit smoking about 43 years ago. His smoking use included cigarettes. He smoked 0.25 packs per day. He has quit using smokeless tobacco. He reports that he does not drink alcohol. Family History  Susu Rodríguez family history includes Cancer in his maternal grandmother and mother; Diabetes in his father and mother; Heart Disease in his father. There is no family history of bicuspid aortic valve, aneurysms, heart transplant, pacemakers, defibrillators, or sudden cardiac death. Past Surgical History   Past Surgical History:   Procedure Laterality Date    CARDIAC SURGERY      COLONOSCOPY  2019    at [address] [facility] CORONARY ARTERY BYPASS GRAFT N/A 1/28/2019    CABG X3 MVR / MAZE performed by Mayra Spatz, MD at [address] [place] [address] N/A 1/21/2019    EGD DIAGNOSTIC ONLY performed by Hitesh Pedraza MD at [facility]       Review of Systems   Constitutional: Negative for chills and fever  HENT: Negative for congestion, sinus pressure, sneezing and sore throat. Eyes: Negative for pain, discharge, redness and itching. Respiratory: Negative for apnea, cough  Gastrointestinal: Negative for blood in stool, constipation, diarrhea   Endocrine: Negative for cold intolerance, heat intolerance, polydipsia. Genitourinary: Negative for dysuria, enuresis, flank pain and hematuria. Musculoskeletal: Negative for arthralgias, joint swelling and neck pain. Neurological: Negative for numbness and headaches. Psychiatric/Behavioral: Negative for agitation, confusion, decreased concentration and dysphoric mood.      Objective:     /62   Pulse 64   Ht 5' 11\" (1.803 m)   Wt 174 lb 3.2 oz (79 kg)   BMI 24.30 kg/m²     Wt Readings from Last 3 Encounters:   06/27/19 174 lb 3.2 oz (79 kg)   06/21/19 172 lb 9.6 oz (78.3 kg)   05/20/19 199 lb (90.3 kg)     BP Readings from Last 3 Encounters:   06/27/19 104/62   06/21/19 133/68   05/20/19 112/62 left ventricular diastolic compliance and/or   increased left atrial pressure (grade 3 diastolic dysfunction). Left atrial size was severely dilated. There was mild aortic regurgitation. There was moderate-severe mitral regurgitation. There was mild-moderate tricuspid regurgitation. Assuming RAP = 10 mmHg, the estimated RVSP = 47 mmHg. IVC size is dilated with reduced respiratory phasic changes (CVP~5-10   mmHg)   Left pleural effusion. Signature      ----------------------------------------------------------------   Electronically signed by Yordy Dooley MD (Interpreting   physician) on 01/18/2019 at 03:11 PM   ----------------------------------------------------------------      Findings      Mitral Valve   The mitral valve structure was normal with normal leaflet separation. DOPPLER: The transmitral velocity was within the normal range with no   evidence for mitral stenosis. There was moderate-severe mitral   regurgitation. Aortic Valve   The aortic valve was not well seen. DOPPLER: Transaortic velocity was   within the normal range with no evidence of aortic stenosis. There was   mild aortic regurgitation. Tricuspid Valve   The tricuspid valve structure was normal with normal leaflet separation. DOPPLER: There was no evidence of tricuspid stenosis. There was   mild-moderate tricuspid regurgitation. Assuming RAP = 10 mmHg, the   estimated RVSP = 47 mmHg. Pulmonic Valve   The pulmonic valve leaflets were not well seen. DOPPLER: The transpulmonic   velocity was within the normal range with no evidence for regurgitation. Left Atrium   Left atrial size was severely dilated. Left Ventricle   Normal left ventricle size and severely reduced systolic function. Ejection fraction was estimated at 30-35%. There was severe hypokinesis of   the anterior, septal, lateral and apical walls. Inferior wall was   moderately hypokinetic. The wall thickness was within normal limits. Doppler parameters were consistent with a reversible restrictive pattern,   indicative of decreased left ventricular diastolic compliance and/or   increased left atrial pressure (grade 3 diastolic dysfunction). Right Atrium   Right atrial size was normal.      Right Ventricle   The right ventricular size was normal with normal systolic function and   wall thickness. Pericardial Effusion   The pericardium was normal in appearance with no evidence of a pericardial   effusion. Pleural Effusion   Left pleural effusion.       Aorta / Great Vessels   IVC size is dilated with reduced respiratory phasic changes (CVP~5-10   mmHg)     M-Mode/2D Measurements & Calculations      LV Diastolic    LV Systolic Dimension:    AV Cusp Separation: 1.8 cmLA   Dimension: 5.3  4.1 cm                    Dimension: 4.1 cmAO Root   cm              LV Volume Diastolic: 966  Dimension: 2.7 cmLA Area: 24   LV FS:22.6 %    ml                        cm^2   LV PW           LV Volume Systolic: 93.8   Diastolic: 1.1  ml   cm              LV EDV/LV EDV Index: 135   Septum          ml/63 m^2LV ESV/LV ESV    RV Diastolic Dimension: 2.5 cm   Diastolic: 1.1  Index: 60.7 ml/35 m^2   cm              EF Calculated: 45 %       LA/Aorta: 1.52                      LV Length: 9.5 cm         LA volume/Index: 81.2 ml /38m^2      LV Area   Diastolic: 86.1   cm^2   LV Area   Systolic: 59.3   cm^2     Doppler Measurements & Calculations      MV Peak E-Wave: 159  AV Peak Velocity: 134 LVOT Peak Velocity: 71.7 cm/s   cm/s                 cm/s                  LVOT Mean Velocity: 48.3 cm/s   MV Peak A-Wave: 73.3 AV Peak Gradient:     LVOT Peak Gradient: 2 mmHgLVOT   cm/s                 7.18 mmHg             Mean Gradient: 1 mmHg   MV E/A Ratio: 2.17   AV Mean Velocity:   MV Peak Gradient:    87.5 cm/s             TV Peak E-Wave: 64.9 cm/s   10.11 mmHg           AV Mean Gradient: 4   TV Peak A-Wave: 62.4 cm/s                        mmHg   MV Deceleration AV VTI: 23.1 cm       TV Peak Gradient: 1.68 mmHg   Time: 155 msec                             TR Velocity:305 cm/s   MV P1/2t: 45 msec                          TR Gradient:37.21 mmHg   MVA by PHT:4.89 cm^2 LVOT VTI: 15.9 cm     PV Peak Velocity: 82.9 cm/s                        AV P1/2t: 310 msec    PV Peak Gradient: 2.75 mmHg   MV E' Septal         IVRT: 85 msec   Velocity: 4.2 cm/s   MV A' Septal   Velocity: 4.4 cm/s   AV DVI (VTI): 0.69AV   MV E' Lateral        DVI (Vmax):0.54   Velocity: 6.9 cm/s   MV A' Lateral   Velocity: 5.1 cm/s   E/E' septal: 37.86   E/E' lateral: 23.04   MR Velocity: 462   cm/s     [email or web address] [id number]        Assessment/Plan   CABG x 3/MVR/Maze 1/2019 - CAD that caused ICM and then functional MR that resulted in the the need for the CABG/MVR/MAZE  ICM/Valvular CM, EF 35-40%, NYHA II  CKD - follows with Dr. Barbara Corcoran  DM II  HTN  Doing well on the current therapies. Consider switching to [facility] with Nephrology/CHF clinic guidance. Consider Cardiomems in the future as he has had an NYHA III admission and is at high risk for CHF decompensation. Add Aldactone as tolerated. Continue rehab. Wrap legs, salt restrict, fluid restrict, raise legs, daily weights. Follow labs. Discussed diet/exercise/BP/weight loss/health lifestyle choices/lipids; the patient understands the goals and will try to comply.       Disposition:  6 months         Electronically signed by Chepe Moralez MD   6/27/2019 at 1:27 PM

## 2019-07-02 ENCOUNTER — TELEPHONE (OUTPATIENT)
Dept: CARDIOLOGY CLINIC | Age: 74
End: 2019-07-02

## 2019-07-11 ENCOUNTER — OFFICE VISIT (OUTPATIENT)
Dept: UROLOGY | Age: 74
End: 2019-07-11
Payer: MEDICARE

## 2019-07-11 VITALS
SYSTOLIC BLOOD PRESSURE: 90 MMHG | HEIGHT: 71 IN | DIASTOLIC BLOOD PRESSURE: 50 MMHG | BODY MASS INDEX: 22.96 KG/M2 | WEIGHT: 164 LBS

## 2019-07-11 DIAGNOSIS — N40.1 HYPERTROPHY OF PROSTATE WITH URINARY OBSTRUCTION: ICD-10-CM

## 2019-07-11 DIAGNOSIS — R33.9 INCOMPLETE BLADDER EMPTYING: Primary | ICD-10-CM

## 2019-07-11 DIAGNOSIS — N13.8 HYPERTROPHY OF PROSTATE WITH URINARY OBSTRUCTION: ICD-10-CM

## 2019-07-11 DIAGNOSIS — R35.0 URINARY FREQUENCY: ICD-10-CM

## 2019-07-11 LAB — POST VOID RESIDUAL (PVR): 20 ML

## 2019-07-11 PROCEDURE — G8598 ASA/ANTIPLAT THER USED: HCPCS | Performed by: NURSE PRACTITIONER

## 2019-07-11 PROCEDURE — 3017F COLORECTAL CA SCREEN DOC REV: CPT | Performed by: NURSE PRACTITIONER

## 2019-07-11 PROCEDURE — 1036F TOBACCO NON-USER: CPT | Performed by: NURSE PRACTITIONER

## 2019-07-11 PROCEDURE — 1123F ACP DISCUSS/DSCN MKR DOCD: CPT | Performed by: NURSE PRACTITIONER

## 2019-07-11 PROCEDURE — G8420 CALC BMI NORM PARAMETERS: HCPCS | Performed by: NURSE PRACTITIONER

## 2019-07-11 PROCEDURE — 4040F PNEUMOC VAC/ADMIN/RCVD: CPT | Performed by: NURSE PRACTITIONER

## 2019-07-11 PROCEDURE — 99213 OFFICE O/P EST LOW 20 MIN: CPT | Performed by: NURSE PRACTITIONER

## 2019-07-11 PROCEDURE — G8427 DOCREV CUR MEDS BY ELIG CLIN: HCPCS | Performed by: NURSE PRACTITIONER

## 2019-07-11 PROCEDURE — 51798 US URINE CAPACITY MEASURE: CPT | Performed by: NURSE PRACTITIONER

## 2019-07-11 NOTE — PROGRESS NOTES
[address] [facility]  [facility] [address] [place] [address]  Dept: [phone]  Dept Fax: [phone] : [phone]      Visit Date: 2019    HPI:     Sravani Brewer presents for follow-up of urinary retention and BPH with LUTs. He takes Flomax daily for symptom control. He was previously on Myrbetriq but it was too expensive so it was stopped. He has been having low blood pressure. He takes Coreg and Bumex daily per Cardiology. They check his BP at home daily. They report it was 120/80 this morning so medications were given. He underwent cystoscopy on 3/6/19 which showed BPH with obstruction. His wife and daughter are with him today.     Past Medical History:   Diagnosis Date    CAD (coronary artery disease)     CHF (congestive heart failure) (HCC)     Chronic kidney disease     History of blood transfusion     Hyperlipidemia     Hypertension     Proteinuria     Type II or unspecified type diabetes mellitus without mention of complication, not stated as uncontrolled       Past Surgical History:   Procedure Laterality Date    CARDIAC SURGERY      COLONOSCOPY      at [address] [facility] [address] BYPASS GRAFT N/A 2019    CABG X3 MVR / MAZE performed by Fidel Kumar MD at [facility] N/A 2019    EGD DIAGNOSTIC ONLY performed by Betzaida Marquez MD at [address] [facility]       Family History   Problem Relation Age of Onset    Cancer Mother         breast    Diabetes Mother     Heart Disease Father     Diabetes Father     Cancer Maternal Grandmother         leukemia       Social History     Tobacco Use    Smoking status: Former Smoker     Packs/day: 0.25     Types: Cigarettes     Last attempt to quit: 1976     Years since quittin.5    Smokeless tobacco: Former User   Substance Use Topics    Alcohol use: No     Alcohol/week: 0.0 oz          ROS:  Constitutional: Negative for chills, fatigue, fever, or weight loss. Eyes: Denies reported visual changes. ENT: Denies headache, difficulty swallowing, nose bleeds, ringing in ears, or earaches. Cardiovascular: Negative for chest pain, palpitations, tachycardia or edema. Respiratory: Denies cough or SOB. GI:The patient denies abdominal or flank pain, anorexia, nausea or vomiting. : See HPI  Musculoskeletal: Patient denies low back pain or painful or reduced ROM of the back or extremities. Neurological: The patient denies any symptoms of neurological impairment or TIA's; no history of stroke. Lymphatic: Denies swollen glands in neck, axillary or inguinal areas. Psychiatric: Denies anxiety or depression. Skin: Denies rash or lesions. The remainder of the complete ROS is negative    PHYSICAL EXAM:  VITALS:  There were no vitals taken for this visit. .    Constitutional:    Alert and oriented times 3, no acute distress and cooperative to examination with appropriate mood and affect. HEENT:   Head:         Normocephalic and atraumatic. Mouth/Throat:          Mucous membranes are normal.   Eyes:         EOM are normal. No scleral icterus. Nose:    The external appearance of the nose is normal  Ears: The ears appear normal to external inspection   Neck:         Supple, symmetrical, trachea midline, no adenopathy, thyroid symmetric, not enlarged and no tenderness. Cardiovascular:        Normal rate, regular rhythm, S1 S2 heart sounds. Pulmonary/Chest:       Chest symmetric with normal A/P diameter, no wheezes, rales, or rhonchi noted. Normal respiratory rate and rhthym. No use of accessory muscles. Abdominal:          Soft. No tenderness. Active bowel sounds. Musculoskeletal:    Normal range of motion. She exhibits no edema or tenderness of lower extremities. Extremities:    No cyanosis, clubbing, or edema present. Neurological:    Alert and oriented. No cranial nerve deficit.  There are no focalizing motor or sensory deficits.     DATA:  CBC:   Lab Results   Component Value Date    WBC 6.9 06/24/2019    RBC 4.38 06/24/2019    HGB 10.9 06/24/2019    HCT 35.8 06/24/2019    MCV 88.5 04/26/2019    MCH 26.0 04/26/2019    MCHC 29.4 04/26/2019     06/24/2019    MPV 9.5 04/26/2019     BMP:    Lab Results   Component Value Date     06/24/2019    K 4.1 06/24/2019    K 4.0 01/23/2019     06/24/2019    CO2 31 06/24/2019    BUN 58 06/24/2019    CREATININE 2.57 06/24/2019    CALCIUM 9.0 06/24/2019    LABGLOM 25 06/24/2019    LABGLOM 31 05/06/2019    GLUCOSE 116 06/24/2019     BUN/Creatinine:    Lab Results   Component Value Date    BUN 58 06/24/2019    CREATININE 2.57 06/24/2019     Magnesium:    Lab Results   Component Value Date    MG 1.8 04/29/2019     Phosphorus:    Lab Results   Component Value Date    PHOS 3.4 04/27/2019     PT/INR:    Lab Results   Component Value Date    INR 1.08 02/05/2019     U/A:    Lab Results   Component Value Date    NITRITE positive 05/15/2019    COLORU yellow 05/15/2019    COLORU RED 02/14/2019    PHUR 6.5 05/15/2019    PHUR 5.0 02/14/2019    LABCAST NONE SEEN 02/14/2019    LABCAST NONE SEEN 02/14/2019    WBCUA 25-50 02/14/2019    RBCUA 3-5 02/14/2019    MUCUS OBSCURED 01/24/2019    YEAST MOD BUDDING 02/14/2019    BACTERIA NONE 02/14/2019    CLARITYU clear 05/15/2019    CLARITYU Clear 12/13/2018    SPECGRAV 1.015 05/15/2019    SPECGRAV 1.018 02/14/2019    LEUKOCYTESUR large 05/15/2019    LEUKOCYTESUR MODERATE 02/14/2019    UROBILINOGEN 0.2 02/14/2019    BILIRUBINUR neg 05/15/2019    BILIRUBINUR Negative 12/13/2018    BLOODU trace-intact 05/15/2019    BLOODU MODERATE 02/14/2019    GLUCOSEU neg 05/15/2019    GLUCOSEU negative 12/13/2018    AMORPHOUS URATES 01/27/2019     Results for POC orders placed in visit on 07/11/19   poct post void residual   Result Value Ref Range    post void residual 20 ml         Assessment & Plan:      BPH with Obstruction  Urinary Retention    Continue

## 2019-07-15 ENCOUNTER — OFFICE VISIT (OUTPATIENT)
Dept: CARDIOLOGY CLINIC | Age: 74
End: 2019-07-15
Payer: MEDICARE

## 2019-07-15 VITALS
BODY MASS INDEX: 23.66 KG/M2 | HEIGHT: 71 IN | HEART RATE: 83 BPM | SYSTOLIC BLOOD PRESSURE: 116 MMHG | OXYGEN SATURATION: 95 % | WEIGHT: 169 LBS | DIASTOLIC BLOOD PRESSURE: 56 MMHG

## 2019-07-15 DIAGNOSIS — I50.22 CHF (CONGESTIVE HEART FAILURE), NYHA CLASS III, CHRONIC, SYSTOLIC (HCC): Primary | ICD-10-CM

## 2019-07-15 PROCEDURE — G8427 DOCREV CUR MEDS BY ELIG CLIN: HCPCS | Performed by: NURSE PRACTITIONER

## 2019-07-15 PROCEDURE — 4040F PNEUMOC VAC/ADMIN/RCVD: CPT | Performed by: NURSE PRACTITIONER

## 2019-07-15 PROCEDURE — G8420 CALC BMI NORM PARAMETERS: HCPCS | Performed by: NURSE PRACTITIONER

## 2019-07-15 PROCEDURE — 1123F ACP DISCUSS/DSCN MKR DOCD: CPT | Performed by: NURSE PRACTITIONER

## 2019-07-15 PROCEDURE — 3017F COLORECTAL CA SCREEN DOC REV: CPT | Performed by: NURSE PRACTITIONER

## 2019-07-15 PROCEDURE — 1036F TOBACCO NON-USER: CPT | Performed by: NURSE PRACTITIONER

## 2019-07-15 PROCEDURE — G8598 ASA/ANTIPLAT THER USED: HCPCS | Performed by: NURSE PRACTITIONER

## 2019-07-15 PROCEDURE — 99213 OFFICE O/P EST LOW 20 MIN: CPT | Performed by: NURSE PRACTITIONER

## 2019-07-15 RX ORDER — METOLAZONE 2.5 MG/1
2.5 TABLET ORAL DAILY PRN
COMMUNITY
End: 2019-11-26

## 2019-07-15 RX ORDER — POTASSIUM CHLORIDE 20 MEQ/1
TABLET, EXTENDED RELEASE ORAL
COMMUNITY
End: 2020-06-15 | Stop reason: SDUPTHER

## 2019-07-15 RX ORDER — CARVEDILOL 6.25 MG/1
6.25 TABLET ORAL 2 TIMES DAILY WITH MEALS
Qty: 60 TABLET | Refills: 3
Start: 2019-07-15 | End: 2019-07-31 | Stop reason: DRUGHIGH

## 2019-07-15 NOTE — PROGRESS NOTES
DIAGNOSTIC ONLY performed by Cuco Em MD at [facility]     Family History   Problem Relation Age of Onset    Cancer Mother         breast    Diabetes Mother     Heart Disease Father     Diabetes Father     Cancer Maternal Grandmother         leukemia     Social History     Tobacco Use    Smoking status: Former Smoker     Packs/day: 0.25     Types: Cigarettes     Last attempt to quit: 1976     Years since quittin.5    Smokeless tobacco: Former User   Substance Use Topics    Alcohol use: No     Alcohol/week: 0.0 standard drinks     Current Outpatient Medications   Medication Sig Dispense Refill    potassium chloride (KLOR-CON M) 20 MEQ extended release tablet Take 40 mEq by mouth 2 times daily      metolazone (ZAROXOLYN) 2.5 MG tablet Take 2.5 mg by mouth One tab on Mon and Fri      carvedilol (COREG) 6.25 MG tablet Take 1 tablet by mouth 2 times daily (with meals) . hold if SBP less than 110 mm hg or HR less than 60 60 tablet 3    Povidone-Iodine 10 % PADS Please dispense 1 box of 10% iodine prep pads to pt with 3 refills. Will use PRN.  1 each 3    nitroGLYCERIN (NITROSTAT) 0.4 MG SL tablet Place 1 tablet under the tongue every 5 minutes as needed for Chest pain 25 tablet 1    tamsulosin (FLOMAX) 0.4 MG capsule Take 0.4 mg by mouth daily      NONFORMULARY Mulugeta-bacit-poly-lidocaine creame 4 % topical as prn  for pain      timolol (TIMOPTIC) 0.5 % ophthalmic solution Place 1 drop into both eyes daily      Coenzyme Q10 (COQ-10) 50 MG CAPS Take by mouth daily       isosorbide dinitrate (ISORDIL) 10 MG tablet Take 1 tablet by mouth 2 times daily 90 tablet 3    zinc oxide (TRIAD HYDROPHILIC) PSTE paste Apply topically daily as needed       bumetanide (BUMEX) 2 MG tablet Take 1 tablet by mouth 2 times daily 60 tablet 3    atorvastatin (LIPITOR) 40 MG tablet Take 40 mg by mouth daily      ferrous sulfate 325 (65 Fe) MG tablet Take 325 mg by mouth 2 times daily      miconazole (MICOTIN)

## 2019-07-16 ENCOUNTER — TELEPHONE (OUTPATIENT)
Dept: CARDIOLOGY CLINIC | Age: 74
End: 2019-07-16

## 2019-07-16 DIAGNOSIS — I50.22 CHF (CONGESTIVE HEART FAILURE), NYHA CLASS III, CHRONIC, SYSTOLIC (HCC): Primary | ICD-10-CM

## 2019-07-16 ASSESSMENT — ENCOUNTER SYMPTOMS
SHORTNESS OF BREATH: 1
ABDOMINAL DISTENTION: 0
COUGH: 1

## 2019-07-17 LAB
BUN BLDV-MCNC: 47 MG/DL
CALCIUM SERPL-MCNC: 9 MG/DL
CHLORIDE BLD-SCNC: 98 MMOL/L
CO2: 30 MMOL/L
CREAT SERPL-MCNC: 2.33 MG/DL
GFR CALCULATED: 28
GLUCOSE BLD-MCNC: 167 MG/DL
POTASSIUM SERPL-SCNC: 4.1 MMOL/L
PROSTATE SPECIFIC ANTIGEN: 1.57 NG/ML
SODIUM BLD-SCNC: 135 MMOL/L

## 2019-07-18 ENCOUNTER — TELEPHONE (OUTPATIENT)
Dept: UROLOGY | Age: 74
End: 2019-07-18

## 2019-07-31 RX ORDER — CARVEDILOL 3.12 MG/1
3.12 TABLET ORAL 2 TIMES DAILY WITH MEALS
COMMUNITY
End: 2019-08-01 | Stop reason: SDUPTHER

## 2019-08-01 RX ORDER — CARVEDILOL 3.12 MG/1
TABLET ORAL
Qty: 45 TABLET | Refills: 3 | Status: SHIPPED | OUTPATIENT
Start: 2019-08-01 | End: 2019-08-20 | Stop reason: DRUGHIGH

## 2019-08-16 LAB
BUN BLDV-MCNC: 53 MG/DL
CALCIUM SERPL-MCNC: 9.2 MG/DL
CHLORIDE BLD-SCNC: 100 MMOL/L
CO2: 33 MMOL/L
CREAT SERPL-MCNC: 2.13 MG/DL
GFR CALCULATED: 31
GLUCOSE BLD-MCNC: 120 MG/DL
POTASSIUM SERPL-SCNC: 3.7 MMOL/L
SODIUM BLD-SCNC: 139 MMOL/L

## 2019-08-20 ENCOUNTER — TELEPHONE (OUTPATIENT)
Dept: CARDIOLOGY CLINIC | Age: 74
End: 2019-08-20

## 2019-08-20 RX ORDER — CARVEDILOL 6.25 MG/1
6.25 TABLET ORAL 2 TIMES DAILY WITH MEALS
Qty: 60 TABLET | Refills: 3 | COMMUNITY
Start: 2019-08-20 | End: 2019-10-03 | Stop reason: ALTCHOICE

## 2019-08-28 PROBLEM — E83.51 HYPOCALCEMIA: Status: RESOLVED | Noted: 2019-01-18 | Resolved: 2019-08-28

## 2019-08-28 PROBLEM — I46.9 CARDIAC ARREST (HCC): Status: RESOLVED | Noted: 2019-01-17 | Resolved: 2019-08-28

## 2019-08-28 PROBLEM — A41.9 SEPSIS (HCC): Status: RESOLVED | Noted: 2019-02-14 | Resolved: 2019-08-28

## 2019-08-28 PROBLEM — R94.31 ABNORMAL EKG: Status: RESOLVED | Noted: 2019-01-18 | Resolved: 2019-08-28

## 2019-08-28 PROBLEM — N39.0 SEPSIS DUE TO URINARY TRACT INFECTION (HCC): Status: RESOLVED | Noted: 2019-02-14 | Resolved: 2019-08-28

## 2019-08-28 PROBLEM — Z99.2 ESRD ON DIALYSIS (HCC): Status: RESOLVED | Noted: 2019-01-18 | Resolved: 2019-08-28

## 2019-08-28 PROBLEM — E43 SEVERE MALNUTRITION (HCC): Status: RESOLVED | Noted: 2019-02-11 | Resolved: 2019-08-28

## 2019-08-28 PROBLEM — N18.6 ESRD ON DIALYSIS (HCC): Status: RESOLVED | Noted: 2019-01-18 | Resolved: 2019-08-28

## 2019-08-28 PROBLEM — D64.9 ANEMIA: Status: RESOLVED | Noted: 2019-01-18 | Resolved: 2019-08-28

## 2019-08-28 PROBLEM — A41.9 SEPSIS DUE TO URINARY TRACT INFECTION (HCC): Status: RESOLVED | Noted: 2019-02-14 | Resolved: 2019-08-28

## 2019-09-04 ENCOUNTER — OFFICE VISIT (OUTPATIENT)
Dept: NEPHROLOGY | Age: 74
End: 2019-09-04
Payer: MEDICARE

## 2019-09-04 VITALS
OXYGEN SATURATION: 97 % | WEIGHT: 168.2 LBS | DIASTOLIC BLOOD PRESSURE: 70 MMHG | HEART RATE: 77 BPM | BODY MASS INDEX: 23.46 KG/M2 | SYSTOLIC BLOOD PRESSURE: 142 MMHG

## 2019-09-04 DIAGNOSIS — N18.30 CKD (CHRONIC KIDNEY DISEASE), STAGE III (HCC): Primary | ICD-10-CM

## 2019-09-04 DIAGNOSIS — I50.22 CHRONIC SYSTOLIC (CONGESTIVE) HEART FAILURE (HCC): ICD-10-CM

## 2019-09-04 DIAGNOSIS — E87.6 HYPOKALEMIA: ICD-10-CM

## 2019-09-04 DIAGNOSIS — I10 ESSENTIAL HYPERTENSION: ICD-10-CM

## 2019-09-04 PROCEDURE — G8598 ASA/ANTIPLAT THER USED: HCPCS | Performed by: INTERNAL MEDICINE

## 2019-09-04 PROCEDURE — 3017F COLORECTAL CA SCREEN DOC REV: CPT | Performed by: INTERNAL MEDICINE

## 2019-09-04 PROCEDURE — G8420 CALC BMI NORM PARAMETERS: HCPCS | Performed by: INTERNAL MEDICINE

## 2019-09-04 PROCEDURE — 1123F ACP DISCUSS/DSCN MKR DOCD: CPT | Performed by: INTERNAL MEDICINE

## 2019-09-04 PROCEDURE — G8427 DOCREV CUR MEDS BY ELIG CLIN: HCPCS | Performed by: INTERNAL MEDICINE

## 2019-09-04 PROCEDURE — 4040F PNEUMOC VAC/ADMIN/RCVD: CPT | Performed by: INTERNAL MEDICINE

## 2019-09-04 PROCEDURE — 1036F TOBACCO NON-USER: CPT | Performed by: INTERNAL MEDICINE

## 2019-09-04 PROCEDURE — 99213 OFFICE O/P EST LOW 20 MIN: CPT | Performed by: INTERNAL MEDICINE

## 2019-09-04 RX ORDER — LISINOPRIL 2.5 MG/1
2.5 TABLET ORAL DAILY
COMMUNITY
Start: 2019-08-20 | End: 2019-09-04 | Stop reason: DRUGHIGH

## 2019-09-04 RX ORDER — LISINOPRIL 5 MG/1
5 TABLET ORAL DAILY
Qty: 90 TABLET | Refills: 3 | Status: SHIPPED | OUTPATIENT
Start: 2019-09-04 | End: 2019-10-07 | Stop reason: ALTCHOICE

## 2019-09-20 LAB
BUN BLDV-MCNC: 44 MG/DL
CALCIUM SERPL-MCNC: 8.6 MG/DL
CHLORIDE BLD-SCNC: 104 MMOL/L
CO2: 31 MMOL/L
CREAT SERPL-MCNC: 2.17 MG/DL
GFR CALCULATED: 30
GLUCOSE BLD-MCNC: 123 MG/DL
POTASSIUM SERPL-SCNC: 4 MMOL/L
SODIUM BLD-SCNC: 142 MMOL/L

## 2019-10-03 ENCOUNTER — OFFICE VISIT (OUTPATIENT)
Dept: CARDIOLOGY CLINIC | Age: 74
End: 2019-10-03
Payer: MEDICARE

## 2019-10-03 VITALS
HEART RATE: 77 BPM | OXYGEN SATURATION: 98 % | SYSTOLIC BLOOD PRESSURE: 138 MMHG | HEIGHT: 71 IN | WEIGHT: 165.8 LBS | BODY MASS INDEX: 23.21 KG/M2 | DIASTOLIC BLOOD PRESSURE: 60 MMHG

## 2019-10-03 DIAGNOSIS — I50.22 CHF (CONGESTIVE HEART FAILURE), NYHA CLASS III, CHRONIC, SYSTOLIC (HCC): Primary | ICD-10-CM

## 2019-10-03 PROCEDURE — G8484 FLU IMMUNIZE NO ADMIN: HCPCS | Performed by: NURSE PRACTITIONER

## 2019-10-03 PROCEDURE — G8420 CALC BMI NORM PARAMETERS: HCPCS | Performed by: NURSE PRACTITIONER

## 2019-10-03 PROCEDURE — 4040F PNEUMOC VAC/ADMIN/RCVD: CPT | Performed by: NURSE PRACTITIONER

## 2019-10-03 PROCEDURE — G8598 ASA/ANTIPLAT THER USED: HCPCS | Performed by: NURSE PRACTITIONER

## 2019-10-03 PROCEDURE — G8427 DOCREV CUR MEDS BY ELIG CLIN: HCPCS | Performed by: NURSE PRACTITIONER

## 2019-10-03 PROCEDURE — 1036F TOBACCO NON-USER: CPT | Performed by: NURSE PRACTITIONER

## 2019-10-03 PROCEDURE — 99213 OFFICE O/P EST LOW 20 MIN: CPT | Performed by: NURSE PRACTITIONER

## 2019-10-03 PROCEDURE — 1123F ACP DISCUSS/DSCN MKR DOCD: CPT | Performed by: NURSE PRACTITIONER

## 2019-10-03 PROCEDURE — 3017F COLORECTAL CA SCREEN DOC REV: CPT | Performed by: NURSE PRACTITIONER

## 2019-10-03 RX ORDER — CARVEDILOL 12.5 MG/1
12.5 TABLET ORAL 2 TIMES DAILY
Qty: 90 TABLET | Refills: 1
Start: 2019-10-03 | End: 2019-12-05 | Stop reason: SDUPTHER

## 2019-10-03 ASSESSMENT — ENCOUNTER SYMPTOMS
SHORTNESS OF BREATH: 1
COUGH: 0
ABDOMINAL DISTENTION: 0

## 2019-10-07 ENCOUNTER — TELEPHONE (OUTPATIENT)
Dept: CARDIOLOGY CLINIC | Age: 74
End: 2019-10-07

## 2019-10-08 ENCOUNTER — TELEPHONE (OUTPATIENT)
Dept: CARDIOLOGY CLINIC | Age: 74
End: 2019-10-08

## 2019-10-17 ENCOUNTER — TELEPHONE (OUTPATIENT)
Dept: CARDIOLOGY CLINIC | Age: 74
End: 2019-10-17

## 2019-10-17 DIAGNOSIS — I50.22 CHF (CONGESTIVE HEART FAILURE), NYHA CLASS III, CHRONIC, SYSTOLIC (HCC): Primary | ICD-10-CM

## 2019-10-17 LAB
B-TYPE NATRIURETIC PEPTIDE: 812 PG/ML
BUN BLDV-MCNC: 49 MG/DL
CALCIUM SERPL-MCNC: 8.7 MG/DL
CHLORIDE BLD-SCNC: 98 MMOL/L
CO2: 33 MMOL/L
CREAT SERPL-MCNC: 2.29 MG/DL
GFR CALCULATED: 28
GLUCOSE BLD-MCNC: 129 MG/DL
POTASSIUM SERPL-SCNC: 4.3 MMOL/L
SODIUM BLD-SCNC: 138 MMOL/L

## 2019-10-18 DIAGNOSIS — I50.22 CHF (CONGESTIVE HEART FAILURE), NYHA CLASS III, CHRONIC, SYSTOLIC (HCC): ICD-10-CM

## 2019-10-23 RX ORDER — LISINOPRIL 5 MG/1
2.5 TABLET ORAL DAILY
COMMUNITY
End: 2020-03-10 | Stop reason: ALTCHOICE

## 2019-10-24 ENCOUNTER — TELEPHONE (OUTPATIENT)
Dept: CARDIOLOGY CLINIC | Age: 74
End: 2019-10-24

## 2019-10-24 ENCOUNTER — TELEPHONE (OUTPATIENT)
Dept: NEPHROLOGY | Age: 74
End: 2019-10-24

## 2019-10-31 ENCOUNTER — HOSPITAL ENCOUNTER (OUTPATIENT)
Age: 74
Discharge: HOME | End: 2019-10-31
Payer: MEDICARE

## 2019-10-31 ENCOUNTER — OFFICE VISIT (OUTPATIENT)
Dept: CARDIOLOGY CLINIC | Age: 74
End: 2019-10-31
Payer: MEDICARE

## 2019-10-31 ENCOUNTER — HOSPITAL ENCOUNTER (OUTPATIENT)
Dept: GENERAL RADIOLOGY | Age: 74
Discharge: HOME | End: 2019-10-31
Payer: MEDICARE

## 2019-10-31 VITALS
OXYGEN SATURATION: 90 % | HEART RATE: 84 BPM | HEIGHT: 71 IN | SYSTOLIC BLOOD PRESSURE: 144 MMHG | DIASTOLIC BLOOD PRESSURE: 76 MMHG | WEIGHT: 163.4 LBS | BODY MASS INDEX: 22.88 KG/M2

## 2019-10-31 DIAGNOSIS — I50.22 CHF (CONGESTIVE HEART FAILURE), NYHA CLASS III, CHRONIC, SYSTOLIC (HCC): ICD-10-CM

## 2019-10-31 DIAGNOSIS — N20.0 KIDNEY STONE ON LEFT SIDE: ICD-10-CM

## 2019-10-31 DIAGNOSIS — R10.9 LEFT FLANK PAIN: ICD-10-CM

## 2019-10-31 DIAGNOSIS — I50.22 CHF (CONGESTIVE HEART FAILURE), NYHA CLASS III, CHRONIC, SYSTOLIC (HCC): Primary | ICD-10-CM

## 2019-10-31 PROCEDURE — 1036F TOBACCO NON-USER: CPT | Performed by: NURSE PRACTITIONER

## 2019-10-31 PROCEDURE — 1123F ACP DISCUSS/DSCN MKR DOCD: CPT | Performed by: NURSE PRACTITIONER

## 2019-10-31 PROCEDURE — G8420 CALC BMI NORM PARAMETERS: HCPCS | Performed by: NURSE PRACTITIONER

## 2019-10-31 PROCEDURE — 99213 OFFICE O/P EST LOW 20 MIN: CPT | Performed by: NURSE PRACTITIONER

## 2019-10-31 PROCEDURE — 71046 X-RAY EXAM CHEST 2 VIEWS: CPT

## 2019-10-31 PROCEDURE — 4040F PNEUMOC VAC/ADMIN/RCVD: CPT | Performed by: NURSE PRACTITIONER

## 2019-10-31 PROCEDURE — G8598 ASA/ANTIPLAT THER USED: HCPCS | Performed by: NURSE PRACTITIONER

## 2019-10-31 PROCEDURE — G8484 FLU IMMUNIZE NO ADMIN: HCPCS | Performed by: NURSE PRACTITIONER

## 2019-10-31 PROCEDURE — 74018 RADEX ABDOMEN 1 VIEW: CPT

## 2019-10-31 PROCEDURE — G8427 DOCREV CUR MEDS BY ELIG CLIN: HCPCS | Performed by: NURSE PRACTITIONER

## 2019-10-31 PROCEDURE — 3017F COLORECTAL CA SCREEN DOC REV: CPT | Performed by: NURSE PRACTITIONER

## 2019-10-31 ASSESSMENT — ENCOUNTER SYMPTOMS
COUGH: 1
ABDOMINAL DISTENTION: 0
SHORTNESS OF BREATH: 1

## 2019-11-01 ENCOUNTER — HOSPITAL ENCOUNTER (OUTPATIENT)
Dept: GENERAL RADIOLOGY | Age: 74
Discharge: HOME | End: 2019-11-01
Payer: MEDICARE

## 2019-11-01 ENCOUNTER — TELEPHONE (OUTPATIENT)
Dept: CARDIOLOGY CLINIC | Age: 74
End: 2019-11-01

## 2019-11-01 ENCOUNTER — HOSPITAL ENCOUNTER (OUTPATIENT)
Dept: ULTRASOUND IMAGING | Age: 74
Discharge: HOME | End: 2019-11-01
Payer: MEDICARE

## 2019-11-01 DIAGNOSIS — I50.22 CHF (CONGESTIVE HEART FAILURE), NYHA CLASS III, CHRONIC, SYSTOLIC (HCC): ICD-10-CM

## 2019-11-01 PROCEDURE — 71045 X-RAY EXAM CHEST 1 VIEW: CPT

## 2019-11-01 PROCEDURE — 32555 ASPIRATE PLEURA W/ IMAGING: CPT

## 2019-11-04 ENCOUNTER — TELEPHONE (OUTPATIENT)
Dept: CARDIOLOGY CLINIC | Age: 74
End: 2019-11-04

## 2019-11-05 ENCOUNTER — TELEPHONE (OUTPATIENT)
Dept: CARDIOLOGY CLINIC | Age: 74
End: 2019-11-05

## 2019-11-12 ENCOUNTER — TELEPHONE (OUTPATIENT)
Dept: CARDIOLOGY CLINIC | Age: 74
End: 2019-11-12

## 2019-11-19 ENCOUNTER — HOSPITAL ENCOUNTER (OUTPATIENT)
Dept: ULTRASOUND IMAGING | Age: 74
Discharge: HOME | End: 2019-11-19
Payer: MEDICARE

## 2019-11-19 ENCOUNTER — HOSPITAL ENCOUNTER (OUTPATIENT)
Age: 74
Discharge: HOME | End: 2019-11-19
Payer: MEDICARE

## 2019-11-19 ENCOUNTER — HOSPITAL ENCOUNTER (OUTPATIENT)
Dept: GENERAL RADIOLOGY | Age: 74
Discharge: HOME | End: 2019-11-19
Payer: MEDICARE

## 2019-11-19 ENCOUNTER — OFFICE VISIT (OUTPATIENT)
Dept: CARDIOLOGY CLINIC | Age: 74
End: 2019-11-19
Payer: MEDICARE

## 2019-11-19 VITALS
HEART RATE: 81 BPM | SYSTOLIC BLOOD PRESSURE: 142 MMHG | OXYGEN SATURATION: 91 % | DIASTOLIC BLOOD PRESSURE: 78 MMHG | WEIGHT: 165 LBS | BODY MASS INDEX: 23.01 KG/M2

## 2019-11-19 DIAGNOSIS — I50.43 CHF (CONGESTIVE HEART FAILURE), NYHA CLASS IV, ACUTE ON CHRONIC, COMBINED (HCC): ICD-10-CM

## 2019-11-19 DIAGNOSIS — J90 PLEURAL EFFUSION, RIGHT: ICD-10-CM

## 2019-11-19 DIAGNOSIS — I50.43 CHF (CONGESTIVE HEART FAILURE), NYHA CLASS IV, ACUTE ON CHRONIC, COMBINED (HCC): Primary | ICD-10-CM

## 2019-11-19 LAB
AMYLASE FLUID: 26 U/L
ANION GAP SERPL CALCULATED.3IONS-SCNC: 14 MEQ/L (ref 8–16)
BASOPHILS # BLD: 0.4 %
BASOPHILS ABSOLUTE: 0 THOU/MM3 (ref 0–0.1)
BUN BLDV-MCNC: 44 MG/DL (ref 7–22)
CALCIUM SERPL-MCNC: 8.7 MG/DL (ref 8.5–10.5)
CHLORIDE BLD-SCNC: 99 MEQ/L (ref 98–111)
CO2: 24 MEQ/L (ref 23–33)
CREAT SERPL-MCNC: 1.6 MG/DL (ref 0.4–1.2)
EOSINOPHIL # BLD: 1.5 %
EOSINOPHILS ABSOLUTE: 0.1 THOU/MM3 (ref 0–0.4)
ERYTHROCYTE [DISTWIDTH] IN BLOOD BY AUTOMATED COUNT: 17.5 % (ref 11.5–14.5)
ERYTHROCYTE [DISTWIDTH] IN BLOOD BY AUTOMATED COUNT: 53 FL (ref 35–45)
GFR SERPL CREATININE-BSD FRML MDRD: 43 ML/MIN/1.73M2
GLUCOSE BLD-MCNC: 163 MG/DL (ref 70–108)
HCT VFR BLD CALC: 34.5 % (ref 42–52)
HEMOGLOBIN: 10.5 GM/DL (ref 14–18)
IMMATURE GRANS (ABS): 0.12 THOU/MM3 (ref 0–0.07)
IMMATURE GRANULOCYTES: 1.5 %
LD, FLUID: 113 U/L
LYMPHOCYTES # BLD: 13.4 %
LYMPHOCYTES ABSOLUTE: 1 THOU/MM3 (ref 1–4.8)
MCH RBC QN AUTO: 25.6 PG (ref 26–33)
MCHC RBC AUTO-ENTMCNC: 30.4 GM/DL (ref 32.2–35.5)
MCV RBC AUTO: 84.1 FL (ref 80–94)
MONOCYTES # BLD: 9.2 %
MONOCYTES ABSOLUTE: 0.7 THOU/MM3 (ref 0.4–1.3)
NUCLEATED RED BLOOD CELLS: 0 /100 WBC
PH FLUID: 7.54
PLATELET # BLD: 283 THOU/MM3 (ref 130–400)
PMV BLD AUTO: 10.8 FL (ref 9.4–12.4)
POTASSIUM SERPL-SCNC: 5 MEQ/L (ref 3.5–5.2)
PRO-BNP: ABNORMAL PG/ML (ref 0–900)
PROTEIN FLUID: 2.4 GM/DL
RBC # BLD: 4.1 MILL/MM3 (ref 4.7–6.1)
SEG NEUTROPHILS: 74 %
SEGMENTED NEUTROPHILS ABSOLUTE COUNT: 5.8 THOU/MM3 (ref 1.8–7.7)
SODIUM BLD-SCNC: 137 MEQ/L (ref 135–145)
WBC # BLD: 7.8 THOU/MM3 (ref 4.8–10.8)

## 2019-11-19 PROCEDURE — 89050 BODY FLUID CELL COUNT: CPT

## 2019-11-19 PROCEDURE — 4040F PNEUMOC VAC/ADMIN/RCVD: CPT | Performed by: NURSE PRACTITIONER

## 2019-11-19 PROCEDURE — G8420 CALC BMI NORM PARAMETERS: HCPCS | Performed by: NURSE PRACTITIONER

## 2019-11-19 PROCEDURE — 88112 CYTOPATH CELL ENHANCE TECH: CPT

## 2019-11-19 PROCEDURE — G8484 FLU IMMUNIZE NO ADMIN: HCPCS | Performed by: NURSE PRACTITIONER

## 2019-11-19 PROCEDURE — 82150 ASSAY OF AMYLASE: CPT

## 2019-11-19 PROCEDURE — 87075 CULTR BACTERIA EXCEPT BLOOD: CPT

## 2019-11-19 PROCEDURE — 87070 CULTURE OTHR SPECIMN AEROBIC: CPT

## 2019-11-19 PROCEDURE — 71045 X-RAY EXAM CHEST 1 VIEW: CPT

## 2019-11-19 PROCEDURE — 71046 X-RAY EXAM CHEST 2 VIEWS: CPT

## 2019-11-19 PROCEDURE — 1036F TOBACCO NON-USER: CPT | Performed by: NURSE PRACTITIONER

## 2019-11-19 PROCEDURE — 3017F COLORECTAL CA SCREEN DOC REV: CPT | Performed by: NURSE PRACTITIONER

## 2019-11-19 PROCEDURE — 84157 ASSAY OF PROTEIN OTHER: CPT

## 2019-11-19 PROCEDURE — 99214 OFFICE O/P EST MOD 30 MIN: CPT | Performed by: NURSE PRACTITIONER

## 2019-11-19 PROCEDURE — 32555 ASPIRATE PLEURA W/ IMAGING: CPT

## 2019-11-19 PROCEDURE — 87102 FUNGUS ISOLATION CULTURE: CPT

## 2019-11-19 PROCEDURE — 88305 TISSUE EXAM BY PATHOLOGIST: CPT

## 2019-11-19 PROCEDURE — G8427 DOCREV CUR MEDS BY ELIG CLIN: HCPCS | Performed by: NURSE PRACTITIONER

## 2019-11-19 PROCEDURE — 1123F ACP DISCUSS/DSCN MKR DOCD: CPT | Performed by: NURSE PRACTITIONER

## 2019-11-19 PROCEDURE — 83986 ASSAY PH BODY FLUID NOS: CPT

## 2019-11-19 PROCEDURE — 87205 SMEAR GRAM STAIN: CPT

## 2019-11-19 PROCEDURE — 87116 MYCOBACTERIA CULTURE: CPT

## 2019-11-19 PROCEDURE — 83615 LACTATE (LD) (LDH) ENZYME: CPT

## 2019-11-19 PROCEDURE — G8598 ASA/ANTIPLAT THER USED: HCPCS | Performed by: NURSE PRACTITIONER

## 2019-11-19 RX ORDER — UREA 10 %
LOTION (ML) TOPICAL
COMMUNITY
Start: 2019-11-15 | End: 2020-01-22 | Stop reason: ALTCHOICE

## 2019-11-19 RX ORDER — DIPHENHYDRAMINE HYDROCHLORIDE 25 MG/1
CAPSULE ORAL
Refills: 0 | COMMUNITY
Start: 2019-11-11 | End: 2020-01-22 | Stop reason: ALTCHOICE

## 2019-11-19 ASSESSMENT — ENCOUNTER SYMPTOMS
ABDOMINAL DISTENTION: 0
COUGH: 0
SHORTNESS OF BREATH: 1

## 2019-11-20 ENCOUNTER — TELEPHONE (OUTPATIENT)
Dept: CARDIOLOGY CLINIC | Age: 74
End: 2019-11-20

## 2019-11-20 LAB
BODY FLUID RBC: ABNORMAL /CUMM
CHARACTER, BODY FLUID: ABNORMAL
COLOR: ABNORMAL
MONONUCLEAR CELLS BODY FLUID: 97.8 %
PATHOLOGIST REVIEW: ABNORMAL
POLYMORPHONUCLEAR CELLS BODY FLUID: 2.2 %
SPECIMEN: ABNORMAL
TOTAL NUCLEATED CELLS BODY FLUID: 883 /CUMM (ref 0–500)
TOTAL VOLUME RECEIVED BODY FLUID: 83 ML

## 2019-11-24 LAB
ANAEROBIC CULTURE: NORMAL
BODY FLUID CULTURE, STERILE: NORMAL
GRAM STAIN RESULT: NORMAL

## 2019-11-25 ENCOUNTER — TELEPHONE (OUTPATIENT)
Dept: PULMONOLOGY | Age: 74
End: 2019-11-25

## 2019-11-26 ENCOUNTER — OFFICE VISIT (OUTPATIENT)
Dept: CARDIOLOGY CLINIC | Age: 74
End: 2019-11-26
Payer: MEDICARE

## 2019-11-26 VITALS
DIASTOLIC BLOOD PRESSURE: 60 MMHG | WEIGHT: 159 LBS | OXYGEN SATURATION: 93 % | HEART RATE: 72 BPM | SYSTOLIC BLOOD PRESSURE: 144 MMHG | BODY MASS INDEX: 22.18 KG/M2

## 2019-11-26 DIAGNOSIS — I50.22 CHF (CONGESTIVE HEART FAILURE), NYHA CLASS III, CHRONIC, SYSTOLIC (HCC): Primary | ICD-10-CM

## 2019-11-26 LAB
ANION GAP SERPL CALCULATED.3IONS-SCNC: 12 MEQ/L (ref 8–16)
BUN BLDV-MCNC: 52 MG/DL (ref 7–22)
CALCIUM SERPL-MCNC: 8.9 MG/DL (ref 8.5–10.5)
CHLORIDE BLD-SCNC: 96 MEQ/L (ref 98–111)
CO2: 30 MEQ/L (ref 23–33)
CREAT SERPL-MCNC: 2.1 MG/DL (ref 0.4–1.2)
GFR SERPL CREATININE-BSD FRML MDRD: 31 ML/MIN/1.73M2
GLUCOSE BLD-MCNC: 206 MG/DL (ref 70–108)
POTASSIUM SERPL-SCNC: 3.7 MEQ/L (ref 3.5–5.2)
SODIUM BLD-SCNC: 138 MEQ/L (ref 135–145)

## 2019-11-26 PROCEDURE — G8484 FLU IMMUNIZE NO ADMIN: HCPCS | Performed by: NURSE PRACTITIONER

## 2019-11-26 PROCEDURE — 4040F PNEUMOC VAC/ADMIN/RCVD: CPT | Performed by: NURSE PRACTITIONER

## 2019-11-26 PROCEDURE — 1123F ACP DISCUSS/DSCN MKR DOCD: CPT | Performed by: NURSE PRACTITIONER

## 2019-11-26 PROCEDURE — 36415 COLL VENOUS BLD VENIPUNCTURE: CPT | Performed by: NURSE PRACTITIONER

## 2019-11-26 PROCEDURE — 99213 OFFICE O/P EST LOW 20 MIN: CPT | Performed by: NURSE PRACTITIONER

## 2019-11-26 PROCEDURE — G8420 CALC BMI NORM PARAMETERS: HCPCS | Performed by: NURSE PRACTITIONER

## 2019-11-26 PROCEDURE — 3017F COLORECTAL CA SCREEN DOC REV: CPT | Performed by: NURSE PRACTITIONER

## 2019-11-26 PROCEDURE — 1036F TOBACCO NON-USER: CPT | Performed by: NURSE PRACTITIONER

## 2019-11-26 PROCEDURE — G8598 ASA/ANTIPLAT THER USED: HCPCS | Performed by: NURSE PRACTITIONER

## 2019-11-26 PROCEDURE — G8427 DOCREV CUR MEDS BY ELIG CLIN: HCPCS | Performed by: NURSE PRACTITIONER

## 2019-11-26 RX ORDER — ISOSORBIDE DINITRATE 20 MG/1
20 TABLET ORAL 2 TIMES DAILY
Qty: 60 TABLET | Refills: 3 | Status: SHIPPED | OUTPATIENT
Start: 2019-11-26 | End: 2019-11-26

## 2019-11-26 RX ORDER — BUMETANIDE 2 MG/1
2 TABLET ORAL 2 TIMES DAILY
Qty: 60 TABLET | Refills: 3 | Status: SHIPPED | OUTPATIENT
Start: 2019-11-26 | End: 2020-06-15 | Stop reason: SDUPTHER

## 2019-11-26 RX ORDER — METOLAZONE 2.5 MG/1
TABLET ORAL
Qty: 20 TABLET | Refills: 2 | Status: SHIPPED | OUTPATIENT
Start: 2019-11-26 | End: 2020-01-09 | Stop reason: SDUPTHER

## 2019-11-26 RX ORDER — FERROUS SULFATE 325(65) MG
325 TABLET ORAL 2 TIMES DAILY
Qty: 60 TABLET | Refills: 3 | Status: SHIPPED | OUTPATIENT
Start: 2019-11-26 | End: 2020-06-15 | Stop reason: SDUPTHER

## 2019-11-26 RX ORDER — ISOSORBIDE DINITRATE 20 MG/1
20 TABLET ORAL 2 TIMES DAILY
Qty: 60 TABLET | Refills: 5 | Status: SHIPPED | OUTPATIENT
Start: 2019-11-26 | End: 2021-01-01 | Stop reason: SDUPTHER

## 2019-11-26 ASSESSMENT — ENCOUNTER SYMPTOMS
COUGH: 0
ABDOMINAL DISTENTION: 0
SHORTNESS OF BREATH: 1

## 2019-12-02 ENCOUNTER — HOSPITAL ENCOUNTER (OUTPATIENT)
Dept: INFUSION THERAPY | Age: 74
Discharge: HOME | End: 2019-12-02
Payer: MEDICARE

## 2019-12-02 ENCOUNTER — OFFICE VISIT (OUTPATIENT)
Dept: ONCOLOGY | Age: 74
End: 2019-12-02
Payer: MEDICARE

## 2019-12-02 VITALS
HEIGHT: 71 IN | HEART RATE: 71 BPM | SYSTOLIC BLOOD PRESSURE: 119 MMHG | TEMPERATURE: 97.6 F | RESPIRATION RATE: 18 BRPM | OXYGEN SATURATION: 97 % | DIASTOLIC BLOOD PRESSURE: 64 MMHG | WEIGHT: 159 LBS | BODY MASS INDEX: 22.26 KG/M2

## 2019-12-02 DIAGNOSIS — D47.2 MGUS (MONOCLONAL GAMMOPATHY OF UNKNOWN SIGNIFICANCE): Primary | ICD-10-CM

## 2019-12-02 DIAGNOSIS — R25.1 HAS A TREMOR: ICD-10-CM

## 2019-12-02 DIAGNOSIS — R77.9 ELEVATED BLOOD PROTEIN: ICD-10-CM

## 2019-12-02 DIAGNOSIS — D47.2 MGUS (MONOCLONAL GAMMOPATHY OF UNKNOWN SIGNIFICANCE): ICD-10-CM

## 2019-12-02 LAB
ALBUMIN SERPL-MCNC: 3.6 G/DL (ref 3.5–5.1)
ALP BLD-CCNC: 92 U/L (ref 38–126)
ALT SERPL-CCNC: 17 U/L (ref 11–66)
ANION GAP SERPL CALCULATED.3IONS-SCNC: 13 MEQ/L (ref 8–16)
AST SERPL-CCNC: 19 U/L (ref 5–40)
BASOPHILS # BLD: 0.5 %
BASOPHILS ABSOLUTE: 0 THOU/MM3 (ref 0–0.1)
BILIRUB SERPL-MCNC: 0.4 MG/DL (ref 0.3–1.2)
BILIRUBIN DIRECT: < 0.2 MG/DL (ref 0–0.3)
BUN BLDV-MCNC: 69 MG/DL (ref 7–22)
CALCIUM SERPL-MCNC: 9 MG/DL (ref 8.5–10.5)
CHLORIDE BLD-SCNC: 96 MEQ/L (ref 98–111)
CO2: 29 MEQ/L (ref 23–33)
CREAT SERPL-MCNC: 2.2 MG/DL (ref 0.4–1.2)
EOSINOPHIL # BLD: 1.7 %
EOSINOPHILS ABSOLUTE: 0.1 THOU/MM3 (ref 0–0.4)
ERYTHROCYTE [DISTWIDTH] IN BLOOD BY AUTOMATED COUNT: 17.3 % (ref 11.5–14.5)
ERYTHROCYTE [DISTWIDTH] IN BLOOD BY AUTOMATED COUNT: 54.3 FL (ref 35–45)
GFR SERPL CREATININE-BSD FRML MDRD: 30 ML/MIN/1.73M2
GLUCOSE BLD-MCNC: 211 MG/DL (ref 70–108)
HCT VFR BLD CALC: 39.4 % (ref 42–52)
HEMOGLOBIN: 11.6 GM/DL (ref 14–18)
IMMATURE GRANS (ABS): 0.02 THOU/MM3 (ref 0–0.07)
IMMATURE GRANULOCYTES: 0.3 %
LYMPHOCYTES # BLD: 13.8 %
LYMPHOCYTES ABSOLUTE: 0.9 THOU/MM3 (ref 1–4.8)
MAGNESIUM: 2.2 MG/DL (ref 1.6–2.4)
MCH RBC QN AUTO: 25.3 PG (ref 26–33)
MCHC RBC AUTO-ENTMCNC: 29.4 GM/DL (ref 32.2–35.5)
MCV RBC AUTO: 86 FL (ref 80–94)
MONOCYTES # BLD: 9.1 %
MONOCYTES ABSOLUTE: 0.6 THOU/MM3 (ref 0.4–1.3)
NUCLEATED RED BLOOD CELLS: 0 /100 WBC
PLATELET # BLD: 213 THOU/MM3 (ref 130–400)
PMV BLD AUTO: 11.3 FL (ref 9.4–12.4)
POTASSIUM SERPL-SCNC: 4.3 MEQ/L (ref 3.5–5.2)
RBC # BLD: 4.58 MILL/MM3 (ref 4.7–6.1)
SEDIMENTATION RATE, ERYTHROCYTE: 94 MM/HR (ref 0–10)
SEG NEUTROPHILS: 74.6 %
SEGMENTED NEUTROPHILS ABSOLUTE COUNT: 4.8 THOU/MM3 (ref 1.8–7.7)
SODIUM BLD-SCNC: 138 MEQ/L (ref 135–145)
TOTAL PROTEIN: 8 G/DL (ref 6.1–8)
WBC # BLD: 6.5 THOU/MM3 (ref 4.8–10.8)

## 2019-12-02 PROCEDURE — 88184 FLOWCYTOMETRY/ TC 1 MARKER: CPT

## 2019-12-02 PROCEDURE — 36415 COLL VENOUS BLD VENIPUNCTURE: CPT

## 2019-12-02 PROCEDURE — 99204 OFFICE O/P NEW MOD 45 MIN: CPT | Performed by: INTERNAL MEDICINE

## 2019-12-02 PROCEDURE — 82248 BILIRUBIN DIRECT: CPT

## 2019-12-02 PROCEDURE — 1123F ACP DISCUSS/DSCN MKR DOCD: CPT | Performed by: INTERNAL MEDICINE

## 2019-12-02 PROCEDURE — 86334 IMMUNOFIX E-PHORESIS SERUM: CPT

## 2019-12-02 PROCEDURE — G8427 DOCREV CUR MEDS BY ELIG CLIN: HCPCS | Performed by: INTERNAL MEDICINE

## 2019-12-02 PROCEDURE — G8598 ASA/ANTIPLAT THER USED: HCPCS | Performed by: INTERNAL MEDICINE

## 2019-12-02 PROCEDURE — 83883 ASSAY NEPHELOMETRY NOT SPEC: CPT

## 2019-12-02 PROCEDURE — 3017F COLORECTAL CA SCREEN DOC REV: CPT | Performed by: INTERNAL MEDICINE

## 2019-12-02 PROCEDURE — 80053 COMPREHEN METABOLIC PANEL: CPT

## 2019-12-02 PROCEDURE — 83735 ASSAY OF MAGNESIUM: CPT

## 2019-12-02 PROCEDURE — 84155 ASSAY OF PROTEIN SERUM: CPT

## 2019-12-02 PROCEDURE — 82784 ASSAY IGA/IGD/IGG/IGM EACH: CPT

## 2019-12-02 PROCEDURE — G8484 FLU IMMUNIZE NO ADMIN: HCPCS | Performed by: INTERNAL MEDICINE

## 2019-12-02 PROCEDURE — 85025 COMPLETE CBC W/AUTO DIFF WBC: CPT

## 2019-12-02 PROCEDURE — G8420 CALC BMI NORM PARAMETERS: HCPCS | Performed by: INTERNAL MEDICINE

## 2019-12-02 PROCEDURE — 4040F PNEUMOC VAC/ADMIN/RCVD: CPT | Performed by: INTERNAL MEDICINE

## 2019-12-02 PROCEDURE — 84165 PROTEIN E-PHORESIS SERUM: CPT

## 2019-12-02 PROCEDURE — 1036F TOBACCO NON-USER: CPT | Performed by: INTERNAL MEDICINE

## 2019-12-02 PROCEDURE — 88185 FLOWCYTOMETRY/TC ADD-ON: CPT

## 2019-12-02 PROCEDURE — 85651 RBC SED RATE NONAUTOMATED: CPT

## 2019-12-02 PROCEDURE — 83615 LACTATE (LD) (LDH) ENZYME: CPT

## 2019-12-02 PROCEDURE — 99211 OFF/OP EST MAY X REQ PHY/QHP: CPT

## 2019-12-03 ENCOUNTER — OFFICE VISIT (OUTPATIENT)
Dept: PULMONOLOGY | Age: 74
End: 2019-12-03
Payer: MEDICARE

## 2019-12-03 VITALS
BODY MASS INDEX: 22.31 KG/M2 | SYSTOLIC BLOOD PRESSURE: 126 MMHG | TEMPERATURE: 97.3 F | OXYGEN SATURATION: 98 % | DIASTOLIC BLOOD PRESSURE: 74 MMHG | WEIGHT: 159.4 LBS | HEART RATE: 67 BPM | HEIGHT: 71 IN

## 2019-12-03 DIAGNOSIS — J90 RECURRENT PLEURAL EFFUSION ON RIGHT: Primary | ICD-10-CM

## 2019-12-03 DIAGNOSIS — R93.89 ABNORMAL CHEST X-RAY: ICD-10-CM

## 2019-12-03 DIAGNOSIS — I50.22 CHRONIC SYSTOLIC CONGESTIVE HEART FAILURE (HCC): ICD-10-CM

## 2019-12-03 LAB — LD: 183 U/L (ref 100–190)

## 2019-12-03 PROCEDURE — 4040F PNEUMOC VAC/ADMIN/RCVD: CPT | Performed by: INTERNAL MEDICINE

## 2019-12-03 PROCEDURE — 1036F TOBACCO NON-USER: CPT | Performed by: INTERNAL MEDICINE

## 2019-12-03 PROCEDURE — 3017F COLORECTAL CA SCREEN DOC REV: CPT | Performed by: INTERNAL MEDICINE

## 2019-12-03 PROCEDURE — G8598 ASA/ANTIPLAT THER USED: HCPCS | Performed by: INTERNAL MEDICINE

## 2019-12-03 PROCEDURE — G8484 FLU IMMUNIZE NO ADMIN: HCPCS | Performed by: INTERNAL MEDICINE

## 2019-12-03 PROCEDURE — 1123F ACP DISCUSS/DSCN MKR DOCD: CPT | Performed by: INTERNAL MEDICINE

## 2019-12-03 PROCEDURE — G8427 DOCREV CUR MEDS BY ELIG CLIN: HCPCS | Performed by: INTERNAL MEDICINE

## 2019-12-03 PROCEDURE — 99214 OFFICE O/P EST MOD 30 MIN: CPT | Performed by: INTERNAL MEDICINE

## 2019-12-03 PROCEDURE — G8420 CALC BMI NORM PARAMETERS: HCPCS | Performed by: INTERNAL MEDICINE

## 2019-12-05 LAB
KAPPA/LAMBDA FREE LIGHT CHAINS: NORMAL
LEUK/LYMPH PHENOTYPING WB: NORMAL

## 2019-12-05 RX ORDER — CARVEDILOL 12.5 MG/1
12.5 TABLET ORAL 2 TIMES DAILY
Qty: 180 TABLET | Refills: 1 | Status: SHIPPED | OUTPATIENT
Start: 2019-12-05 | End: 2020-08-10 | Stop reason: SDUPTHER

## 2019-12-06 LAB — IMMUNOFIXATION WITH QUANT: NORMAL

## 2019-12-13 ENCOUNTER — HOSPITAL ENCOUNTER (OUTPATIENT)
Dept: NON INVASIVE DIAGNOSTICS | Age: 74
Discharge: HOME | End: 2019-12-13
Payer: MEDICARE

## 2019-12-13 DIAGNOSIS — I50.22 CHF (CONGESTIVE HEART FAILURE), NYHA CLASS III, CHRONIC, SYSTOLIC (HCC): ICD-10-CM

## 2019-12-13 LAB
LV EF: 28 %
LVEF MODALITY: NORMAL

## 2019-12-13 PROCEDURE — 93306 TTE W/DOPPLER COMPLETE: CPT

## 2019-12-16 ENCOUNTER — HOSPITAL ENCOUNTER (OUTPATIENT)
Dept: CT IMAGING | Age: 74
Discharge: HOME | End: 2019-12-16
Payer: MEDICARE

## 2019-12-16 ENCOUNTER — HOSPITAL ENCOUNTER (OUTPATIENT)
Age: 74
Discharge: HOME | End: 2019-12-16
Payer: MEDICARE

## 2019-12-16 DIAGNOSIS — I50.22 CHF (CONGESTIVE HEART FAILURE), NYHA CLASS III, CHRONIC, SYSTOLIC (HCC): ICD-10-CM

## 2019-12-16 DIAGNOSIS — I50.22 CHRONIC SYSTOLIC CONGESTIVE HEART FAILURE (HCC): ICD-10-CM

## 2019-12-16 DIAGNOSIS — R93.89 ABNORMAL CHEST X-RAY: ICD-10-CM

## 2019-12-16 DIAGNOSIS — J90 RECURRENT PLEURAL EFFUSION ON RIGHT: ICD-10-CM

## 2019-12-16 LAB
ANION GAP SERPL CALCULATED.3IONS-SCNC: 11 MEQ/L (ref 8–16)
BASOPHILS ABSOLUTE: ABNORMAL
BASOPHILS RELATIVE PERCENT: ABNORMAL
BILIRUBIN, URINE: NEGATIVE
BLOOD, URINE: NEGATIVE
BUN BLDV-MCNC: 56 MG/DL
BUN BLDV-MCNC: 61 MG/DL (ref 7–22)
CALCIUM SERPL-MCNC: 8.7 MG/DL (ref 8.5–10.5)
CALCIUM SERPL-MCNC: 99 MG/DL
CHLORIDE BLD-SCNC: 134 MMOL/L
CHLORIDE BLD-SCNC: 95 MEQ/L (ref 98–111)
CHOLESTEROL, TOTAL: 134 MG/DL
CHOLESTEROL/HDL RATIO: NORMAL
CLARITY: CLEAR
CO2: 30 MEQ/L (ref 23–33)
CO2: 32 MMOL/L
COLOR: YELLOW
CREAT SERPL-MCNC: 2.4 MG/DL
CREAT SERPL-MCNC: 2.4 MG/DL (ref 0.4–1.2)
EOSINOPHILS ABSOLUTE: ABNORMAL
EOSINOPHILS RELATIVE PERCENT: ABNORMAL
GFR CALCULATED: 26.7
GFR SERPL CREATININE-BSD FRML MDRD: 27 ML/MIN/1.73M2
GLUCOSE BLD-MCNC: 132 MG/DL (ref 70–108)
GLUCOSE BLD-MCNC: 144 MG/DL
GLUCOSE URINE: NEGATIVE
HCT VFR BLD CALC: 33.3 % (ref 41–53)
HDLC SERPL-MCNC: 40 MG/DL (ref 35–70)
HEMOGLOBIN: 10.8 G/DL (ref 13.5–17.5)
KETONES, URINE: NEGATIVE
LDL CHOLESTEROL CALCULATED: 72 MG/DL (ref 0–160)
LEUKOCYTE ESTERASE, URINE: NEGATIVE
LYMPHOCYTES ABSOLUTE: ABNORMAL
LYMPHOCYTES RELATIVE PERCENT: ABNORMAL
MCH RBC QN AUTO: ABNORMAL PG
MCHC RBC AUTO-ENTMCNC: ABNORMAL G/DL
MCV RBC AUTO: ABNORMAL FL
MONOCYTES ABSOLUTE: ABNORMAL
MONOCYTES RELATIVE PERCENT: ABNORMAL
NEUTROPHILS ABSOLUTE: ABNORMAL
NEUTROPHILS RELATIVE PERCENT: ABNORMAL
NITRITE, URINE: NEGATIVE
PH UA: 5 (ref 4.5–8)
PLATELET # BLD: 273 K/ΜL
PMV BLD AUTO: ABNORMAL FL
POTASSIUM SERPL-SCNC: 4.3 MEQ/L (ref 3.5–5.2)
POTASSIUM SERPL-SCNC: 8.1 MMOL/L
PROTEIN UA: NEGATIVE
RBC # BLD: 4.24 10^6/ΜL
SODIUM BLD-SCNC: 136 MEQ/L (ref 135–145)
SODIUM BLD-SCNC: NORMAL MMOL/L
SPECIFIC GRAVITY, URINE: 1.01
TRIGL SERPL-MCNC: 110 MG/DL
UROBILINOGEN, URINE: NORMAL
VLDLC SERPL CALC-MCNC: NORMAL MG/DL
WBC # BLD: 6 10^3/ML

## 2019-12-16 PROCEDURE — 71250 CT THORAX DX C-: CPT

## 2019-12-16 PROCEDURE — 80048 BASIC METABOLIC PNL TOTAL CA: CPT

## 2019-12-16 PROCEDURE — 36415 COLL VENOUS BLD VENIPUNCTURE: CPT

## 2019-12-17 ENCOUNTER — TELEPHONE (OUTPATIENT)
Dept: PULMONOLOGY | Age: 74
End: 2019-12-17

## 2019-12-19 ENCOUNTER — OFFICE VISIT (OUTPATIENT)
Dept: CARDIOLOGY CLINIC | Age: 74
End: 2019-12-19
Payer: MEDICARE

## 2019-12-19 VITALS
BODY MASS INDEX: 22.32 KG/M2 | WEIGHT: 160 LBS | DIASTOLIC BLOOD PRESSURE: 62 MMHG | SYSTOLIC BLOOD PRESSURE: 126 MMHG | HEART RATE: 73 BPM | OXYGEN SATURATION: 99 %

## 2019-12-19 DIAGNOSIS — I50.43 CHF (CONGESTIVE HEART FAILURE), NYHA CLASS IV, ACUTE ON CHRONIC, COMBINED (HCC): Primary | ICD-10-CM

## 2019-12-19 PROCEDURE — 3017F COLORECTAL CA SCREEN DOC REV: CPT | Performed by: NURSE PRACTITIONER

## 2019-12-19 PROCEDURE — G8484 FLU IMMUNIZE NO ADMIN: HCPCS | Performed by: NURSE PRACTITIONER

## 2019-12-19 PROCEDURE — 1123F ACP DISCUSS/DSCN MKR DOCD: CPT | Performed by: NURSE PRACTITIONER

## 2019-12-19 PROCEDURE — G8598 ASA/ANTIPLAT THER USED: HCPCS | Performed by: NURSE PRACTITIONER

## 2019-12-19 PROCEDURE — G8427 DOCREV CUR MEDS BY ELIG CLIN: HCPCS | Performed by: NURSE PRACTITIONER

## 2019-12-19 PROCEDURE — 4040F PNEUMOC VAC/ADMIN/RCVD: CPT | Performed by: NURSE PRACTITIONER

## 2019-12-19 PROCEDURE — G8420 CALC BMI NORM PARAMETERS: HCPCS | Performed by: NURSE PRACTITIONER

## 2019-12-19 PROCEDURE — 99214 OFFICE O/P EST MOD 30 MIN: CPT | Performed by: NURSE PRACTITIONER

## 2019-12-19 PROCEDURE — 1036F TOBACCO NON-USER: CPT | Performed by: NURSE PRACTITIONER

## 2019-12-19 ASSESSMENT — ENCOUNTER SYMPTOMS
ABDOMINAL DISTENTION: 0
COUGH: 1
SHORTNESS OF BREATH: 1

## 2019-12-23 ENCOUNTER — TELEPHONE (OUTPATIENT)
Dept: CARDIOLOGY CLINIC | Age: 74
End: 2019-12-23

## 2019-12-23 LAB
FUNGUS IDENTIFIED: NORMAL
FUNGUS SMEAR: NORMAL

## 2019-12-29 PROBLEM — R77.9 ELEVATED BLOOD PROTEIN: Status: ACTIVE | Noted: 2019-12-29

## 2019-12-29 PROBLEM — D47.2 MGUS (MONOCLONAL GAMMOPATHY OF UNKNOWN SIGNIFICANCE): Status: ACTIVE | Noted: 2019-12-29

## 2020-01-06 LAB — AFB CULTURE & SMEAR: NORMAL

## 2020-01-09 ENCOUNTER — TELEPHONE (OUTPATIENT)
Dept: CARDIOLOGY CLINIC | Age: 75
End: 2020-01-09

## 2020-01-09 RX ORDER — METOLAZONE 2.5 MG/1
TABLET ORAL
Qty: 60 TABLET | Refills: 2 | Status: SHIPPED | OUTPATIENT
Start: 2020-01-09 | End: 2021-01-01

## 2020-01-09 NOTE — TELEPHONE ENCOUNTER
Needs to come to ED if feeling that bad. Needs to not skip doses of Metolazone w/ extra Kcl - take dose tomorrow if not wanting to come in. Has appt w/ Pulm on Monday.

## 2020-01-13 ENCOUNTER — HOSPITAL ENCOUNTER (OUTPATIENT)
Age: 75
Discharge: HOME | End: 2020-01-13
Payer: MEDICARE

## 2020-01-13 ENCOUNTER — OFFICE VISIT (OUTPATIENT)
Dept: PULMONOLOGY | Age: 75
End: 2020-01-13
Payer: MEDICARE

## 2020-01-13 VITALS
TEMPERATURE: 98.2 F | OXYGEN SATURATION: 97 % | BODY MASS INDEX: 22.04 KG/M2 | SYSTOLIC BLOOD PRESSURE: 122 MMHG | WEIGHT: 157.4 LBS | HEIGHT: 71 IN | HEART RATE: 72 BPM | DIASTOLIC BLOOD PRESSURE: 64 MMHG

## 2020-01-13 LAB
ANION GAP SERPL CALCULATED.3IONS-SCNC: 13 MEQ/L (ref 8–16)
BUN BLDV-MCNC: 66 MG/DL (ref 7–22)
CALCIUM SERPL-MCNC: 9 MG/DL (ref 8.5–10.5)
CHLORIDE BLD-SCNC: 96 MEQ/L (ref 98–111)
CO2: 28 MEQ/L (ref 23–33)
CREAT SERPL-MCNC: 2.4 MG/DL (ref 0.4–1.2)
GFR SERPL CREATININE-BSD FRML MDRD: 27 ML/MIN/1.73M2
GLUCOSE BLD-MCNC: 179 MG/DL (ref 70–108)
POTASSIUM SERPL-SCNC: 4.3 MEQ/L (ref 3.5–5.2)
SODIUM BLD-SCNC: 137 MEQ/L (ref 135–145)

## 2020-01-13 PROCEDURE — 1036F TOBACCO NON-USER: CPT | Performed by: NURSE PRACTITIONER

## 2020-01-13 PROCEDURE — 1123F ACP DISCUSS/DSCN MKR DOCD: CPT | Performed by: NURSE PRACTITIONER

## 2020-01-13 PROCEDURE — 36415 COLL VENOUS BLD VENIPUNCTURE: CPT

## 2020-01-13 PROCEDURE — 80048 BASIC METABOLIC PNL TOTAL CA: CPT

## 2020-01-13 PROCEDURE — 4040F PNEUMOC VAC/ADMIN/RCVD: CPT | Performed by: NURSE PRACTITIONER

## 2020-01-13 PROCEDURE — G8484 FLU IMMUNIZE NO ADMIN: HCPCS | Performed by: NURSE PRACTITIONER

## 2020-01-13 PROCEDURE — G8427 DOCREV CUR MEDS BY ELIG CLIN: HCPCS | Performed by: NURSE PRACTITIONER

## 2020-01-13 PROCEDURE — 99214 OFFICE O/P EST MOD 30 MIN: CPT | Performed by: NURSE PRACTITIONER

## 2020-01-13 PROCEDURE — G8420 CALC BMI NORM PARAMETERS: HCPCS | Performed by: NURSE PRACTITIONER

## 2020-01-13 PROCEDURE — 3017F COLORECTAL CA SCREEN DOC REV: CPT | Performed by: NURSE PRACTITIONER

## 2020-01-13 ASSESSMENT — ENCOUNTER SYMPTOMS
ALLERGIC/IMMUNOLOGIC NEGATIVE: 1
SHORTNESS OF BREATH: 1
EYES NEGATIVE: 1
WHEEZING: 0
CHEST TIGHTNESS: 0
ABDOMINAL DISTENTION: 0
DIARRHEA: 0
VOMITING: 0
ABDOMINAL PAIN: 0
CHOKING: 0
TROUBLE SWALLOWING: 0
NAUSEA: 0
COUGH: 0

## 2020-01-13 NOTE — PROGRESS NOTES
dust: NO  History of recent travel to long distances: NO  History of exposure to birds, pigeons, or chickens in the past:NO  Pet animals at home: yes  Dogs: 2  Cats: 1     History of pulmonary embolism in the past: No            History of DVT in the past:No                      Past Medical hx   PMH:  Past Medical History:   Diagnosis Date    CAD (coronary artery disease)     CHF (congestive heart failure) ([address]     Chronic kidney disease     History of blood transfusion     Hyperlipidemia     Hypertension     Proteinuria     Type II or unspecified type diabetes mellitus without mention of complication, not stated as uncontrolled      SURGICAL HISTORY:  Past Surgical History:   Procedure Laterality Date    CARDIAC SURGERY      [facility]      at [facility]    CORONARY ARTERY BYPASS GRAFT N/A 2019    CABG X3 MVR / MAZE performed by Liz Mosquera MD at [address] [place] [address] N/A 2019    EGD DIAGNOSTIC ONLY performed by Clemencia De La Paz MD at [facility]     SOCIAL HISTORY:  Social History     Tobacco Use    Smoking status: Former Smoker     Packs/day: 0.25     Years: 10.00     Pack years: 2.50     Types: Cigarettes     Last attempt to quit: 1976     Years since quittin.0    Smokeless tobacco: Former User   Substance Use Topics    Alcohol use: No     Alcohol/week: 0.0 standard drinks    Drug use: Not on file     ALLERGIES:  Allergies   Allergen Reactions    Atorvastatin      Other reaction(s): Myalgia, ON NURSING HOME REPORT     FAMILY HISTORY:  Family History   Problem Relation Age of Onset    Cancer Mother         breast    Diabetes Mother     Heart Disease Father     Diabetes Father     Cancer Maternal Grandmother         leukemia     CURRENT MEDICATIONS:  Current Outpatient Medications   Medication Sig Dispense Refill    metOLazone (ZAROXOLYN) 2.5 MG tablet Take 1 tab every Monday and Thursday AND as directed by clinic Systems   Constitutional: Positive for fatigue. Negative for activity change, appetite change, chills, fever and unexpected weight change. HENT: Negative. Negative for trouble swallowing. Eyes: Negative. Respiratory: Positive for shortness of breath. Negative for cough, choking, chest tightness and wheezing. Cardiovascular: Positive for chest pain. Negative for palpitations and leg swelling. Gastrointestinal: Negative for abdominal distention, abdominal pain, diarrhea, nausea and vomiting. Genitourinary: Negative. Musculoskeletal: Positive for gait problem. Skin: Negative. Allergic/Immunologic: Negative. Neurological: Positive for weakness (generalized ). Hematological: Does not bruise/bleed easily. Psychiatric/Behavioral: Negative for sleep disturbance and suicidal ideas. The patient is nervous/anxious (about his health). Physical exam   /64 (Site: Left Upper Arm, Position: Sitting, Cuff Size: Medium Adult)   Pulse 72   Temp 98.2 °F (36.8 °C)   Ht 5' 11\" (1.803 m)   Wt 157 lb 6.4 oz (71.4 kg)   SpO2 97% Comment: room air at rest  BMI 21.95 kg/m²        Physical Exam  Vitals signs and nursing note reviewed. Constitutional:       General: He is not in acute distress. Appearance: He is well-developed. He is ill-appearing. HENT:      Mouth/Throat:      Pharynx: No oropharyngeal exudate. Eyes:      General: No scleral icterus. Right eye: No discharge. Conjunctiva/sclera: Conjunctivae normal.   Neck:      Musculoskeletal: Neck supple. Vascular: No JVD. Cardiovascular:      Rate and Rhythm: Normal rate and regular rhythm. Heart sounds: Normal heart sounds. No murmur. No friction rub. Pulmonary:      Effort: Pulmonary effort is normal. No respiratory distress. Breath sounds: Examination of the right-middle field reveals rales. Examination of the right-lower field reveals rales. Rales present. No wheezing or rhonchi.    Chest: Chest wall: No tenderness. Abdominal:      Palpations: Abdomen is soft. Musculoskeletal:         General: No tenderness. Right lower leg: No edema. Left lower leg: No edema. Lymphadenopathy:      Cervical: No cervical adenopathy. Skin:     General: Skin is warm and dry. Capillary Refill: Capillary refill takes less than 2 seconds. Coloration: Skin is pale. Neurological:      Mental Status: He is alert and oriented to person, place, and time. Psychiatric:         Attention and Perception: Attention normal.         Mood and Affect: Mood is depressed. Speech: Speech normal.         Behavior: Behavior normal.         Thought Content: Thought content normal.         Judgment: Judgment normal.      Comments: Tearful at one point during the exam as he explained all his health problems. Test results   Lung Nodule Screening     [] Qualifies    [x]Does not qualify   [] Declined    [] Completed     Chest Xray:  Nov 19, 2019   PROCEDURE: XR CHEST PA INSPIRATION 1 VW   Interval decrease in size of a right-sided pleural effusion with a small effusion remaining on the right. A small amount of pleural fluid or scarring is also again noted on the left and appears similar to the prior exam. There are linear densities at the  bilateral lung bases which likely correspond to atelectasis. No pneumothorax is identified. CT chest wo contrast 12/16/2019     FINDINGS:       There is a moderate right and small left pleural effusion, decreased compared to prior CT and grossly similar to the most recent radiograph. There is associated atelectasis bilaterally, right greater than left with a 3 x 1.6 cm nodular focus which is    contiguous with atelectasis. The lungs otherwise appear clear. There is a prominent pretracheal lymph node measuring 3.4 x 1.3 cm which has mildly increased in size compared to prior CT when it measured 3.2 x 0.9 cm. No other lymphadenopathy is    identified. There has been interval thoracostomy with interval placement of a mitral valvuloplasty. There are pericardial stimulator leads. Visualized upper abdominal solid organs are grossly unremarkable. There are stable degenerative changes of the    spine.           Impression       1. Moderate right and small left pleural effusions with associated atelectasis with focal nodular component on the right which may represent round atelectasis although underlying nodule can't be excluded. Consider short-term interval follow-up imaging in    2-3 months. 2. Interval thoracotomy and valvuloplasty compared to prior CT.                   **This report has been created using voice recognition software. It may contain minor errors which are inherent in voice recognition technology. **       Final report electronically signed by Dr. Jones Hendricks MD on 12/16/2019 3:32 PM           Echocardiogram:  2/14/2019   Narrative & Impression     Transthoracic Echocardiography Report (TTE)      Conclusions      Summary   Left ventricle size is normal.   Mildly increased left ventricle wall thickness. Akinetic motion of the anteroseptal wall noted in the left ventricle. Severly hypokinetic motion of the rest of the LV walls noted   Systolic function was severely reduced. Ejection fraction is visually estimated at 25%. The left atrium is Mild to moderate dilated. Signature      ----------------------------------------------------------------   Electronically signed by Tatiana Agarwal MD (Interpreting   physician) on 02/14/2019 at 05:57 PM      Assessment      Diagnosis Orders   1. Nodular radiologic density  CT Chest WO Contrast   2. Shortness of breath  CT Chest WO Contrast    Full PFT Study With Bronchodilator   3. Chronic systolic congestive heart failure ([address]     4. Former smoker, stopped smoking in distant past  CT Chest WO Contrast    Full PFT Study With Bronchodilator   5. Ischemic cardiomyopathy  CT Chest WO Contrast   6.

## 2020-01-22 ENCOUNTER — OFFICE VISIT (OUTPATIENT)
Dept: CARDIOLOGY CLINIC | Age: 75
End: 2020-01-22
Payer: MEDICARE

## 2020-01-22 VITALS
HEIGHT: 71 IN | BODY MASS INDEX: 22.29 KG/M2 | SYSTOLIC BLOOD PRESSURE: 150 MMHG | HEART RATE: 72 BPM | WEIGHT: 159.2 LBS | DIASTOLIC BLOOD PRESSURE: 62 MMHG

## 2020-01-22 PROCEDURE — 3017F COLORECTAL CA SCREEN DOC REV: CPT | Performed by: INTERNAL MEDICINE

## 2020-01-22 PROCEDURE — 99213 OFFICE O/P EST LOW 20 MIN: CPT | Performed by: INTERNAL MEDICINE

## 2020-01-22 PROCEDURE — G8427 DOCREV CUR MEDS BY ELIG CLIN: HCPCS | Performed by: INTERNAL MEDICINE

## 2020-01-22 PROCEDURE — 1036F TOBACCO NON-USER: CPT | Performed by: INTERNAL MEDICINE

## 2020-01-22 PROCEDURE — G8420 CALC BMI NORM PARAMETERS: HCPCS | Performed by: INTERNAL MEDICINE

## 2020-01-22 PROCEDURE — G8484 FLU IMMUNIZE NO ADMIN: HCPCS | Performed by: INTERNAL MEDICINE

## 2020-01-22 PROCEDURE — 1123F ACP DISCUSS/DSCN MKR DOCD: CPT | Performed by: INTERNAL MEDICINE

## 2020-01-22 PROCEDURE — 4040F PNEUMOC VAC/ADMIN/RCVD: CPT | Performed by: INTERNAL MEDICINE

## 2020-01-22 NOTE — Clinical Note
Lets call and see if they made a decision for cath and put on the unfilled procedure day if he wants to proceed

## 2020-01-27 ENCOUNTER — TELEPHONE (OUTPATIENT)
Dept: CARDIOLOGY CLINIC | Age: 75
End: 2020-01-27

## 2020-02-28 LAB
BUN BLDV-MCNC: 60 MG/DL
CALCIUM SERPL-MCNC: 8.8 MG/DL
CHLORIDE BLD-SCNC: 98 MMOL/L
CO2: 29 MMOL/L
CREAT SERPL-MCNC: 2.52 MG/DL
GFR CALCULATED: 25
GLUCOSE BLD-MCNC: 163 MG/DL
HCT VFR BLD CALC: 37.4 % (ref 41–53)
HEMOGLOBIN: 11.8 G/DL (ref 13.5–17.5)
PHOSPHORUS: 4 MG/DL
POTASSIUM SERPL-SCNC: 3.8 MMOL/L
PTH INTACT: 110
SODIUM BLD-SCNC: 138 MMOL/L

## 2020-03-02 ENCOUNTER — TELEPHONE (OUTPATIENT)
Dept: NEPHROLOGY | Age: 75
End: 2020-03-02

## 2020-03-04 ENCOUNTER — OFFICE VISIT (OUTPATIENT)
Dept: ONCOLOGY | Age: 75
End: 2020-03-04
Payer: MEDICARE

## 2020-03-04 ENCOUNTER — HOSPITAL ENCOUNTER (OUTPATIENT)
Dept: INFUSION THERAPY | Age: 75
Discharge: HOME | End: 2020-03-04
Payer: MEDICARE

## 2020-03-04 ENCOUNTER — OFFICE VISIT (OUTPATIENT)
Dept: NEPHROLOGY | Age: 75
End: 2020-03-04
Payer: MEDICARE

## 2020-03-04 VITALS
SYSTOLIC BLOOD PRESSURE: 139 MMHG | WEIGHT: 160.4 LBS | DIASTOLIC BLOOD PRESSURE: 71 MMHG | OXYGEN SATURATION: 98 % | HEART RATE: 86 BPM | BODY MASS INDEX: 22.37 KG/M2

## 2020-03-04 VITALS
DIASTOLIC BLOOD PRESSURE: 57 MMHG | WEIGHT: 160.2 LBS | TEMPERATURE: 97.4 F | HEART RATE: 80 BPM | SYSTOLIC BLOOD PRESSURE: 127 MMHG | BODY MASS INDEX: 22.43 KG/M2 | HEIGHT: 71 IN | OXYGEN SATURATION: 98 % | RESPIRATION RATE: 18 BRPM

## 2020-03-04 DIAGNOSIS — D47.2 MGUS (MONOCLONAL GAMMOPATHY OF UNKNOWN SIGNIFICANCE): ICD-10-CM

## 2020-03-04 LAB
ALBUMIN SERPL-MCNC: 3.5 G/DL (ref 3.5–5.1)
ALP BLD-CCNC: 98 U/L (ref 38–126)
ALT SERPL-CCNC: 18 U/L (ref 11–66)
AST SERPL-CCNC: 18 U/L (ref 5–40)
BASOPHILS # BLD: 0.4 %
BASOPHILS ABSOLUTE: 0 THOU/MM3 (ref 0–0.1)
BILIRUB SERPL-MCNC: 0.3 MG/DL (ref 0.3–1.2)
BILIRUBIN DIRECT: < 0.2 MG/DL (ref 0–0.3)
BUN BLDV-MCNC: 55 MG/DL (ref 7–22)
CALCIUM SERPL-MCNC: 8.9 MG/DL (ref 8.5–10.5)
CHLORIDE, WHOLE BLOOD: 93 MEQ/L (ref 98–109)
CO2: 31 MEQ/L (ref 23–33)
CREATININE, WHOLE BLOOD: 2.8 MG/DL (ref 0.5–1.2)
EOSINOPHIL # BLD: 1.2 %
EOSINOPHILS ABSOLUTE: 0.1 THOU/MM3 (ref 0–0.4)
ERYTHROCYTE [DISTWIDTH] IN BLOOD BY AUTOMATED COUNT: 16.4 % (ref 11.5–14.5)
ERYTHROCYTE [DISTWIDTH] IN BLOOD BY AUTOMATED COUNT: 51.5 FL (ref 35–45)
GFR, ESTIMATED ,CON: 24 ML/MIN/1.73M2
GLUCOSE, WHOLE BLOOD: 311 MG/DL (ref 70–108)
HCT VFR BLD CALC: 39.7 % (ref 42–52)
HEMOGLOBIN: 12 GM/DL (ref 14–18)
IMMATURE GRANS (ABS): 0 THOU/MM3 (ref 0–0.07)
IMMATURE GRANULOCYTES: 0 %
IONIZED CALCIUM, WHOLE BLOOD: 1.04 MMOL/L (ref 1.12–1.32)
LYMPHOCYTES # BLD: 15.6 %
LYMPHOCYTES ABSOLUTE: 0.8 THOU/MM3 (ref 1–4.8)
MCH RBC QN AUTO: 26.3 PG (ref 26–33)
MCHC RBC AUTO-ENTMCNC: 30.2 GM/DL (ref 32.2–35.5)
MCV RBC AUTO: 87.1 FL (ref 80–94)
MONOCYTES # BLD: 9.1 %
MONOCYTES ABSOLUTE: 0.5 THOU/MM3 (ref 0.4–1.3)
NUCLEATED RED BLOOD CELLS: 0 /100 WBC
PLATELET # BLD: 190 THOU/MM3 (ref 130–400)
PMV BLD AUTO: 10.2 FL (ref 9.4–12.4)
POTASSIUM, WHOLE BLOOD: 3.6 MEQ/L (ref 3.5–4.9)
RBC # BLD: 4.56 MILL/MM3 (ref 4.7–6.1)
SEG NEUTROPHILS: 73.7 %
SEGMENTED NEUTROPHILS ABSOLUTE COUNT: 3.8 THOU/MM3 (ref 1.8–7.7)
SODIUM, WHOLE BLOOD: 136 MEQ/L (ref 138–146)
TOTAL PROTEIN: 8.2 G/DL (ref 6.1–8)
WBC # BLD: 5.2 THOU/MM3 (ref 4.8–10.8)

## 2020-03-04 PROCEDURE — 82374 ASSAY BLOOD CARBON DIOXIDE: CPT

## 2020-03-04 PROCEDURE — 4040F PNEUMOC VAC/ADMIN/RCVD: CPT | Performed by: INTERNAL MEDICINE

## 2020-03-04 PROCEDURE — 1036F TOBACCO NON-USER: CPT | Performed by: INTERNAL MEDICINE

## 2020-03-04 PROCEDURE — 1123F ACP DISCUSS/DSCN MKR DOCD: CPT | Performed by: INTERNAL MEDICINE

## 2020-03-04 PROCEDURE — 80047 BASIC METABLC PNL IONIZED CA: CPT

## 2020-03-04 PROCEDURE — G8420 CALC BMI NORM PARAMETERS: HCPCS | Performed by: INTERNAL MEDICINE

## 2020-03-04 PROCEDURE — 3017F COLORECTAL CA SCREEN DOC REV: CPT | Performed by: INTERNAL MEDICINE

## 2020-03-04 PROCEDURE — G8427 DOCREV CUR MEDS BY ELIG CLIN: HCPCS | Performed by: INTERNAL MEDICINE

## 2020-03-04 PROCEDURE — 36415 COLL VENOUS BLD VENIPUNCTURE: CPT

## 2020-03-04 PROCEDURE — 88185 FLOWCYTOMETRY/TC ADD-ON: CPT

## 2020-03-04 PROCEDURE — 82310 ASSAY OF CALCIUM: CPT

## 2020-03-04 PROCEDURE — 86335 IMMUNFIX E-PHORSIS/URINE/CSF: CPT

## 2020-03-04 PROCEDURE — 84156 ASSAY OF PROTEIN URINE: CPT

## 2020-03-04 PROCEDURE — 99213 OFFICE O/P EST LOW 20 MIN: CPT | Performed by: INTERNAL MEDICINE

## 2020-03-04 PROCEDURE — 88184 FLOWCYTOMETRY/ TC 1 MARKER: CPT

## 2020-03-04 PROCEDURE — G8484 FLU IMMUNIZE NO ADMIN: HCPCS | Performed by: INTERNAL MEDICINE

## 2020-03-04 PROCEDURE — 99215 OFFICE O/P EST HI 40 MIN: CPT | Performed by: INTERNAL MEDICINE

## 2020-03-04 PROCEDURE — 84520 ASSAY OF UREA NITROGEN: CPT

## 2020-03-04 PROCEDURE — 85025 COMPLETE CBC W/AUTO DIFF WBC: CPT

## 2020-03-04 PROCEDURE — 83883 ASSAY NEPHELOMETRY NOT SPEC: CPT

## 2020-03-04 PROCEDURE — 99211 OFF/OP EST MAY X REQ PHY/QHP: CPT

## 2020-03-04 PROCEDURE — 80076 HEPATIC FUNCTION PANEL: CPT

## 2020-03-04 NOTE — PROGRESS NOTES
mg/L   Haswell/Lambda Free Light Chain Ratio          15.11 H   0.26-1.65     Hematology 3/4/2020 2/28/2020 12/16/2019   WBC 5.2  6.0   RBC 4.56 (L)  4.24   HGB 12.0 (L) 11.8 (A) 10.8 (A)   HCT 39.7 (L) 37.4 (A) 33.3 (A)   MCV 87.1       273     Assessment:   1. Elevated gammaglobulin. 2.  IgG kappa monoclonal protein. 3.  Elevated free kappa light chain. Plan:   1.  Schedule bone marrow biopsy for further evaluation of plasma cell dyscrasia. 2.  Monitor hemoglobin hematocrit and for any evidence of blood loss. 3.  Monitor IgG kappa monoclonal protein as well as kappa free light chain. 4.  Return to medical hematology clinic after bone marrow biopsy is been completed. Cheral Frankel M.D. Medical Director: [facility]  [address] [facility], [address] [place] [address], [address] [place] [address], [address] [place] [address] of the [facility] at [facility]      **This report has been created using voice recognition software. It may contain minor errors which are inherent in voice recognition technology. **

## 2020-03-04 NOTE — PROGRESS NOTES
[facility]    [facility], [address]   [place] [facility]  [phone]  Progress Note  3/4/2020 12:59 PM      Pt Name:    Neno Thompson  Birthdate:    [birth year]  Primary Care Physician:  Matteo Saab     Chief Complaint:   Chief Complaint   Patient presents with    Follow-up    Chronic Kidney Disease     CKD Stage IV        Background Information/Interval History:   72 yo white male with hx HTN, CKD IV, CAD s/p CABG, MVR, DM, hx smoking who is here for follow-up. He has had difficult course lately which started in Dec 2018 with cardiac arrest s/p intervention complicated by FATEMEH requiring dialysis. He subsequently had another cardiac arrest and had CABG on urgent basis along with MVR. He continued on dialysis (was started Dec 2018) until Feb 2019. Following with urology closely for BPH and urinary retention. He had chronic posey catheter which was subsequently removed per urology. He is following with [facility]. His Echo shows EF of 35-40%.      Patient is here for follow-up. Feels okay. No chest pain. Chronic shortness of breath. No longer using wheelchair. Now walking with a cane. On Room air, no longer has posey catheter. He is taking coreg, flomax, K-dur 40 meq po am, 20 meq pm.  Also taking bumex 2 mg po BID. Improved leg swelling. On metolazone twice weekly.  Following hematology/oncology for paraproteinemia evaluation     Past History:  Past Medical History:   Diagnosis Date    CAD (coronary artery disease)     CHF (congestive heart failure) (HCC)     Chronic kidney disease     History of blood transfusion     Hyperlipidemia     Hypertension     Proteinuria     Type II or unspecified type diabetes mellitus without mention of complication, not stated as uncontrolled      Past Surgical History:   Procedure Laterality Date    CARDIAC SURGERY      COLONOSCOPY  2019    at [address] GRAFT N/A 1/28/2019    CABG X3 MVR / MAZE performed by Rupert Hernandez MD at [address] ENDOSCOPY N/A 1/21/2019    EGD DIAGNOSTIC ONLY performed by Aviva Orozco MD at [facility]        VITALS:  /71 (Site: Left Upper Arm, Position: Sitting, Cuff Size: Large Adult)   Pulse 86   Wt 160 lb 6.4 oz (72.8 kg)   SpO2 98%   BMI 22.37 kg/m²   Wt Readings from Last 3 Encounters:   03/04/20 160 lb 6.4 oz (72.8 kg)   03/04/20 160 lb 3.2 oz (72.7 kg)   01/22/20 159 lb 3.2 oz (72.2 kg)     Body mass index is 22.37 kg/m². General Appearance: alert and cooperative with exam, appears comfortable, no distress  Oral: moist oral mucus membranes  Neck: No jugular venous distention  Lungs: Air entry B/L, no crackles or rales, no use of accessory muscles  Heart: S1, S2 heard  GI: soft, non-tender, no guarding  Extremities: No sig LE edema     Medications:  Current Outpatient Medications   Medication Sig Dispense Refill    metOLazone (ZAROXOLYN) 2.5 MG tablet Take 1 tab every Monday and Thursday AND as directed by clinic 60 tablet 2    carvedilol (COREG) 12.5 MG tablet Take 1 tablet by mouth 2 times daily (Patient taking differently: Take 12.5 mg by mouth 2 times daily Will take if B/P is okay.) 180 tablet 1    bumetanide (BUMEX) 2 MG tablet Take 1 tablet by mouth 2 times daily 60 tablet 3    ferrous sulfate 325 (65 Fe) MG tablet Take 1 tablet by mouth 2 times daily 60 tablet 3    isosorbide dinitrate (ISORDIL) 20 MG tablet Take 1 tablet by mouth 2 times daily (Patient taking differently: Take 10 mg by mouth 2 times daily ) 60 tablet 5    lisinopril (PRINIVIL;ZESTRIL) 5 MG tablet Take 2.5 mg by mouth daily Will take if BP okay      potassium chloride (KLOR-CON M) 20 MEQ extended release tablet Take by mouth 2 tab in am and 1 in pm      Povidone-Iodine 10 % PADS Please dispense 1 box of 10% iodine prep pads to pt with 3 refills. Will use PRN.  1 each 3    Insulin Glargine (LANTUS SC) Inject 18 mg into the skin      nitroGLYCERIN (NITROSTAT) 0.4 MG SL tablet Place 1 tablet under the tongue every 5 minutes as needed for Chest pain 25 tablet 1    tamsulosin (FLOMAX) 0.4 MG capsule Take 0.4 mg by mouth daily      NONFORMULARY Mulugeta-bacit-poly-lidocaine creame 4 % topical as prn  for pain      timolol (TIMOPTIC) 0.5 % ophthalmic solution Place 1 drop into both eyes daily      Coenzyme Q10 (COQ-10) 50 MG CAPS Take by mouth daily       zinc oxide (TRIAD HYDROPHILIC) PSTE paste Apply topically daily as needed       atorvastatin (LIPITOR) 40 MG tablet Take 40 mg by mouth daily      miconazole (MICOTIN) 2 % powder Apply topically 2 times daily. 45 g 1    aspirin EC 81 MG EC tablet Take 1 tablet by mouth daily 30 tablet 3    acetaminophen (TYLENOL) 325 MG tablet Take 650 mg by mouth every 4 hours as needed for Pain      BD ULTRA-FINE MICRO PEN NEEDLE 32G X 6 MM MISC       latanoprost (XALATAN) 0.005 % ophthalmic solution Place 1 drop into both eyes nightly        No current facility-administered medications for this visit. Laboratory & Diagnostics:  2827: WO: R 10.5, L 10.1, 1.4 cm left kidney cyst  Creat 1.8, K 3.9, UACR ~ 500 mg/g  March 4 2019: Creat 1.9, K 4.5, Ca 7.8, Hb 8.2  April 2019: K 4.6, creat 1.9, ca 8.2, Hb 8.1  April 15, 2019: K 3.8, creat 1.5, Hb 9.3, ferritin ~ 200  May 2019: K 4.2, creat 2.1  June 2019: K 3.6, Hb 10.9  Aug 2019: K 3.7, creat 2.13    March 2020: K 3.8, creat 2.8, Hb 11.8     Impression/Plan:   1. CKD IV: Stable at baseline, CKD is multifactorial from diabetic nephropathy, ischemic injuries, chronic cardiorenal syndrome. check labs. 2. Chronic systolic CHF/cardiomyopathy: he refused lifevest, see cardiology notes for details. At this time no evidence of edema or volume overload,  Doing well/sig improved, secondary to chronic systolic congestive heart failure and also has diastolic dysfunction. On bumex. He is taking bumex 2 mg po BID.  He is on metolazone twice a week- following with CHF

## 2020-03-04 NOTE — PROGRESS NOTES
Patient was just at another Doctor office and meds were updated there.       Patient is saying he is always cold

## 2020-03-06 LAB
IMMUNOFIXATION URINE: NORMAL
KAPPA/LAMBDA FREE LIGHT CHAINS: NORMAL

## 2020-03-08 LAB — LEUK/LYMPH PHENOTYPING WB: NORMAL

## 2020-03-10 ENCOUNTER — OFFICE VISIT (OUTPATIENT)
Dept: CARDIOLOGY CLINIC | Age: 75
End: 2020-03-10
Payer: MEDICARE

## 2020-03-10 VITALS
BODY MASS INDEX: 23.15 KG/M2 | WEIGHT: 166 LBS | HEART RATE: 72 BPM | OXYGEN SATURATION: 90 % | SYSTOLIC BLOOD PRESSURE: 138 MMHG | DIASTOLIC BLOOD PRESSURE: 66 MMHG

## 2020-03-10 PROCEDURE — G8484 FLU IMMUNIZE NO ADMIN: HCPCS | Performed by: NURSE PRACTITIONER

## 2020-03-10 PROCEDURE — 1123F ACP DISCUSS/DSCN MKR DOCD: CPT | Performed by: NURSE PRACTITIONER

## 2020-03-10 PROCEDURE — 3017F COLORECTAL CA SCREEN DOC REV: CPT | Performed by: NURSE PRACTITIONER

## 2020-03-10 PROCEDURE — 4040F PNEUMOC VAC/ADMIN/RCVD: CPT | Performed by: NURSE PRACTITIONER

## 2020-03-10 PROCEDURE — G8427 DOCREV CUR MEDS BY ELIG CLIN: HCPCS | Performed by: NURSE PRACTITIONER

## 2020-03-10 PROCEDURE — G8420 CALC BMI NORM PARAMETERS: HCPCS | Performed by: NURSE PRACTITIONER

## 2020-03-10 PROCEDURE — 99213 OFFICE O/P EST LOW 20 MIN: CPT | Performed by: NURSE PRACTITIONER

## 2020-03-10 PROCEDURE — 1036F TOBACCO NON-USER: CPT | Performed by: NURSE PRACTITIONER

## 2020-03-10 RX ORDER — POTASSIUM CHLORIDE 3 G/15ML
SOLUTION ORAL
Qty: 600 ML | Refills: 0 | Status: SHIPPED
Start: 2020-03-10 | End: 2020-03-11 | Stop reason: SDUPTHER

## 2020-03-10 ASSESSMENT — ENCOUNTER SYMPTOMS
SHORTNESS OF BREATH: 1
ABDOMINAL DISTENTION: 0
COUGH: 0

## 2020-03-10 NOTE — PATIENT INSTRUCTIONS
You may receive a survey regarding the care you received during your visit. Your input is valuable to us. We encourage you to complete and return your survey. We hope you will choose us in the future for your healthcare needs. Continue:  · Continue current medications  · Daily weights and record  · Fluid restriction of 2 Liters per day  · Limit sodium in diet to around 8959-7677 mg/day  · Monitor BP  · Activity as tolerated     Call the [facility] Failure [facility] for any of the following symptoms: [phone]   Weight gain of 2-3 pounds in 1 day or 5 pounds in 1 week   Increased shortness of breath   Shortness of breath while laying down   Cough   Chest pain   Swelling in feet, ankles or legs   Tenderness or bloating in the abdomen   Fatigue    Decreased appetite or nausea    Confusion       GET blood work in 4-6 weeks.

## 2020-03-10 NOTE — PROGRESS NOTES
ophthalmic solution Place 1 drop into both eyes daily      Coenzyme Q10 (COQ-10) 50 MG CAPS Take by mouth daily       zinc oxide (TRIAD HYDROPHILIC) PSTE paste Apply topically daily as needed       atorvastatin (LIPITOR) 40 MG tablet Take 40 mg by mouth daily      miconazole (MICOTIN) 2 % powder Apply topically 2 times daily. 45 g 1    aspirin EC 81 MG EC tablet Take 1 tablet by mouth daily 30 tablet 3    acetaminophen (TYLENOL) 325 MG tablet Take 650 mg by mouth every 4 hours as needed for Pain      latanoprost (XALATAN) 0.005 % ophthalmic solution Place 1 drop into both eyes nightly       isosorbide dinitrate (ISORDIL) 20 MG tablet Take 1 tablet by mouth 2 times daily (Patient not taking: Reported on 3/10/2020) 60 tablet 5    Povidone-Iodine 10 % PADS Please dispense 1 box of 10% iodine prep pads to pt with 3 refills. Will use PRN. 1 each 3    BD ULTRA-FINE MICRO PEN NEEDLE 32G X 6 MM MISC        No current facility-administered medications for this visit. Allergies   Allergen Reactions    Atorvastatin      Other reaction(s): Myalgia, ON NURSING HOME REPORT       SUBJECTIVE:   Review of Systems   Constitutional: Positive for fatigue. Negative for activity change and appetite change. Respiratory: Positive for shortness of breath (SHANNON). Negative for cough. Cardiovascular: Negative for chest pain, palpitations and leg swelling. Gastrointestinal: Negative for abdominal distention. Neurological: Negative for weakness, light-headedness and headaches. Hematological: Negative for adenopathy. Psychiatric/Behavioral: Negative for sleep disturbance. OBJECTIVE:   Today's Vitals:  /66 (Site: Right Upper Arm)   Pulse 72   Wt 166 lb (75.3 kg)   SpO2 90%   BMI 23.15 kg/m²     Physical Exam  Vitals signs reviewed. Constitutional:       General: He is not in acute distress. Appearance: Normal appearance. He is well-developed. He is not diaphoretic.    HENT:      Head: Normocephalic and atraumatic. Eyes:      Conjunctiva/sclera: Conjunctivae normal.   Neck:      Musculoskeletal: Normal range of motion and neck supple. Comments: No JVD  Cardiovascular:      Rate and Rhythm: Normal rate and regular rhythm. Heart sounds: Normal heart sounds. No murmur. Pulmonary:      Effort: Pulmonary effort is normal. No respiratory distress. Breath sounds: Normal breath sounds. No wheezing or rales. Comments: Slight diminished on right base  Abdominal:      General: Bowel sounds are normal. There is no distension. Palpations: Abdomen is soft. Tenderness: There is no abdominal tenderness. Musculoskeletal: Normal range of motion. Right lower leg: No edema. Left lower leg: No edema. Skin:     General: Skin is warm and dry. Capillary Refill: Capillary refill takes less than 2 seconds. Neurological:      Mental Status: He is alert and oriented to person, place, and time. Coordination: Coordination normal.   Psychiatric:         Behavior: Behavior normal.         Wt Readings from Last 3 Encounters:   03/10/20 166 lb (75.3 kg)   03/04/20 160 lb 6.4 oz (72.8 kg)   03/04/20 160 lb 3.2 oz (72.7 kg)     BP Readings from Last 3 Encounters:   03/10/20 138/66   03/04/20 139/71   03/04/20 (!) 127/57     Pulse Readings from Last 3 Encounters:   03/10/20 72   03/04/20 86   03/04/20 80     Body mass index is 23.15 kg/m². ECHO:   Summary   Ejection fraction was estimated at 25-30%.   Left atrial size was severely dilated.   S/p MV replacement - probable bioprosthetic. DOPPLER: The transmitral   velocity was within the normal range with no evidence for mitral stenosis   (mean gradient 6 mmHg, V max 2.0 m/s, P1/2T = 60 ms). There was mild   mitral regurgitation.   There was mild-moderate aortic regurgitation.   There was mild tricuspid regurgitation.  Assuming RAP = 5 mmHg, the   estimated RVSP = 59 mmHg.   Signature   ---------------------------------------------------------------   Electronically signed by Carlos Suresh MD (Interpreting   [id number]) on 12/16/2019 at 05:36 PM         Summary   Technically difficult examination.   Ejection fraction was estimated at 35-40%.   Left atrial size was moderately dilated.   S/p mitral valve replacement.   Left pleural effusion.      Signature      ----------------------------------------------------------------   Electronically signed by Carlos Suresh MD (Interpreting   physician) on 05/16/2019 at 04:18 PM   ----------------------------------------------------------------    CATH - 1/2019  = [facility] 1/28/19 Aj Linda)  SUMMARY:  Severe triple-vessel coronary artery disease; severe  cardiomyopathy; moderate-to-severe mitral regurgitation.     PLAN:  1. Bed rest.  2.  Optimal medical therapy. 3.  Risk factor management. 4.  CT surgery consultation for CABG/MVR.  5.  Consideration for balloon pump given the fact that there is  significant iliac disease for hemodynamic support if necessary. 6.  May need ICU care/inotropic therapy.   7.  We will need to establish revascularization plan or a palliative  care option, so the patient not chose for further management.     All the above was explained to the patient and the patient's family in  detail.    Reginald Peraza MD     D: 01/24/2019 15:29:39           Results reviewed:  BNP:   Lab Results   Component Value Date     10/17/2019     CBC:   Lab Results   Component Value Date    WBC 5.2 03/04/2020    RBC 4.56 03/04/2020    HGB 12.0 03/04/2020    HCT 39.7 03/04/2020     03/04/2020     CMP:    Lab Results   Component Value Date     03/04/2020     02/28/2020    K 3.6 03/04/2020    K 3.8 02/28/2020    K 4.0 01/23/2019    CL 98 02/28/2020    CO2 31 03/04/2020    BUN 55 03/04/2020    CREATININE 2.8 03/04/2020    CREATININE 2.52 02/28/2020    LABGLOM 25 02/28/2020    LABGLOM 27 01/13/2020    GLUCOSE 163 educational materials - see patient instructions. We discussed the importance of weighing oneself and recording daily. We also discussed the importance of a low sodium diet, higher sodium foods to avoid and better low sodium food options. Patient verbalizes understanding of plan of care using teach back method, and is agreeable to the treatment plan.        Electronically signed by SILVIA Lomas CNP on 3/10/2020 at 3:50 PM

## 2020-03-11 ENCOUNTER — OFFICE VISIT (OUTPATIENT)
Dept: UROLOGY | Age: 75
End: 2020-03-11
Payer: MEDICARE

## 2020-03-11 VITALS
SYSTOLIC BLOOD PRESSURE: 122 MMHG | DIASTOLIC BLOOD PRESSURE: 60 MMHG | WEIGHT: 166 LBS | BODY MASS INDEX: 23.24 KG/M2 | HEIGHT: 71 IN

## 2020-03-11 LAB
BILIRUBIN URINE: NORMAL
BILIRUBIN, POC: NORMAL
BLOOD URINE, POC: NORMAL
BLOOD, URINE: NORMAL
CLARITY, POC: CLEAR
CLARITY: CLEAR
COLOR, POC: YELLOW
COLOR: YELLOW
GLUCOSE URINE, POC: NORMAL
GLUCOSE URINE: NEGATIVE
KETONES, POC: NORMAL
KETONES, URINE: NEGATIVE
LEUKOCYTE EST, POC: NORMAL
LEUKOCYTE ESTERASE, URINE: NEGATIVE
NITRITE, POC: NORMAL
NITRITE, URINE: NEGATIVE
PH UA: 6 (ref 4.5–8)
PH, POC: 5.5
POST VOID RESIDUAL (PVR): 81 ML
PROTEIN UA: NORMAL
PROTEIN, POC: 30
SPECIFIC GRAVITY UA: 1.01 (ref 1–1.03)
SPECIFIC GRAVITY, POC: 1.01
UROBILINOGEN, POC: 0.2
UROBILINOGEN, URINE: NORMAL

## 2020-03-11 PROCEDURE — G8420 CALC BMI NORM PARAMETERS: HCPCS | Performed by: NURSE PRACTITIONER

## 2020-03-11 PROCEDURE — G8484 FLU IMMUNIZE NO ADMIN: HCPCS | Performed by: NURSE PRACTITIONER

## 2020-03-11 PROCEDURE — 1123F ACP DISCUSS/DSCN MKR DOCD: CPT | Performed by: NURSE PRACTITIONER

## 2020-03-11 PROCEDURE — 1036F TOBACCO NON-USER: CPT | Performed by: NURSE PRACTITIONER

## 2020-03-11 PROCEDURE — 3017F COLORECTAL CA SCREEN DOC REV: CPT | Performed by: NURSE PRACTITIONER

## 2020-03-11 PROCEDURE — 81003 URINALYSIS AUTO W/O SCOPE: CPT | Performed by: NURSE PRACTITIONER

## 2020-03-11 PROCEDURE — 51798 US URINE CAPACITY MEASURE: CPT | Performed by: NURSE PRACTITIONER

## 2020-03-11 PROCEDURE — 99213 OFFICE O/P EST LOW 20 MIN: CPT | Performed by: NURSE PRACTITIONER

## 2020-03-11 PROCEDURE — 4040F PNEUMOC VAC/ADMIN/RCVD: CPT | Performed by: NURSE PRACTITIONER

## 2020-03-11 PROCEDURE — G8427 DOCREV CUR MEDS BY ELIG CLIN: HCPCS | Performed by: NURSE PRACTITIONER

## 2020-03-11 RX ORDER — TAMSULOSIN HYDROCHLORIDE 0.4 MG/1
0.4 CAPSULE ORAL DAILY
Qty: 90 CAPSULE | Refills: 3 | Status: SHIPPED | OUTPATIENT
Start: 2020-03-11 | End: 2021-01-01 | Stop reason: SDUPTHER

## 2020-03-11 NOTE — PROGRESS NOTES
[address] [facility]  [address] [facility] [address]  Dept: [phone]  Loc: [phone]    Visit Date: 3/11/2020        HPI:     Nolan Nelson is a 69 y.o. male who presents today for:  Chief Complaint   Patient presents with    Follow-up    Urinary Retention       HPI   Pt seen in follow up for urinary retention and BPH with LUTs. Pt is currently on Flomax daily. He reports symptoms of urinary frequency, urgency, and nocturia of 2 x per night. His IPSS score today is 8/35. He reports he is happy with his symptoms at this time. He last underwent office cystoscopy 3/2019 that noted BPH with obstruction. Hx of smoking. Quit in 1976.      Current Outpatient Medications   Medication Sig Dispense Refill    metOLazone (ZAROXOLYN) 2.5 MG tablet Take 1 tab every Monday and Thursday AND as directed by clinic 60 tablet 2    carvedilol (COREG) 12.5 MG tablet Take 1 tablet by mouth 2 times daily (Patient taking differently: Take 12.5 mg by mouth 2 times daily Will take if B/P is okay.) 180 tablet 1    bumetanide (BUMEX) 2 MG tablet Take 1 tablet by mouth 2 times daily 60 tablet 3    ferrous sulfate 325 (65 Fe) MG tablet Take 1 tablet by mouth 2 times daily 60 tablet 3    isosorbide dinitrate (ISORDIL) 20 MG tablet Take 1 tablet by mouth 2 times daily 60 tablet 5    potassium chloride (KLOR-CON M) 20 MEQ extended release tablet Take by mouth 2 tab in am and 1 in pm      Insulin Glargine (LANTUS SC) Inject 18 mg into the skin      tamsulosin (FLOMAX) 0.4 MG capsule Take 0.4 mg by mouth daily      timolol (TIMOPTIC) 0.5 % ophthalmic solution Place 1 drop into both eyes daily      Coenzyme Q10 (COQ-10) 50 MG CAPS Take by mouth daily       atorvastatin (LIPITOR) 40 MG tablet Take 40 mg by mouth daily      aspirin EC 81 MG EC tablet Take 1 tablet by mouth daily 30 tablet 3    BD ULTRA-FINE MICRO PEN NEEDLE 32G X 6 MM MISC       latanoprost (XALATAN) 0.005 % ophthalmic solution Place 1 drop into both eyes nightly       nitroGLYCERIN (NITROSTAT) 0.4 MG SL tablet Place 1 tablet under the tongue every 5 minutes as needed for Chest pain (Patient not taking: Reported on 3/11/2020) 25 tablet 1    acetaminophen (TYLENOL) 325 MG tablet Take 650 mg by mouth every 4 hours as needed for Pain       No current facility-administered medications for this visit. Past Medical History  Mariela Carmona  has a past medical history of CAD (coronary artery disease), CHF (congestive heart failure) ([facility] [address] Chronic kidney disease, History of blood transfusion, Hyperlipidemia, Hypertension, Proteinuria, and Type II or unspecified type diabetes mellitus without mention of complication, not stated as uncontrolled. Past Surgical History  The patient  has a past surgical history that includes Colonoscopy (2019); Upper gastrointestinal endoscopy (N/A, 1/21/2019); Coronary artery bypass graft (N/A, 1/28/2019); Cardiac surgery; and Tonsillectomy. Family History  This patient's family history includes Cancer in his maternal grandmother and mother; Diabetes in his father and mother; Heart Disease in his father. Social History  Mariela Carmona  reports that he quit smoking about 44 years ago. His smoking use included cigarettes. He has a 2.50 pack-year smoking history. He has quit using smokeless tobacco. He reports that he does not drink alcohol. Subjective:      Review of Systems   Constitutional: Negative for activity change, appetite change, chills, diaphoresis, fatigue, fever and unexpected weight change. Gastrointestinal: Negative for abdominal pain. Genitourinary: Positive for frequency and urgency. Negative for decreased urine volume, difficulty urinating, dysuria, flank pain and hematuria. Musculoskeletal: Negative for back pain.        Objective:   /60   Ht 5' 11\" (1.803 m)   Wt 166 lb (75.3 kg)   BMI 23.15 kg/m²     Physical Exam  Constitutional:       General: He is not

## 2020-03-13 ASSESSMENT — ENCOUNTER SYMPTOMS
BACK PAIN: 0
ABDOMINAL PAIN: 0

## 2020-03-17 ENCOUNTER — OFFICE VISIT (OUTPATIENT)
Dept: ONCOLOGY | Age: 75
End: 2020-03-17
Payer: MEDICARE

## 2020-03-17 ENCOUNTER — HOSPITAL ENCOUNTER (OUTPATIENT)
Dept: INFUSION THERAPY | Age: 75
Discharge: HOME | End: 2020-03-17
Payer: MEDICARE

## 2020-03-17 VITALS
WEIGHT: 165.4 LBS | TEMPERATURE: 97.9 F | BODY MASS INDEX: 23.15 KG/M2 | HEART RATE: 74 BPM | HEIGHT: 71 IN | DIASTOLIC BLOOD PRESSURE: 49 MMHG | OXYGEN SATURATION: 95 % | RESPIRATION RATE: 18 BRPM | SYSTOLIC BLOOD PRESSURE: 136 MMHG

## 2020-03-17 DIAGNOSIS — D47.2 MGUS (MONOCLONAL GAMMOPATHY OF UNKNOWN SIGNIFICANCE): ICD-10-CM

## 2020-03-17 LAB
ALBUMIN SERPL-MCNC: 3.3 G/DL (ref 3.5–5.1)
ALP BLD-CCNC: 82 U/L (ref 38–126)
ALT SERPL-CCNC: 13 U/L (ref 11–66)
AST SERPL-CCNC: 14 U/L (ref 5–40)
BILIRUB SERPL-MCNC: 0.2 MG/DL (ref 0.3–1.2)
BILIRUBIN DIRECT: < 0.2 MG/DL (ref 0–0.3)
BUN BLDV-MCNC: 61 MG/DL (ref 7–22)
CHLORIDE, WHOLE BLOOD: 96 MEQ/L (ref 98–109)
CO2: 28 MEQ/L (ref 23–33)
CREATININE, WHOLE BLOOD: 2.6 MG/DL (ref 0.5–1.2)
GFR, ESTIMATED ,CON: 26 ML/MIN/1.73M2
GLUCOSE, WHOLE BLOOD: 267 MG/DL (ref 70–108)
HCT VFR BLD CALC: 32.8 % (ref 42–52)
HEMOGLOBIN: 10.7 GM/DL (ref 14–18)
IONIZED CALCIUM, WHOLE BLOOD: 1.05 MMOL/L (ref 1.12–1.32)
MCH RBC QN AUTO: 27.2 PG (ref 27–31)
MCHC RBC AUTO-ENTMCNC: 32.7 GM/DL (ref 33–37)
MCV RBC AUTO: 83 FL (ref 80–94)
PDW BLD-RTO: 15.8 % (ref 11.5–14.5)
PLATELET # BLD: 204 THOU/MM3 (ref 130–400)
PMV BLD AUTO: 6.8 FL (ref 7.4–10.4)
POTASSIUM, WHOLE BLOOD: 4 MEQ/L (ref 3.5–4.9)
RBC # BLD: 3.95 MILL/MM3 (ref 4.7–6.1)
SEG NEUTROPHILS: 69 % (ref 43–75)
SEGMENTED NEUTROPHILS ABSOLUTE COUNT: 3.3 THOU/MM3 (ref 1.8–7.7)
SODIUM, WHOLE BLOOD: 138 MEQ/L (ref 138–146)
TOTAL PROTEIN: 7.4 G/DL (ref 6.1–8)
WBC # BLD: 4.8 THOU/MM3 (ref 4.8–10.8)

## 2020-03-17 PROCEDURE — 82784 ASSAY IGA/IGD/IGG/IGM EACH: CPT

## 2020-03-17 PROCEDURE — 80047 BASIC METABLC PNL IONIZED CA: CPT

## 2020-03-17 PROCEDURE — 86334 IMMUNOFIX E-PHORESIS SERUM: CPT

## 2020-03-17 PROCEDURE — 99211 OFF/OP EST MAY X REQ PHY/QHP: CPT

## 2020-03-17 PROCEDURE — 1036F TOBACCO NON-USER: CPT | Performed by: INTERNAL MEDICINE

## 2020-03-17 PROCEDURE — 36415 COLL VENOUS BLD VENIPUNCTURE: CPT

## 2020-03-17 PROCEDURE — 84520 ASSAY OF UREA NITROGEN: CPT

## 2020-03-17 PROCEDURE — 99213 OFFICE O/P EST LOW 20 MIN: CPT | Performed by: INTERNAL MEDICINE

## 2020-03-17 PROCEDURE — 1123F ACP DISCUSS/DSCN MKR DOCD: CPT | Performed by: INTERNAL MEDICINE

## 2020-03-17 PROCEDURE — 84165 PROTEIN E-PHORESIS SERUM: CPT

## 2020-03-17 PROCEDURE — 84155 ASSAY OF PROTEIN SERUM: CPT

## 2020-03-17 PROCEDURE — G8420 CALC BMI NORM PARAMETERS: HCPCS | Performed by: INTERNAL MEDICINE

## 2020-03-17 PROCEDURE — 83883 ASSAY NEPHELOMETRY NOT SPEC: CPT

## 2020-03-17 PROCEDURE — 85027 COMPLETE CBC AUTOMATED: CPT

## 2020-03-17 PROCEDURE — 3017F COLORECTAL CA SCREEN DOC REV: CPT | Performed by: INTERNAL MEDICINE

## 2020-03-17 PROCEDURE — 88185 FLOWCYTOMETRY/TC ADD-ON: CPT

## 2020-03-17 PROCEDURE — 82374 ASSAY BLOOD CARBON DIOXIDE: CPT

## 2020-03-17 PROCEDURE — 80076 HEPATIC FUNCTION PANEL: CPT

## 2020-03-17 PROCEDURE — G8427 DOCREV CUR MEDS BY ELIG CLIN: HCPCS | Performed by: INTERNAL MEDICINE

## 2020-03-17 PROCEDURE — G8484 FLU IMMUNIZE NO ADMIN: HCPCS | Performed by: INTERNAL MEDICINE

## 2020-03-17 PROCEDURE — 4040F PNEUMOC VAC/ADMIN/RCVD: CPT | Performed by: INTERNAL MEDICINE

## 2020-03-17 PROCEDURE — 88184 FLOWCYTOMETRY/ TC 1 MARKER: CPT

## 2020-03-17 NOTE — PROGRESS NOTES
[facility] CANCER CENTER  CANCER NETWORK OF [facility]  [facility] [address] [facility] 98  [address]  [address]  Dept: [phone]  Dept Fax: [phone]  Loc: [phone]     Subjective:      Chief Complaint:  Netta Ibanez is a 69 y.o. with abnormal immunoglobulin levels. On November 6, 2019 the patient had a serum protein electrophoresis performed. This found an M spike with additional restrictions in the gamma region. The monoclonal protein peak accounted for 0.97 g/dL of a total of 1.61 g/dL of a protein in the gamma region. Immunoglobulin electrophoresis gel pattern found an IgG type kappa monoclonal protein with additional faint bands in the IgM kappa and lambda region. In addition, the total gammaglobulin level was 1.81 g/dL. There is a mild elevation of the total gammaglobulin. HPI:    Dacia Jacobson returns today for follow-up regarding his history of a monoclonal protein. Patient's been noted to have a kappa free light chain modestly elevated serum. This was found by neurological evaluation secondary to a spontaneous and undetermined muscle jerking. He also noted to have a heavy chain monoclonal IgG kappa. The patient has mild anemia. We reviewed the possibility of proceeding with a bone marrow biopsy for further evaluation of this underlying finding concern for possible underlying plasma cell dyscrasia. Multiple questions were answered throughout the course of this consultation. He has not had fever, shortness of breath or cough. The patient's bowel and bladder habits have been normal.  He denies skeletal pain and has had good urine output. ECOG performance status is level 0    PMH, SH, and FH:  I reviewed the patients medication list and allergy list as noted on the electronic medical record. The PMH, SH and FH were also reviewed as noted on the EMR. Review of Systems  Constitutional: Negative. HENT: Negative. Eyes: Negative. Respiratory: Negative. Cardiovascular: Negative. Gastrointestinal: Negative. Genitourinary: Negative. Musculoskeletal: Negative. Skin: Negative. Neurological: Muscle jerking. Hematological: Negative. Psychiatric/Behavioral: Negative. Objective:   Physical Exam  Vitals:    03/17/20 1000   BP: (!) 136/49   Pulse: 74   Resp: 18   Temp: 97.9 °F (36.6 °C)   SpO2: 95%   Vitals reviewed and are stable. Constitutional: Well-developed and well-nourished. No acute distress. HENT: Normocephalic and atraumatic. Eyes: Pupils are equal and reactive. No scleral icterus. Neck: Overall appearance is symmetrical. No identifiable masses. Pulmonary: Effort normal. No respiratory distress. .  Neurological: Alert and oriented to person, place, and time. Judgment and thought content normal.  Skin: Skin is warm and dry. No rash. Psychiatric: Mood and affect appropriate for the clinical situation. Behavior is normal.      Data Analysis:    SPEP/SHERYL Interpretation                    M-spike with additional restrictions in the gamma region. The monoclonal protein peak accounts for 1.07 g/dL of the   total 1.91 g/dL of protein in the gamma region.  SHERYL   pattern shows an IgG type kappa monoclonal protein with an   additional band in IgM kappa and faint bands in IgM lambda     and lambda. Kappa Qnt Free Light Chains                 619.65 H   3.30-19.40    mg/L   Lambda Qnt Free Light Chains                41.02 H   5.71-26.30    mg/L   Kappa/Lambda Free Light Chain Ratio          15.11 H   0.26-1.65     Hematology 3/17/2020 3/4/2020   WBC 4.8 5.2   RBC 3.95 (L) 4.56 (L)   HGB 10.7 (L) 12.0 (L)   HCT 32.8 (L) 39.7 (L)   MCV 83 87.1   RDW 15.8 (H)     190     Assessment:   1. Elevated gammaglobulin. 2.  IgG kappa monoclonal protein. 3.  Elevated free kappa light chain. Plan:   1. Proceed with bone marrow biopsy for further evaluation of plasma cell dyscrasia.   2.  Monitor hemoglobin hematocrit and for any evidence of blood loss. 3.  Monitor IgG kappa monoclonal protein as well as kappa free light chain. 4.  Return to medical hematology clinic after bone marrow biopsy is been completed. Amber Velez M.D. Medical Director: [facility]  Cancer [place] [address], [address] [place] [address], [address] [place] [address], [address] [place] [address] of the [facility] at the [facility]      **This report has been created using voice recognition software. It may contain minor errors which are inherent in voice recognition technology. **

## 2020-03-20 LAB
KAPPA/LAMBDA FREE LIGHT CHAINS: NORMAL
LEUK/LYMPH PHENOTYPING WB: NORMAL

## 2020-03-21 LAB — IMMUNOFIXATION WITH QUANT: NORMAL

## 2020-06-12 ENCOUNTER — TELEPHONE (OUTPATIENT)
Dept: CARDIOLOGY CLINIC | Age: 75
End: 2020-06-12

## 2020-06-15 ENCOUNTER — VIRTUAL VISIT (OUTPATIENT)
Dept: CARDIOLOGY CLINIC | Age: 75
End: 2020-06-15
Payer: MEDICARE

## 2020-06-15 PROCEDURE — 3017F COLORECTAL CA SCREEN DOC REV: CPT | Performed by: NURSE PRACTITIONER

## 2020-06-15 PROCEDURE — 99213 OFFICE O/P EST LOW 20 MIN: CPT | Performed by: NURSE PRACTITIONER

## 2020-06-15 PROCEDURE — G8428 CUR MEDS NOT DOCUMENT: HCPCS | Performed by: NURSE PRACTITIONER

## 2020-06-15 PROCEDURE — 1123F ACP DISCUSS/DSCN MKR DOCD: CPT | Performed by: NURSE PRACTITIONER

## 2020-06-15 PROCEDURE — 4040F PNEUMOC VAC/ADMIN/RCVD: CPT | Performed by: NURSE PRACTITIONER

## 2020-06-15 RX ORDER — BUMETANIDE 2 MG/1
2 TABLET ORAL 2 TIMES DAILY
Qty: 180 TABLET | Refills: 3 | Status: SHIPPED | OUTPATIENT
Start: 2020-06-15 | End: 2021-01-01 | Stop reason: SDUPTHER

## 2020-06-15 RX ORDER — FERROUS SULFATE 325(65) MG
325 TABLET ORAL 2 TIMES DAILY
Qty: 180 TABLET | Refills: 3 | Status: SHIPPED | OUTPATIENT
Start: 2020-06-15 | End: 2021-01-13 | Stop reason: SDUPTHER

## 2020-06-15 RX ORDER — ATORVASTATIN CALCIUM 40 MG/1
40 TABLET, FILM COATED ORAL DAILY
Qty: 90 TABLET | Refills: 3 | Status: SHIPPED | OUTPATIENT
Start: 2020-06-15 | End: 2021-01-01 | Stop reason: SDUPTHER

## 2020-06-15 RX ORDER — POTASSIUM CHLORIDE 20 MEQ/1
TABLET, EXTENDED RELEASE ORAL
Qty: 300 TABLET | Refills: 3 | Status: SHIPPED | OUTPATIENT
Start: 2020-06-15 | End: 2021-01-01 | Stop reason: DRUGHIGH

## 2020-06-15 ASSESSMENT — ENCOUNTER SYMPTOMS
COUGH: 0
SHORTNESS OF BREATH: 1
ABDOMINAL DISTENTION: 0

## 2020-06-15 NOTE — PROGRESS NOTES
(Patient taking differently: Take 12.5 mg by mouth 2 times daily Will take if B/P is okay.) 180 tablet 1    isosorbide dinitrate (ISORDIL) 20 MG tablet Take 1 tablet by mouth 2 times daily 60 tablet 5    Insulin Glargine (LANTUS SC) Inject 18 mg into the skin      nitroGLYCERIN (NITROSTAT) 0.4 MG SL tablet Place 1 tablet under the tongue every 5 minutes as needed for Chest pain 25 tablet 1    timolol (TIMOPTIC) 0.5 % ophthalmic solution Place 1 drop into both eyes daily      Coenzyme Q10 (COQ-10) 50 MG CAPS Take by mouth daily       aspirin EC 81 MG EC tablet Take 1 tablet by mouth daily 30 tablet 3    acetaminophen (TYLENOL) 325 MG tablet Take 650 mg by mouth every 4 hours as needed for Pain      BD ULTRA-FINE MICRO PEN NEEDLE 32G X 6 MM MISC       latanoprost (XALATAN) 0.005 % ophthalmic solution Place 1 drop into both eyes nightly        No current facility-administered medications for this visit. Allergies   Allergen Reactions    Atorvastatin      Other reaction(s): Myalgia, ON NURSING HOME REPORT       SUBJECTIVE:   Review of Systems   Constitutional: Positive for fatigue (same). Negative for activity change and appetite change. Respiratory: Positive for shortness of breath (same - some SHANNON). Negative for cough. Cardiovascular: Negative for chest pain, palpitations and leg swelling. Gastrointestinal: Negative for abdominal distention. Neurological: Negative for weakness, light-headedness and headaches. Hematological: Negative for adenopathy. Psychiatric/Behavioral: Negative for sleep disturbance. OBJECTIVE:   Today's Vitals: There were no vitals taken for this visit. Physical Exam  Vitals signs reviewed. Constitutional:       General: He is not in acute distress. Appearance: Normal appearance. HENT:      Head: Normocephalic and atraumatic. Eyes:      Extraocular Movements: Extraocular movements intact. Neck:      Musculoskeletal: Normal range of motion. conducted, with patient's consent, to reduce the patient's risk of exposure to COVID-19 and provide continuity of care for an established patient. Services were provided through a video synchronous discussion virtually to substitute for in-person clinic visit. Greater then 30 minutes of time was spent reviewing the chart and educating the patient about HF, medications, diet, exercise, and discussing the plan of care. I personally spent more then 50% of the appt time face to face with the patient counseling/coordinating patient's care.     Electronicallysigned by Severo Rued, APRN - CNP on 6/15/2020 at 1:15 PM

## 2020-06-15 NOTE — PATIENT INSTRUCTIONS
You may receive a survey regarding the care you received during your visit. Your input is valuable to us. We encourage you to complete and return your survey. We hope you will choose us in the future for your healthcare needs.     Continue:  · Continue current medications  · Daily weights and record  · Fluid restriction of 2 Liters per day  · Limit sodium in diet to around 3448-6717 mg/day  · Monitor BP  · Activity as tolerated     Call the Heart Failure Clinic for any of the following symptoms: [phone]   Weight gain of 2-3 pounds in 1 day or 5 pounds in 1 week   Increased shortness of breath   Shortness of breath while laying down   Cough   Chest pain   Swelling in feet, ankles or legs   Tenderness or bloating in the abdomen   Fatigue    Decreased appetite or nausea    Confusion

## 2020-06-29 LAB
BUN BLDV-MCNC: 46 MG/DL
CALCIUM SERPL-MCNC: 8.9 MG/DL
CHLORIDE BLD-SCNC: 99 MMOL/L
CO2: 29 MMOL/L
CREAT SERPL-MCNC: 2.4 MG/DL
GFR CALCULATED: 27
GLUCOSE BLD-MCNC: 110 MG/DL
POTASSIUM SERPL-SCNC: 4.2 MMOL/L
SODIUM BLD-SCNC: 137 MMOL/L

## 2020-08-11 RX ORDER — CARVEDILOL 12.5 MG/1
12.5 TABLET ORAL 2 TIMES DAILY
Qty: 180 TABLET | Refills: 1 | Status: SHIPPED | OUTPATIENT
Start: 2020-08-11 | End: 2021-01-01 | Stop reason: SDUPTHER

## 2020-08-25 NOTE — PROGRESS NOTES
[facility] CANCER CENTER  CANCER NETWORK OF [facility]  [facility] [address] [facility]  [address] [facility] [address]  [address]  Dept: [phone]  Dept Fax: [phone]  Loc: [phone]     Subjective:      Chief Complaint:  Biju Engel is a 70 y.o. with abnormal immunoglobulin levels. On November 6, 2019 the patient had a serum protein electrophoresis performed. This found an M spike with additional restrictions in the gamma region. The monoclonal protein peak accounted for 0.97 g/dL of a total of 1.61 g/dL of a protein in the gamma region. Immunoglobulin electrophoresis gel pattern found an IgG type kappa monoclonal protein with additional faint bands in the IgM kappa and lambda region. In addition, the total gammaglobulin level was 1.81 g/dL. There is a mild elevation of the total gammaglobulin. HPI:    Mr. Cornelia Fraire returns today for follow-up regarding his history of a monoclonal protein. On today's evaluation he has been having increased fatigue and generalized weakness since he was last seen in the clinic. On laboratory studies the patient continues to have an elevated kappa free light chain which is increased since his last clinic visit. He also continues to have both an IgG and IgM monoclonal protein. The IgG component is the vast majority of the monoclonal protein noted. The patient does not complain of specific skeletal pain. He does report having joint pain in multiple joints. The patient has not had fever, cough, shortness of breath or other signs of infection. Both his bowel and bladder habits have been normal.  He does have chronic anemia and today's hemoglobin is 11.8. The patient does not have any complaints of blood in his urine or stool. He has no specific source of blood loss. The patient's ECOG performance status is level 1 based on the fatigue and generalized weakness that he has been experiencing.   I have discussed with the patient to proceed with a bone marrow biopsy for further evaluation of his monoclonal gammopathy. The patient is willing to proceed with this plan of care. A bone marrow biopsy will be scheduled through interventional radiology at [facility] as soon as possible. The patient will then return to the medical oncology clinic for further evaluation. PMH, SH, and FH:  I reviewed the patients medication list and allergy list as noted on the electronic medical record. The PMH, SH and FH were also reviewed as noted on the EMR. Review of Systems  Constitutional: Fatigue. HENT: Negative. Eyes: Negative. Respiratory: Negative. Cardiovascular: Negative. Gastrointestinal: Negative. Genitourinary: Negative. Musculoskeletal: Negative. Skin: Negative. Neurological: General weakness. Hematological: Negative. Psychiatric/Behavioral: Negative. Objective:   Physical Exam  Vitals:    08/26/20 0929   BP: 120/61   Pulse: 76   Resp: 22   Temp: 97.3 °F (36.3 °C)   SpO2: 99%   Vitals reviewed and are stable. Constitutional: Elderly, frail. No acute distress. HENT: Normocephalic and atraumatic. Oral mucosa clear. Eyes: Pupils are equal and reactive. No scleral icterus. Neck: Bilateral appearance is symmetrical. No identifiable masses. Chest: Inspection and palpation of chest is normal.  Pulmonary: Effort normal. No respiratory distress. No rhonchi, rales or wheezing. Cardiovascular: Regular rate and rhythm normal S1 and S2. Abdominal: Soft. Bowel sounds present. No hepatomegaly or splenomegaly. Musculoskeletal: Gait is abnormal. Muscle strength and tone decreased in all four extremities. Neurological: Alert and oriented to person, place, and time. Judgment and thought content normal.  Skin: Scattered ecchymosis noted on bilateral upper forearms. Psychiatric: Mood and affect appropriate for the clinical situation.  Behavior is normal.     Data Analysis:    Hematology 8/26/2020 3/17/2020 3/4/2020   WBC 5.2 4.8 5.2   RBC 4.45 (L) 3.95 (L) 4.56 (L)   HGB 11.8 (L) 10.7 (L) 12.0 (L)   HCT 38.0 (L) 32.8 (L) 39.7 (L)   MCV 85 83 87.1   RDW 16.0 (H) 15.8 (H)     204 190     Assessment:   1. Monoclonal gammopathy. 2.  IgG kappa monoclonal protein. 3.  Elevated free kappa light chain. 4.  Chronic anemia. Plan:   1. Proceed with bone marrow biopsy for further evaluation of plasma cell dyscrasia. 2.  Monitor hemoglobin hematocrit and for any evidence of blood loss. 3.  Monitor IgG kappa monoclonal protein as well as kappa free light chain. 4.  Return to medical hematology clinic after bone marrow biopsy is been completed. Yohan Reyes M.D. Medical Director: [facility]  Cancer [address] [facility], [address] [place] [address], [address] [place] [address], [address] [place] [address] of the [facility] at [facility]      **This report has been created using voice recognition software. It may contain minor errors which are inherent in voice recognition technology. **

## 2020-08-26 ENCOUNTER — HOSPITAL ENCOUNTER (OUTPATIENT)
Dept: INFUSION THERAPY | Age: 75
Discharge: HOME | End: 2020-08-26
Payer: MEDICARE

## 2020-08-26 ENCOUNTER — OFFICE VISIT (OUTPATIENT)
Dept: ONCOLOGY | Age: 75
End: 2020-08-26
Payer: MEDICARE

## 2020-08-26 VITALS
HEART RATE: 76 BPM | RESPIRATION RATE: 22 BRPM | TEMPERATURE: 97.3 F | BODY MASS INDEX: 23.8 KG/M2 | HEIGHT: 71 IN | OXYGEN SATURATION: 99 % | WEIGHT: 170 LBS | DIASTOLIC BLOOD PRESSURE: 61 MMHG | SYSTOLIC BLOOD PRESSURE: 120 MMHG

## 2020-08-26 DIAGNOSIS — D47.2 MGUS (MONOCLONAL GAMMOPATHY OF UNKNOWN SIGNIFICANCE): ICD-10-CM

## 2020-08-26 LAB
ABSOLUTE IMMATURE GRANULOCYTE: 0.01 THOU/MM3 (ref 0–0.07)
ALBUMIN SERPL-MCNC: 3 G/DL (ref 3.5–5.1)
ALP BLD-CCNC: 102 U/L (ref 38–126)
ALT SERPL-CCNC: 20 U/L (ref 11–66)
AST SERPL-CCNC: 18 U/L (ref 5–40)
BASINOPHIL, AUTOMATED: 0 % (ref 0–3)
BASOPHILS ABSOLUTE: 0 THOU/MM3 (ref 0–0.1)
BILIRUB SERPL-MCNC: 0.2 MG/DL (ref 0.3–1.2)
BILIRUBIN DIRECT: < 0.2 MG/DL (ref 0–0.3)
BUN BLDV-MCNC: 69 MG/DL (ref 7–22)
CHLORIDE, WHOLE BLOOD: 98 MEQ/L (ref 98–109)
CO2: 31 MEQ/L (ref 23–33)
CREATININE, WHOLE BLOOD: 3 MG/DL (ref 0.5–1.2)
EOSINOPHILS ABSOLUTE: 0.1 THOU/MM3 (ref 0–0.4)
EOSINOPHILS RELATIVE PERCENT: 2 % (ref 0–4)
GFR, ESTIMATED ,CON: 22 ML/MIN/1.73M2
GLUCOSE, WHOLE BLOOD: 255 MG/DL (ref 70–108)
HCT VFR BLD CALC: 38 % (ref 42–52)
HEMOGLOBIN: 11.8 GM/DL (ref 14–18)
IMMATURE GRANULOCYTES: 0 %
IONIZED CALCIUM, WHOLE BLOOD: 1 MMOL/L (ref 1.12–1.32)
LYMPHOCYTES # BLD: 17 % (ref 15–47)
LYMPHOCYTES ABSOLUTE: 0.9 THOU/MM3 (ref 1–4.8)
MCH RBC QN AUTO: 26.5 PG (ref 26–33)
MCHC RBC AUTO-ENTMCNC: 31.1 GM/DL (ref 32.2–35.5)
MCV RBC AUTO: 85 FL (ref 80–94)
MONOCYTES ABSOLUTE: 0.5 THOU/MM3 (ref 0.4–1.3)
MONOCYTES: 10 % (ref 0–12)
PDW BLD-RTO: 16 % (ref 11.5–14.5)
PLATELET # BLD: 182 THOU/MM3 (ref 130–400)
PMV BLD AUTO: 9.5 FL (ref 9.4–12.4)
POTASSIUM, WHOLE BLOOD: 4.2 MEQ/L (ref 3.5–4.9)
RBC # BLD: 4.45 MILL/MM3 (ref 4.7–6.1)
SEG NEUTROPHILS: 71 % (ref 43–75)
SEGMENTED NEUTROPHILS ABSOLUTE COUNT: 3.7 THOU/MM3 (ref 1.8–7.7)
SODIUM, WHOLE BLOOD: 139 MEQ/L (ref 138–146)
TOTAL PROTEIN: 7.2 G/DL (ref 6.1–8)
WBC # BLD: 5.2 THOU/MM3 (ref 4.8–10.8)

## 2020-08-26 PROCEDURE — 1036F TOBACCO NON-USER: CPT | Performed by: INTERNAL MEDICINE

## 2020-08-26 PROCEDURE — 84155 ASSAY OF PROTEIN SERUM: CPT

## 2020-08-26 PROCEDURE — G8427 DOCREV CUR MEDS BY ELIG CLIN: HCPCS | Performed by: INTERNAL MEDICINE

## 2020-08-26 PROCEDURE — 83883 ASSAY NEPHELOMETRY NOT SPEC: CPT

## 2020-08-26 PROCEDURE — G8420 CALC BMI NORM PARAMETERS: HCPCS | Performed by: INTERNAL MEDICINE

## 2020-08-26 PROCEDURE — 99215 OFFICE O/P EST HI 40 MIN: CPT | Performed by: INTERNAL MEDICINE

## 2020-08-26 PROCEDURE — 80076 HEPATIC FUNCTION PANEL: CPT

## 2020-08-26 PROCEDURE — 82784 ASSAY IGA/IGD/IGG/IGM EACH: CPT

## 2020-08-26 PROCEDURE — 82374 ASSAY BLOOD CARBON DIOXIDE: CPT

## 2020-08-26 PROCEDURE — 3017F COLORECTAL CA SCREEN DOC REV: CPT | Performed by: INTERNAL MEDICINE

## 2020-08-26 PROCEDURE — 84165 PROTEIN E-PHORESIS SERUM: CPT

## 2020-08-26 PROCEDURE — 86334 IMMUNOFIX E-PHORESIS SERUM: CPT

## 2020-08-26 PROCEDURE — 36415 COLL VENOUS BLD VENIPUNCTURE: CPT

## 2020-08-26 PROCEDURE — 4040F PNEUMOC VAC/ADMIN/RCVD: CPT | Performed by: INTERNAL MEDICINE

## 2020-08-26 PROCEDURE — 1123F ACP DISCUSS/DSCN MKR DOCD: CPT | Performed by: INTERNAL MEDICINE

## 2020-08-26 PROCEDURE — 99211 OFF/OP EST MAY X REQ PHY/QHP: CPT

## 2020-08-26 PROCEDURE — 85025 COMPLETE CBC W/AUTO DIFF WBC: CPT

## 2020-08-26 PROCEDURE — 84520 ASSAY OF UREA NITROGEN: CPT

## 2020-08-26 PROCEDURE — 80047 BASIC METABLC PNL IONIZED CA: CPT

## 2020-08-28 LAB — KAPPA/LAMBDA FREE LIGHT CHAINS: NORMAL

## 2020-08-29 LAB — IMMUNOFIXATION WITH QUANT: NORMAL

## 2020-08-31 LAB
BUN BLDV-MCNC: 70 MG/DL
CALCIUM SERPL-MCNC: 8.9 MG/DL
CHLORIDE BLD-SCNC: 99 MMOL/L
CO2: 33 MMOL/L
CREAT SERPL-MCNC: 2.64 MG/DL
GFR CALCULATED: 24
GLUCOSE BLD-MCNC: 138 MG/DL
HCT VFR BLD CALC: 37.2 % (ref 41–53)
HEMOGLOBIN: 11.9 G/DL (ref 13.5–17.5)
PHOSPHORUS: 4 MG/DL
POTASSIUM SERPL-SCNC: 4.3 MMOL/L
PTH INTACT: 143
SODIUM BLD-SCNC: 138 MMOL/L

## 2020-09-02 ENCOUNTER — OFFICE VISIT (OUTPATIENT)
Dept: NEPHROLOGY | Age: 75
End: 2020-09-02
Payer: MEDICARE

## 2020-09-02 VITALS
WEIGHT: 169.4 LBS | DIASTOLIC BLOOD PRESSURE: 78 MMHG | BODY MASS INDEX: 23.63 KG/M2 | OXYGEN SATURATION: 100 % | SYSTOLIC BLOOD PRESSURE: 148 MMHG | HEART RATE: 67 BPM

## 2020-09-02 PROCEDURE — G8427 DOCREV CUR MEDS BY ELIG CLIN: HCPCS | Performed by: INTERNAL MEDICINE

## 2020-09-02 PROCEDURE — 99213 OFFICE O/P EST LOW 20 MIN: CPT | Performed by: INTERNAL MEDICINE

## 2020-09-02 PROCEDURE — 3017F COLORECTAL CA SCREEN DOC REV: CPT | Performed by: INTERNAL MEDICINE

## 2020-09-02 PROCEDURE — 1036F TOBACCO NON-USER: CPT | Performed by: INTERNAL MEDICINE

## 2020-09-02 PROCEDURE — 1123F ACP DISCUSS/DSCN MKR DOCD: CPT | Performed by: INTERNAL MEDICINE

## 2020-09-02 PROCEDURE — 4040F PNEUMOC VAC/ADMIN/RCVD: CPT | Performed by: INTERNAL MEDICINE

## 2020-09-02 PROCEDURE — G8420 CALC BMI NORM PARAMETERS: HCPCS | Performed by: INTERNAL MEDICINE

## 2020-09-02 NOTE — PROGRESS NOTES
[facility]    [facility], [address]   [facility], [address]  [phone]  Progress Note  9/2/2020 11:26 AM        Pt Name:    Brandon Kerr  Birthdate:    [birth year]  Primary Care Physician:  Marianna Castillo     Chief Complaint:   Chief Complaint   Patient presents with    Follow-up    Chronic Kidney Disease     CKD Stage IV        Background Information/Interval History:   74 yo white male with hx HTN, CKD IV, CAD s/p CABG, MVR, DM, hx smoking who is here for follow-up. He has had difficult course lately which started in Dec 2018 with cardiac arrest s/p intervention complicated by FATEMEH requiring dialysis. He subsequently had another cardiac arrest and had CABG on urgent basis along with MVR. He continued on dialysis (was started Dec 2018) until Feb 2019. Following with urology closely for BPH and urinary retention. He had chronic posey catheter which was subsequently removed per urology. He is following with [facility].       He is scheduled for another echo in Sept at [facility]. He is also scheduled for BM biopsy (seeing Dr. Booker Heimlich). He is following closely with cardiology. Reports some shortness of breath with exertion. Otherwise no chest pain. On Room air. No leg swelling. He is taking coreg, flomax, K-dur 40 meq po am, 20 meq pm.  Also taking bumex 2 mg po BID. Improved leg swelling. On metolazone twice weekly. Following hematology/oncology for paraproteinemia evaluation. He says he feels better when his BP is little high. Pt reports he has had some random jerking episodes since 2019. He says this is not new.        Past History:  Past Medical History:   Diagnosis Date    CAD (coronary artery disease)     CHF (congestive heart failure) (HCC)     Chronic kidney disease     History of blood transfusion     Hyperlipidemia     Hypertension     Proteinuria     Type II or unspecified type diabetes mellitus without mention of complication, not stated as uncontrolled      Past Surgical History:   Procedure Laterality Date    CARDIAC SURGERY      COLONOSCOPY  2019    at [address] [facility] [address] GRAFT N/A 1/28/2019    CABG X3 MVR / MAZE performed by Lory Bella MD at [address] N/A 1/21/2019    EGD DIAGNOSTIC ONLY performed by [facility]ika Keenan [facility] [address] [facility]        VITALS:  BP (!) 148/78 (Site: Left Upper Arm, Position: Sitting, Cuff Size: Medium Adult) Comment: manual  Pulse 67   Wt 169 lb 6.4 oz (76.8 kg)   SpO2 100%   BMI 23.63 kg/m²   Wt Readings from Last 3 Encounters:   09/02/20 169 lb 6.4 oz (76.8 kg)   08/26/20 170 lb (77.1 kg)   03/17/20 165 lb 6.4 oz (75 kg)     Body mass index is 23.63 kg/m².      General Appearance: alert and cooperative with exam, appears comfortable, no distress  Oral: moist oral mucus membranes  Neck: No jugular venous distention  Lungs: Air entry B/L, no crackles or rales, no use of accessory muscles  Heart: S1, S2 heard  GI: soft, non-tender, no guarding  Extremities: No sig LE edema     Medications:  Current Outpatient Medications   Medication Sig Dispense Refill    carvedilol (COREG) 12.5 MG tablet Take 1 tablet by mouth 2 times daily 180 tablet 1    bumetanide (BUMEX) 2 MG tablet Take 1 tablet by mouth 2 times daily 180 tablet 3    ferrous sulfate (IRON 325) 325 (65 Fe) MG tablet Take 1 tablet by mouth 2 times daily 180 tablet 3    potassium chloride (KLOR-CON M) 20 MEQ extended release tablet 2 tab in am and 1 in pm 300 tablet 3    atorvastatin (LIPITOR) 40 MG tablet Take 1 tablet by mouth daily 90 tablet 3    tamsulosin (FLOMAX) 0.4 MG capsule Take 1 capsule by mouth daily 90 capsule 3    metOLazone (ZAROXOLYN) 2.5 MG tablet Take 1 tab every Monday and Thursday AND as directed by clinic 60 tablet 2    isosorbide dinitrate (ISORDIL) 20 MG tablet Take 1 tablet by mouth 2 times daily 60 tablet 5    Insulin Glargine (LANTUS SC) Inject 18 mg into the skin      nitroGLYCERIN (NITROSTAT) 0.4 MG SL tablet Place 1 tablet under the tongue every 5 minutes as needed for Chest pain 25 tablet 1    timolol (TIMOPTIC) 0.5 % ophthalmic solution Place 1 drop into both eyes daily      Coenzyme Q10 (COQ-10) 50 MG CAPS Take by mouth daily       acetaminophen (TYLENOL) 325 MG tablet Take 650 mg by mouth every 4 hours as needed for Pain      BD ULTRA-FINE MICRO PEN NEEDLE 32G X 6 MM MISC       latanoprost (XALATAN) 0.005 % ophthalmic solution Place 1 drop into both eyes nightly       aspirin EC 81 MG EC tablet Take 1 tablet by mouth daily 30 tablet 3     No current facility-administered medications for this visit. Laboratory & Diagnostics:  3814: VX: R 10.5, L 10.1, 1.4 cm left kidney cyst  Creat 1.8, K 3.9, UACR ~ 500 mg/g  March 4 2019: Creat 1.9, K 4.5, Ca 7.8, Hb 8.2  April 2019: K 4.6, creat 1.9, ca 8.2, Hb 8.1  April 15, 2019: K 3.8, creat 1.5, Hb 9.3, ferritin ~ 200  May 2019: K 4.2, creat 2.1  June 2019: K 3.6, Hb 10.9  Aug 2019: K 3.7, creat 2.13    March 2020: K 3.8, creat 2.8, Hb 11.8  Aug 2020: K 4.3, Creat 2.64, Ca 8.9, Hb 11.9     Impression/Plan:   1. CKD IV:  CKD is multifactorial from diabetic nephropathy, ischemic injuries, chronic cardiorenal syndrome. 2. Chronic systolic CHF/cardiomyopathy:  secondary to chronic systolic congestive heart failure and also has diastolic dysfunction. On bumex and metolazone. Monitor weights. He says he took lisinopril but it dropped his BP too much and he did not feel well. He stopped the lisinopril. 3. Hypokalemia: on replacement. Hypokalemia is due to diuretics. Potassium is stable. 4. Anemia in CKD. stable hemoglobin  5. HTN: on coreg and several diuretics. Did not tolerate lisinopril. 6  Proteinuria secondary to diabetic nephropathy   7. Left kidney cyst, simple  8.  CAD s/p CABG recently    Orders Placed This Encounter   Procedures    Basic Metabolic Panel    PTH, Intact    Phosphorus    Hemoglobin and Hematocrit, Blood     Return in about 6 months (around 3/2/2021).     Dao Monroy MD  [facility]

## 2020-09-04 RX ORDER — FENTANYL CITRATE 50 UG/ML
50 INJECTION, SOLUTION INTRAMUSCULAR; INTRAVENOUS ONCE
Status: CANCELLED | OUTPATIENT
Start: 2020-09-04 | End: 2020-09-04

## 2020-09-04 RX ORDER — SODIUM CHLORIDE 450 MG/100ML
INJECTION, SOLUTION INTRAVENOUS CONTINUOUS
Status: CANCELLED | OUTPATIENT
Start: 2020-09-04

## 2020-09-04 RX ORDER — MIDAZOLAM HYDROCHLORIDE 1 MG/ML
1 INJECTION INTRAMUSCULAR; INTRAVENOUS ONCE
Status: CANCELLED | OUTPATIENT
Start: 2020-09-04 | End: 2020-09-04

## 2020-09-08 ENCOUNTER — HOSPITAL ENCOUNTER (OUTPATIENT)
Dept: CT IMAGING | Age: 75
Discharge: HOME | End: 2020-09-08
Payer: MEDICARE

## 2020-09-08 VITALS
BODY MASS INDEX: 23.1 KG/M2 | WEIGHT: 165 LBS | SYSTOLIC BLOOD PRESSURE: 148 MMHG | TEMPERATURE: 98.2 F | RESPIRATION RATE: 18 BRPM | HEART RATE: 78 BPM | HEIGHT: 71 IN | DIASTOLIC BLOOD PRESSURE: 73 MMHG | OXYGEN SATURATION: 96 %

## 2020-09-08 LAB
APTT: 30 SECONDS (ref 22–38)
BASOPHILS # BLD: 0.6 %
BASOPHILS ABSOLUTE: 0 THOU/MM3 (ref 0–0.1)
EOSINOPHIL # BLD: 2.1 %
EOSINOPHILS ABSOLUTE: 0.1 THOU/MM3 (ref 0–0.4)
ERYTHROCYTE [DISTWIDTH] IN BLOOD BY AUTOMATED COUNT: 16.1 % (ref 11.5–14.5)
ERYTHROCYTE [DISTWIDTH] IN BLOOD BY AUTOMATED COUNT: 51.2 FL (ref 35–45)
HCT VFR BLD CALC: 38.8 % (ref 42–52)
HEMOGLOBIN: 12.1 GM/DL (ref 14–18)
IMMATURE GRANS (ABS): 0.02 THOU/MM3 (ref 0–0.07)
IMMATURE GRANULOCYTES: 0.4 %
INR BLD: 1.13 (ref 0.85–1.13)
LYMPHOCYTES # BLD: 20.6 %
LYMPHOCYTES ABSOLUTE: 1.1 THOU/MM3 (ref 1–4.8)
MCH RBC QN AUTO: 27.3 PG (ref 26–33)
MCHC RBC AUTO-ENTMCNC: 31.2 GM/DL (ref 32.2–35.5)
MCV RBC AUTO: 87.4 FL (ref 80–94)
MONOCYTES # BLD: 10.1 %
MONOCYTES ABSOLUTE: 0.5 THOU/MM3 (ref 0.4–1.3)
NUCLEATED RED BLOOD CELLS: 0 /100 WBC
PLATELET # BLD: 159 THOU/MM3 (ref 130–400)
PMV BLD AUTO: 9.9 FL (ref 9.4–12.4)
RBC # BLD: 4.44 MILL/MM3 (ref 4.7–6.1)
SEG NEUTROPHILS: 66.2 %
SEGMENTED NEUTROPHILS ABSOLUTE COUNT: 3.5 THOU/MM3 (ref 1.8–7.7)
WBC # BLD: 5.3 THOU/MM3 (ref 4.8–10.8)

## 2020-09-08 PROCEDURE — 6360000002 HC RX W HCPCS

## 2020-09-08 PROCEDURE — 85730 THROMBOPLASTIN TIME PARTIAL: CPT

## 2020-09-08 PROCEDURE — 88184 FLOWCYTOMETRY/ TC 1 MARKER: CPT

## 2020-09-08 PROCEDURE — 88305 TISSUE EXAM BY PATHOLOGIST: CPT

## 2020-09-08 PROCEDURE — 88313 SPECIAL STAINS GROUP 2: CPT

## 2020-09-08 PROCEDURE — 88365 INSITU HYBRIDIZATION (FISH): CPT

## 2020-09-08 PROCEDURE — 2580000003 HC RX 258: Performed by: RADIOLOGY

## 2020-09-08 PROCEDURE — 88185 FLOWCYTOMETRY/TC ADD-ON: CPT

## 2020-09-08 PROCEDURE — 36415 COLL VENOUS BLD VENIPUNCTURE: CPT

## 2020-09-08 PROCEDURE — 88364 INSITU HYBRIDIZATION (FISH): CPT

## 2020-09-08 PROCEDURE — 6370000000 HC RX 637 (ALT 250 FOR IP)

## 2020-09-08 PROCEDURE — 88311 DECALCIFY TISSUE: CPT

## 2020-09-08 PROCEDURE — 85025 COMPLETE CBC W/AUTO DIFF WBC: CPT

## 2020-09-08 PROCEDURE — 38221 DX BONE MARROW BIOPSIES: CPT

## 2020-09-08 PROCEDURE — 77012 CT SCAN FOR NEEDLE BIOPSY: CPT

## 2020-09-08 PROCEDURE — 85610 PROTHROMBIN TIME: CPT

## 2020-09-08 RX ORDER — FENTANYL CITRATE 50 UG/ML
50 INJECTION, SOLUTION INTRAMUSCULAR; INTRAVENOUS ONCE
Status: COMPLETED | OUTPATIENT
Start: 2020-09-08 | End: 2020-09-08

## 2020-09-08 RX ORDER — MIDAZOLAM HYDROCHLORIDE 1 MG/ML
1 INJECTION INTRAMUSCULAR; INTRAVENOUS ONCE
Status: COMPLETED | OUTPATIENT
Start: 2020-09-08 | End: 2020-09-08

## 2020-09-08 RX ORDER — MIDAZOLAM HYDROCHLORIDE 1 MG/ML
2 INJECTION INTRAMUSCULAR; INTRAVENOUS ONCE
Status: COMPLETED | OUTPATIENT
Start: 2020-09-08 | End: 2020-09-08

## 2020-09-08 RX ORDER — SODIUM CHLORIDE 450 MG/100ML
INJECTION, SOLUTION INTRAVENOUS CONTINUOUS
Status: DISCONTINUED | OUTPATIENT
Start: 2020-09-08 | End: 2020-09-09 | Stop reason: HOSPADM

## 2020-09-08 RX ORDER — BACITRACIN, NEOMYCIN, POLYMYXIN B 400; 3.5; 5 [USP'U]/G; MG/G; [USP'U]/G
OINTMENT TOPICAL ONCE
Status: COMPLETED | OUTPATIENT
Start: 2020-09-08 | End: 2020-09-08

## 2020-09-08 RX ADMIN — FENTANYL CITRATE 50 MCG: 50 INJECTION, SOLUTION INTRAMUSCULAR; INTRAVENOUS at 10:10

## 2020-09-08 RX ADMIN — SODIUM CHLORIDE: 4.5 INJECTION, SOLUTION INTRAVENOUS at 08:48

## 2020-09-08 RX ADMIN — FENTANYL CITRATE 50 MCG: 50 INJECTION, SOLUTION INTRAMUSCULAR; INTRAVENOUS at 10:15

## 2020-09-08 RX ADMIN — BACITRACIN, NEOMYCIN, POLYMYXIN B 1 G: 400; 3.5; 5 OINTMENT TOPICAL at 10:24

## 2020-09-08 RX ADMIN — MIDAZOLAM HYDROCHLORIDE 2 MG: 1 INJECTION INTRAMUSCULAR; INTRAVENOUS at 10:10

## 2020-09-08 RX ADMIN — MIDAZOLAM HYDROCHLORIDE 1 MG: 1 INJECTION INTRAMUSCULAR; INTRAVENOUS at 10:15

## 2020-09-08 ASSESSMENT — PAIN - FUNCTIONAL ASSESSMENT: PAIN_FUNCTIONAL_ASSESSMENT: 0-10

## 2020-09-08 ASSESSMENT — PAIN SCALES - GENERAL
PAINLEVEL_OUTOF10: 0

## 2020-09-08 NOTE — PROGRESS NOTES
__m__ Safety:       (Environmental)   [place] to environment   Ensure ID band is correct and in place/ allergy band as needed   Assess for fall risk   Initiate fall precautions as applicable (fall band, side rails, etc.)   Call light within reach   Bed in low position/ wheels locked    __m__ Pain:        Assess pain level and characteristics   Administer analgesics as ordered   Assess effectiveness of pain management and report to MD as needed    _m___ Knowledge Deficit:   Assess baseline knowledge   Provide teaching at level of understanding   Provide teaching via preferred learning method   Evaluate teaching effectiveness    __m__ Hemodynamic/Respiratory Status:       (Pre and Post Procedure Monitoring)   Assess/Monitor vital signs and LOC   Assess Baseline SpO2 prior to any sedation   Obtain weight/height   Assess vital signs/ LOC until patient meets discharge criteria   Monitor procedure site and notify MD of any issues    _

## 2020-09-08 NOTE — PRE SEDATION
2.5 MG tablet, Take 1 tab every Monday and Thursday AND as directed by clinic, Disp: 60 tablet, Rfl: 2    isosorbide dinitrate (ISORDIL) 20 MG tablet, Take 1 tablet by mouth 2 times daily, Disp: 60 tablet, Rfl: 5    Insulin Glargine (LANTUS SC), Inject 18 mg into the skin, Disp: , Rfl:     timolol (TIMOPTIC) 0.5 % ophthalmic solution, Place 1 drop into both eyes daily, Disp: , Rfl:     Coenzyme Q10 (COQ-10) 50 MG CAPS, Take by mouth daily , Disp: , Rfl:     BD ULTRA-FINE MICRO PEN NEEDLE 32G X 6 MM MISC, , Disp: , Rfl:     latanoprost (XALATAN) 0.005 % ophthalmic solution, Place 1 drop into both eyes nightly , Disp: , Rfl:     atorvastatin (LIPITOR) 40 MG tablet, Take 1 tablet by mouth daily, Disp: 90 tablet, Rfl: 3    nitroGLYCERIN (NITROSTAT) 0.4 MG SL tablet, Place 1 tablet under the tongue every 5 minutes as needed for Chest pain, Disp: 25 tablet, Rfl: 1    aspirin EC 81 MG EC tablet, Take 1 tablet by mouth daily, Disp: 30 tablet, Rfl: 3    acetaminophen (TYLENOL) 325 MG tablet, Take 650 mg by mouth every 4 hours as needed for Pain, Disp: , Rfl:     Current Facility-Administered Medications:     0.45 % sodium chloride infusion, , Intravenous, Continuous, Lilo Mosqueda MD, Last Rate: 20 mL/hr at 09/08/20 0848    fentaNYL (SUBLIMAZE) injection 50 mcg, 50 mcg, Intravenous, Once, Lilo Mosqueda MD    midazolam (VERSED) injection 1 mg, 1 mg, Intravenous, Once, Lilo Mosqueda MD    midazolam (VERSED) injection 2 mg, 2 mg, Intravenous, Once, John Mathis MD    neomycin-bacitracin-polymyxin (NEOSPORIN) ointment, , Topical, Once, John Mathis MD  Prior to Admission medications    Medication Sig Start Date End Date Taking?  Authorizing Provider   carvedilol (COREG) 12.5 MG tablet Take 1 tablet by mouth 2 times daily 8/11/20  Yes Shayy Sue APRN - CNP   bumetanide (BUMEX) 2 MG tablet Take 1 tablet by mouth 2 times daily 6/15/20  Yes Murtis Sue, APRN - CNP   ferrous sulfate (IRON 325) 325 (65 Fe) MG tablet Take 1 tablet by mouth 2 times daily 6/15/20  Yes SILVIA Rodas CNP   potassium chloride (KLOR-CON M) 20 MEQ extended release tablet 2 tab in am and 1 in pm 6/15/20  Yes SILVIA Rodas CNP   tamsulosin (FLOMAX) 0.4 MG capsule Take 1 capsule by mouth daily 3/11/20  Yes SILVIA Peck CNP   metOLazone (ZAROXOLYN) 2.5 MG tablet Take 1 tab every Monday and Thursday AND as directed by clinic 1/9/20  Yes SILVIA Rodas CNP   isosorbide dinitrate (ISORDIL) 20 MG tablet Take 1 tablet by mouth 2 times daily 11/26/19  Yes SILVIA Rodas CNP   Insulin Glargine (LANTUS SC) Inject 18 mg into the skin   Yes Historical Provider, MD   timolol (TIMOPTIC) 0.5 % ophthalmic solution Place 1 drop into both eyes daily   Yes Historical Provider, MD   Coenzyme Q10 (COQ-10) 50 MG CAPS Take by mouth daily    Yes Historical Provider, MD   BD ULTRA-FINE MICRO PEN NEEDLE 32G X 6 MM MISC  7/9/18  Yes Historical Provider, MD   latanoprost (XALATAN) 0.005 % ophthalmic solution Place 1 drop into both eyes nightly  9/11/17  Yes Historical Provider, MD   atorvastatin (LIPITOR) 40 MG tablet Take 1 tablet by mouth daily 6/15/20   SILVIA Rodas CNP   nitroGLYCERIN (NITROSTAT) 0.4 MG SL tablet Place 1 tablet under the tongue every 5 minutes as needed for Chest pain 5/6/19   SILVIA Rodas CNP   aspirin EC 81 MG EC tablet Take 1 tablet by mouth daily 2/19/19 8/26/20  Mya Grover MD   acetaminophen (TYLENOL) 325 MG tablet Take 650 mg by mouth every 4 hours as needed for Pain    Historical Provider, MD     Additional information:       VITAL SIGNS   Vitals:    09/08/20 1008   BP: (!) 153/57   Pulse: 73   Resp: 20   Temp:    SpO2: 98%       PHYSICAL:   Heart:  [x]Regular rate and rhythm  []Other:    Lungs:  [x]Clear    []Other:    Abdomen: [x]Soft    []Other:    Mental Status: [x]Alert & Oriented  []Other:      PLANNED PROCEDURE   [x]Biospy []Arteriogram []Drainage  []Fistulogram []IV access       []Dialysis catheter  []IVC filter []Dialysis catheter []Biliary drainage  []Other:  SEDATION  Planned agent:[x]Midazolam []Meperidine [x]Sublimaze []Morphine  []Diazepam  []Other:     ASA Classification:  []1 [x]2 []3 []4 []5  Class 1: A normal healthy patient  Class 2: Pt with mild to moderate systemic disease  Class 3: Severe systemic disease or disturbance  Class 4: Severe systemic disorders that are already life threatening. Class 5: Moribund pt with little chances of survival, for more than 24 hours. Mallampati I Airway Classification:   [x]1 []2 []3 []4    [x]Pre-procedure diagnostic studies complete and results available. Comment:    [x]Previous sedation/anesthesia experiences assessed. Comment:    [x]The patient is an appropriate candidate to undergo the planned procedure sedation and anesthesia. (Refer to nursing sedation/analgesia documentation record)  [x]Formulation and discussion of sedation/procedure plan, risks, and expectations with patient and/or responsible adult completed. [x]Patient examined immediately prior to the procedure.  (Refer to nursing sedation/analgesia documentation record)    Becky Wakefield MD  Electronically signed 9/8/2020 at 10:17 AM

## 2020-09-08 NOTE — PROGRESS NOTES
Formulation and discussion of sedation / procedure plans, risks, benefits, side effects and alternatives with patient and/or responsible adult completed.     Electronically signed by Jihan Figueroa MD on 9/8/2020 at 10:16 AM

## 2020-09-10 LAB — LEUK/LYMPH PHENOTYPING WB: NORMAL

## 2020-09-23 ENCOUNTER — HOSPITAL ENCOUNTER (OUTPATIENT)
Dept: INFUSION THERAPY | Age: 75
Discharge: HOME | End: 2020-09-23
Payer: MEDICARE

## 2020-09-23 ENCOUNTER — OFFICE VISIT (OUTPATIENT)
Dept: ONCOLOGY | Age: 75
End: 2020-09-23
Payer: MEDICARE

## 2020-09-23 VITALS
BODY MASS INDEX: 24.67 KG/M2 | OXYGEN SATURATION: 94 % | WEIGHT: 176.2 LBS | TEMPERATURE: 97.2 F | SYSTOLIC BLOOD PRESSURE: 109 MMHG | DIASTOLIC BLOOD PRESSURE: 54 MMHG | RESPIRATION RATE: 19 BRPM | HEIGHT: 71 IN | HEART RATE: 79 BPM

## 2020-09-23 PROCEDURE — 99214 OFFICE O/P EST MOD 30 MIN: CPT | Performed by: INTERNAL MEDICINE

## 2020-09-23 PROCEDURE — 3017F COLORECTAL CA SCREEN DOC REV: CPT | Performed by: INTERNAL MEDICINE

## 2020-09-23 PROCEDURE — 1123F ACP DISCUSS/DSCN MKR DOCD: CPT | Performed by: INTERNAL MEDICINE

## 2020-09-23 PROCEDURE — 1036F TOBACCO NON-USER: CPT | Performed by: INTERNAL MEDICINE

## 2020-09-23 PROCEDURE — G8420 CALC BMI NORM PARAMETERS: HCPCS | Performed by: INTERNAL MEDICINE

## 2020-09-23 PROCEDURE — 4040F PNEUMOC VAC/ADMIN/RCVD: CPT | Performed by: INTERNAL MEDICINE

## 2020-09-23 PROCEDURE — G8427 DOCREV CUR MEDS BY ELIG CLIN: HCPCS | Performed by: INTERNAL MEDICINE

## 2020-09-23 PROCEDURE — 99211 OFF/OP EST MAY X REQ PHY/QHP: CPT

## 2020-09-23 NOTE — PROGRESS NOTES
[facility]  [facility] NETWORK OF [facility]  [facility] [address] [facility]  [address]  [address]  Dept: [phone]  Dept Fax: [phone]  Loc: [phone]     Subjective:      Chief Complaint:  Pablo Cabral is a 70 y.o. with abnormal immunoglobulin levels. On November 6, 2019 the patient had a serum protein electrophoresis performed. This found an M spike with additional restrictions in the gamma region. The monoclonal protein peak accounted for 0.97 g/dL of a total of 1.61 g/dL of a protein in the gamma region. Immunoglobulin electrophoresis gel pattern found an IgG type kappa monoclonal protein with additional faint bands in the IgM kappa and lambda region. In addition, the total gammaglobulin level was 1.81 g/dL. There is a mild elevation of the total gammaglobulin. HPI:   Mr. Marisela Mota returns today for follow-up regarding his history of a monoclonal gammopathy. On September 8, 2020 the patient underwent a bone marrow aspiration and biopsy. Surgical pathology confirmed a cellular marrow at 20% with trilineage hematopoiesis noted. Two percent of plasma cells were noted without any evidence of monoclonality. Adequate iron stores were also identified. The results were reviewed with the patient today. Well the 2% plasma cells were identified via aspirate differential there was no evidence of monoclonality as present by in situ hybridization for kappa or lambda RNA. The patient does continue to have a significantly elevated serum kappa free light chain. Given that there is no evidence of monoclonality in this bone marrow the free light change may possibly be related to a plasmacytoma, lymphoma producing light change including the potential lymphoblastic lymphoma. However the patient has no signs or symptoms suggestive of plasmacytoma or a lymphoma.   He denies hot flashes, night sweats, unexplained weight loss, or unexplained fever. The patient has generalized weakness and fatigue. He has not had fever, cough, shortness of breath or other signs of infection. The patient's bowel and bladder habits have been normal.  He has not seen blood in his stool or urine. The patient remains active with an ECOG performance status of level 1. Jeffrey Power continues to have chronic anemia which remained stable. Is not report any evidence of blood loss. PMH, SH, and FH:  I reviewed the patients medication list and allergy list as noted on the electronic medical record. The PMH, SH and FH were also reviewed as noted on the EMR. Review of Systems  Constitutional: Fatigue. HENT: Negative. Eyes: Negative. Respiratory: Negative. Cardiovascular: Negative. Gastrointestinal: Negative. Genitourinary: Negative. Musculoskeletal: Negative. Skin: Negative. Neurological: General weakness. Hematological: Negative. Psychiatric/Behavioral: Negative. Objective:   Physical Exam  Vitals:    09/23/20 0939   BP: (!) 109/54   Pulse: 79   Resp: 19   Temp: 97.2 °F (36.2 °C)   SpO2: 94%   Vitals reviewed and are stable. HENT: Normocephalic and atraumatic. Eyes: Pupils are equal and reactive. No scleral icterus. Neck: Overall appearance is symmetrical. No identifiable masses. Pulmonary: Effort normal. No respiratory distress. Cardiovascular: RRR. No edema in any of the four extremities. Abdominal: Soft. No hepatomegaly or splenomegaly. Musculoskeletal: Gait is normal. Muscle strength and tone good. Neurological: Alert and oriented to person, place, and time. Judgment and thought content normal.  Skin: Skin is warm and dry. No rash. Psychiatric: Mood and affect appropriate for the clinical situation. Behavior is normal.     Data Analysis:   The following studies were reviewed with the patient today:        Hematology 9/8/2020 8/26/2020   WBC 5.3 5.2   RBC 4.44 (L) 4.45 (L)   HGB 12.1 (L) 11.8 (L)   HCT 38.8 (L) 38.0 (L)   MCV 87.4 85   RDW  16.0 (H)    182     Assessment:   1. Monoclonal gammopathy. 2.  IgG kappa monoclonal protein. 3.  Elevated free kappa light chain. 4.  Chronic anemia. Plan:   1. Continue to monitor for any of his development of malignancy such as plasmacytoma or lymphoma. 2.  Monitor hemoglobin hematocrit and for any evidence of blood loss. 3.  Monitor IgG kappa monoclonal protein as well as kappa free light chain. Aleta Ventura M.D. Medical Director: [facility]  Cancer Network [facility]  [address] [facility], [address] [place] [address], [address] [place] [address], [address] [place] [address] of the [facility] at the [facility]      **This report has been created using voice recognition software. It may contain minor errors which are inherent in voice recognition technology. **

## 2020-11-03 ENCOUNTER — VIRTUAL VISIT (OUTPATIENT)
Dept: CARDIOLOGY CLINIC | Age: 75
End: 2020-11-03
Payer: MEDICARE

## 2020-11-03 ENCOUNTER — TELEPHONE (OUTPATIENT)
Dept: CARDIOLOGY CLINIC | Age: 75
End: 2020-11-03

## 2020-11-03 PROBLEM — R06.09 DOE (DYSPNEA ON EXERTION): Status: ACTIVE | Noted: 2020-11-03

## 2020-11-03 PROBLEM — R53.83 FATIGUE: Status: ACTIVE | Noted: 2020-11-03

## 2020-11-03 PROBLEM — N18.4 CKD (CHRONIC KIDNEY DISEASE) STAGE 4, GFR 15-29 ML/MIN (HCC): Status: ACTIVE | Noted: 2020-11-03

## 2020-11-03 PROCEDURE — 1123F ACP DISCUSS/DSCN MKR DOCD: CPT | Performed by: NURSE PRACTITIONER

## 2020-11-03 PROCEDURE — G8427 DOCREV CUR MEDS BY ELIG CLIN: HCPCS | Performed by: NURSE PRACTITIONER

## 2020-11-03 PROCEDURE — 99214 OFFICE O/P EST MOD 30 MIN: CPT | Performed by: NURSE PRACTITIONER

## 2020-11-03 PROCEDURE — 4040F PNEUMOC VAC/ADMIN/RCVD: CPT | Performed by: NURSE PRACTITIONER

## 2020-11-03 PROCEDURE — 3017F COLORECTAL CA SCREEN DOC REV: CPT | Performed by: NURSE PRACTITIONER

## 2020-11-03 ASSESSMENT — ENCOUNTER SYMPTOMS
ABDOMINAL DISTENTION: 0
COUGH: 0
SHORTNESS OF BREATH: 1

## 2020-11-03 NOTE — PROGRESS NOTES
TELEHEALTH EVALUATION -- Audio/Visual (During OSQXJ-81 public health emergency)    [address]        Visit Date: 11/3/2020  Cardiologist:  Dr. Antonio Salvador  Primary Care Physician: Dr. Ora Avila is a 70 y.o. male who presents today for:  Chief Complaint   Patient presents with    Congestive Heart Failure       HPI:   Tyrone Berg is a 70 y.o. male who is seen via video virtual Doxy[employer] visit for a follow up patient visit in the heart failure clinic. Tyrone Berg was at home. Wife Wayne Jimenez present. Provider was present at home. CHF clinic nurse assisted. HF: ICM, HFrEF (EF 25% 12/2019, down from 35-40%), RVSP 59  Device: Refused Lifevest  HX: Cardiac arrest in 12/2018, 1/2019 - CABG x 3V, MV repair, MAZE- 1/28/19-Dr Dickey, DM, HTN, HLD, CKD III (Yesika Arcelia, did require Hemo x 2 months), Anemia, Former smoker (10 years total), MGUS (Dr Cuauhtemoc Torres)    Dry wt:  165-170     Hospitalization r/t Heart Failure:   April 2019  Hx recurrent Pleural effusions = last tap 11/2019 (1.8L off)  12/2019 = ECHO shows drop in EF 25-30% (from 35-40%).    1/2020 = saw Antonio Salvador, recommended LHC/RHC d/t declined EF, cant guarantee wont end up on dialysis - pt refused. Pt refused Lifevest.    Concerns today: \"Little\" SOB/SHANNON - no more than normal.  Feeling \"pretty good\" overall. Tinkers in shop when weather good. Uses golf cart outside. Walking little more - not using cane as much  No falls recently  Little hoarse today - no fever. Wife states he had ECHO at [facility] within last month. BPs 130-150s after meds - he feels better when BP little higher. Activity: getting around house ok - having to lift bucket daily for toilet, states arms stronger. Diet: watching salt and fluid - wife does admit he overdrinks at times.      Patient has:  Chest Pain: no  SOB: yes  Orthopnea/PND:sleeps at 40*  AMBREEN: no  Edema: No  Fatigue: same  Abdominal bloating: No  Cough: No  Appetite: good  Any extra diuretic use: No  Weight gain: no  Home weight: stable - 165-170 - Has been stable for past year.    Home blood pressure: 130-150s after meds    Past Medical History:   Diagnosis Date    CAD (coronary artery disease)     CHF (congestive heart failure) (HCC)     Chronic kidney disease     History of blood transfusion     Hyperlipidemia     Hypertension     Proteinuria     Type II or unspecified type diabetes mellitus without mention of complication, not stated as uncontrolled      Past Surgical History:   Procedure Laterality Date    CARDIAC SURGERY      COLONOSCOPY      at [address] CORONARY ARTERY BYPASS GRAFT N/A 2019    CABG X3 MVR / MAZE performed by Ariela Bullard MD at [address]  2020    CT BONE MARROW BIOPSY 2020 [facility] CT SCAN    TONSILLECTOMY      UPPER GASTROINTESTINAL ENDOSCOPY N/A 2019    EGD DIAGNOSTIC ONLY performed [facility] Danny [facility]     Family History   Problem Relation Age of Onset    Cancer Mother         breast    Diabetes Mother     Heart Disease Father     Diabetes Father     Cancer Maternal Grandmother         leukemia     Social History     Tobacco Use    Smoking status: Former Smoker     Packs/day: 0.25     Years: 10.00     Pack years: 2.50     Types: Cigarettes     Last attempt to quit: 1976     Years since quittin.8    Smokeless tobacco: Former User   Substance Use Topics    Alcohol use: No     Alcohol/week: 0.0 standard drinks     Current Outpatient Medications   Medication Sig Dispense Refill    carvedilol (COREG) 12.5 MG tablet Take 1 tablet by mouth 2 times daily 180 tablet 1    bumetanide (BUMEX) 2 MG tablet Take 1 tablet by mouth 2 times daily 180 tablet 3    ferrous sulfate (IRON 325) 325 (65 Fe) MG tablet Take 1 tablet by mouth 2 times daily 180 tablet 3    potassium chloride (KLOR-CON M) 20 MEQ extended release tablet 2 tab in am and 1 in pm 300 tablet 3    atorvastatin atraumatic. Eyes:      Extraocular Movements: Extraocular movements intact. Neck:      Musculoskeletal: Normal range of motion. Pulmonary:      Effort: Pulmonary effort is normal.   Abdominal:      General: Abdomen is flat. There is no distension. Musculoskeletal:      Right lower leg: No edema (none per patient). Left lower leg: No edema (none per patient). Skin:     General: Skin is warm and dry. Neurological:      Mental Status: He is alert and oriented to person, place, and time. Psychiatric:         Mood and Affect: Mood normal.         Behavior: Behavior normal.         Wt Readings from Last 3 Encounters:   09/23/20 176 lb 3.2 oz (79.9 kg)   09/08/20 165 lb (74.8 kg)   09/02/20 169 lb 6.4 oz (76.8 kg)     BP Readings from Last 3 Encounters:   09/23/20 (!) 109/54   09/08/20 (!) 148/73   09/02/20 (!) 148/78     Pulse Readings from Last 3 Encounters:   09/23/20 79   09/08/20 78   09/02/20 67     There is no height or weight on file to calculate BMI. ECHO:   Summary   Ejection fraction was estimated at 25-30%.   Left atrial size was severely dilated.   S/p MV replacement - probable bioprosthetic. DOPPLER: The transmitral   velocity was within the normal range with no evidence for mitral stenosis   (mean gradient 6 mmHg, V max 2.0 m/s, P1/2T = 60 ms). There was mild   mitral regurgitation.   There was mild-moderate aortic regurgitation.   There was mild tricuspid regurgitation.  Assuming RAP = 5 mmHg, the   estimated RVSP = 59 mmHg.   Signature   ---------------------------------------------------------------   Electronically signed by Keesha Bowden MD (Interpreting   [email or web address]) on 12/16/2019 at 05:36 PM         Summary   Technically difficult examination.   Ejection fraction was estimated at 35-40%.   Left atrial size was moderately dilated.   S/p mitral valve replacement.   Left pleural effusion.      Signature      ----------------------------------------------------------------   Electronically signed by Renetta Quach MD (Interpreting   [email or web address]) on 05/16/2019 at 04:18 PM   ----------------------------------------------------------------     CATH - 1/2019  = [facility] 1/28/19 Dc Altamirano)  SUMMARY:  Severe triple-vessel coronary artery disease; severe  cardiomyopathy; moderate-to-severe mitral regurgitation.     PLAN:  1.  Bed rest.  2.  Optimal medical therapy. 3.  Risk factor management. 4.  CT surgery consultation for CABG/MVR.  5.  Consideration for balloon pump given the fact that there is  significant iliac disease for hemodynamic support if necessary. 6.  May need ICU care/inotropic therapy.   7.  We will need to establish revascularization plan or a palliative  care option, so the patient not chose for further management.     All the above was explained to the patient and the patient's family in  detail. Gianluca Espinoza MD     D: 01/24/2019 15:29:39    Results reviewed:  BNP:   Lab Results   Component Value Date     10/17/2019     CBC:   Lab Results   Component Value Date    WBC 5.3 09/08/2020    RBC 4.44 09/08/2020    HGB 12.1 09/08/2020    HCT 38.8 09/08/2020     09/08/2020     CMP:    Lab Results   Component Value Date     08/31/2020    K 4.3 08/31/2020    K 4.0 01/23/2019    CL 99 08/31/2020    CO2 33 08/31/2020    BUN 70 08/31/2020    CREATININE 2.64 08/31/2020    LABGLOM 24 08/31/2020    LABGLOM 27 01/13/2020    GLUCOSE 138 08/31/2020    CALCIUM 8.9 08/31/2020     Hepatic Function Panel:    Lab Results   Component Value Date    ALKPHOS 102 08/26/2020    ALT 20 08/26/2020    AST 18 08/26/2020    PROT 7.2 08/26/2020    BILITOT 0.2 08/26/2020    BILIDIR <0.2 08/26/2020    LABALBU 3.0 08/26/2020     Magnesium:    Lab Results   Component Value Date    MG 2.2 12/02/2019     PT/INR:    Lab Results   Component Value Date    INR 1.13 09/08/2020     Lipids:    Lab Results Component Value Date    TRIG 110 12/16/2019    HDL 40 12/16/2019    LDLCALC 72 12/16/2019       ASSESSMENT AND PLAN:   The patient's condition/symptoms are Stable. Diagnosis Orders   1. CHF (congestive heart failure), NYHA class III, chronic, systolic ([address]     2. Ischemic cardiomyopathy     3. Hx of CABG     4. Hx of cardiac arrest     5. SHANNON (dyspnea on exertion)     6. Fatigue, unspecified type     7. CKD (chronic kidney disease) stage 4, GFR 15-29 ml/min ([facility])       Continue:  GDMT:   ACE/ARB/ARNI - None - pt stopped Lisinopril d/t low BPs/dizziness   BB - Coreg 12.5 BID   Diuretic - Bumex 2 BID, Metolazone 2.5 Mon/Thurs   Hydralazine/Isos. - Isordil 20 BID   Aldosterone- None d/t CKD  Continue Current medications  Overall stable. Appears/sounds Euvolemic  Chronic SHANNON/fatigue = voices slight improvement   Meds reviewed = no changes today  Labs reviewed = creat stable at 2.6  Continue daily wts and BP = monitor BP, call if trending greater than 150s after meds. ECHO at [facility] recent - nurse will call to obtain  Increase activity/exercise as able. F/U w/ Ruben Staff in Dec   F/U in clinic in Mar/April    · Daily weights  · Fluid restriction of 2 Liters per day  · Limit sodium in diet to around 2845-1499 mg/day  · Monitor BP  · Activity as tolerated     Patient was instructed to call the [employer] for any changes in symptoms as noted in AVS.      Return in about 5 months (around 4/3/2021). or sooner if needed     We discussed the importance of weighing oneself and recording daily. We also discussed the importance of a low sodium diet, higher sodium foods to avoid and appropriate low sodium food choices. Discussed use, benefit, and side effects of prescribed medications. All patient questions answered. Patient verbalizes understanding of plan of care using teach back method, and is agreeable to the treatment plan.      Pursuant to the emergency declaration under the [address] and the National Emergencies Act, [facility] waiver authority and the [employer] and [employer] Act, this Virtual  Visit was conducted, with patient's consent, to reduce the patient's risk of exposure to COVID-19 and provide continuity of care for an established patient. Services were provided through a video synchronous discussion virtually to substitute for in-person clinic visit. Greater then 45 minutes of time was spent reviewing the chart and educating the patient about HF, medications, diet, exercise, and discussing the plan of care. I personally spent more then 50% of the appt time face to face with the patient counseling/coordinating patient's care.     Electronicallysigned by SILVIA Keita CNP on 11/3/2020 at 11:17 AM

## 2020-12-15 ENCOUNTER — HOSPITAL ENCOUNTER (OUTPATIENT)
Dept: INFUSION THERAPY | Age: 75
Discharge: HOME | End: 2020-12-15
Payer: MEDICARE

## 2020-12-15 ENCOUNTER — OFFICE VISIT (OUTPATIENT)
Dept: ONCOLOGY | Age: 75
End: 2020-12-15
Payer: MEDICARE

## 2020-12-15 VITALS
BODY MASS INDEX: 24.92 KG/M2 | DIASTOLIC BLOOD PRESSURE: 61 MMHG | RESPIRATION RATE: 18 BRPM | WEIGHT: 178 LBS | TEMPERATURE: 98 F | SYSTOLIC BLOOD PRESSURE: 122 MMHG | HEART RATE: 96 BPM | OXYGEN SATURATION: 96 % | HEIGHT: 71 IN

## 2020-12-15 DIAGNOSIS — D47.2 MGUS (MONOCLONAL GAMMOPATHY OF UNKNOWN SIGNIFICANCE): ICD-10-CM

## 2020-12-15 LAB
ABSOLUTE IMMATURE GRANULOCYTE: 0.01 THOU/MM3 (ref 0–0.07)
BASINOPHIL, AUTOMATED: 0 % (ref 0–3)
BASOPHILS ABSOLUTE: 0 THOU/MM3 (ref 0–0.1)
BUN, WHOLE BLOOD: 57 MG/DL (ref 8–26)
CHLORIDE, WHOLE BLOOD: 96 MEQ/L (ref 98–109)
CREATININE, WHOLE BLOOD: 2.7 MG/DL (ref 0.5–1.2)
EOSINOPHILS ABSOLUTE: 0.1 THOU/MM3 (ref 0–0.4)
EOSINOPHILS RELATIVE PERCENT: 1 % (ref 0–4)
GFR, ESTIMATED ,CON: 25 ML/MIN/1.73M2
GLUCOSE, WHOLE BLOOD: 262 MG/DL (ref 70–108)
HCT VFR BLD CALC: 39.7 % (ref 42–52)
HEMOGLOBIN: 12.5 GM/DL (ref 14–18)
IMMATURE GRANULOCYTES: 0 %
IONIZED CALCIUM, WHOLE BLOOD: 1.02 MMOL/L (ref 1.12–1.32)
LYMPHOCYTES # BLD: 14 % (ref 15–47)
LYMPHOCYTES ABSOLUTE: 0.8 THOU/MM3 (ref 1–4.8)
MCH RBC QN AUTO: 27 PG (ref 26–33)
MCHC RBC AUTO-ENTMCNC: 31.5 GM/DL (ref 32.2–35.5)
MCV RBC AUTO: 86 FL (ref 80–94)
MONOCYTES ABSOLUTE: 0.4 THOU/MM3 (ref 0.4–1.3)
MONOCYTES: 8 % (ref 0–12)
PDW BLD-RTO: 16 % (ref 11.5–14.5)
PLATELET # BLD: 162 THOU/MM3 (ref 130–400)
PMV BLD AUTO: 9.8 FL (ref 9.4–12.4)
POTASSIUM, WHOLE BLOOD: 4.5 MEQ/L (ref 3.5–4.9)
RBC # BLD: 4.63 MILL/MM3 (ref 4.7–6.1)
SEG NEUTROPHILS: 76 % (ref 43–75)
SEGMENTED NEUTROPHILS ABSOLUTE COUNT: 4.2 THOU/MM3 (ref 1.8–7.7)
SODIUM, WHOLE BLOOD: 137 MEQ/L (ref 138–146)
TOTAL CO2, WHOLE BLOOD: 33 MEQ/L (ref 23–33)
WBC # BLD: 5.6 THOU/MM3 (ref 4.8–10.8)

## 2020-12-15 PROCEDURE — 85025 COMPLETE CBC W/AUTO DIFF WBC: CPT

## 2020-12-15 PROCEDURE — 80076 HEPATIC FUNCTION PANEL: CPT

## 2020-12-15 PROCEDURE — 99214 OFFICE O/P EST MOD 30 MIN: CPT | Performed by: INTERNAL MEDICINE

## 2020-12-15 PROCEDURE — 84155 ASSAY OF PROTEIN SERUM: CPT

## 2020-12-15 PROCEDURE — 82784 ASSAY IGA/IGD/IGG/IGM EACH: CPT

## 2020-12-15 PROCEDURE — 86334 IMMUNOFIX E-PHORESIS SERUM: CPT

## 2020-12-15 PROCEDURE — G8420 CALC BMI NORM PARAMETERS: HCPCS | Performed by: INTERNAL MEDICINE

## 2020-12-15 PROCEDURE — 3017F COLORECTAL CA SCREEN DOC REV: CPT | Performed by: INTERNAL MEDICINE

## 2020-12-15 PROCEDURE — G8484 FLU IMMUNIZE NO ADMIN: HCPCS | Performed by: INTERNAL MEDICINE

## 2020-12-15 PROCEDURE — 82310 ASSAY OF CALCIUM: CPT

## 2020-12-15 PROCEDURE — 36415 COLL VENOUS BLD VENIPUNCTURE: CPT

## 2020-12-15 PROCEDURE — G8427 DOCREV CUR MEDS BY ELIG CLIN: HCPCS | Performed by: INTERNAL MEDICINE

## 2020-12-15 PROCEDURE — 80047 BASIC METABLC PNL IONIZED CA: CPT

## 2020-12-15 PROCEDURE — 84165 PROTEIN E-PHORESIS SERUM: CPT

## 2020-12-15 PROCEDURE — 99211 OFF/OP EST MAY X REQ PHY/QHP: CPT

## 2020-12-15 PROCEDURE — 83520 IMMUNOASSAY QUANT NOS NONAB: CPT

## 2020-12-15 PROCEDURE — 83883 ASSAY NEPHELOMETRY NOT SPEC: CPT

## 2020-12-15 PROCEDURE — 4040F PNEUMOC VAC/ADMIN/RCVD: CPT | Performed by: INTERNAL MEDICINE

## 2020-12-15 PROCEDURE — 1123F ACP DISCUSS/DSCN MKR DOCD: CPT | Performed by: INTERNAL MEDICINE

## 2020-12-15 PROCEDURE — 1036F TOBACCO NON-USER: CPT | Performed by: INTERNAL MEDICINE

## 2020-12-15 NOTE — PROGRESS NOTES
[facility] CANCER CENTER  [facility] OF [facility]  [facility] [address] [facility] [address]  [address] [facility] [address] [facility] [address]  Dept: [phone]  Dept Fax: [phone]  Loc: [phone]     Subjective:      Chief Complaint:  Daniel Campa is a 70 y.o. with abnormal immunoglobulin levels. On November 6, 2019 the patient had a serum protein electrophoresis performed. This found an M spike with additional restrictions in the gamma region. The monoclonal protein peak accounted for 0.97 g/dL of a total of 1.61 g/dL of a protein in the gamma region. Immunoglobulin electrophoresis gel pattern found an IgG type kappa monoclonal protein with additional faint bands in the IgM kappa and lambda region. In addition, the total gammaglobulin level was 1.81 g/dL. There is a mild elevation of the total gammaglobulin. On September 8, 2020 the patient underwent a bone marrow aspiration and biopsy. Surgical pathology confirmed a cellular marrow at 20% with trilineage hematopoiesis noted. Two percent of plasma cells were noted without any evidence of monoclonality. Adequate iron stores were also identified. The results were reviewed with the patient today. Well the 2% plasma cells were identified via aspirate differential there was no evidence of monoclonality as present by in situ hybridization for kappa or lambda RNA. The patient does continue to have a significantly elevated serum kappa free light chain. Given that there is no evidence of monoclonality in this bone marrow the free light change may possibly be related to a plasmacytoma, lymphoma producing light change including the potential lymphoblastic lymphoma. HPI:   The patient is here today for follow-up regarding his history of monoclonal gammopathy and chronic anemia. On laboratory studies completed today his hemoglobin hematocrit have improved.   The patient also has had clinical improvement as he has had less fatigue and less weakness. He does have multiple comorbid conditions likely contributing to his symptoms. The patient does not have any specific signs or symptoms suggest his monoclonal gammopathy progressing to a more significant plasma cell disorder. He denies skeletal pain, renal changes or hypercalcemia. The patient is not had fever, cough, or other signs of infection. He has not had any known COVID-19 exposure. Both his bowel and bladder habits have been stable. His ECOG performance status is level 1. PMH, SH, and FH:  I reviewed the patients medication list and allergy list as noted on the electronic medical record. The PMH, SH and FH were also reviewed as noted on the EMR. Review of Systems  Constitutional: Fatigue. HENT: Negative. Eyes: Negative. Respiratory: Negative. Cardiovascular: Negative. Gastrointestinal: Negative. Genitourinary: Negative. Musculoskeletal: Chronic pain. Skin: Negative. Neurological: Negative. Hematological: Negative. Psychiatric/Behavioral: Negative. Objective:   Physical Exam  Vitals:    12/15/20 1057   BP: 122/61   Pulse: 96   Resp: 18   Temp: 98 °F (36.7 °C)   SpO2: 96%   Vitals reviewed and are stable. Constitutional: Elderly. No acute distress. HENT: Normocephalic and atraumatic. Eyes: Pupils appear equal. No scleral icterus. Neck: Bilateral appearance is symmetrical. No identifiable masses. Chest: Inspection and palpation of chest is normal.  Pulmonary: Effort normal. No respiratory distress. Cardiovascular: Nn edema in any of the four extremities. Abdominal: Soft. Bowel sounds present. No hepatomegaly or splenomegaly. Musculoskeletal: Gait is abnormal. Muscle strength and tone grossly decreased. Neurological: Alert and oriented to person, place, and time. Judgment and thought content normal.  Skin: Scattered ecchymosis noted on bilateral upper forearms.      Psychiatric: Mood and affect appropriate for the clinical situation. .      Data Analysis:    Hematology 12/15/2020 9/8/2020 8/31/2020   WBC 5.6 5.3    RBC 4.63 (L) 4.44 (L)    HGB 12.5 (L) 12.1 (L) 11.9 (A)   HCT 39.7 (L) 38.8 (L) 37.2 (A)   MCV 86 87.4    RDW 16.0 (H)      159      Assessment:   1. Monoclonal gammopathy. 2.  IgG kappa monoclonal protein. 3.  Elevated free kappa light chain. 4.  Chronic anemia. Plan:   1. Continue to monitor for any of his development of malignancy such as plasmacytoma or lymphoma. 2.  Monitor hemoglobin hematocrit and for any evidence of blood loss. 3.  Monitor IgG kappa monoclonal protein as well as kappa free light chain. Rashad Wong M.D. Medical Director: [facility]  Can[facility] Network [facility]  [address] [employer] [facility], [address] [place] [address], [address] [place] [address], [address] [place] [address] of the [facility] at [facility]      **This report has been created using voice recognition software. It may contain minor errors which are inherent in voice recognition technology. **

## 2020-12-16 LAB
ALBUMIN SERPL-MCNC: 3.2 G/DL (ref 3.5–5.1)
ALP BLD-CCNC: 91 U/L (ref 38–126)
ALT SERPL-CCNC: 18 U/L (ref 11–66)
AST SERPL-CCNC: 17 U/L (ref 5–40)
BILIRUB SERPL-MCNC: 0.4 MG/DL (ref 0.3–1.2)
BILIRUBIN DIRECT: < 0.2 MG/DL (ref 0–0.3)
CALCIUM SERPL-MCNC: 8.6 MG/DL (ref 8.5–10.5)
TOTAL PROTEIN: 7 G/DL (ref 6.1–8)

## 2020-12-17 LAB — KAPPA/LAMBDA FREE LIGHT CHAINS: NORMAL

## 2020-12-19 LAB — MISC. #1 REFERENCE GROUP TEST: ABNORMAL

## 2021-01-01 ENCOUNTER — OFFICE VISIT (OUTPATIENT)
Dept: UROLOGY | Age: 76
End: 2021-01-01
Payer: MEDICARE

## 2021-01-01 ENCOUNTER — TELEPHONE (OUTPATIENT)
Dept: NEPHROLOGY | Age: 76
End: 2021-01-01

## 2021-01-01 ENCOUNTER — HOSPITAL ENCOUNTER (INPATIENT)
Age: 76
LOS: 12 days | Discharge: NURSING, REHAB OR LONG-TERM CARE | DRG: 177 | End: 2022-01-06
Attending: EMERGENCY MEDICINE | Admitting: PEDIATRICS
Payer: MEDICARE

## 2021-01-01 ENCOUNTER — OFFICE VISIT (OUTPATIENT)
Dept: NEPHROLOGY | Age: 76
End: 2021-01-01
Payer: MEDICARE

## 2021-01-01 ENCOUNTER — OFFICE VISIT (OUTPATIENT)
Dept: ONCOLOGY | Age: 76
End: 2021-01-01
Payer: MEDICARE

## 2021-01-01 ENCOUNTER — HOSPITAL ENCOUNTER (OUTPATIENT)
Age: 76
Discharge: HOME | End: 2021-11-01
Payer: MEDICARE

## 2021-01-01 ENCOUNTER — HOSPITAL ENCOUNTER (OUTPATIENT)
Dept: INFUSION THERAPY | Age: 76
Discharge: HOME | End: 2021-07-20
Payer: MEDICARE

## 2021-01-01 ENCOUNTER — APPOINTMENT (OUTPATIENT)
Dept: GENERAL RADIOLOGY | Age: 76
DRG: 177 | End: 2021-01-01
Payer: MEDICARE

## 2021-01-01 ENCOUNTER — OFFICE VISIT (OUTPATIENT)
Dept: CARDIOLOGY CLINIC | Age: 76
End: 2021-01-01
Payer: MEDICARE

## 2021-01-01 ENCOUNTER — HOSPITAL ENCOUNTER (OUTPATIENT)
Age: 76
Discharge: HOME | End: 2021-09-20
Payer: MEDICARE

## 2021-01-01 ENCOUNTER — TELEPHONE (OUTPATIENT)
Dept: CARDIOLOGY CLINIC | Age: 76
End: 2021-01-01

## 2021-01-01 ENCOUNTER — HOSPITAL ENCOUNTER (OUTPATIENT)
Dept: INFUSION THERAPY | Age: 76
Discharge: HOME | End: 2021-03-16
Payer: MEDICARE

## 2021-01-01 ENCOUNTER — HOSPITAL ENCOUNTER (OUTPATIENT)
Age: 76
Discharge: HOME | End: 2021-10-27
Payer: MEDICARE

## 2021-01-01 ENCOUNTER — APPOINTMENT (OUTPATIENT)
Dept: ULTRASOUND IMAGING | Age: 76
DRG: 177 | End: 2021-01-01
Payer: MEDICARE

## 2021-01-01 ENCOUNTER — TELEPHONE (OUTPATIENT)
Dept: UROLOGY | Age: 76
End: 2021-01-01

## 2021-01-01 ENCOUNTER — HOSPITAL ENCOUNTER (OUTPATIENT)
Dept: RESPIRATORY THERAPY | Age: 76
Discharge: HOME | End: 2021-11-12
Payer: MEDICARE

## 2021-01-01 ENCOUNTER — HOSPITAL ENCOUNTER (OUTPATIENT)
Dept: INTERVENTIONAL RADIOLOGY/VASCULAR | Age: 76
Discharge: HOME | End: 2021-03-18
Payer: MEDICARE

## 2021-01-01 ENCOUNTER — VIRTUAL VISIT (OUTPATIENT)
Dept: CARDIOLOGY CLINIC | Age: 76
End: 2021-01-01
Payer: MEDICARE

## 2021-01-01 ENCOUNTER — HOSPITAL ENCOUNTER (OUTPATIENT)
Age: 76
Discharge: HOME | End: 2021-11-09
Payer: MEDICARE

## 2021-01-01 ENCOUNTER — APPOINTMENT (OUTPATIENT)
Dept: INTERVENTIONAL RADIOLOGY/VASCULAR | Age: 76
DRG: 177 | End: 2021-01-01
Payer: MEDICARE

## 2021-01-01 ENCOUNTER — HOSPITAL ENCOUNTER (OUTPATIENT)
Dept: INFUSION THERAPY | Age: 76
Discharge: HOME | End: 2021-11-29
Payer: MEDICARE

## 2021-01-01 ENCOUNTER — HOSPITAL ENCOUNTER (OUTPATIENT)
Age: 76
Discharge: HOME | End: 2021-08-23
Payer: MEDICARE

## 2021-01-01 VITALS
BODY MASS INDEX: 25.81 KG/M2 | HEIGHT: 71 IN | HEART RATE: 73 BPM | OXYGEN SATURATION: 90 % | SYSTOLIC BLOOD PRESSURE: 150 MMHG | DIASTOLIC BLOOD PRESSURE: 70 MMHG | WEIGHT: 184.4 LBS

## 2021-01-01 VITALS
RESPIRATION RATE: 20 BRPM | DIASTOLIC BLOOD PRESSURE: 64 MMHG | HEIGHT: 71 IN | BODY MASS INDEX: 24.98 KG/M2 | TEMPERATURE: 97.5 F | OXYGEN SATURATION: 96 % | HEART RATE: 68 BPM | SYSTOLIC BLOOD PRESSURE: 141 MMHG | WEIGHT: 178.4 LBS

## 2021-01-01 VITALS
HEART RATE: 68 BPM | DIASTOLIC BLOOD PRESSURE: 70 MMHG | WEIGHT: 175 LBS | OXYGEN SATURATION: 98 % | BODY MASS INDEX: 24.41 KG/M2 | TEMPERATURE: 97.3 F | SYSTOLIC BLOOD PRESSURE: 172 MMHG

## 2021-01-01 VITALS — HEIGHT: 71 IN | WEIGHT: 178 LBS | TEMPERATURE: 96.9 F | BODY MASS INDEX: 24.92 KG/M2

## 2021-01-01 VITALS
HEIGHT: 71 IN | HEART RATE: 78 BPM | WEIGHT: 178 LBS | DIASTOLIC BLOOD PRESSURE: 68 MMHG | SYSTOLIC BLOOD PRESSURE: 118 MMHG | BODY MASS INDEX: 24.92 KG/M2

## 2021-01-01 VITALS
SYSTOLIC BLOOD PRESSURE: 179 MMHG | BODY MASS INDEX: 26.32 KG/M2 | HEIGHT: 71 IN | TEMPERATURE: 97.8 F | WEIGHT: 188 LBS | HEART RATE: 66 BPM | OXYGEN SATURATION: 99 % | RESPIRATION RATE: 18 BRPM | DIASTOLIC BLOOD PRESSURE: 71 MMHG

## 2021-01-01 VITALS
WEIGHT: 185.6 LBS | SYSTOLIC BLOOD PRESSURE: 160 MMHG | BODY MASS INDEX: 25.98 KG/M2 | HEART RATE: 76 BPM | DIASTOLIC BLOOD PRESSURE: 78 MMHG | HEIGHT: 71 IN | OXYGEN SATURATION: 98 %

## 2021-01-01 VITALS
OXYGEN SATURATION: 97 % | HEART RATE: 72 BPM | SYSTOLIC BLOOD PRESSURE: 142 MMHG | HEIGHT: 71 IN | BODY MASS INDEX: 25.51 KG/M2 | TEMPERATURE: 97.1 F | DIASTOLIC BLOOD PRESSURE: 65 MMHG | WEIGHT: 182.2 LBS | RESPIRATION RATE: 18 BRPM

## 2021-01-01 VITALS
OXYGEN SATURATION: 97 % | BODY MASS INDEX: 25.66 KG/M2 | TEMPERATURE: 97.2 F | WEIGHT: 184 LBS | DIASTOLIC BLOOD PRESSURE: 66 MMHG | SYSTOLIC BLOOD PRESSURE: 160 MMHG | HEART RATE: 60 BPM

## 2021-01-01 VITALS
SYSTOLIC BLOOD PRESSURE: 169 MMHG | TEMPERATURE: 98.7 F | HEART RATE: 74 BPM | WEIGHT: 180 LBS | BODY MASS INDEX: 25.1 KG/M2 | DIASTOLIC BLOOD PRESSURE: 79 MMHG

## 2021-01-01 VITALS
HEIGHT: 71 IN | BODY MASS INDEX: 25.76 KG/M2 | WEIGHT: 184 LBS | DIASTOLIC BLOOD PRESSURE: 68 MMHG | SYSTOLIC BLOOD PRESSURE: 162 MMHG | HEART RATE: 64 BPM

## 2021-01-01 DIAGNOSIS — E11.21 DIABETIC NEPHROPATHY ASSOCIATED WITH TYPE 2 DIABETES MELLITUS (HCC): ICD-10-CM

## 2021-01-01 DIAGNOSIS — I25.5 ISCHEMIC CARDIOMYOPATHY: ICD-10-CM

## 2021-01-01 DIAGNOSIS — I50.9 ACUTE ON CHRONIC CONGESTIVE HEART FAILURE, UNSPECIFIED HEART FAILURE TYPE (HCC): Primary | ICD-10-CM

## 2021-01-01 DIAGNOSIS — D47.2 MGUS (MONOCLONAL GAMMOPATHY OF UNKNOWN SIGNIFICANCE): Primary | ICD-10-CM

## 2021-01-01 DIAGNOSIS — N18.4 ANEMIA IN STAGE 4 CHRONIC KIDNEY DISEASE (HCC): ICD-10-CM

## 2021-01-01 DIAGNOSIS — N18.4 CKD (CHRONIC KIDNEY DISEASE), STAGE IV (HCC): ICD-10-CM

## 2021-01-01 DIAGNOSIS — I51.9 CHRONIC SYSTOLIC DYSFUNCTION OF LEFT VENTRICLE: ICD-10-CM

## 2021-01-01 DIAGNOSIS — I50.22 CHF (CONGESTIVE HEART FAILURE), NYHA CLASS III, CHRONIC, SYSTOLIC (HCC): Primary | ICD-10-CM

## 2021-01-01 DIAGNOSIS — D64.9 CHRONIC ANEMIA: ICD-10-CM

## 2021-01-01 DIAGNOSIS — I73.9 PAD (PERIPHERAL ARTERY DISEASE) (HCC): ICD-10-CM

## 2021-01-01 DIAGNOSIS — I50.23 CHF (CONGESTIVE HEART FAILURE), NYHA CLASS III, ACUTE ON CHRONIC, SYSTOLIC (HCC): Primary | ICD-10-CM

## 2021-01-01 DIAGNOSIS — N18.4 CKD (CHRONIC KIDNEY DISEASE), STAGE IV (HCC): Primary | ICD-10-CM

## 2021-01-01 DIAGNOSIS — I50.22 CHRONIC SYSTOLIC HEART FAILURE (HCC): ICD-10-CM

## 2021-01-01 DIAGNOSIS — I50.22 CHF NYHA CLASS III, CHRONIC, SYSTOLIC (HCC): Primary | ICD-10-CM

## 2021-01-01 DIAGNOSIS — U07.1 COVID-19: ICD-10-CM

## 2021-01-01 DIAGNOSIS — R77.9 ELEVATED BLOOD PROTEIN: ICD-10-CM

## 2021-01-01 DIAGNOSIS — I10 ESSENTIAL HYPERTENSION, BENIGN: ICD-10-CM

## 2021-01-01 DIAGNOSIS — N18.4 CKD (CHRONIC KIDNEY DISEASE) STAGE 4, GFR 15-29 ML/MIN (HCC): ICD-10-CM

## 2021-01-01 DIAGNOSIS — N28.9 ACUTE ON CHRONIC RENAL INSUFFICIENCY: ICD-10-CM

## 2021-01-01 DIAGNOSIS — D47.2 MGUS (MONOCLONAL GAMMOPATHY OF UNKNOWN SIGNIFICANCE): ICD-10-CM

## 2021-01-01 DIAGNOSIS — I50.22 CHRONIC SYSTOLIC CONGESTIVE HEART FAILURE (HCC): ICD-10-CM

## 2021-01-01 DIAGNOSIS — D72.819 LEUKOPENIA, UNSPECIFIED TYPE: ICD-10-CM

## 2021-01-01 DIAGNOSIS — Z95.1 HX OF CABG: ICD-10-CM

## 2021-01-01 DIAGNOSIS — N18.4 ANEMIA IN STAGE 4 CHRONIC KIDNEY DISEASE (HCC): Primary | ICD-10-CM

## 2021-01-01 DIAGNOSIS — D63.1 ANEMIA IN STAGE 4 CHRONIC KIDNEY DISEASE (HCC): ICD-10-CM

## 2021-01-01 DIAGNOSIS — R31.29 MICROSCOPIC HEMATURIA: ICD-10-CM

## 2021-01-01 DIAGNOSIS — I10 ESSENTIAL HYPERTENSION: ICD-10-CM

## 2021-01-01 DIAGNOSIS — D63.1 ANEMIA IN STAGE 4 CHRONIC KIDNEY DISEASE (HCC): Primary | ICD-10-CM

## 2021-01-01 DIAGNOSIS — E87.6 HYPOKALEMIA: ICD-10-CM

## 2021-01-01 DIAGNOSIS — I50.22 CHF (CONGESTIVE HEART FAILURE), NYHA CLASS III, CHRONIC, SYSTOLIC (HCC): ICD-10-CM

## 2021-01-01 DIAGNOSIS — I25.10 CORONARY ARTERY DISEASE INVOLVING NATIVE CORONARY ARTERY OF NATIVE HEART WITHOUT ANGINA PECTORIS: ICD-10-CM

## 2021-01-01 DIAGNOSIS — Z95.2 H/O MITRAL VALVE REPLACEMENT: ICD-10-CM

## 2021-01-01 DIAGNOSIS — N18.9 ACUTE ON CHRONIC RENAL INSUFFICIENCY: ICD-10-CM

## 2021-01-01 DIAGNOSIS — E78.5 HYPERLIPIDEMIA, UNSPECIFIED HYPERLIPIDEMIA TYPE: Primary | ICD-10-CM

## 2021-01-01 DIAGNOSIS — I12.9 HYPERTENSIVE RENAL DISEASE, STAGE 1 THROUGH STAGE 4 OR UNSPECIFIED CHRONIC KIDNEY DISEASE: ICD-10-CM

## 2021-01-01 DIAGNOSIS — N40.1 BENIGN PROSTATIC HYPERPLASIA WITH NOCTURIA: Primary | ICD-10-CM

## 2021-01-01 DIAGNOSIS — N17.9 AKI (ACUTE KIDNEY INJURY) (HCC): Primary | ICD-10-CM

## 2021-01-01 DIAGNOSIS — R35.1 BENIGN PROSTATIC HYPERPLASIA WITH NOCTURIA: Primary | ICD-10-CM

## 2021-01-01 DIAGNOSIS — I25.10 CORONARY ARTERY DISEASE INVOLVING NATIVE CORONARY ARTERY OF NATIVE HEART WITHOUT ANGINA PECTORIS: Primary | ICD-10-CM

## 2021-01-01 DIAGNOSIS — I50.22 CHRONIC SYSTOLIC (CONGESTIVE) HEART FAILURE (HCC): ICD-10-CM

## 2021-01-01 LAB
ABSOLUTE IMMATURE GRANULOCYTE: 0 THOU/MM3 (ref 0–0.07)
ABSOLUTE IMMATURE GRANULOCYTE: 0.01 THOU/MM3 (ref 0–0.07)
ALBUMIN SERPL-MCNC: 3 G/DL (ref 3.5–5.1)
ALBUMIN SERPL-MCNC: 3.1 G/DL (ref 3.5–5.1)
ALBUMIN SERPL-MCNC: 3.1 G/DL (ref 3.5–5.1)
ALBUMIN SERPL-MCNC: 3.2 G/DL (ref 3.5–5.1)
ALBUMIN SERPL-MCNC: 3.3 G/DL (ref 3.5–5.1)
ALBUMIN SERPL-MCNC: 3.4 G/DL (ref 3.5–5.1)
ALBUMIN SERPL-MCNC: 3.7 G/DL (ref 3.5–5.1)
ALLEN TEST: POSITIVE
ALP BLD-CCNC: 101 U/L (ref 38–126)
ALP BLD-CCNC: 106 U/L (ref 38–126)
ALP BLD-CCNC: 80 U/L (ref 38–126)
ALP BLD-CCNC: 87 U/L (ref 38–126)
ALP BLD-CCNC: 91 U/L (ref 38–126)
ALP BLD-CCNC: 95 U/L (ref 38–126)
ALP BLD-CCNC: 99 U/L (ref 38–126)
ALT SERPL-CCNC: 15 U/L (ref 11–66)
ALT SERPL-CCNC: 22 U/L (ref 11–66)
ALT SERPL-CCNC: 24 U/L (ref 11–66)
ALT SERPL-CCNC: 45 U/L (ref 11–66)
ALT SERPL-CCNC: 47 U/L (ref 11–66)
ALT SERPL-CCNC: 51 U/L (ref 11–66)
ALT SERPL-CCNC: 59 U/L (ref 11–66)
ANION GAP SERPL CALCULATED.3IONS-SCNC: 10 MEQ/L (ref 8–16)
ANION GAP SERPL CALCULATED.3IONS-SCNC: 12 MEQ/L (ref 8–16)
ANION GAP SERPL CALCULATED.3IONS-SCNC: 13 MEQ/L (ref 8–16)
ANION GAP SERPL CALCULATED.3IONS-SCNC: 13 MEQ/L (ref 8–16)
ANION GAP SERPL CALCULATED.3IONS-SCNC: 14 MEQ/L (ref 8–16)
ANION GAP SERPL CALCULATED.3IONS-SCNC: 15 MEQ/L (ref 8–16)
ANION GAP SERPL CALCULATED.3IONS-SCNC: 16 MEQ/L (ref 8–16)
ANION GAP SERPL CALCULATED.3IONS-SCNC: 17 MEQ/L (ref 8–16)
ANION GAP SERPL CALCULATED.3IONS-SCNC: 9 MEQ/L (ref 8–16)
AST SERPL-CCNC: 16 U/L (ref 5–40)
AST SERPL-CCNC: 17 U/L (ref 5–40)
AST SERPL-CCNC: 21 U/L (ref 5–40)
AST SERPL-CCNC: 32 U/L (ref 5–40)
AST SERPL-CCNC: 43 U/L (ref 5–40)
AST SERPL-CCNC: 45 U/L (ref 5–40)
AST SERPL-CCNC: 87 U/L (ref 5–40)
BACTERIA: ABNORMAL
BACTERIA: ABNORMAL
BASE EXCESS (CALCULATED): -1.5 MMOL/L (ref -2.5–2.5)
BASINOPHIL, AUTOMATED: 0 % (ref 0–3)
BASINOPHIL, AUTOMATED: 0 % (ref 0–3)
BASOPHILS # BLD: 0.1 %
BASOPHILS # BLD: 0.5 %
BASOPHILS ABSOLUTE: 0 THOU/MM3 (ref 0–0.1)
BILIRUB SERPL-MCNC: 0.4 MG/DL (ref 0.3–1.2)
BILIRUB SERPL-MCNC: 0.5 MG/DL (ref 0.3–1.2)
BILIRUB SERPL-MCNC: 0.5 MG/DL (ref 0.3–1.2)
BILIRUBIN DIRECT: < 0.2 MG/DL (ref 0–0.3)
BILIRUBIN URINE: NEGATIVE
BILIRUBIN URINE: NEGATIVE
BILIRUBIN, POC: NORMAL
BLOOD URINE, POC: NORMAL
BLOOD, URINE: ABNORMAL
BLOOD, URINE: NEGATIVE
BUN BLDV-MCNC: 102 MG/DL (ref 7–22)
BUN BLDV-MCNC: 111 MG/DL (ref 7–22)
BUN BLDV-MCNC: 118 MG/DL (ref 7–22)
BUN BLDV-MCNC: 119 MG/DL (ref 7–22)
BUN BLDV-MCNC: 128 MG/DL (ref 7–22)
BUN BLDV-MCNC: 131 MG/DL (ref 7–22)
BUN BLDV-MCNC: 63 MG/DL
BUN BLDV-MCNC: 65 MG/DL (ref 7–22)
BUN BLDV-MCNC: 68 MG/DL (ref 7–22)
BUN BLDV-MCNC: 70 MG/DL
BUN BLDV-MCNC: 70 MG/DL (ref 7–22)
BUN BLDV-MCNC: 72 MG/DL (ref 7–22)
BUN BLDV-MCNC: 74 MG/DL (ref 7–22)
BUN BLDV-MCNC: 77 MG/DL (ref 7–22)
BUN BLDV-MCNC: 89 MG/DL (ref 7–22)
BUN BLDV-MCNC: 90 MG/DL (ref 7–22)
BUN BLDV-MCNC: 93 MG/DL (ref 7–22)
BUN BLDV-MCNC: > 336 MG/DL (ref 7–22)
BUN, WHOLE BLOOD: 58 MG/DL (ref 8–26)
BUN, WHOLE BLOOD: 69 MG/DL (ref 8–26)
C-REACTIVE PROTEIN: 10.06 MG/DL (ref 0–1)
C-REACTIVE PROTEIN: 10.07 MG/DL (ref 0–1)
C-REACTIVE PROTEIN: 10.53 MG/DL (ref 0–1)
C-REACTIVE PROTEIN: 11.94 MG/DL (ref 0–1)
C-REACTIVE PROTEIN: 13.58 MG/DL (ref 0–1)
C-REACTIVE PROTEIN: 15.44 MG/DL (ref 0–1)
C-REACTIVE PROTEIN: 5.72 MG/DL (ref 0–1)
C-REACTIVE PROTEIN: 7.99 MG/DL (ref 0–1)
CALCIUM IONIZED: 0.98 MMOL/L (ref 1.12–1.32)
CALCIUM SERPL-MCNC: 7.9 MG/DL
CALCIUM SERPL-MCNC: 7.9 MG/DL (ref 8.5–10.5)
CALCIUM SERPL-MCNC: 8 MG/DL (ref 8.5–10.5)
CALCIUM SERPL-MCNC: 8.1 MG/DL (ref 8.5–10.5)
CALCIUM SERPL-MCNC: 8.1 MG/DL (ref 8.5–10.5)
CALCIUM SERPL-MCNC: 8.2 MG/DL (ref 8.5–10.5)
CALCIUM SERPL-MCNC: 8.3 MG/DL (ref 8.5–10.5)
CALCIUM SERPL-MCNC: 8.4 MG/DL
CALCIUM SERPL-MCNC: 8.5 MG/DL (ref 8.5–10.5)
CALCIUM SERPL-MCNC: 8.5 MG/DL (ref 8.5–10.5)
CALCIUM SERPL-MCNC: 8.8 MG/DL (ref 8.5–10.5)
CALCIUM SERPL-MCNC: 8.8 MG/DL (ref 8.5–10.5)
CALCIUM SERPL-MCNC: 9 MG/DL (ref 8.5–10.5)
CASTS: ABNORMAL /LPF
CHARACTER, URINE: CLEAR
CHARACTER, URINE: CLEAR
CHLORIDE BLD-SCNC: 100 MEQ/L (ref 98–111)
CHLORIDE BLD-SCNC: 100 MEQ/L (ref 98–111)
CHLORIDE BLD-SCNC: 101 MEQ/L (ref 98–111)
CHLORIDE BLD-SCNC: 101 MEQ/L (ref 98–111)
CHLORIDE BLD-SCNC: 103 MEQ/L (ref 98–111)
CHLORIDE BLD-SCNC: 103 MEQ/L (ref 98–111)
CHLORIDE BLD-SCNC: 104 MEQ/L (ref 98–111)
CHLORIDE BLD-SCNC: 94 MEQ/L (ref 98–111)
CHLORIDE BLD-SCNC: 95 MEQ/L (ref 98–111)
CHLORIDE BLD-SCNC: 95 MEQ/L (ref 98–111)
CHLORIDE BLD-SCNC: 97 MEQ/L (ref 98–111)
CHLORIDE BLD-SCNC: 98 MEQ/L (ref 98–111)
CHLORIDE BLD-SCNC: 98 MEQ/L (ref 98–111)
CHLORIDE BLD-SCNC: 99 MMOL/L
CHLORIDE BLD-SCNC: 99 MMOL/L
CHLORIDE, URINE: < 20 MEQ/L
CHLORIDE, WHOLE BLOOD: 102 MEQ/L (ref 98–109)
CHLORIDE, WHOLE BLOOD: 96 MEQ/L (ref 98–109)
CLARITY, POC: CLEAR
CO2: 18 MEQ/L (ref 23–33)
CO2: 19 MEQ/L (ref 23–33)
CO2: 19 MEQ/L (ref 23–33)
CO2: 20 MEQ/L (ref 23–33)
CO2: 20 MEQ/L (ref 23–33)
CO2: 21 MEQ/L (ref 23–33)
CO2: 22 MEQ/L (ref 23–33)
CO2: 24 MEQ/L (ref 23–33)
CO2: 26 MEQ/L (ref 23–33)
CO2: 27 MEQ/L (ref 23–33)
CO2: 28 MEQ/L (ref 23–33)
CO2: 30 MEQ/L (ref 23–33)
CO2: 31 MEQ/L (ref 23–33)
CO2: 31 MMOL/L
CO2: 33 MMOL/L
COLLECTED BY:: ABNORMAL
COLOR, POC: YELLOW
COLOR: YELLOW
COLOR: YELLOW
CREAT SERPL-MCNC: 2.4 MG/DL (ref 0.4–1.2)
CREAT SERPL-MCNC: 2.8 MG/DL (ref 0.4–1.2)
CREAT SERPL-MCNC: 2.8 MG/DL (ref 0.4–1.2)
CREAT SERPL-MCNC: 2.83 MG/DL
CREAT SERPL-MCNC: 2.85 MG/DL
CREAT SERPL-MCNC: 2.9 MG/DL (ref 0.4–1.2)
CREAT SERPL-MCNC: 2.9 MG/DL (ref 0.4–1.2)
CREAT SERPL-MCNC: 3.2 MG/DL (ref 0.4–1.2)
CREAT SERPL-MCNC: 3.3 MG/DL (ref 0.4–1.2)
CREAT SERPL-MCNC: 3.5 MG/DL (ref 0.4–1.2)
CREAT SERPL-MCNC: 3.5 MG/DL (ref 0.4–1.2)
CREAT SERPL-MCNC: 3.6 MG/DL (ref 0.4–1.2)
CREAT SERPL-MCNC: 3.7 MG/DL (ref 0.4–1.2)
CREAT SERPL-MCNC: 4 MG/DL (ref 0.4–1.2)
CREAT SERPL-MCNC: 4.1 MG/DL (ref 0.4–1.2)
CREATININE URINE: 110.1 MG/DL
CREATININE URINE: 135 MG/DL
CREATININE, WHOLE BLOOD: 2.6 MG/DL (ref 0.5–1.2)
CREATININE, WHOLE BLOOD: 3 MG/DL (ref 0.5–1.2)
CRYSTALS: ABNORMAL
CRYSTALS: ABNORMAL
DEVICE: ABNORMAL
EKG ATRIAL RATE: 60 BPM
EKG ATRIAL RATE: 70 BPM
EKG ATRIAL RATE: 88 BPM
EKG P AXIS: 52 DEGREES
EKG P AXIS: 66 DEGREES
EKG P AXIS: 81 DEGREES
EKG P-R INTERVAL: 166 MS
EKG P-R INTERVAL: 172 MS
EKG P-R INTERVAL: 186 MS
EKG Q-T INTERVAL: 358 MS
EKG Q-T INTERVAL: 482 MS
EKG Q-T INTERVAL: 488 MS
EKG QRS DURATION: 100 MS
EKG QRS DURATION: 102 MS
EKG QRS DURATION: 90 MS
EKG QTC CALCULATION (BAZETT): 433 MS
EKG QTC CALCULATION (BAZETT): 488 MS
EKG QTC CALCULATION (BAZETT): 520 MS
EKG R AXIS: 56 DEGREES
EKG R AXIS: 60 DEGREES
EKG R AXIS: 66 DEGREES
EKG T AXIS: -91 DEGREES
EKG T AXIS: 115 DEGREES
EKG T AXIS: 118 DEGREES
EKG VENTRICULAR RATE: 60 BPM
EKG VENTRICULAR RATE: 70 BPM
EKG VENTRICULAR RATE: 88 BPM
EOSINOPHIL # BLD: 0.1 %
EOSINOPHIL # BLD: 1.6 %
EOSINOPHILS ABSOLUTE: 0 THOU/MM3 (ref 0–0.4)
EOSINOPHILS ABSOLUTE: 0.1 THOU/MM3 (ref 0–0.4)
EOSINOPHILS RELATIVE PERCENT: 1 % (ref 0–4)
EOSINOPHILS RELATIVE PERCENT: 2 % (ref 0–4)
EPITHELIAL CELLS, UA: ABNORMAL /HPF
EPITHELIAL CELLS, UA: ABNORMAL /HPF
ERYTHROCYTE [DISTWIDTH] IN BLOOD BY AUTOMATED COUNT: 16.2 % (ref 11.5–14.5)
ERYTHROCYTE [DISTWIDTH] IN BLOOD BY AUTOMATED COUNT: 16.3 % (ref 11.5–14.5)
ERYTHROCYTE [DISTWIDTH] IN BLOOD BY AUTOMATED COUNT: 16.4 % (ref 11.5–14.5)
ERYTHROCYTE [DISTWIDTH] IN BLOOD BY AUTOMATED COUNT: 16.7 % (ref 11.5–14.5)
ERYTHROCYTE [DISTWIDTH] IN BLOOD BY AUTOMATED COUNT: 16.8 % (ref 11.5–14.5)
ERYTHROCYTE [DISTWIDTH] IN BLOOD BY AUTOMATED COUNT: 16.8 % (ref 11.5–14.5)
ERYTHROCYTE [DISTWIDTH] IN BLOOD BY AUTOMATED COUNT: 16.9 % (ref 11.5–14.5)
ERYTHROCYTE [DISTWIDTH] IN BLOOD BY AUTOMATED COUNT: 16.9 % (ref 11.5–14.5)
ERYTHROCYTE [DISTWIDTH] IN BLOOD BY AUTOMATED COUNT: 51.9 FL (ref 35–45)
ERYTHROCYTE [DISTWIDTH] IN BLOOD BY AUTOMATED COUNT: 52 FL (ref 35–45)
ERYTHROCYTE [DISTWIDTH] IN BLOOD BY AUTOMATED COUNT: 52.5 FL (ref 35–45)
ERYTHROCYTE [DISTWIDTH] IN BLOOD BY AUTOMATED COUNT: 52.7 FL (ref 35–45)
ERYTHROCYTE [DISTWIDTH] IN BLOOD BY AUTOMATED COUNT: 53.4 FL (ref 35–45)
ERYTHROCYTE [DISTWIDTH] IN BLOOD BY AUTOMATED COUNT: 53.7 FL (ref 35–45)
ERYTHROCYTE [DISTWIDTH] IN BLOOD BY AUTOMATED COUNT: 53.8 FL (ref 35–45)
ERYTHROCYTE [DISTWIDTH] IN BLOOD BY AUTOMATED COUNT: 54.5 FL (ref 35–45)
FERRITIN: 497 NG/ML (ref 22–322)
GFR CALCULATED: 22
GFR CALCULATED: 22
GFR SERPL CREATININE-BSD FRML MDRD: 14 ML/MIN/1.73M2
GFR SERPL CREATININE-BSD FRML MDRD: 15 ML/MIN/1.73M2
GFR SERPL CREATININE-BSD FRML MDRD: 16 ML/MIN/1.73M2
GFR SERPL CREATININE-BSD FRML MDRD: 17 ML/MIN/1.73M2
GFR SERPL CREATININE-BSD FRML MDRD: 18 ML/MIN/1.73M2
GFR SERPL CREATININE-BSD FRML MDRD: 19 ML/MIN/1.73M2
GFR SERPL CREATININE-BSD FRML MDRD: 21 ML/MIN/1.73M2
GFR SERPL CREATININE-BSD FRML MDRD: 21 ML/MIN/1.73M2
GFR SERPL CREATININE-BSD FRML MDRD: 22 ML/MIN/1.73M2
GFR SERPL CREATININE-BSD FRML MDRD: 22 ML/MIN/1.73M2
GFR SERPL CREATININE-BSD FRML MDRD: 27 ML/MIN/1.73M2
GFR, ESTIMATED ,CON: 22 ML/MIN/1.73M2
GFR, ESTIMATED ,CON: 26 ML/MIN/1.73M2
GLUCOSE BLD-MCNC: 126 MG/DL (ref 70–108)
GLUCOSE BLD-MCNC: 131 MG/DL
GLUCOSE BLD-MCNC: 134 MG/DL (ref 70–108)
GLUCOSE BLD-MCNC: 142 MG/DL (ref 70–108)
GLUCOSE BLD-MCNC: 159 MG/DL (ref 70–108)
GLUCOSE BLD-MCNC: 161 MG/DL (ref 70–108)
GLUCOSE BLD-MCNC: 161 MG/DL (ref 70–108)
GLUCOSE BLD-MCNC: 162 MG/DL (ref 70–108)
GLUCOSE BLD-MCNC: 164 MG/DL (ref 70–108)
GLUCOSE BLD-MCNC: 171 MG/DL (ref 70–108)
GLUCOSE BLD-MCNC: 172 MG/DL (ref 70–108)
GLUCOSE BLD-MCNC: 176 MG/DL (ref 70–108)
GLUCOSE BLD-MCNC: 177 MG/DL (ref 70–108)
GLUCOSE BLD-MCNC: 179 MG/DL (ref 70–108)
GLUCOSE BLD-MCNC: 187 MG/DL (ref 70–108)
GLUCOSE BLD-MCNC: 189 MG/DL (ref 70–108)
GLUCOSE BLD-MCNC: 193 MG/DL (ref 70–108)
GLUCOSE BLD-MCNC: 193 MG/DL (ref 70–108)
GLUCOSE BLD-MCNC: 194 MG/DL (ref 70–108)
GLUCOSE BLD-MCNC: 196 MG/DL (ref 70–108)
GLUCOSE BLD-MCNC: 203 MG/DL (ref 70–108)
GLUCOSE BLD-MCNC: 205 MG/DL (ref 70–108)
GLUCOSE BLD-MCNC: 206 MG/DL (ref 70–108)
GLUCOSE BLD-MCNC: 209 MG/DL (ref 70–108)
GLUCOSE BLD-MCNC: 214 MG/DL (ref 70–108)
GLUCOSE BLD-MCNC: 221 MG/DL (ref 70–108)
GLUCOSE BLD-MCNC: 222 MG/DL
GLUCOSE BLD-MCNC: 224 MG/DL (ref 70–108)
GLUCOSE BLD-MCNC: 229 MG/DL (ref 70–108)
GLUCOSE BLD-MCNC: 234 MG/DL (ref 70–108)
GLUCOSE BLD-MCNC: 235 MG/DL (ref 70–108)
GLUCOSE BLD-MCNC: 238 MG/DL (ref 70–108)
GLUCOSE BLD-MCNC: 239 MG/DL (ref 70–108)
GLUCOSE BLD-MCNC: 247 MG/DL (ref 70–108)
GLUCOSE BLD-MCNC: 248 MG/DL (ref 70–108)
GLUCOSE BLD-MCNC: 260 MG/DL (ref 70–108)
GLUCOSE BLD-MCNC: 269 MG/DL (ref 70–108)
GLUCOSE BLD-MCNC: 276 MG/DL (ref 70–108)
GLUCOSE BLD-MCNC: 278 MG/DL (ref 70–108)
GLUCOSE BLD-MCNC: 293 MG/DL (ref 70–108)
GLUCOSE BLD-MCNC: 309 MG/DL (ref 70–108)
GLUCOSE BLD-MCNC: 350 MG/DL (ref 70–108)
GLUCOSE BLD-MCNC: 95 MG/DL (ref 70–108)
GLUCOSE URINE, POC: NORMAL
GLUCOSE, URINE: NEGATIVE MG/DL
GLUCOSE, URINE: NEGATIVE MG/DL
GLUCOSE, WHOLE BLOOD: 163 MG/DL (ref 70–108)
GLUCOSE, WHOLE BLOOD: 201 MG/DL (ref 70–108)
HCO3: 21 MMOL/L (ref 23–28)
HCT VFR BLD CALC: 33.2 % (ref 42–52)
HCT VFR BLD CALC: 34.6 % (ref 42–52)
HCT VFR BLD CALC: 36.5 % (ref 42–52)
HCT VFR BLD CALC: 36.9 % (ref 41–53)
HCT VFR BLD CALC: 36.9 % (ref 42–52)
HCT VFR BLD CALC: 38.3 % (ref 42–52)
HCT VFR BLD CALC: 38.3 % (ref 42–52)
HCT VFR BLD CALC: 38.4 % (ref 42–52)
HCT VFR BLD CALC: 38.8 % (ref 42–52)
HCT VFR BLD CALC: 39.2 % (ref 42–52)
HCT VFR BLD CALC: 39.9 % (ref 42–52)
HCT VFR BLD CALC: 42.4 % (ref 42–52)
HEMOGLOBIN: 10.3 GM/DL (ref 14–18)
HEMOGLOBIN: 10.6 GM/DL (ref 14–18)
HEMOGLOBIN: 11.3 GM/DL (ref 14–18)
HEMOGLOBIN: 11.5 GM/DL (ref 14–18)
HEMOGLOBIN: 11.7 G/DL (ref 13.5–17.5)
HEMOGLOBIN: 12 GM/DL (ref 14–18)
HEMOGLOBIN: 12.2 GM/DL (ref 14–18)
HEMOGLOBIN: 12.3 GM/DL (ref 14–18)
HEMOGLOBIN: 12.3 GM/DL (ref 14–18)
HEMOGLOBIN: 12.4 GM/DL (ref 14–18)
HEMOGLOBIN: 12.4 GM/DL (ref 14–18)
HEMOGLOBIN: 13 GM/DL (ref 14–18)
IFIO2: 24
IMMATURE GRANS (ABS): 0.02 THOU/MM3 (ref 0–0.07)
IMMATURE GRANS (ABS): 0.06 THOU/MM3 (ref 0–0.07)
IMMATURE GRANULOCYTES: 0 %
IMMATURE GRANULOCYTES: 0 %
IMMATURE GRANULOCYTES: 0.3 %
IMMATURE GRANULOCYTES: 0.7 %
IMMUNOFIXATION WITH QUANT: NORMAL
IONIZED CALCIUM, WHOLE BLOOD: 1.04 MMOL/L (ref 1.12–1.32)
IONIZED CALCIUM, WHOLE BLOOD: 1.06 MMOL/L (ref 1.12–1.32)
KAPPA/LAMBDA FREE LIGHT CHAINS: NORMAL
KETONES, POC: NORMAL
KETONES, URINE: NEGATIVE
KETONES, URINE: NEGATIVE
LACTIC ACID, SEPSIS: 1.9 MMOL/L (ref 0.5–1.9)
LD: 279 U/L (ref 100–190)
LEUKOCYTE EST, POC: NORMAL
LEUKOCYTE ESTERASE, URINE: ABNORMAL
LEUKOCYTE ESTERASE, URINE: NEGATIVE
LV EF: 33 %
LVEF MODALITY: NORMAL
LYMPHOCYTES # BLD: 10 % (ref 15–47)
LYMPHOCYTES # BLD: 17 % (ref 15–47)
LYMPHOCYTES # BLD: 18.2 %
LYMPHOCYTES # BLD: 8.6 %
LYMPHOCYTES ABSOLUTE: 0.5 THOU/MM3 (ref 1–4.8)
LYMPHOCYTES ABSOLUTE: 0.7 THOU/MM3 (ref 1–4.8)
LYMPHOCYTES ABSOLUTE: 0.8 THOU/MM3 (ref 1–4.8)
LYMPHOCYTES ABSOLUTE: 1.1 THOU/MM3 (ref 1–4.8)
MAGNESIUM: 2.2 MG/DL (ref 1.6–2.4)
MCH RBC QN AUTO: 26.6 PG (ref 26–33)
MCH RBC QN AUTO: 27.3 PG (ref 26–33)
MCH RBC QN AUTO: 27.5 PG (ref 26–33)
MCH RBC QN AUTO: 27.6 PG (ref 26–33)
MCH RBC QN AUTO: 28.1 PG (ref 26–33)
MCH RBC QN AUTO: 28.2 PG (ref 26–33)
MCHC RBC AUTO-ENTMCNC: 30.6 GM/DL (ref 32.2–35.5)
MCHC RBC AUTO-ENTMCNC: 30.6 GM/DL (ref 32.2–35.5)
MCHC RBC AUTO-ENTMCNC: 31 GM/DL (ref 32.2–35.5)
MCHC RBC AUTO-ENTMCNC: 31.1 GM/DL (ref 32.2–35.5)
MCHC RBC AUTO-ENTMCNC: 31.3 GM/DL (ref 32.2–35.5)
MCHC RBC AUTO-ENTMCNC: 31.4 GM/DL (ref 32.2–35.5)
MCHC RBC AUTO-ENTMCNC: 31.5 GM/DL (ref 32.2–35.5)
MCHC RBC AUTO-ENTMCNC: 31.9 GM/DL (ref 32.2–35.5)
MCHC RBC AUTO-ENTMCNC: 32 GM/DL (ref 32.2–35.5)
MCHC RBC AUTO-ENTMCNC: 32 GM/DL (ref 32.2–35.5)
MCV RBC AUTO: 85.7 FL (ref 80–94)
MCV RBC AUTO: 86.2 FL (ref 80–94)
MCV RBC AUTO: 86.5 FL (ref 80–94)
MCV RBC AUTO: 86.9 FL (ref 80–94)
MCV RBC AUTO: 86.9 FL (ref 80–94)
MCV RBC AUTO: 87.7 FL (ref 80–94)
MCV RBC AUTO: 88.5 FL (ref 80–94)
MCV RBC AUTO: 90 FL (ref 80–94)
MCV RBC AUTO: 90 FL (ref 80–94)
MCV RBC AUTO: 90.5 FL (ref 80–94)
MISCELLANEOUS LAB TEST RESULT: ABNORMAL
MISCELLANEOUS LAB TEST RESULT: ABNORMAL
MONOCYTES # BLD: 9.7 %
MONOCYTES # BLD: 9.8 %
MONOCYTES ABSOLUTE: 0.4 THOU/MM3 (ref 0.4–1.3)
MONOCYTES ABSOLUTE: 0.5 THOU/MM3 (ref 0.4–1.3)
MONOCYTES ABSOLUTE: 0.6 THOU/MM3 (ref 0.4–1.3)
MONOCYTES ABSOLUTE: 0.8 THOU/MM3 (ref 0.4–1.3)
MONOCYTES: 11 % (ref 0–12)
MONOCYTES: 8 % (ref 0–12)
NITRITE, POC: NORMAL
NITRITE, URINE: NEGATIVE
NITRITE, URINE: NEGATIVE
NUCLEATED RED BLOOD CELLS: 0 /100 WBC
NUCLEATED RED BLOOD CELLS: 0 /100 WBC
O2 SATURATION: 95 %
OSMOLALITY CALCULATION: 297.5 MOSMOL/KG (ref 275–300)
OSMOLALITY CALCULATION: 304.7 MOSMOL/KG (ref 275–300)
OSMOLALITY URINE: 482 MOSMOL/KG (ref 250–750)
PCO2: 29 MMHG (ref 35–45)
PDW BLD-RTO: 15.3 % (ref 11.5–14.5)
PDW BLD-RTO: 16.4 % (ref 11.5–14.5)
PH BLOOD GAS: 7.47 (ref 7.35–7.45)
PH UA: 5 (ref 5–9)
PH UA: 6 (ref 5–9)
PH, POC: 6.5
PHOSPHORUS: 4.3 MG/DL
PHOSPHORUS: 4.8 MG/DL (ref 2.4–4.7)
PLATELET # BLD: 100 THOU/MM3 (ref 130–400)
PLATELET # BLD: 110 THOU/MM3 (ref 130–400)
PLATELET # BLD: 115 THOU/MM3 (ref 130–400)
PLATELET # BLD: 117 THOU/MM3 (ref 130–400)
PLATELET # BLD: 118 THOU/MM3 (ref 130–400)
PLATELET # BLD: 139 THOU/MM3 (ref 130–400)
PLATELET # BLD: 146 THOU/MM3 (ref 130–400)
PLATELET # BLD: 160 THOU/MM3 (ref 130–400)
PLATELET # BLD: 170 THOU/MM3 (ref 130–400)
PLATELET # BLD: 177 THOU/MM3 (ref 130–400)
PMV BLD AUTO: 10 FL (ref 9.4–12.4)
PMV BLD AUTO: 11 FL (ref 9.4–12.4)
PMV BLD AUTO: 11.4 FL (ref 9.4–12.4)
PMV BLD AUTO: 12.1 FL (ref 9.4–12.4)
PMV BLD AUTO: 12.3 FL (ref 9.4–12.4)
PMV BLD AUTO: 12.7 FL (ref 9.4–12.4)
PMV BLD AUTO: 13.3 FL (ref 9.4–12.4)
PMV BLD AUTO: 8.3 FL (ref 9.4–12.4)
PMV BLD AUTO: 9.3 FL (ref 9.4–12.4)
PO2: 68 MMHG (ref 71–104)
POST VOID RESIDUAL (PVR): 33 ML
POTASSIUM REFLEX MAGNESIUM: 4 MEQ/L (ref 3.5–5.2)
POTASSIUM REFLEX MAGNESIUM: 4.3 MEQ/L (ref 3.5–5.2)
POTASSIUM SERPL-SCNC: 3.8 MEQ/L (ref 3.5–5.2)
POTASSIUM SERPL-SCNC: 3.8 MEQ/L (ref 3.5–5.2)
POTASSIUM SERPL-SCNC: 3.9 MMOL/L
POTASSIUM SERPL-SCNC: 4 MEQ/L (ref 3.5–5.2)
POTASSIUM SERPL-SCNC: 4 MEQ/L (ref 3.5–5.2)
POTASSIUM SERPL-SCNC: 4.1 MEQ/L (ref 3.5–5.2)
POTASSIUM SERPL-SCNC: 4.2 MEQ/L (ref 3.5–5.2)
POTASSIUM SERPL-SCNC: 4.4 MEQ/L (ref 3.5–5.2)
POTASSIUM SERPL-SCNC: 4.4 MEQ/L (ref 3.5–5.2)
POTASSIUM SERPL-SCNC: 4.5 MEQ/L (ref 3.5–5.2)
POTASSIUM SERPL-SCNC: 4.7 MEQ/L (ref 3.5–5.2)
POTASSIUM SERPL-SCNC: 4.7 MMOL/L
POTASSIUM SERPL-SCNC: 4.8 MEQ/L (ref 3.5–5.2)
POTASSIUM SERPL-SCNC: 4.9 MEQ/L (ref 3.5–5.2)
POTASSIUM, WHOLE BLOOD: 4 MEQ/L (ref 3.5–4.9)
POTASSIUM, WHOLE BLOOD: 4.7 MEQ/L (ref 3.5–4.9)
PRO-BNP: ABNORMAL PG/ML (ref 0–900)
PROCALCITONIN: 19.26 NG/ML (ref 0.01–0.09)
PROT/CREAT RATIO, UR: 0.57
PROT/CREAT RATIO, UR: 1
PROTEIN UA: 100 MG/DL
PROTEIN UA: 100 MG/DL
PROTEIN, POC: 100
PROTEIN, URINE: 134.4 MG/DL
PROTEIN, URINE: 63.1 MG/DL
PTH INTACT: 166.4 PG/ML (ref 15–65)
PTH INTACT: 215
RBC # BLD: 3.75 MILL/MM3 (ref 4.7–6.1)
RBC # BLD: 3.98 MILL/MM3 (ref 4.7–6.1)
RBC # BLD: 4.1 MILL/MM3 (ref 4.7–6.1)
RBC # BLD: 4.16 MILL/MM3 (ref 4.7–6.1)
RBC # BLD: 4.25 MILL/MM3 (ref 4.7–6.1)
RBC # BLD: 4.41 MILL/MM3 (ref 4.7–6.1)
RBC # BLD: 4.43 MILL/MM3 (ref 4.7–6.1)
RBC # BLD: 4.48 MILL/MM3 (ref 4.7–6.1)
RBC # BLD: 4.5 MILL/MM3 (ref 4.7–6.1)
RBC # BLD: 4.51 MILL/MM3 (ref 4.7–6.1)
RBC URINE: ABNORMAL /HPF
RBC URINE: ABNORMAL /HPF
REASON FOR REJECTION: NORMAL
REJECTED TEST: NORMAL
RENAL EPITHELIAL, UA: ABNORMAL
RENAL EPITHELIAL, UA: ABNORMAL
SARS-COV-2, NAAT: DETECTED
SEG NEUTROPHILS: 69.7 %
SEG NEUTROPHILS: 70 % (ref 43–75)
SEG NEUTROPHILS: 80 % (ref 43–75)
SEG NEUTROPHILS: 80.7 %
SEGMENTED NEUTROPHILS ABSOLUTE COUNT: 3.3 THOU/MM3 (ref 1.8–7.7)
SEGMENTED NEUTROPHILS ABSOLUTE COUNT: 4 THOU/MM3 (ref 1.8–7.7)
SEGMENTED NEUTROPHILS ABSOLUTE COUNT: 4.3 THOU/MM3 (ref 1.8–7.7)
SEGMENTED NEUTROPHILS ABSOLUTE COUNT: 6.9 THOU/MM3 (ref 1.8–7.7)
SODIUM BLD-SCNC: 130 MEQ/L (ref 135–145)
SODIUM BLD-SCNC: 132 MEQ/L (ref 135–145)
SODIUM BLD-SCNC: 133 MEQ/L (ref 135–145)
SODIUM BLD-SCNC: 135 MEQ/L (ref 135–145)
SODIUM BLD-SCNC: 136 MEQ/L (ref 135–145)
SODIUM BLD-SCNC: 137 MEQ/L (ref 135–145)
SODIUM BLD-SCNC: 138 MEQ/L (ref 135–145)
SODIUM BLD-SCNC: 138 MEQ/L (ref 135–145)
SODIUM BLD-SCNC: 138 MMOL/L
SODIUM BLD-SCNC: 140 MMOL/L
SODIUM BLD-SCNC: 141 MEQ/L (ref 135–145)
SODIUM BLD-SCNC: 141 MEQ/L (ref 135–145)
SODIUM URINE: 20 MEQ/L
SODIUM URINE: < 20 MEQ/L
SODIUM, WHOLE BLOOD: 138 MEQ/L (ref 138–146)
SODIUM, WHOLE BLOOD: 139 MEQ/L (ref 138–146)
SOURCE, BLOOD GAS: ABNORMAL
SPECIFIC GRAVITY UA: 1.01 (ref 1–1.03)
SPECIFIC GRAVITY UA: 1.01 (ref 1–1.03)
SPECIFIC GRAVITY, POC: 1.02
TOTAL CK: 111 U/L (ref 55–170)
TOTAL CO2, WHOLE BLOOD: 29 MEQ/L (ref 23–33)
TOTAL CO2, WHOLE BLOOD: 33 MEQ/L (ref 23–33)
TOTAL PROTEIN: 6.4 G/DL (ref 6.1–8)
TOTAL PROTEIN: 6.5 G/DL (ref 6.1–8)
TOTAL PROTEIN: 6.6 G/DL (ref 6.1–8)
TOTAL PROTEIN: 7.1 G/DL (ref 6.1–8)
TOTAL PROTEIN: 7.3 G/DL (ref 6.1–8)
TOTAL PROTEIN: 7.7 G/DL (ref 6.1–8)
TOTAL PROTEIN: 7.8 G/DL (ref 6.1–8)
TROPONIN T: 0.04 NG/ML
TROPONIN T: 0.05 NG/ML
UREA NITROGEN, UR: 834 MG/DL
UROBILINOGEN, POC: 0.2
UROBILINOGEN, URINE: 0.2 EU/DL (ref 0–1)
UROBILINOGEN, URINE: 0.2 EU/DL (ref 0–1)
WBC # BLD: 4.7 THOU/MM3 (ref 4.8–10.8)
WBC # BLD: 4.9 THOU/MM3 (ref 4.8–10.8)
WBC # BLD: 5 THOU/MM3 (ref 4.8–10.8)
WBC # BLD: 5.3 THOU/MM3 (ref 4.8–10.8)
WBC # BLD: 5.7 THOU/MM3 (ref 4.8–10.8)
WBC # BLD: 6.1 THOU/MM3 (ref 4.8–10.8)
WBC # BLD: 8.2 THOU/MM3 (ref 4.8–10.8)
WBC # BLD: 8.5 THOU/MM3 (ref 4.8–10.8)
WBC # BLD: 8.6 THOU/MM3 (ref 4.8–10.8)
WBC # BLD: 9.3 THOU/MM3 (ref 4.8–10.8)
WBC UA: ABNORMAL /HPF
WBC UA: ABNORMAL /HPF
YEAST: ABNORMAL
YEAST: ABNORMAL

## 2021-01-01 PROCEDURE — 3017F COLORECTAL CA SCREEN DOC REV: CPT | Performed by: NURSE PRACTITIONER

## 2021-01-01 PROCEDURE — 2500000003 HC RX 250 WO HCPCS: Performed by: INTERNAL MEDICINE

## 2021-01-01 PROCEDURE — 82948 REAGENT STRIP/BLOOD GLUCOSE: CPT

## 2021-01-01 PROCEDURE — 6360000002 HC RX W HCPCS: Performed by: INTERNAL MEDICINE

## 2021-01-01 PROCEDURE — 1123F ACP DISCUSS/DSCN MKR DOCD: CPT | Performed by: INTERNAL MEDICINE

## 2021-01-01 PROCEDURE — 2580000003 HC RX 258: Performed by: STUDENT IN AN ORGANIZED HEALTH CARE EDUCATION/TRAINING PROGRAM

## 2021-01-01 PROCEDURE — 6370000000 HC RX 637 (ALT 250 FOR IP): Performed by: INTERNAL MEDICINE

## 2021-01-01 PROCEDURE — 80053 COMPREHEN METABOLIC PANEL: CPT

## 2021-01-01 PROCEDURE — G8427 DOCREV CUR MEDS BY ELIG CLIN: HCPCS | Performed by: INTERNAL MEDICINE

## 2021-01-01 PROCEDURE — 92610 EVALUATE SWALLOWING FUNCTION: CPT

## 2021-01-01 PROCEDURE — 4040F PNEUMOC VAC/ADMIN/RCVD: CPT | Performed by: INTERNAL MEDICINE

## 2021-01-01 PROCEDURE — 1123F ACP DISCUSS/DSCN MKR DOCD: CPT | Performed by: NURSE PRACTITIONER

## 2021-01-01 PROCEDURE — 4040F PNEUMOC VAC/ADMIN/RCVD: CPT | Performed by: NURSE PRACTITIONER

## 2021-01-01 PROCEDURE — 99232 SBSQ HOSP IP/OBS MODERATE 35: CPT | Performed by: INTERNAL MEDICINE

## 2021-01-01 PROCEDURE — 1036F TOBACCO NON-USER: CPT | Performed by: NURSE PRACTITIONER

## 2021-01-01 PROCEDURE — 6370000000 HC RX 637 (ALT 250 FOR IP): Performed by: STUDENT IN AN ORGANIZED HEALTH CARE EDUCATION/TRAINING PROGRAM

## 2021-01-01 PROCEDURE — 86140 C-REACTIVE PROTEIN: CPT

## 2021-01-01 PROCEDURE — 1200000000 HC SEMI PRIVATE

## 2021-01-01 PROCEDURE — G8484 FLU IMMUNIZE NO ADMIN: HCPCS | Performed by: INTERNAL MEDICINE

## 2021-01-01 PROCEDURE — 83970 ASSAY OF PARATHORMONE: CPT

## 2021-01-01 PROCEDURE — 99215 OFFICE O/P EST HI 40 MIN: CPT | Performed by: NURSE PRACTITIONER

## 2021-01-01 PROCEDURE — 36415 COLL VENOUS BLD VENIPUNCTURE: CPT

## 2021-01-01 PROCEDURE — 2580000003 HC RX 258: Performed by: INTERNAL MEDICINE

## 2021-01-01 PROCEDURE — 85027 COMPLETE CBC AUTOMATED: CPT

## 2021-01-01 PROCEDURE — 93000 ELECTROCARDIOGRAM COMPLETE: CPT | Performed by: INTERNAL MEDICINE

## 2021-01-01 PROCEDURE — 93970 EXTREMITY STUDY: CPT

## 2021-01-01 PROCEDURE — 1036F TOBACCO NON-USER: CPT | Performed by: INTERNAL MEDICINE

## 2021-01-01 PROCEDURE — 80048 BASIC METABOLIC PNL TOTAL CA: CPT

## 2021-01-01 PROCEDURE — 83605 ASSAY OF LACTIC ACID: CPT

## 2021-01-01 PROCEDURE — 94761 N-INVAS EAR/PLS OXIMETRY MLT: CPT

## 2021-01-01 PROCEDURE — G8417 CALC BMI ABV UP PARAM F/U: HCPCS | Performed by: NURSE PRACTITIONER

## 2021-01-01 PROCEDURE — 97167 OT EVAL HIGH COMPLEX 60 MIN: CPT

## 2021-01-01 PROCEDURE — 94640 AIRWAY INHALATION TREATMENT: CPT

## 2021-01-01 PROCEDURE — 85025 COMPLETE CBC W/AUTO DIFF WBC: CPT

## 2021-01-01 PROCEDURE — 99214 OFFICE O/P EST MOD 30 MIN: CPT | Performed by: INTERNAL MEDICINE

## 2021-01-01 PROCEDURE — XW0DXM6 INTRODUCTION OF BARICITINIB INTO MOUTH AND PHARYNX, EXTERNAL APPROACH, NEW TECHNOLOGY GROUP 6: ICD-10-PCS | Performed by: PEDIATRICS

## 2021-01-01 PROCEDURE — S0171 BUMETANIDE 0.5 MG: HCPCS | Performed by: NURSE PRACTITIONER

## 2021-01-01 PROCEDURE — 99233 SBSQ HOSP IP/OBS HIGH 50: CPT | Performed by: INTERNAL MEDICINE

## 2021-01-01 PROCEDURE — 1200000003 HC TELEMETRY R&B

## 2021-01-01 PROCEDURE — 92526 ORAL FUNCTION THERAPY: CPT

## 2021-01-01 PROCEDURE — 71046 X-RAY EXAM CHEST 2 VIEWS: CPT

## 2021-01-01 PROCEDURE — 97530 THERAPEUTIC ACTIVITIES: CPT

## 2021-01-01 PROCEDURE — 84300 ASSAY OF URINE SODIUM: CPT

## 2021-01-01 PROCEDURE — 6360000002 HC RX W HCPCS: Performed by: STUDENT IN AN ORGANIZED HEALTH CARE EDUCATION/TRAINING PROGRAM

## 2021-01-01 PROCEDURE — 97162 PT EVAL MOD COMPLEX 30 MIN: CPT

## 2021-01-01 PROCEDURE — G8427 DOCREV CUR MEDS BY ELIG CLIN: HCPCS | Performed by: NURSE PRACTITIONER

## 2021-01-01 PROCEDURE — 97535 SELF CARE MNGMENT TRAINING: CPT

## 2021-01-01 PROCEDURE — 99213 OFFICE O/P EST LOW 20 MIN: CPT | Performed by: NURSE PRACTITIONER

## 2021-01-01 PROCEDURE — 3017F COLORECTAL CA SCREEN DOC REV: CPT | Performed by: INTERNAL MEDICINE

## 2021-01-01 PROCEDURE — 87040 BLOOD CULTURE FOR BACTERIA: CPT

## 2021-01-01 PROCEDURE — 92611 MOTION FLUOROSCOPY/SWALLOW: CPT

## 2021-01-01 PROCEDURE — 82803 BLOOD GASES ANY COMBINATION: CPT

## 2021-01-01 PROCEDURE — G8484 FLU IMMUNIZE NO ADMIN: HCPCS | Performed by: NURSE PRACTITIONER

## 2021-01-01 PROCEDURE — 86334 IMMUNOFIX E-PHORESIS SERUM: CPT

## 2021-01-01 PROCEDURE — G8420 CALC BMI NORM PARAMETERS: HCPCS | Performed by: INTERNAL MEDICINE

## 2021-01-01 PROCEDURE — G8417 CALC BMI ABV UP PARAM F/U: HCPCS | Performed by: INTERNAL MEDICINE

## 2021-01-01 PROCEDURE — 81003 URINALYSIS AUTO W/O SCOPE: CPT | Performed by: NURSE PRACTITIONER

## 2021-01-01 PROCEDURE — 71045 X-RAY EXAM CHEST 1 VIEW: CPT

## 2021-01-01 PROCEDURE — 51798 US URINE CAPACITY MEASURE: CPT | Performed by: NURSE PRACTITIONER

## 2021-01-01 PROCEDURE — 97110 THERAPEUTIC EXERCISES: CPT

## 2021-01-01 PROCEDURE — 96374 THER/PROPH/DIAG INJ IV PUSH: CPT | Performed by: NURSE PRACTITIONER

## 2021-01-01 PROCEDURE — 93005 ELECTROCARDIOGRAM TRACING: CPT | Performed by: STUDENT IN AN ORGANIZED HEALTH CARE EDUCATION/TRAINING PROGRAM

## 2021-01-01 PROCEDURE — 83880 ASSAY OF NATRIURETIC PEPTIDE: CPT

## 2021-01-01 PROCEDURE — 93010 ELECTROCARDIOGRAM REPORT: CPT | Performed by: INTERNAL MEDICINE

## 2021-01-01 PROCEDURE — 82784 ASSAY IGA/IGD/IGG/IGM EACH: CPT

## 2021-01-01 PROCEDURE — 84145 PROCALCITONIN (PCT): CPT

## 2021-01-01 PROCEDURE — 74230 X-RAY XM SWLNG FUNCJ C+: CPT

## 2021-01-01 PROCEDURE — 99213 OFFICE O/P EST LOW 20 MIN: CPT | Performed by: INTERNAL MEDICINE

## 2021-01-01 PROCEDURE — 99211 OFF/OP EST MAY X REQ PHY/QHP: CPT

## 2021-01-01 PROCEDURE — 2700000000 HC OXYGEN THERAPY PER DAY

## 2021-01-01 PROCEDURE — 93922 UPR/L XTREMITY ART 2 LEVELS: CPT

## 2021-01-01 PROCEDURE — 83735 ASSAY OF MAGNESIUM: CPT

## 2021-01-01 PROCEDURE — 82436 ASSAY OF URINE CHLORIDE: CPT

## 2021-01-01 PROCEDURE — 96365 THER/PROPH/DIAG IV INF INIT: CPT

## 2021-01-01 PROCEDURE — 36415 COLL VENOUS BLD VENIPUNCTURE: CPT | Performed by: NURSE PRACTITIONER

## 2021-01-01 PROCEDURE — 84155 ASSAY OF PROTEIN SERUM: CPT

## 2021-01-01 PROCEDURE — 93005 ELECTROCARDIOGRAM TRACING: CPT | Performed by: EMERGENCY MEDICINE

## 2021-01-01 PROCEDURE — 85014 HEMATOCRIT: CPT

## 2021-01-01 PROCEDURE — 82310 ASSAY OF CALCIUM: CPT

## 2021-01-01 PROCEDURE — 82550 ASSAY OF CK (CPK): CPT

## 2021-01-01 PROCEDURE — G8420 CALC BMI NORM PARAMETERS: HCPCS | Performed by: NURSE PRACTITIONER

## 2021-01-01 PROCEDURE — 81001 URINALYSIS AUTO W/SCOPE: CPT

## 2021-01-01 PROCEDURE — 80076 HEPATIC FUNCTION PANEL: CPT

## 2021-01-01 PROCEDURE — 83883 ASSAY NEPHELOMETRY NOT SPEC: CPT

## 2021-01-01 PROCEDURE — 2022F DILAT RTA XM EVC RTNOPTHY: CPT | Performed by: INTERNAL MEDICINE

## 2021-01-01 PROCEDURE — 84156 ASSAY OF PROTEIN URINE: CPT

## 2021-01-01 PROCEDURE — 76770 US EXAM ABDO BACK WALL COMP: CPT

## 2021-01-01 PROCEDURE — 82248 BILIRUBIN DIRECT: CPT

## 2021-01-01 PROCEDURE — 99214 OFFICE O/P EST MOD 30 MIN: CPT | Performed by: NURSE PRACTITIONER

## 2021-01-01 PROCEDURE — 82570 ASSAY OF URINE CREATININE: CPT

## 2021-01-01 PROCEDURE — 93005 ELECTROCARDIOGRAM TRACING: CPT | Performed by: INTERNAL MEDICINE

## 2021-01-01 PROCEDURE — 99223 1ST HOSP IP/OBS HIGH 75: CPT | Performed by: INTERNAL MEDICINE

## 2021-01-01 PROCEDURE — 99215 OFFICE O/P EST HI 40 MIN: CPT | Performed by: INTERNAL MEDICINE

## 2021-01-01 PROCEDURE — 82728 ASSAY OF FERRITIN: CPT

## 2021-01-01 PROCEDURE — 83935 ASSAY OF URINE OSMOLALITY: CPT

## 2021-01-01 PROCEDURE — 37799 UNLISTED PX VASCULAR SURGERY: CPT

## 2021-01-01 PROCEDURE — 2500000003 HC RX 250 WO HCPCS: Performed by: EMERGENCY MEDICINE

## 2021-01-01 PROCEDURE — 80047 BASIC METABLC PNL IONIZED CA: CPT

## 2021-01-01 PROCEDURE — 99284 EMERGENCY DEPT VISIT MOD MDM: CPT

## 2021-01-01 PROCEDURE — 84100 ASSAY OF PHOSPHORUS: CPT

## 2021-01-01 PROCEDURE — 99999: CPT | Performed by: INTERNAL MEDICINE

## 2021-01-01 PROCEDURE — 87635 SARS-COV-2 COVID-19 AMP PRB: CPT

## 2021-01-01 PROCEDURE — 84540 ASSAY OF URINE/UREA-N: CPT

## 2021-01-01 PROCEDURE — 96375 TX/PRO/DX INJ NEW DRUG ADDON: CPT

## 2021-01-01 PROCEDURE — 3046F HEMOGLOBIN A1C LEVEL >9.0%: CPT | Performed by: INTERNAL MEDICINE

## 2021-01-01 PROCEDURE — 84165 PROTEIN E-PHORESIS SERUM: CPT

## 2021-01-01 PROCEDURE — 93306 TTE W/DOPPLER COMPLETE: CPT

## 2021-01-01 PROCEDURE — 84484 ASSAY OF TROPONIN QUANT: CPT

## 2021-01-01 PROCEDURE — 83615 LACTATE (LD) (LDH) ENZYME: CPT

## 2021-01-01 PROCEDURE — 82330 ASSAY OF CALCIUM: CPT

## 2021-01-01 PROCEDURE — 85018 HEMOGLOBIN: CPT

## 2021-01-01 RX ORDER — INSULIN GLARGINE 100 [IU]/ML
18 INJECTION, SOLUTION SUBCUTANEOUS NIGHTLY
Status: DISCONTINUED | OUTPATIENT
Start: 2021-01-01 | End: 2022-01-01

## 2021-01-01 RX ORDER — LISINOPRIL 10 MG/1
TABLET ORAL
Qty: 90 TABLET | Refills: 3 | Status: SHIPPED | OUTPATIENT
Start: 2021-01-01 | End: 2021-01-01 | Stop reason: ALTCHOICE

## 2021-01-01 RX ORDER — IPRATROPIUM BROMIDE AND ALBUTEROL SULFATE 2.5; .5 MG/3ML; MG/3ML
1 SOLUTION RESPIRATORY (INHALATION) EVERY 4 HOURS PRN
Status: DISCONTINUED | OUTPATIENT
Start: 2021-01-01 | End: 2022-01-01 | Stop reason: HOSPADM

## 2021-01-01 RX ORDER — LISINOPRIL 10 MG/1
10 TABLET ORAL DAILY
Qty: 90 TABLET | Refills: 1 | Status: SHIPPED | OUTPATIENT
Start: 2021-01-01 | End: 2021-01-01

## 2021-01-01 RX ORDER — LISINOPRIL 10 MG/1
TABLET ORAL
Qty: 90 TABLET | Refills: 2 | Status: SHIPPED | OUTPATIENT
Start: 2021-01-01 | End: 2021-01-01 | Stop reason: SDUPTHER

## 2021-01-01 RX ORDER — HYDRALAZINE HYDROCHLORIDE 50 MG/1
50 TABLET, FILM COATED ORAL EVERY 8 HOURS SCHEDULED
Status: DISCONTINUED | OUTPATIENT
Start: 2021-01-01 | End: 2022-01-01 | Stop reason: HOSPADM

## 2021-01-01 RX ORDER — GUAIFENESIN 600 MG/1
600 TABLET, EXTENDED RELEASE ORAL 2 TIMES DAILY
Status: DISCONTINUED | OUTPATIENT
Start: 2021-01-01 | End: 2022-01-01

## 2021-01-01 RX ORDER — POTASSIUM CHLORIDE 20 MEQ/1
20 TABLET, EXTENDED RELEASE ORAL DAILY
Status: DISCONTINUED | OUTPATIENT
Start: 2021-01-01 | End: 2021-01-01

## 2021-01-01 RX ORDER — LISINOPRIL 10 MG/1
10 TABLET ORAL DAILY
Qty: 14 TABLET | Refills: 0 | Status: SHIPPED | OUTPATIENT
Start: 2021-01-01 | End: 2021-01-01

## 2021-01-01 RX ORDER — POTASSIUM CHLORIDE 20 MEQ/1
20 TABLET, EXTENDED RELEASE ORAL 2 TIMES DAILY
Qty: 180 TABLET | Refills: 3 | Status: SHIPPED | OUTPATIENT
Start: 2021-01-01 | End: 2021-01-01 | Stop reason: ALTCHOICE

## 2021-01-01 RX ORDER — ISOSORBIDE DINITRATE 30 MG/1
30 TABLET ORAL 2 TIMES DAILY
Qty: 180 TABLET | Refills: 3 | Status: SHIPPED | OUTPATIENT
Start: 2021-01-01 | End: 2021-01-01 | Stop reason: ALTCHOICE

## 2021-01-01 RX ORDER — ISOSORBIDE DINITRATE 20 MG/1
20 TABLET ORAL 2 TIMES DAILY
Qty: 60 TABLET | Refills: 1 | Status: SHIPPED | OUTPATIENT
Start: 2021-01-01 | End: 2021-01-01 | Stop reason: ALTCHOICE

## 2021-01-01 RX ORDER — BUMETANIDE 2 MG/1
2 TABLET ORAL 2 TIMES DAILY
Qty: 60 TABLET | Refills: 3 | Status: ON HOLD
Start: 2021-01-01 | End: 2022-01-01 | Stop reason: HOSPADM

## 2021-01-01 RX ORDER — ONDANSETRON 4 MG/1
4 TABLET, ORALLY DISINTEGRATING ORAL EVERY 8 HOURS PRN
Status: DISCONTINUED | OUTPATIENT
Start: 2021-01-01 | End: 2021-01-01

## 2021-01-01 RX ORDER — HEPARIN SODIUM 5000 [USP'U]/ML
5000 INJECTION, SOLUTION INTRAVENOUS; SUBCUTANEOUS 2 TIMES DAILY
Status: DISCONTINUED | OUTPATIENT
Start: 2021-01-01 | End: 2022-01-01 | Stop reason: HOSPADM

## 2021-01-01 RX ORDER — SODIUM CHLORIDE 0.9 % (FLUSH) 0.9 %
5-40 SYRINGE (ML) INJECTION EVERY 12 HOURS SCHEDULED
Status: DISCONTINUED | OUTPATIENT
Start: 2021-01-01 | End: 2022-01-01 | Stop reason: HOSPADM

## 2021-01-01 RX ORDER — ASPIRIN 81 MG/1
81 TABLET ORAL DAILY
Status: DISCONTINUED | OUTPATIENT
Start: 2021-01-01 | End: 2022-01-01 | Stop reason: HOSPADM

## 2021-01-01 RX ORDER — BUMETANIDE 0.25 MG/ML
2 INJECTION, SOLUTION INTRAMUSCULAR; INTRAVENOUS ONCE
Status: COMPLETED | OUTPATIENT
Start: 2021-01-01 | End: 2021-01-01

## 2021-01-01 RX ORDER — LISINOPRIL 10 MG/1
5 TABLET ORAL DAILY
Qty: 30 TABLET | Refills: 3 | Status: ON HOLD
Start: 2021-01-01 | End: 2022-01-01 | Stop reason: HOSPADM

## 2021-01-01 RX ORDER — TAMSULOSIN HYDROCHLORIDE 0.4 MG/1
0.4 CAPSULE ORAL DAILY
Status: DISCONTINUED | OUTPATIENT
Start: 2021-01-01 | End: 2022-01-01 | Stop reason: HOSPADM

## 2021-01-01 RX ORDER — CARVEDILOL 12.5 MG/1
12.5 TABLET ORAL 2 TIMES DAILY
Qty: 180 TABLET | Refills: 1 | Status: SHIPPED | OUTPATIENT
Start: 2021-01-01 | End: 2021-01-01 | Stop reason: SDUPTHER

## 2021-01-01 RX ORDER — DOXYCYCLINE HYCLATE 100 MG
100 TABLET ORAL EVERY 12 HOURS SCHEDULED
Status: DISCONTINUED | OUTPATIENT
Start: 2021-01-01 | End: 2021-01-01

## 2021-01-01 RX ORDER — ISOSORBIDE MONONITRATE 30 MG/1
30 TABLET, EXTENDED RELEASE ORAL DAILY
Qty: 30 TABLET | Refills: 3 | Status: ON HOLD | OUTPATIENT
Start: 2021-01-01 | End: 2022-01-01 | Stop reason: HOSPADM

## 2021-01-01 RX ORDER — METOLAZONE 2.5 MG/1
2.5 TABLET ORAL
COMMUNITY
End: 2021-01-01 | Stop reason: ALTCHOICE

## 2021-01-01 RX ORDER — AMLODIPINE BESYLATE 5 MG/1
5 TABLET ORAL 2 TIMES DAILY
Qty: 180 TABLET | Refills: 3 | Status: ON HOLD | OUTPATIENT
Start: 2021-01-01 | End: 2022-01-01 | Stop reason: HOSPADM

## 2021-01-01 RX ORDER — AMLODIPINE BESYLATE 5 MG/1
5 TABLET ORAL 2 TIMES DAILY
COMMUNITY
Start: 2021-01-01 | End: 2021-01-01 | Stop reason: SDUPTHER

## 2021-01-01 RX ORDER — FAMOTIDINE 20 MG/1
20 TABLET, FILM COATED ORAL DAILY
Status: DISCONTINUED | OUTPATIENT
Start: 2021-01-01 | End: 2021-01-01

## 2021-01-01 RX ORDER — ISOSORBIDE MONONITRATE 30 MG/1
30 TABLET, EXTENDED RELEASE ORAL DAILY
COMMUNITY
Start: 2021-01-01 | End: 2021-01-01 | Stop reason: SDUPTHER

## 2021-01-01 RX ORDER — ISOSORBIDE DINITRATE 30 MG/1
30 TABLET ORAL 2 TIMES DAILY
Qty: 60 TABLET | Refills: 1 | Status: SHIPPED | OUTPATIENT
Start: 2021-01-01 | End: 2021-01-01

## 2021-01-01 RX ORDER — POTASSIUM CHLORIDE 20 MEQ/1
40 TABLET, EXTENDED RELEASE ORAL ONCE
Status: COMPLETED | OUTPATIENT
Start: 2021-01-01 | End: 2021-01-01

## 2021-01-01 RX ORDER — CARVEDILOL 6.25 MG/1
12.5 TABLET ORAL 2 TIMES DAILY WITH MEALS
Status: DISCONTINUED | OUTPATIENT
Start: 2021-01-01 | End: 2022-01-01 | Stop reason: HOSPADM

## 2021-01-01 RX ORDER — DEXTROSE MONOHYDRATE 50 MG/ML
100 INJECTION, SOLUTION INTRAVENOUS PRN
Status: DISCONTINUED | OUTPATIENT
Start: 2021-01-01 | End: 2022-01-01 | Stop reason: HOSPADM

## 2021-01-01 RX ORDER — BUMETANIDE 0.25 MG/ML
2 INJECTION, SOLUTION INTRAMUSCULAR; INTRAVENOUS EVERY 12 HOURS
Status: DISCONTINUED | OUTPATIENT
Start: 2021-01-01 | End: 2021-01-01 | Stop reason: CLARIF

## 2021-01-01 RX ORDER — NICOTINE POLACRILEX 4 MG
15 LOZENGE BUCCAL PRN
Status: DISCONTINUED | OUTPATIENT
Start: 2021-01-01 | End: 2022-01-01 | Stop reason: HOSPADM

## 2021-01-01 RX ORDER — ALBUTEROL SULFATE 90 UG/1
2 AEROSOL, METERED RESPIRATORY (INHALATION) 2 TIMES DAILY
Status: DISCONTINUED | OUTPATIENT
Start: 2021-01-01 | End: 2021-01-01

## 2021-01-01 RX ORDER — ALBUTEROL SULFATE 90 UG/1
2 AEROSOL, METERED RESPIRATORY (INHALATION) 4 TIMES DAILY
Status: DISCONTINUED | OUTPATIENT
Start: 2021-01-01 | End: 2021-01-01

## 2021-01-01 RX ORDER — METOLAZONE 2.5 MG/1
2.5 TABLET ORAL
Qty: 8 TABLET | Refills: 3
Start: 2021-01-01 | End: 2021-01-01 | Stop reason: SDUPTHER

## 2021-01-01 RX ORDER — BUMETANIDE 2 MG/1
2 TABLET ORAL 2 TIMES DAILY
Qty: 180 TABLET | Refills: 3 | Status: SHIPPED | OUTPATIENT
Start: 2021-01-01 | End: 2021-01-01

## 2021-01-01 RX ORDER — METOLAZONE 2.5 MG/1
2.5 TABLET ORAL
Qty: 8 TABLET | Refills: 3 | Status: ON HOLD | OUTPATIENT
Start: 2021-01-01 | End: 2022-01-01 | Stop reason: HOSPADM

## 2021-01-01 RX ORDER — DEXTROSE MONOHYDRATE 25 G/50ML
12.5 INJECTION, SOLUTION INTRAVENOUS PRN
Status: DISCONTINUED | OUTPATIENT
Start: 2021-01-01 | End: 2022-01-01 | Stop reason: HOSPADM

## 2021-01-01 RX ORDER — ACETAMINOPHEN 325 MG/1
650 TABLET ORAL EVERY 6 HOURS PRN
Status: DISCONTINUED | OUTPATIENT
Start: 2021-01-01 | End: 2022-01-01 | Stop reason: HOSPADM

## 2021-01-01 RX ORDER — DEXAMETHASONE 4 MG/1
6 TABLET ORAL DAILY
Status: COMPLETED | OUTPATIENT
Start: 2021-01-01 | End: 2022-01-01

## 2021-01-01 RX ORDER — ALBUTEROL SULFATE 90 UG/1
2 AEROSOL, METERED RESPIRATORY (INHALATION) EVERY 4 HOURS PRN
Status: DISCONTINUED | OUTPATIENT
Start: 2021-01-01 | End: 2022-01-01 | Stop reason: HOSPADM

## 2021-01-01 RX ORDER — TAMSULOSIN HYDROCHLORIDE 0.4 MG/1
0.4 CAPSULE ORAL DAILY
Qty: 90 CAPSULE | Refills: 3 | Status: SHIPPED | OUTPATIENT
Start: 2021-01-01

## 2021-01-01 RX ORDER — ISOSORBIDE DINITRATE 20 MG/1
20 TABLET ORAL 2 TIMES DAILY
Qty: 180 TABLET | Refills: 3 | Status: SHIPPED | OUTPATIENT
Start: 2021-01-01 | End: 2021-01-01 | Stop reason: SDUPTHER

## 2021-01-01 RX ORDER — POTASSIUM CHLORIDE 20 MEQ/1
20 TABLET, EXTENDED RELEASE ORAL DAILY
Qty: 90 TABLET | Refills: 1
Start: 2021-01-01

## 2021-01-01 RX ORDER — INSULIN GLARGINE 100 [IU]/ML
10 INJECTION, SOLUTION SUBCUTANEOUS NIGHTLY
Status: DISCONTINUED | OUTPATIENT
Start: 2021-01-01 | End: 2021-01-01

## 2021-01-01 RX ORDER — NITROGLYCERIN 20 MG/100ML
5-200 INJECTION INTRAVENOUS CONTINUOUS
Status: DISCONTINUED | OUTPATIENT
Start: 2021-01-01 | End: 2021-01-01

## 2021-01-01 RX ORDER — POTASSIUM CHLORIDE 20 MEQ/1
TABLET, EXTENDED RELEASE ORAL
Qty: 300 TABLET | Refills: 3 | Status: SHIPPED
Start: 2021-01-01 | End: 2021-01-01

## 2021-01-01 RX ORDER — SODIUM CHLORIDE 9 MG/ML
INJECTION, SOLUTION INTRAVENOUS CONTINUOUS
Status: ACTIVE | OUTPATIENT
Start: 2021-01-01 | End: 2021-01-01

## 2021-01-01 RX ORDER — DEXAMETHASONE SODIUM PHOSPHATE 4 MG/ML
6 INJECTION, SOLUTION INTRA-ARTICULAR; INTRALESIONAL; INTRAMUSCULAR; INTRAVENOUS; SOFT TISSUE EVERY 24 HOURS
Status: DISCONTINUED | OUTPATIENT
Start: 2021-01-01 | End: 2021-01-01

## 2021-01-01 RX ORDER — HYDROCODONE BITARTRATE AND ACETAMINOPHEN 5; 325 MG/1; MG/1
1 TABLET ORAL ONCE
Status: COMPLETED | OUTPATIENT
Start: 2021-01-01 | End: 2021-01-01

## 2021-01-01 RX ORDER — POTASSIUM CHLORIDE 20 MEQ/1
20 TABLET, EXTENDED RELEASE ORAL 2 TIMES DAILY
Qty: 60 TABLET | Refills: 5
Start: 2021-01-01 | End: 2021-01-01 | Stop reason: SDUPTHER

## 2021-01-01 RX ORDER — ACETAMINOPHEN 650 MG/1
650 SUPPOSITORY RECTAL EVERY 6 HOURS PRN
Status: DISCONTINUED | OUTPATIENT
Start: 2021-01-01 | End: 2022-01-01 | Stop reason: HOSPADM

## 2021-01-01 RX ORDER — IPRATROPIUM BROMIDE AND ALBUTEROL SULFATE 2.5; .5 MG/3ML; MG/3ML
1 SOLUTION RESPIRATORY (INHALATION)
Status: DISCONTINUED | OUTPATIENT
Start: 2021-01-01 | End: 2021-01-01

## 2021-01-01 RX ORDER — SODIUM CHLORIDE 9 MG/ML
25 INJECTION, SOLUTION INTRAVENOUS PRN
Status: DISCONTINUED | OUTPATIENT
Start: 2021-01-01 | End: 2022-01-01 | Stop reason: HOSPADM

## 2021-01-01 RX ORDER — FUROSEMIDE 10 MG/ML
80 INJECTION INTRAMUSCULAR; INTRAVENOUS EVERY 12 HOURS
Status: DISCONTINUED | OUTPATIENT
Start: 2021-01-01 | End: 2021-01-01

## 2021-01-01 RX ORDER — SODIUM CHLORIDE 0.9 % (FLUSH) 0.9 %
5-40 SYRINGE (ML) INJECTION PRN
Status: DISCONTINUED | OUTPATIENT
Start: 2021-01-01 | End: 2022-01-01 | Stop reason: HOSPADM

## 2021-01-01 RX ORDER — ISOSORBIDE DINITRATE 10 MG/1
10 TABLET ORAL 2 TIMES DAILY
Status: DISCONTINUED | OUTPATIENT
Start: 2021-01-01 | End: 2022-01-01 | Stop reason: HOSPADM

## 2021-01-01 RX ORDER — ONDANSETRON 2 MG/ML
4 INJECTION INTRAMUSCULAR; INTRAVENOUS EVERY 6 HOURS PRN
Status: DISCONTINUED | OUTPATIENT
Start: 2021-01-01 | End: 2021-01-01

## 2021-01-01 RX ORDER — FAMOTIDINE 20 MG/1
20 TABLET, FILM COATED ORAL EVERY OTHER DAY
Status: DISCONTINUED | OUTPATIENT
Start: 2021-01-01 | End: 2022-01-01 | Stop reason: HOSPADM

## 2021-01-01 RX ORDER — CARVEDILOL 12.5 MG/1
12.5 TABLET ORAL 2 TIMES DAILY
Qty: 180 TABLET | Refills: 3 | Status: SHIPPED | OUTPATIENT
Start: 2021-01-01

## 2021-01-01 RX ORDER — ATORVASTATIN CALCIUM 40 MG/1
40 TABLET, FILM COATED ORAL DAILY
Qty: 90 TABLET | Refills: 3 | Status: SHIPPED | OUTPATIENT
Start: 2021-01-01

## 2021-01-01 RX ORDER — HYDRALAZINE HYDROCHLORIDE 25 MG/1
25 TABLET, FILM COATED ORAL EVERY 8 HOURS SCHEDULED
Status: DISCONTINUED | OUTPATIENT
Start: 2021-01-01 | End: 2021-01-01

## 2021-01-01 RX ORDER — HYDRALAZINE HYDROCHLORIDE 10 MG/1
10 TABLET, FILM COATED ORAL EVERY 8 HOURS SCHEDULED
Status: DISCONTINUED | OUTPATIENT
Start: 2021-01-01 | End: 2021-01-01

## 2021-01-01 RX ORDER — AMLODIPINE BESYLATE 5 MG/1
5 TABLET ORAL 2 TIMES DAILY
Qty: 60 TABLET | Refills: 0 | Status: ON HOLD | OUTPATIENT
Start: 2021-01-01 | End: 2022-01-01 | Stop reason: HOSPADM

## 2021-01-01 RX ORDER — NITROGLYCERIN 0.4 MG/1
0.4 TABLET SUBLINGUAL EVERY 5 MIN PRN
Qty: 25 TABLET | Refills: 3 | Status: SHIPPED | OUTPATIENT
Start: 2021-01-01

## 2021-01-01 RX ORDER — ATORVASTATIN CALCIUM 40 MG/1
40 TABLET, FILM COATED ORAL DAILY
Status: DISCONTINUED | OUTPATIENT
Start: 2021-01-01 | End: 2022-01-01 | Stop reason: HOSPADM

## 2021-01-01 RX ORDER — POLYETHYLENE GLYCOL 3350 17 G/17G
17 POWDER, FOR SOLUTION ORAL DAILY PRN
Status: DISCONTINUED | OUTPATIENT
Start: 2021-01-01 | End: 2022-01-01 | Stop reason: HOSPADM

## 2021-01-01 RX ADMIN — SODIUM CHLORIDE, PRESERVATIVE FREE 10 ML: 5 INJECTION INTRAVENOUS at 21:11

## 2021-01-01 RX ADMIN — DOXYCYCLINE HYCLATE 100 MG: 100 TABLET, COATED ORAL at 09:53

## 2021-01-01 RX ADMIN — HEPARIN SODIUM 5000 UNITS: 5000 INJECTION INTRAVENOUS; SUBCUTANEOUS at 23:22

## 2021-01-01 RX ADMIN — INSULIN LISPRO 2 UNITS: 100 INJECTION, SOLUTION INTRAVENOUS; SUBCUTANEOUS at 12:49

## 2021-01-01 RX ADMIN — TAMSULOSIN HYDROCHLORIDE 0.4 MG: 0.4 CAPSULE ORAL at 09:53

## 2021-01-01 RX ADMIN — DOXYCYCLINE 100 MG: 100 INJECTION, POWDER, LYOPHILIZED, FOR SOLUTION INTRAVENOUS at 10:04

## 2021-01-01 RX ADMIN — INSULIN LISPRO 4 UNITS: 100 INJECTION, SOLUTION INTRAVENOUS; SUBCUTANEOUS at 17:57

## 2021-01-01 RX ADMIN — CEFTRIAXONE SODIUM 1000 MG: 1 INJECTION, POWDER, FOR SOLUTION INTRAMUSCULAR; INTRAVENOUS at 10:55

## 2021-01-01 RX ADMIN — INSULIN LISPRO 2 UNITS: 100 INJECTION, SOLUTION INTRAVENOUS; SUBCUTANEOUS at 17:10

## 2021-01-01 RX ADMIN — GUAIFENESIN 600 MG: 600 TABLET, EXTENDED RELEASE ORAL at 20:33

## 2021-01-01 RX ADMIN — CEFTRIAXONE SODIUM 1000 MG: 1 INJECTION, POWDER, FOR SOLUTION INTRAMUSCULAR; INTRAVENOUS at 11:09

## 2021-01-01 RX ADMIN — BARICITINIB 1 MG: 2 TABLET, FILM COATED ORAL at 08:51

## 2021-01-01 RX ADMIN — DOXYCYCLINE HYCLATE 100 MG: 100 TABLET, COATED ORAL at 09:29

## 2021-01-01 RX ADMIN — POTASSIUM CHLORIDE 20 MEQ: 1500 TABLET, EXTENDED RELEASE ORAL at 08:51

## 2021-01-01 RX ADMIN — HEPARIN SODIUM 5000 UNITS: 5000 INJECTION INTRAVENOUS; SUBCUTANEOUS at 21:33

## 2021-01-01 RX ADMIN — CARVEDILOL 12.5 MG: 6.25 TABLET, FILM COATED ORAL at 09:30

## 2021-01-01 RX ADMIN — ATORVASTATIN CALCIUM 40 MG: 40 TABLET, FILM COATED ORAL at 08:51

## 2021-01-01 RX ADMIN — CARVEDILOL 12.5 MG: 6.25 TABLET, FILM COATED ORAL at 17:37

## 2021-01-01 RX ADMIN — ISOSORBIDE DINITRATE 10 MG: 10 TABLET ORAL at 20:33

## 2021-01-01 RX ADMIN — ALBUTEROL SULFATE 2 PUFF: 90 AEROSOL, METERED RESPIRATORY (INHALATION) at 21:11

## 2021-01-01 RX ADMIN — ATORVASTATIN CALCIUM 40 MG: 40 TABLET, FILM COATED ORAL at 09:30

## 2021-01-01 RX ADMIN — ACETAMINOPHEN 650 MG: 325 TABLET ORAL at 15:35

## 2021-01-01 RX ADMIN — DEXAMETHASONE 6 MG: 4 TABLET ORAL at 09:51

## 2021-01-01 RX ADMIN — ATORVASTATIN CALCIUM 40 MG: 40 TABLET, FILM COATED ORAL at 09:53

## 2021-01-01 RX ADMIN — DEXAMETHASONE SODIUM PHOSPHATE 6 MG: 4 INJECTION, SOLUTION INTRA-ARTICULAR; INTRALESIONAL; INTRAMUSCULAR; INTRAVENOUS; SOFT TISSUE at 06:48

## 2021-01-01 RX ADMIN — IPRATROPIUM BROMIDE AND ALBUTEROL SULFATE 1 AMPULE: .5; 3 SOLUTION RESPIRATORY (INHALATION) at 10:27

## 2021-01-01 RX ADMIN — ISOSORBIDE DINITRATE 10 MG: 10 TABLET ORAL at 18:50

## 2021-01-01 RX ADMIN — IPRATROPIUM BROMIDE AND ALBUTEROL SULFATE 1 AMPULE: .5; 3 SOLUTION RESPIRATORY (INHALATION) at 15:11

## 2021-01-01 RX ADMIN — CEFTRIAXONE SODIUM 1000 MG: 1 INJECTION, POWDER, FOR SOLUTION INTRAMUSCULAR; INTRAVENOUS at 12:08

## 2021-01-01 RX ADMIN — FAMOTIDINE 20 MG: 20 TABLET ORAL at 08:50

## 2021-01-01 RX ADMIN — TAMSULOSIN HYDROCHLORIDE 0.4 MG: 0.4 CAPSULE ORAL at 08:51

## 2021-01-01 RX ADMIN — IPRATROPIUM BROMIDE AND ALBUTEROL SULFATE 1 AMPULE: .5; 3 SOLUTION RESPIRATORY (INHALATION) at 12:29

## 2021-01-01 RX ADMIN — INSULIN GLARGINE 18 UNITS: 100 INJECTION, SOLUTION SUBCUTANEOUS at 23:21

## 2021-01-01 RX ADMIN — ASPIRIN 81 MG: 81 TABLET, COATED ORAL at 08:51

## 2021-01-01 RX ADMIN — CARVEDILOL 12.5 MG: 6.25 TABLET, FILM COATED ORAL at 09:55

## 2021-01-01 RX ADMIN — TAMSULOSIN HYDROCHLORIDE 0.4 MG: 0.4 CAPSULE ORAL at 09:29

## 2021-01-01 RX ADMIN — SERTRALINE 50 MG: 50 TABLET, FILM COATED ORAL at 09:53

## 2021-01-01 RX ADMIN — CEFTRIAXONE SODIUM 1000 MG: 1 INJECTION, POWDER, FOR SOLUTION INTRAMUSCULAR; INTRAVENOUS at 09:48

## 2021-01-01 RX ADMIN — GUAIFENESIN 600 MG: 600 TABLET, EXTENDED RELEASE ORAL at 09:31

## 2021-01-01 RX ADMIN — GUAIFENESIN 600 MG: 600 TABLET, EXTENDED RELEASE ORAL at 17:17

## 2021-01-01 RX ADMIN — DEXAMETHASONE SODIUM PHOSPHATE 6 MG: 4 INJECTION, SOLUTION INTRA-ARTICULAR; INTRALESIONAL; INTRAMUSCULAR; INTRAVENOUS; SOFT TISSUE at 05:25

## 2021-01-01 RX ADMIN — SERTRALINE 50 MG: 50 TABLET, FILM COATED ORAL at 12:01

## 2021-01-01 RX ADMIN — IPRATROPIUM BROMIDE AND ALBUTEROL SULFATE 1 AMPULE: .5; 3 SOLUTION RESPIRATORY (INHALATION) at 15:04

## 2021-01-01 RX ADMIN — ENOXAPARIN SODIUM 30 MG: 100 INJECTION SUBCUTANEOUS at 09:54

## 2021-01-01 RX ADMIN — ISOSORBIDE DINITRATE 10 MG: 10 TABLET ORAL at 20:50

## 2021-01-01 RX ADMIN — INSULIN LISPRO 4 UNITS: 100 INJECTION, SOLUTION INTRAVENOUS; SUBCUTANEOUS at 09:00

## 2021-01-01 RX ADMIN — BARIUM SULFATE 10 ML: 400 SUSPENSION ORAL at 11:44

## 2021-01-01 RX ADMIN — DEXAMETHASONE SODIUM PHOSPHATE 6 MG: 4 INJECTION, SOLUTION INTRA-ARTICULAR; INTRALESIONAL; INTRAMUSCULAR; INTRAVENOUS; SOFT TISSUE at 05:37

## 2021-01-01 RX ADMIN — HYDRALAZINE HYDROCHLORIDE 25 MG: 25 TABLET, FILM COATED ORAL at 18:32

## 2021-01-01 RX ADMIN — SODIUM CHLORIDE: 9 INJECTION, SOLUTION INTRAVENOUS at 02:18

## 2021-01-01 RX ADMIN — BARIUM SULFATE 10 ML: 400 PASTE ORAL at 11:43

## 2021-01-01 RX ADMIN — SODIUM CHLORIDE, PRESERVATIVE FREE 10 ML: 5 INJECTION INTRAVENOUS at 21:33

## 2021-01-01 RX ADMIN — SODIUM CHLORIDE: 9 INJECTION, SOLUTION INTRAVENOUS at 12:43

## 2021-01-01 RX ADMIN — HEPARIN SODIUM 5000 UNITS: 5000 INJECTION INTRAVENOUS; SUBCUTANEOUS at 09:53

## 2021-01-01 RX ADMIN — IPRATROPIUM BROMIDE AND ALBUTEROL SULFATE 1 AMPULE: .5; 3 SOLUTION RESPIRATORY (INHALATION) at 11:25

## 2021-01-01 RX ADMIN — SODIUM CHLORIDE, PRESERVATIVE FREE 10 ML: 5 INJECTION INTRAVENOUS at 21:34

## 2021-01-01 RX ADMIN — DOXYCYCLINE 100 MG: 100 INJECTION, POWDER, LYOPHILIZED, FOR SOLUTION INTRAVENOUS at 21:21

## 2021-01-01 RX ADMIN — HYDRALAZINE HYDROCHLORIDE 25 MG: 25 TABLET, FILM COATED ORAL at 15:31

## 2021-01-01 RX ADMIN — HYDRALAZINE HYDROCHLORIDE 25 MG: 25 TABLET, FILM COATED ORAL at 13:10

## 2021-01-01 RX ADMIN — HYDRALAZINE HYDROCHLORIDE 10 MG: 10 TABLET, FILM COATED ORAL at 21:17

## 2021-01-01 RX ADMIN — DOXYCYCLINE 100 MG: 100 INJECTION, POWDER, LYOPHILIZED, FOR SOLUTION INTRAVENOUS at 08:54

## 2021-01-01 RX ADMIN — HYDRALAZINE HYDROCHLORIDE 10 MG: 10 TABLET, FILM COATED ORAL at 05:38

## 2021-01-01 RX ADMIN — SODIUM CHLORIDE, PRESERVATIVE FREE 10 ML: 5 INJECTION INTRAVENOUS at 09:54

## 2021-01-01 RX ADMIN — GUAIFENESIN 600 MG: 600 TABLET, EXTENDED RELEASE ORAL at 21:33

## 2021-01-01 RX ADMIN — SODIUM CHLORIDE, PRESERVATIVE FREE 10 ML: 5 INJECTION INTRAVENOUS at 08:53

## 2021-01-01 RX ADMIN — INSULIN LISPRO 4 UNITS: 100 INJECTION, SOLUTION INTRAVENOUS; SUBCUTANEOUS at 08:35

## 2021-01-01 RX ADMIN — NITROGLYCERIN 5 MCG/MIN: 20 INJECTION INTRAVENOUS at 01:37

## 2021-01-01 RX ADMIN — ACETAMINOPHEN 650 MG: 325 TABLET ORAL at 17:07

## 2021-01-01 RX ADMIN — INSULIN GLARGINE 10 UNITS: 100 INJECTION, SOLUTION SUBCUTANEOUS at 20:19

## 2021-01-01 RX ADMIN — SODIUM CHLORIDE, PRESERVATIVE FREE 10 ML: 5 INJECTION INTRAVENOUS at 20:20

## 2021-01-01 RX ADMIN — DOXYCYCLINE HYCLATE 100 MG: 100 TABLET, COATED ORAL at 21:54

## 2021-01-01 RX ADMIN — CARVEDILOL 12.5 MG: 6.25 TABLET, FILM COATED ORAL at 08:51

## 2021-01-01 RX ADMIN — SERTRALINE 50 MG: 50 TABLET, FILM COATED ORAL at 09:30

## 2021-01-01 RX ADMIN — ISOSORBIDE DINITRATE 10 MG: 10 TABLET ORAL at 21:33

## 2021-01-01 RX ADMIN — DOXYCYCLINE HYCLATE 100 MG: 100 TABLET, COATED ORAL at 21:33

## 2021-01-01 RX ADMIN — SODIUM CHLORIDE, PRESERVATIVE FREE 10 ML: 5 INJECTION INTRAVENOUS at 09:31

## 2021-01-01 RX ADMIN — SERTRALINE 50 MG: 50 TABLET, FILM COATED ORAL at 10:56

## 2021-01-01 RX ADMIN — HYDRALAZINE HYDROCHLORIDE 25 MG: 25 TABLET, FILM COATED ORAL at 21:33

## 2021-01-01 RX ADMIN — HYDRALAZINE HYDROCHLORIDE 25 MG: 25 TABLET, FILM COATED ORAL at 05:57

## 2021-01-01 RX ADMIN — INSULIN LISPRO 2 UNITS: 100 INJECTION, SOLUTION INTRAVENOUS; SUBCUTANEOUS at 12:04

## 2021-01-01 RX ADMIN — HEPARIN SODIUM 5000 UNITS: 5000 INJECTION INTRAVENOUS; SUBCUTANEOUS at 20:50

## 2021-01-01 RX ADMIN — ALBUTEROL SULFATE 2 PUFF: 90 AEROSOL, METERED RESPIRATORY (INHALATION) at 08:04

## 2021-01-01 RX ADMIN — GUAIFENESIN 600 MG: 600 TABLET, EXTENDED RELEASE ORAL at 08:51

## 2021-01-01 RX ADMIN — SODIUM CHLORIDE, PRESERVATIVE FREE 10 ML: 5 INJECTION INTRAVENOUS at 10:02

## 2021-01-01 RX ADMIN — INSULIN LISPRO 2 UNITS: 100 INJECTION, SOLUTION INTRAVENOUS; SUBCUTANEOUS at 17:23

## 2021-01-01 RX ADMIN — BUMETANIDE 2 MG: 0.25 INJECTION, SOLUTION INTRAMUSCULAR; INTRAVENOUS at 15:07

## 2021-01-01 RX ADMIN — HYDRALAZINE HYDROCHLORIDE 25 MG: 25 TABLET, FILM COATED ORAL at 14:29

## 2021-01-01 RX ADMIN — CEFTRIAXONE SODIUM 1000 MG: 1 INJECTION, POWDER, FOR SOLUTION INTRAMUSCULAR; INTRAVENOUS at 10:21

## 2021-01-01 RX ADMIN — BARIUM SULFATE 10 ML: 0.81 POWDER, FOR SUSPENSION ORAL at 11:44

## 2021-01-01 RX ADMIN — CARVEDILOL 12.5 MG: 6.25 TABLET, FILM COATED ORAL at 16:35

## 2021-01-01 RX ADMIN — DOXYCYCLINE 100 MG: 100 INJECTION, POWDER, LYOPHILIZED, FOR SOLUTION INTRAVENOUS at 09:57

## 2021-01-01 RX ADMIN — HYDROCODONE BITARTRATE AND ACETAMINOPHEN 1 TABLET: 5; 325 TABLET ORAL at 20:34

## 2021-01-01 RX ADMIN — INSULIN GLARGINE 10 UNITS: 100 INJECTION, SOLUTION SUBCUTANEOUS at 21:34

## 2021-01-01 RX ADMIN — HEPARIN SODIUM 5000 UNITS: 5000 INJECTION INTRAVENOUS; SUBCUTANEOUS at 08:51

## 2021-01-01 RX ADMIN — SODIUM CHLORIDE, PRESERVATIVE FREE 10 ML: 5 INJECTION INTRAVENOUS at 08:51

## 2021-01-01 RX ADMIN — IPRATROPIUM BROMIDE AND ALBUTEROL SULFATE 1 AMPULE: .5; 3 SOLUTION RESPIRATORY (INHALATION) at 15:59

## 2021-01-01 RX ADMIN — DEXAMETHASONE SODIUM PHOSPHATE 6 MG: 4 INJECTION, SOLUTION INTRA-ARTICULAR; INTRALESIONAL; INTRAMUSCULAR; INTRAVENOUS; SOFT TISSUE at 06:17

## 2021-01-01 RX ADMIN — CARVEDILOL 12.5 MG: 6.25 TABLET, FILM COATED ORAL at 08:50

## 2021-01-01 RX ADMIN — SODIUM CHLORIDE 25 ML: 9 INJECTION, SOLUTION INTRAVENOUS at 09:58

## 2021-01-01 RX ADMIN — INSULIN GLARGINE 10 UNITS: 100 INJECTION, SOLUTION SUBCUTANEOUS at 20:27

## 2021-01-01 RX ADMIN — DEXAMETHASONE 6 MG: 4 TABLET ORAL at 09:29

## 2021-01-01 RX ADMIN — HYDRALAZINE HYDROCHLORIDE 25 MG: 25 TABLET, FILM COATED ORAL at 05:51

## 2021-01-01 RX ADMIN — DOXYCYCLINE 100 MG: 100 INJECTION, POWDER, LYOPHILIZED, FOR SOLUTION INTRAVENOUS at 08:46

## 2021-01-01 RX ADMIN — ENOXAPARIN SODIUM 30 MG: 100 INJECTION SUBCUTANEOUS at 08:52

## 2021-01-01 RX ADMIN — HEPARIN SODIUM 5000 UNITS: 5000 INJECTION INTRAVENOUS; SUBCUTANEOUS at 20:31

## 2021-01-01 RX ADMIN — BUMETANIDE 2 MG: 0.25 INJECTION, SOLUTION INTRAMUSCULAR; INTRAVENOUS at 01:32

## 2021-01-01 RX ADMIN — INSULIN LISPRO 4 UNITS: 100 INJECTION, SOLUTION INTRAVENOUS; SUBCUTANEOUS at 08:40

## 2021-01-01 RX ADMIN — SERTRALINE 50 MG: 50 TABLET, FILM COATED ORAL at 08:51

## 2021-01-01 RX ADMIN — CARVEDILOL 12.5 MG: 6.25 TABLET, FILM COATED ORAL at 18:32

## 2021-01-01 RX ADMIN — INSULIN GLARGINE 18 UNITS: 100 INJECTION, SOLUTION SUBCUTANEOUS at 21:33

## 2021-01-01 RX ADMIN — CARVEDILOL 12.5 MG: 6.25 TABLET, FILM COATED ORAL at 16:42

## 2021-01-01 RX ADMIN — POTASSIUM CHLORIDE 40 MEQ: 20 TABLET, EXTENDED RELEASE ORAL at 15:06

## 2021-01-01 RX ADMIN — INSULIN LISPRO 6 UNITS: 100 INJECTION, SOLUTION INTRAVENOUS; SUBCUTANEOUS at 12:25

## 2021-01-01 RX ADMIN — ALBUTEROL SULFATE 2 PUFF: 90 AEROSOL, METERED RESPIRATORY (INHALATION) at 09:18

## 2021-01-01 RX ADMIN — CARVEDILOL 12.5 MG: 6.25 TABLET, FILM COATED ORAL at 17:17

## 2021-01-01 RX ADMIN — CARVEDILOL 12.5 MG: 6.25 TABLET, FILM COATED ORAL at 17:05

## 2021-01-01 RX ADMIN — SODIUM CHLORIDE: 9 INJECTION, SOLUTION INTRAVENOUS at 06:06

## 2021-01-01 RX ADMIN — SODIUM CHLORIDE, PRESERVATIVE FREE 10 ML: 5 INJECTION INTRAVENOUS at 09:53

## 2021-01-01 RX ADMIN — INSULIN LISPRO 6 UNITS: 100 INJECTION, SOLUTION INTRAVENOUS; SUBCUTANEOUS at 12:35

## 2021-01-01 RX ADMIN — FUROSEMIDE 80 MG: 10 INJECTION, SOLUTION INTRAMUSCULAR; INTRAVENOUS at 08:50

## 2021-01-01 RX ADMIN — HEPARIN SODIUM 5000 UNITS: 5000 INJECTION INTRAVENOUS; SUBCUTANEOUS at 10:02

## 2021-01-01 RX ADMIN — DOXYCYCLINE 100 MG: 100 INJECTION, POWDER, LYOPHILIZED, FOR SOLUTION INTRAVENOUS at 21:57

## 2021-01-01 RX ADMIN — SODIUM CHLORIDE, PRESERVATIVE FREE 10 ML: 5 INJECTION INTRAVENOUS at 10:56

## 2021-01-01 RX ADMIN — ALBUTEROL SULFATE 2 PUFF: 90 AEROSOL, METERED RESPIRATORY (INHALATION) at 18:50

## 2021-01-01 RX ADMIN — INSULIN GLARGINE 18 UNITS: 100 INJECTION, SOLUTION SUBCUTANEOUS at 20:51

## 2021-01-01 RX ADMIN — DOXYCYCLINE 100 MG: 100 INJECTION, POWDER, LYOPHILIZED, FOR SOLUTION INTRAVENOUS at 21:20

## 2021-01-01 RX ADMIN — DEXAMETHASONE SODIUM PHOSPHATE 6 MG: 4 INJECTION, SOLUTION INTRA-ARTICULAR; INTRALESIONAL; INTRAMUSCULAR; INTRAVENOUS; SOFT TISSUE at 05:40

## 2021-01-01 RX ADMIN — FAMOTIDINE 20 MG: 20 TABLET ORAL at 09:53

## 2021-01-01 RX ADMIN — TAMSULOSIN HYDROCHLORIDE 0.4 MG: 0.4 CAPSULE ORAL at 12:01

## 2021-01-01 RX ADMIN — DOXYCYCLINE 100 MG: 100 INJECTION, POWDER, LYOPHILIZED, FOR SOLUTION INTRAVENOUS at 10:00

## 2021-01-01 RX ADMIN — FAMOTIDINE 20 MG: 20 TABLET ORAL at 18:50

## 2021-01-01 RX ADMIN — HYDRALAZINE HYDROCHLORIDE 25 MG: 25 TABLET, FILM COATED ORAL at 05:26

## 2021-01-01 RX ADMIN — GUAIFENESIN 600 MG: 600 TABLET, EXTENDED RELEASE ORAL at 09:53

## 2021-01-01 RX ADMIN — HYDRALAZINE HYDROCHLORIDE 50 MG: 50 TABLET ORAL at 23:28

## 2021-01-01 RX ADMIN — HEPARIN SODIUM 5000 UNITS: 5000 INJECTION INTRAVENOUS; SUBCUTANEOUS at 09:30

## 2021-01-01 RX ADMIN — INSULIN LISPRO 4 UNITS: 100 INJECTION, SOLUTION INTRAVENOUS; SUBCUTANEOUS at 17:45

## 2021-01-01 RX ADMIN — HYDRALAZINE HYDROCHLORIDE 25 MG: 25 TABLET, FILM COATED ORAL at 20:50

## 2021-01-01 RX ADMIN — BARICITINIB 1 MG: 2 TABLET, FILM COATED ORAL at 08:50

## 2021-01-01 RX ADMIN — ISOSORBIDE DINITRATE 10 MG: 10 TABLET ORAL at 09:29

## 2021-01-01 RX ADMIN — SODIUM CHLORIDE, PRESERVATIVE FREE 10 ML: 5 INJECTION INTRAVENOUS at 20:28

## 2021-01-01 RX ADMIN — INSULIN LISPRO 2 UNITS: 100 INJECTION, SOLUTION INTRAVENOUS; SUBCUTANEOUS at 08:00

## 2021-01-01 RX ADMIN — CARVEDILOL 12.5 MG: 6.25 TABLET, FILM COATED ORAL at 17:26

## 2021-01-01 RX ADMIN — GUAIFENESIN 600 MG: 600 TABLET, EXTENDED RELEASE ORAL at 20:50

## 2021-01-01 RX ADMIN — CEFTRIAXONE SODIUM 1000 MG: 1 INJECTION, POWDER, FOR SOLUTION INTRAMUSCULAR; INTRAVENOUS at 09:25

## 2021-01-01 RX ADMIN — IPRATROPIUM BROMIDE AND ALBUTEROL SULFATE 1 AMPULE: .5; 3 SOLUTION RESPIRATORY (INHALATION) at 19:54

## 2021-01-01 RX ADMIN — SODIUM CHLORIDE, PRESERVATIVE FREE 10 ML: 5 INJECTION INTRAVENOUS at 20:33

## 2021-01-01 RX ADMIN — ISOSORBIDE DINITRATE 10 MG: 10 TABLET ORAL at 08:51

## 2021-01-01 RX ADMIN — INSULIN LISPRO 4 UNITS: 100 INJECTION, SOLUTION INTRAVENOUS; SUBCUTANEOUS at 11:47

## 2021-01-01 RX ADMIN — CEFTRIAXONE SODIUM 1000 MG: 1 INJECTION, POWDER, FOR SOLUTION INTRAMUSCULAR; INTRAVENOUS at 09:59

## 2021-01-01 RX ADMIN — INSULIN LISPRO 10 UNITS: 100 INJECTION, SOLUTION INTRAVENOUS; SUBCUTANEOUS at 17:30

## 2021-01-01 RX ADMIN — ENOXAPARIN SODIUM 30 MG: 100 INJECTION SUBCUTANEOUS at 08:51

## 2021-01-01 RX ADMIN — ALBUTEROL SULFATE 2 PUFF: 90 AEROSOL, METERED RESPIRATORY (INHALATION) at 08:06

## 2021-01-01 RX ADMIN — ISOSORBIDE DINITRATE 10 MG: 10 TABLET ORAL at 09:53

## 2021-01-01 ASSESSMENT — PAIN DESCRIPTION - DESCRIPTORS
DESCRIPTORS: DISCOMFORT
DESCRIPTORS: CONSTANT;ACHING

## 2021-01-01 ASSESSMENT — ENCOUNTER SYMPTOMS
ABDOMINAL DISTENTION: 0
ABDOMINAL PAIN: 0
NAUSEA: 0
SHORTNESS OF BREATH: 1
COUGH: 1
SHORTNESS OF BREATH: 1
BACK PAIN: 0
VOMITING: 0
COUGH: 0
VOMITING: 0
ABDOMINAL DISTENTION: 0
COUGH: 0
BACK PAIN: 0
SHORTNESS OF BREATH: 1
SHORTNESS OF BREATH: 1
ABDOMINAL DISTENTION: 0
COUGH: 0
ABDOMINAL PAIN: 0

## 2021-01-01 ASSESSMENT — PAIN SCALES - GENERAL
PAINLEVEL_OUTOF10: 0
PAINLEVEL_OUTOF10: 8
PAINLEVEL_OUTOF10: 0
PAINLEVEL_OUTOF10: 5
PAINLEVEL_OUTOF10: 0
PAINLEVEL_OUTOF10: 0
PAINLEVEL_OUTOF10: 6
PAINLEVEL_OUTOF10: 0
PAINLEVEL_OUTOF10: 3
PAINLEVEL_OUTOF10: 0
PAINLEVEL_OUTOF10: 3
PAINLEVEL_OUTOF10: 0
PAINLEVEL_OUTOF10: 8
PAINLEVEL_OUTOF10: 0

## 2021-01-01 ASSESSMENT — PAIN DESCRIPTION - PROGRESSION
CLINICAL_PROGRESSION: GRADUALLY WORSENING
CLINICAL_PROGRESSION: GRADUALLY IMPROVING
CLINICAL_PROGRESSION: GRADUALLY IMPROVING
CLINICAL_PROGRESSION: NOT CHANGED

## 2021-01-01 ASSESSMENT — PAIN DESCRIPTION - FREQUENCY
FREQUENCY: INTERMITTENT

## 2021-01-01 ASSESSMENT — PAIN DESCRIPTION - ORIENTATION
ORIENTATION: MID;OUTER
ORIENTATION: MID;OUTER
ORIENTATION: MID;LOWER

## 2021-01-01 ASSESSMENT — PAIN - FUNCTIONAL ASSESSMENT
PAIN_FUNCTIONAL_ASSESSMENT: PREVENTS OR INTERFERES SOME ACTIVE ACTIVITIES AND ADLS

## 2021-01-01 ASSESSMENT — PAIN DESCRIPTION - PAIN TYPE
TYPE: ACUTE PAIN

## 2021-01-01 ASSESSMENT — PAIN DESCRIPTION - LOCATION
LOCATION: GENERALIZED
LOCATION: BUTTOCKS

## 2021-01-01 ASSESSMENT — PAIN DESCRIPTION - ONSET
ONSET: ON-GOING
ONSET: GRADUAL
ONSET: ON-GOING
ONSET: ON-GOING

## 2021-01-13 RX ORDER — FERROUS SULFATE 325(65) MG
325 TABLET ORAL 2 TIMES DAILY
Qty: 180 TABLET | Refills: 3 | Status: SHIPPED | OUTPATIENT
Start: 2021-01-13

## 2021-03-03 NOTE — PROGRESS NOTES
[facility]    [facility], [address]   [facility], [address]  [phone]  Progress Note  3/3/2021 10:33 AM        Pt Name:    Paddy Lehman  Birthdate:    [birth year]  Primary Care Physician:  Enrique Cabrales     Chief Complaint:   Chief Complaint   Patient presents with    Follow-up    Chronic Kidney Disease     CKD Stage IV        Background Information/Interval History:   72 yo white male with hx HTN, CKD IV, CAD s/p CABG, MVR, DM, hx smoking who is here for follow-up. He was very ill in 2018 and early 2019. Patient had cardiac arrest s/p intervention complicated by FATEMEH requiring dialysis 2018/2019. He subsequently had another cardiac arrest and had CABG on urgent basis along with MVR. He continued on dialysis (was started Dec 2018) until Feb 2019. Also followed with urology closely for BPH and urinary retention. He had chronic posey catheter which was subsequently removed per urology     Here for 6 months follow-up. Follows with me for CKD management. Has been taking lisinopril but ran out of it. BP today is high. At home BP was in 185/80. He is taking coreg, flomax, K-dur 40 meq po am, 20 meq pm.  Also taking bumex 2 mg po BID. On metolazone twice weekly. Following hematology/oncology for paraproteinemia evaluation. Has chronic shortness of breath. No chest pain. No leg swelling.       Past History:  Past Medical History:   Diagnosis Date    CAD (coronary artery disease)     CHF (congestive heart failure) (HCC)     Chronic kidney disease     History of blood transfusion     Hyperlipidemia     Hypertension     Proteinuria     Type II or unspecified type diabetes mellitus without mention of complication, not stated as uncontrolled      Past Surgical History:   Procedure Laterality Date    CARDIAC SURGERY      [facility]  [facility] N/A 1/28/2019    CABG X3 MVR / MAZE performed by Jesse Yuen MD at [facility] MARROW BIOPSY  9/8/2020    CT BONE MARROW BIOPSY 9/8/2020 [facility] CT SCAN    TONSILLECTOMY      UPPER GASTROINTESTINAL ENDOSCOPY N/A 1/21/2019    EGD DIAGNOSTIC ONLY performed by Checo Painting MD at [facility]        VITALS:  BP (!) 172/70 (Site: Right Upper Arm, Position: Sitting, Cuff Size: Large Adult)   Pulse 68   Temp 97.3 °F (36.3 °C)   Wt 175 lb (79.4 kg)   SpO2 98%   BMI 24.41 kg/m²   Wt Readings from Last 3 Encounters:   03/03/21 175 lb (79.4 kg)   12/15/20 178 lb (80.7 kg)   09/23/20 176 lb 3.2 oz (79.9 kg)     Body mass index is 24.41 kg/m².      General Appearance: alert and cooperative with exam, appears comfortable, no distress  Oral: moist oral mucus membranes  Neck: No jugular venous distention  Lungs: Air entry B/L, no crackles or rales, no use of accessory muscles  Heart: S1, S2 heard  GI: soft, non-tender, no guarding  Extremities: No sig LE edema     Medications:  Current Outpatient Medications   Medication Sig Dispense Refill    carvedilol (COREG) 12.5 MG tablet Take 1 tablet by mouth 2 times daily 180 tablet 1    ferrous sulfate (IRON 325) 325 (65 Fe) MG tablet Take 1 tablet by mouth 2 times daily 180 tablet 3    bumetanide (BUMEX) 2 MG tablet Take 1 tablet by mouth 2 times daily 180 tablet 3    potassium chloride (KLOR-CON M) 20 MEQ extended release tablet 2 tab in am and 1 in pm 300 tablet 3    atorvastatin (LIPITOR) 40 MG tablet Take 1 tablet by mouth daily 90 tablet 3    tamsulosin (FLOMAX) 0.4 MG capsule Take 1 capsule by mouth daily 90 capsule 3    metOLazone (ZAROXOLYN) 2.5 MG tablet Take 1 tab every Monday and Thursday AND as directed by clinic 60 tablet 2    isosorbide dinitrate (ISORDIL) 20 MG tablet Take 1 tablet by mouth 2 times daily 60 tablet 5    Insulin Glargine (LANTUS SC) Inject 18 mg into the skin      nitroGLYCERIN (NITROSTAT) 0.4 MG SL tablet Place 1 tablet under the tongue every 5 minutes as needed for Chest pain 25 tablet 1    timolol (TIMOPTIC) 0.5 % ophthalmic solution Place 1 drop into both eyes daily      Coenzyme Q10 (COQ-10) 50 MG CAPS Take by mouth daily       acetaminophen (TYLENOL) 325 MG tablet Take 650 mg by mouth every 4 hours as needed for Pain      BD ULTRA-FINE MICRO PEN NEEDLE 32G X 6 MM MISC       latanoprost (XALATAN) 0.005 % ophthalmic solution Place 1 drop into both eyes nightly       aspirin EC 81 MG EC tablet Take 1 tablet by mouth daily 30 tablet 3     No current facility-administered medications for this visit. Laboratory & Diagnostics:  9148: FF: R 10.5, L 10.1, 1.4 cm left kidney cyst  Creat 1.8, K 3.9, UACR ~ 500 mg/g  March 4 2019: Creat 1.9, K 4.5, Ca 7.8, Hb 8.2  April 2019: K 4.6, creat 1.9, ca 8.2, Hb 8.1  April 15, 2019: K 3.8, creat 1.5, Hb 9.3, ferritin ~ 200  May 2019: K 4.2, creat 2.1  June 2019: K 3.6, Hb 10.9  Aug 2019: K 3.7, creat 2.13    March 2020: K 3.8, creat 2.8, Hb 11.8  Aug 2020: K 4.3, Creat 2.64, Ca 8.9, Hb 11.9  Feb 2021: K 4.7, creat 2.85, phos 4.3, Ca 7.9, , Hb 11.7     Impression/Plan:   1. CKD IV:  CKD is multifactorial from diabetic nephropathy, ischemic injuries, chronic cardiorenal syndrome. Creat is 2.85 and overall stable. No indication to start RRT at this time. 2. Chronic systolic CHF/cardiomyopathy:  secondary to chronic systolic congestive heart failure and also has diastolic dysfunction. On bumex and metolazone. Euvolemic. Continue with diuretics. Overall weights are stable. Lost 3 pounds since December 2020  3. HTN: elevated. Continue with lopressor. Will refill lisinopril. BMP 2 weeks  4. Hypokalemia: due to diuretics, improved with replacement. Pt is finding it very hard to take potassium. Will reduce to K-dur 20 meq BID. Should improve with lisinopril. 5. Anemia in CKD. stable hemoglobin  6  Proteinuria secondary to diabetic nephropathy   7. Left kidney cyst, simple  8.  CAD s/p CABG recently  9. Monoclonal gammopathy: seeing oncology.      Plan of care reviewed with pt and wife. Advised to call in 2 weeks if no improvement in BP. Orders Placed This Encounter   Procedures    Basic Metabolic Panel    Protein / creatinine ratio, urine    Hemoglobin and Hematocrit, Blood    PTH, Intact    Basic Metabolic Panel     Return in about 6 months (around 9/3/2021).     Catia Allen MD  [facility]

## 2021-03-03 NOTE — PROGRESS NOTES
Patient didn't bring med list, says nothing has changed. Spouse says she was giving him lisinopril which you had taken him off of it due to bp was low, she gave it to him with the isosorbide. Patient states he has been SOB but he says he will has to take a break sometimes.

## 2021-03-11 NOTE — PROGRESS NOTES
[address] [facility] [address] [place] [address]  Dept: [phone]  Dept Fax: [phone]  Loc: [phone]    Visit Date: 3/11/2021    Mr. Bernice Pérez is a 70 y.o. male  who presented for:  Chief Complaint   Patient presents with    6 Month Follow-Up       HPI:   HPI   69 yo F s/p CABG/MVR/Maze 1/2019, ICM, RVSP 59 mmHg who presents for follow-up. BP improving. Seeing Dr. Feroz Perla for CKD. Taking all meds. No chest pain, no angina. Chronic SHANNON, that is relatively unchanged. Making urine daily. ECG with SR. Last Cr 2.85. He did have a cardiac arrest in the setting of CHF and MR/CAD. EF improved to 40-45%, 9/2020. no syncope. Leg pain when he walks. R>L. He has drop foot. No major pain in the calf or thighs. He thinks his walking distance is fine, but at times he gets numbness that makes him stop and then he has to rest to improve the symptoms. No ulcers or wounds.        Current Outpatient Medications:     lisinopril (PRINIVIL;ZESTRIL) 10 MG tablet, Take 1 tablet by mouth daily, Disp: 90 tablet, Rfl: 1    potassium chloride (KLOR-CON M) 20 MEQ extended release tablet, Take 1 tablet by mouth 2 times daily, Disp: 60 tablet, Rfl: 5    carvedilol (COREG) 12.5 MG tablet, Take 1 tablet by mouth 2 times daily, Disp: 180 tablet, Rfl: 1    ferrous sulfate (IRON 325) 325 (65 Fe) MG tablet, Take 1 tablet by mouth 2 times daily, Disp: 180 tablet, Rfl: 3    bumetanide (BUMEX) 2 MG tablet, Take 1 tablet by mouth 2 times daily, Disp: 180 tablet, Rfl: 3    atorvastatin (LIPITOR) 40 MG tablet, Take 1 tablet by mouth daily, Disp: 90 tablet, Rfl: 3    tamsulosin (FLOMAX) 0.4 MG capsule, Take 1 capsule by mouth daily, Disp: 90 capsule, Rfl: 3    metOLazone (ZAROXOLYN) 2.5 MG tablet, Take 1 tab every Monday and Thursday AND as directed by clinic, Disp: 60 tablet, Rfl: 2    isosorbide dinitrate (ISORDIL) 20 MG tablet, Take 1 tablet by mouth 2 times daily, Disp: 60 tablet, Rfl: 5    Insulin Glargine (LANTUS SC), Inject 18 mg into the skin, Disp: , Rfl:     nitroGLYCERIN (NITROSTAT) 0.4 MG SL tablet, Place 1 tablet under the tongue every 5 minutes as needed for Chest pain, Disp: 25 tablet, Rfl: 1    timolol (TIMOPTIC) 0.5 % ophthalmic solution, Place 1 drop into both eyes daily, Disp: , Rfl:     Coenzyme Q10 (COQ-10) 50 MG CAPS, Take by mouth daily , Disp: , Rfl:     acetaminophen (TYLENOL) 325 MG tablet, Take 650 mg by mouth every 4 hours as needed for Pain, Disp: , Rfl:     BD ULTRA-FINE MICRO PEN NEEDLE 32G X 6 MM MISC, , Disp: , Rfl:     latanoprost (XALATAN) 0.005 % ophthalmic solution, Place 1 drop into both eyes nightly , Disp: , Rfl:     aspirin EC 81 MG EC tablet, Take 1 tablet by mouth daily, Disp: 30 tablet, Rfl: 3    Past Medical History  Kelly Bowling  has a past medical history of CAD (coronary artery disease), CHF (congestive heart failure) ([facility] [address] Chronic kidney disease, History of blood transfusion, Hyperlipidemia, Hypertension, Proteinuria, and Type II or unspecified type diabetes mellitus without mention of complication, not stated as uncontrolled. Social History  Kelly Bowling  reports that he quit smoking about 45 years ago. His smoking use included cigarettes. He has a 2.50 pack-year smoking history. He has quit using smokeless tobacco. He reports that he does not drink alcohol. Family History  Kelly Bowling family history includes Cancer in his maternal grandmother and mother; Diabetes in his father and mother; Heart Disease in his father. There is no family history of bicuspid aortic valve, aneurysms, heart transplant, pacemakers, defibrillators, or sudden cardiac death.       Past Surgical History   Past Surgical History:   Procedure Laterality Date    CARDIAC SURGERY      COLONOSCOPY  2019    [facility] CORONARY ARTERY BYPASS GRAFT N/A 1/28/2019    CABG X3 MVR / MAZE performed by Ro Roger MD at [address] [place] [address]  BIOPSY  9/8/2020    CT BONE MARROW BIOPSY 9/8/2020 [facility] CT SCAN    TONSILLECTOMY      UPPER GASTROINTESTINAL ENDOSCOPY N/A 1/21/2019    EGD DIAGNOSTIC ONLY performed by Evelyn Walker MD at [address] [facility]       Review of Systems   Constitutional: Negative for chills and fever  HENT: Negative for congestion, sinus pressure, sneezing and sore throat. Eyes: Negative for pain, discharge, redness and itching. Respiratory: Negative for apnea, cough  Gastrointestinal: Negative for blood in stool, constipation, diarrhea   Endocrine: Negative for cold intolerance, heat intolerance, polydipsia. Genitourinary: Negative for dysuria, enuresis, flank pain and hematuria. Musculoskeletal: Negative for arthralgias, joint swelling and neck pain. Neurological: Negative for numbness and headaches. Psychiatric/Behavioral: Negative for agitation, confusion, decreased concentration and dysphoric mood. Objective:     /68   Pulse 78   Ht 5' 11\" (1.803 m)   Wt 178 lb (80.7 kg)   BMI 24.83 kg/m²     Wt Readings from Last 3 Encounters:   03/11/21 178 lb (80.7 kg)   03/03/21 175 lb (79.4 kg)   12/15/20 178 lb (80.7 kg)     BP Readings from Last 3 Encounters:   03/11/21 118/68   03/03/21 (!) 172/70   12/15/20 122/61       Nursing note and vitals reviewed. Physical Exam   Constitutional: Oriented to person, place, and time. Appears well-developed and well-nourished. HENT:   Head: Normocephalic and atraumatic. Eyes: EOM are normal. Pupils are equal, round, and reactive to light. Neck: Normal range of motion. Neck supple. No JVD present. Cardiovascular: Normal rate, regular rhythm, normal heart sounds and intact distal pulses. No murmur heard. Pulmonary/Chest: Effort normal and breath sounds normal. No respiratory distress. No wheezes. No rales. Abdominal: Soft. Bowel sounds are normal. No distension. There is no tenderness. Musculoskeletal: Normal range of motion. No edema.    Neurological: Alert and oriented to person, place, and time. No cranial nerve deficit. Coordination normal.   Skin: Skin is warm and dry. Psychiatric: Normal mood and affect.        Lab Results   Component Value Date    CKTOTAL 28 01/18/2019       Lab Results   Component Value Date    WBC 5.6 12/15/2020    RBC 4.63 12/15/2020    HGB 11.7 02/25/2021    HCT 36.9 02/25/2021    MCV 86 12/15/2020    MCH 27.0 12/15/2020    MCHC 31.5 12/15/2020    RDW 16.0 12/15/2020     12/15/2020    MPV 9.8 12/15/2020       Lab Results   Component Value Date     02/25/2021    K 4.7 02/25/2021    K 4.0 01/23/2019    CL 99 02/25/2021    CO2 33 02/25/2021    BUN 70 02/25/2021    LABALBU 3.2 12/15/2020    CREATININE 2.85 02/25/2021    CALCIUM 7.9 02/25/2021    LABGLOM 22 02/25/2021    LABGLOM 27 01/13/2020    GLUCOSE 222 02/25/2021       Lab Results   Component Value Date    ALKPHOS 91 12/15/2020    ALT 18 12/15/2020    AST 17 12/15/2020    PROT 7.0 12/15/2020    BILITOT 0.4 12/15/2020    BILIDIR <0.2 12/15/2020    LABALBU 3.2 12/15/2020       Lab Results   Component Value Date    MG 2.2 12/02/2019       Lab Results   Component Value Date    INR 1.13 09/08/2020    INR 1.08 02/05/2019    INR 0.99 02/04/2019         No results found for: LABA1C    Lab Results   Component Value Date    TRIG 110 12/16/2019    HDL 40 12/16/2019    LDLCALC 72 12/16/2019       Lab Results   Component Value Date    TSH 3.290 01/20/2019         Testing Reviewed:      I have individually reviewed the cardiac test below:    ECHO:   Results for orders placed during the hospital encounter of 12/13/19   Echo 2D w doppler w color complete    Narrative Transthoracic Echocardiography Report (TTE)     Demographics      Patient Name  Piedad Kent Gender             Male      MR #          [id number]   Race                                               Ethnicity      Account #     [de-identified]   Room Number      Accession     [id number]   Date of Study      12/13/2019   Number Date of Birth 1945  Referring          Enrique Taylor                             Physician          Donato Mera CNP      Age           69 year(s)  Sonographer        ABBY Doherty, RDCS,                                                RDMS, RVT                                Interpreting       Mercy Abebe MD                             Physician     Procedure    Type of Study      TTE procedure:ECHOCARDIOGRAM COMPLETE 2D W DOPPLER W COLOR. Procedure Date  Date: 12/13/2019 Start: 03:29 PM    Study Location: Echo Lab  Technical Quality: Limited visualization due to body habitus. Indications:Congestive heart failure and Pleural effusion. Additional Medical History:coronary artery bypass grafting, Maze procedure,  mitral valve replacement, diabetes, hypertension, hyperlipidemia, exsmoker,  NSTEMI, history of cardiac arrest, chronic kidney disease, anemia, ischemic  cardiomyopathy, congestive heart failure    Patient Status: Routine    Height: 71 inches Weight: 159 pounds BSA: 1.91 m^2 BMI: 22.18 kg/m^2    BP: 144/60 mmHg    Allergies    - See Epic. Conclusions      Summary   Ejection fraction was estimated at 25-30%. Left atrial size was severely dilated. S/p MV replacement - probable bioprosthetic. DOPPLER: The transmitral   velocity was within the normal range with no evidence for mitral stenosis   (mean gradient 6 mmHg, V max 2.0 m/s, P1/2T = 60 ms). There was mild   mitral regurgitation. There was mild-moderate aortic regurgitation. There was mild tricuspid regurgitation. Assuming RAP = 5 mmHg, the   estimated RVSP = 59 mmHg. Signature      ----------------------------------------------------------------   Electronically signed by Mercy Abebe MD (Interpreting   physician) on 12/16/2019 at 05:36 PM   ----------------------------------------------------------------      Findings      Mitral Valve   S/p MV replacement - probable bioprosthetic.  DOPPLER: The transmitral velocity was within the normal range with no evidence for mitral stenosis   (mean gradient 6 mmHg, V max 2.0 m/s, P1/2T = 60 ms). There was mild   mitral regurgitation. Aortic Valve   The aortic valve was trileaflet with normal thickness and cuspal   separation. DOPPLER: Transaortic velocity was within the normal range with   no evidence of aortic stenosis. There was mild-moderate aortic   regurgitation. Tricuspid Valve   The tricuspid valve structure was normal with normal leaflet separation. DOPPLER: There was no evidence of tricuspid stenosis. There was mild   tricuspid regurgitation. Assuming RAP = 5 mmHg, the estimated RVSP = 59   mmHg. Pulmonic Valve   The pulmonic valve leaflets were not well seen. DOPPLER: The transpulmonic   velocity was within the normal range with no evidence for regurgitation. Left Atrium   Left atrial size was severely dilated. Left Ventricle   Normal left ventricular size and severely reduced systolic function. There was severe global hypokinesis. Mild concentric hypertrophy. Ejection fraction was estimated at 25-30%. Right Atrium   Right atrial size was normal.      Right Ventricle   The right ventricular size was normal with normal systolic function and   wall thickness. Pericardial Effusion   The pericardium was normal in appearance with no evidence of a pericardial   effusion. Pleural Effusion   No evidence of pleural effusion. Aorta / Great Vessels   IVC size is within normal limits with normal respiratory phasic changes.      M-Mode/2D Measurements & Calculations      LV Diastolic   LV Systolic Dimension:    AV Cusp Separation: 1.6 cmLA   Dimension: 4.5 4.1 cm                    Dimension: 4.2 cmAO Root   cm             LV Volume Diastolic: 61.5 Dimension: 3 cmLA Area: 26.9 cm^2   LV FS:8.9 %    ml   LV PW          LV Volume Systolic: 88.5   Diastolic: 1.3 ml   cm             LV EDV/LV EDV Index: 59.8 RV Diastolic Dimension: 2.3 diet/exercise/BP/weight loss/health lifestyle choices/lipids; the patient understands the goals and will try to comply.       Disposition:  1 year    Electronically signed by Dipak Bowling MD   3/11/2021 at 8:38 AM EST

## 2021-03-16 NOTE — PROGRESS NOTES
[facility] CANCER CENTER  CANCER NETWORK OF [facility]  [facility] [address] [facility] 98  [address] [facility] [address]  Dept: [phone]  Dept Fax: [phone]  Loc: [phone]     Subjective:      Chief Complaint:  Winsome Dietrich is a 70 y.o. with abnormal immunoglobulin levels. On November 6, 2019 the patient had a serum protein electrophoresis performed. This found an M spike with additional restrictions in the gamma region. The monoclonal protein peak accounted for 0.97 g/dL of a total of 1.61 g/dL of a protein in the gamma region. Immunoglobulin electrophoresis gel pattern found an IgG type kappa monoclonal protein with additional faint bands in the IgM kappa and lambda region. In addition, the total gammaglobulin level was 1.81 g/dL. There is a mild elevation of the total gammaglobulin. On September 8, 2020 the patient underwent a bone marrow aspiration and biopsy. Surgical pathology confirmed a cellular marrow at 20% with trilineage hematopoiesis noted. Two percent of plasma cells were noted without any evidence of monoclonality. Adequate iron stores were also identified. The results were reviewed with the patient today. Well the 2% plasma cells were identified via aspirate differential there was no evidence of monoclonality as present by in situ hybridization for kappa or lambda RNA. The patient does continue to have a significantly elevated serum kappa free light chain. Given that there is no evidence of monoclonality in this bone marrow the free light change may possibly be related to a plasmacytoma, lymphoma producing light change including the potential lymphoblastic lymphoma. HPI:     Claudette Chavis returns today for follow-up regarding his history of monoclonal gammopathy. He also has noted leukopenia and chronic anemia. The patient previously has had a bone marrow biopsy that did not find evidence of myeloma.   He has both an IgG and IgM monoclonal protein as well as a significant elevation of his free kappa light chains. The patient also has multiple medical conditions and is followed by multiple specialist.  He has a significant history of cardiovascular disease. On today's evaluation he states that his general sense of wellbeing has been fairly stable. He continues to complain of generalized weakness and fatigue. His hemoglobin hematocrit remain low but stable. He does not have any evidence of blood loss. The patient's leukopenia is also mild in nature. He denies fever, cough, shortness of breath or other signs of infection. Laboratory studies from today were reviewed with the patient. His ECOG performance status is level 1, primarily limited by his cardiovascular disease. PMH, SH, and FH:  I reviewed the patients medication list and allergy list as noted on the electronic medical record. The PMH, SH and FH were also reviewed as noted on the EMR. Review of Systems  Constitutional: Fatigue. HENT: Negative. Eyes: Negative. Respiratory: Negative. Cardiovascular: Negative. Gastrointestinal: Negative. Genitourinary: Negative. Musculoskeletal: Negative. Skin: Negative. Neurological: General weakness. Hematological: Negative. Psychiatric/Behavioral: Negative. Objective:   Physical Exam  Vitals:    03/16/21 1053   BP: (!) 141/64   Pulse: 68   Resp: 20   Temp: 97.5 °F (36.4 °C)   SpO2: 96%   Vitals reviewed and are stable. Constitutional: Frail. No acute distress. HENT: Normocephalic and atraumatic. Eyes: Pupils appear equal and reactive. Neck: Overall appearance is symmetrical. No identifiable masses. Pulmonary: Effort normal. No respiratory distress. .  Neurological: Alert and oriented to person, place, and time. Judgment and thought content normal.  Skin: Skin is warm and dry. No rash. Psychiatric: Mood and affect appropriate for the clinical situation.      Data Analysis:    Hematology 3/16/2021 2/25/2021 12/15/2020   WBC 4.7 (L)  5.6   RBC 4.25 (L)  4.63 (L)   HGB 12.0 (L) 11.7 (A) 12.5 (L)   HCT 38.3 (L) 36.9 (A) 39.7 (L)   MCV 90  86   RDW 15.3 (H)  16.0 (H)     162     SPEP/SHERYL Interpretation                  M-spikes with an additional slight restriction in the gamma   region.  The monoclonal protein peaks account for 0.22 g/dL   and 1.06 g/dL, respectively, of the total 1.78 g/dL of   protein in the gamma region.  SHERYL gel pattern shows IgG   type kappa and IgM type kappa biclonal proteins with   additional faint bands in IgM lambda and lambda. Kappa Qnt Free Light Chains                  700.51 H   3.30-19.40    mg/L   Lambda Qnt Free Light Chains                  36.43 H   5.71-26.30    mg/L   Kappa/Lambda Free Light Chain Ratio          19.23 H   0.26-1.65     Assessment:   1. Monoclonal gammopathy. 2.  IgG kappa monoclonal protein. 3.  Elevated free kappa light chain. 4.  Chronic anemia. 5.  Leukopenia    Plan:   1. Continue to monitor for any of his development of malignancy such as plasmacytoma or lymphoma. 2.  Monitor hemoglobin hematocrit and for any evidence of blood loss. 3.  Monitor IgG kappa monoclonal protein as well as kappa free light chain. 4.  Monitor total WBC count and for signs of infection/fever. Multiple questions were answered throughout the course the consultation. By the end of the consultation, all the patient's questions had been answered. The patient was asked to call if there were any questions or concerns prior to the next scheduled clinic visit. Rosa Ricci M.D.                                                                          Medical Director: [facility]  Cancer [address], [address] [place] [address], [address] [place] [address] [address] [place], [place] of the [facility] Marly ARREDONDO at the [facility]      **This report has been created using voice recognition software. It may contain minor errors which are inherent in voice recognition technology. **

## 2021-03-18 NOTE — TELEPHONE ENCOUNTER
Attempted to call the patient. After checking HIPPA, message left stating microscopic urinalysis was negative for microscopic blood.

## 2021-04-20 NOTE — PROGRESS NOTES
Heart Failure Clinic       Visit Date: 4/20/2021  Cardiologist:  Dr. Gayatri Fischer  Primary Care Physician: Dr. Bolivar Rosa is a 78 y.o. male who presents today for:  Chief Complaint   Patient presents with    Congestive Heart Failure       HPI:   Vipul Carrera is a 78 y.o. male who presents to the office for a follow up patient visit in the heart failure clinic. Accompanied by wife, Chase Lim    HF: ICM, HFrEF (EF 25% 12/2019, down from 35-40%), RVSP 59  Device: no  HX: Cardiac arrest in 12/2018, 1/2019 - CABG x 3V, MV repair, MAZE- 1/28/19-Dr Dickey, DM, HTN, HLD, CKD III ([facility], did require Hemo x 2 months), Anemia, Former smoker (10 years total), MGUS (Dr Mateus Flores)     Dry wt:  165-170      Hospitalization r/t Heart Failure:   April 2019  Hx recurrent Pleural effusions = last tap 11/2019 (R side -1.8L off)  12/2019 = ECHO shows drop in EF 25-30% (from 35-40%).    1/2020 = saw Gayatri Fischer, recommended LHC/RHC d/t declined EF, cant guarantee wont end up on dialysis - pt refused. Pt refused [facility].    Concerns today: Increased SHANNON/SOB - has not been taking Metolazone routinely. Was on 2x/week - has maybe been taking once weekly, \"if that\" per wife. He doesn't like that it makes him urinate so much. BPs elevated at home = checking sporadically  Increased cough  Activity: wheelchair - can get around house ok. Not been as active w/ Covid and winter months   Diet: not watching as well as he was = some processed/frozen food.     Patient has:  Chest Pain: no  SOB: yes   Orthopnea/PND: sleeps in chair  AMBREEN: no  Edema: no  Fatigue: yes  Abdominal bloating: mild   Cough: yes   Appetite: fair  Any extra diuretic use: no  Weight gain: yes  Home weight: slow gain  Home blood pressure: 140-160s    Past Medical History:   Diagnosis Date    CAD (coronary artery disease)     CHF (congestive heart failure) (HCC)     Chronic kidney disease     History of blood transfusion     Hyperlipidemia     Hypertension  Proteinuria     Type II or unspecified type diabetes mellitus without mention of complication, not stated as uncontrolled      Past Surgical History:   Procedure Laterality Date    CARDIAC SURGERY      COLONOSCOPY      at [facility] CORONARY ARTERY BYPASS GRAFT N/A 2019    CABG X3 MVR / MAZE performed by Milagros Reyes MD at [address]  2020    CT BONE MARROW BIOPSY 2020 [facility] CT SCAN    TONSILLECTOMY      UPPER GASTROINTESTINAL ENDOSCOPY N/A 2019    EGD DIAGNOSTIC ONLY performed [facility]jo Hunter [facility]     Family History   Problem Relation Age of Onset    Cancer Mother         breast    Diabetes Mother     Heart Disease Father     Diabetes Father     Cancer Maternal Grandmother         leukemia     Social History     Tobacco Use    Smoking status: Former Smoker     Packs/day: 0.25     Years: 10.00     Pack years: 2.50     Types: Cigarettes     Quit date: 1976     Years since quittin.2    Smokeless tobacco: Former User   Substance Use Topics    Alcohol use: No     Alcohol/week: 0.0 standard drinks     Current Outpatient Medications   Medication Sig Dispense Refill    tamsulosin (FLOMAX) 0.4 MG capsule Take 1 capsule by mouth daily 90 capsule 3    potassium chloride (KLOR-CON M) 20 MEQ extended release tablet Take 1 tablet by mouth 2 times daily 180 tablet 3    carvedilol (COREG) 12.5 MG tablet Take 1 tablet by mouth 2 times daily 180 tablet 3    bumetanide (BUMEX) 2 MG tablet Take 1 tablet by mouth 2 times daily 180 tablet 3    atorvastatin (LIPITOR) 40 MG tablet Take 1 tablet by mouth daily 90 tablet 3    isosorbide dinitrate (ISORDIL) 20 MG tablet Take 1 tablet by mouth 2 times daily 180 tablet 3    nitroGLYCERIN (NITROSTAT) 0.4 MG SL tablet Place 1 tablet under the tongue every 5 minutes as needed for Chest pain 25 tablet 3    lisinopril (PRINIVIL;ZESTRIL) 10 MG tablet Take 1 tablet by mouth daily 90 tablet 1  ferrous sulfate (IRON 325) 325 (65 Fe) MG tablet Take 1 tablet by mouth 2 times daily 180 tablet 3    metOLazone (ZAROXOLYN) 2.5 MG tablet Take 1 tab every Monday and Thursday AND as directed by clinic 60 tablet 2    Insulin Glargine (LANTUS SC) Inject 18 Units into the skin       timolol (TIMOPTIC) 0.5 % ophthalmic solution Place 1 drop into both eyes daily      Coenzyme Q10 (COQ-10) 50 MG CAPS Take by mouth daily       aspirin EC 81 MG EC tablet Take 1 tablet by mouth daily 30 tablet 3    acetaminophen (TYLENOL) 325 MG tablet Take 650 mg by mouth every 4 hours as needed for Pain      BD ULTRA-FINE MICRO PEN NEEDLE 32G X 6 MM MISC       latanoprost (XALATAN) 0.005 % ophthalmic solution Place 1 drop into both eyes nightly        No current facility-administered medications for this visit. Allergies   Allergen Reactions    Atorvastatin      Other reaction(s): Myalgia, ON NURSING HOME REPORT       SUBJECTIVE:   Review of Systems   Constitutional: Positive for fatigue. Negative for activity change and appetite change. Respiratory: Positive for shortness of breath. Negative for cough. Cardiovascular: Negative for chest pain, palpitations and leg swelling. Gastrointestinal: Negative for abdominal distention. Neurological: Negative for weakness, light-headedness and headaches. Hematological: Negative for adenopathy. Psychiatric/Behavioral: Negative for sleep disturbance. OBJECTIVE:   Today's Vitals:  BP (!) 150/70   Pulse 73   Ht 5' 11\" (1.803 m)   Wt 184 lb 6.4 oz (83.6 kg)   SpO2 90%   BMI 25.72 kg/m²     Physical Exam  Vitals signs reviewed. Constitutional:       General: He is not in acute distress. Appearance: Normal appearance. He is well-developed. He is not diaphoretic. HENT:      Head: Normocephalic and atraumatic. Eyes:      Conjunctiva/sclera: Conjunctivae normal.   Neck:      Musculoskeletal: Normal range of motion and neck supple.       Comments: No JVD  Cardiovascular:      Rate and Rhythm: Normal rate and regular rhythm. Heart sounds: Normal heart sounds. No murmur. Pulmonary:      Effort: Pulmonary effort is normal. No respiratory distress. Breath sounds: Normal breath sounds. No wheezing or rales. Comments: Dim right base    Abdominal:      General: Bowel sounds are normal. There is no distension. Palpations: Abdomen is soft. Tenderness: There is no abdominal tenderness. Musculoskeletal: Normal range of motion. Right lower leg: No edema. Left lower leg: No edema. Skin:     General: Skin is warm and dry. Capillary Refill: Capillary refill takes less than 2 seconds. Neurological:      Mental Status: He is alert and oriented to person, place, and time. Coordination: Coordination normal.   Psychiatric:         Behavior: Behavior normal.       Wt Readings from Last 3 Encounters:   04/20/21 184 lb 6.4 oz (83.6 kg)   03/17/21 178 lb (80.7 kg)   03/16/21 178 lb 6.4 oz (80.9 kg)     BP Readings from Last 3 Encounters:   04/20/21 (!) 150/70   03/16/21 (!) 141/64   03/11/21 118/68     Pulse Readings from Last 3 Encounters:   04/20/21 73   03/16/21 68   03/11/21 78     Body mass index is 25.72 kg/m². [facility]: [facility]/2020 [facility]    12/2019 - EF 25-30%  5/2019 - 35-40%    CATH - 1/2019  = [facility] 1/28/19 [place])  SUMMARY:  Severe triple-vessel coronary artery disease; severe  cardiomyopathy; moderate-to-severe mitral regurgitation.     PLAN:  1.  Bed rest.  2.  Optimal medical therapy. 3.  Risk factor management. 4.  CT surgery consultation for CABG/MVR.  5.  Consideration for balloon pump given the fact that there is  significant iliac disease for hemodynamic support if necessary. 6.  May need ICU care/inotropic therapy.   7.  We will need to establish revascularization plan or a palliative  care option, so the patient not chose for further management.     All the above was explained to the patient and the patient's family in  detail. Pierre Garcia MD     D: 01/24/2019 15:29:39      Results reviewed:  BNP:   Lab Results   Component Value Date     10/17/2019     CBC:   Lab Results   Component Value Date    WBC 4.7 03/16/2021    RBC 4.25 03/16/2021    HGB 12.0 03/16/2021    HCT 38.3 03/16/2021     03/16/2021     CMP:    Lab Results   Component Value Date     03/23/2021    K 3.9 03/23/2021    K 4.0 01/23/2019    CL 99 03/23/2021    CO2 31 03/23/2021    BUN 63 03/23/2021    CREATININE 2.83 03/23/2021    LABGLOM 22 03/23/2021    LABGLOM 27 01/13/2020    GLUCOSE 131 03/23/2021    CALCIUM 8.4 03/23/2021     Hepatic Function Panel:    Lab Results   Component Value Date    ALKPHOS 95 03/16/2021    ALT 15 03/16/2021    AST 16 03/16/2021    PROT 7.7 03/16/2021    BILITOT 0.4 03/16/2021    BILIDIR <0.2 03/16/2021    LABALBU 3.2 03/16/2021     Magnesium:    Lab Results   Component Value Date    MG 2.2 12/02/2019     PT/INR:    Lab Results   Component Value Date    INR 1.13 09/08/2020     Lipids:    Lab Results   Component Value Date    TRIG 110 12/16/2019    HDL 40 12/16/2019    LDLCALC 72 12/16/2019       ASSESSMENT AND PLAN:   The patient's condition/symptoms are Stable      Diagnosis Orders   1. CHF (congestive heart failure), NYHA class III, chronic, systolic ([facility] [address]     2. Ischemic cardiomyopathy     3. Essential hypertension, benign     4. CKD (chronic kidney disease) stage 4, GFR 15-29 ml/min ([facility])       Continue:  GDMT:   ACE/ARB/ARNI - Lisinopril 10/day - consider changing to [facility], wife questioning, cost was issue in past   BB - Coreg 12.5 BID   Diuretic - Bumex 2 BID, Metolazone 2.5 M/Th  AA - none  SGLT - None  Vasodilator - Isordil 20 BID  Continue Current medications   Stable but worsening SOB/SHANNON, fatigue, cough  Get back on routine Metolazone (taking sporadically)   Take tomorrow and repeat Friday  Rediscussed salt/fluid - eating more processed foods.    Right lung diminished -= question pleural effusion - hx recurrent in past - he does not want CXR today - will try diuretics first.   Continue daily wts  Check BP daily - few hours after meds  Call w/ update next week  Labs reviewed - stable. Continue w/ Dr Nicole Sims   F/U in 3 months   F/U w/ Yamini Hopkins next March    Total visit time of 40 minutes has been spent with patient on education of symptoms, management, medication, and plan of care; as well as review of chart: labs, ECHO, radiology reports, etc.   I personally spent more then 50% of the appt time face to face with the patient    Tolerating above noted HF meds, no ill side effects noted. Will continue to monitor kidney function and electrolytes. Will optimize as tolerated. Pt is compliant w/ medications. · Daily weights  · Fluid restriction of 2 Liters per day  · Limit sodium in diet to around 3954-3249 mg/day  · Monitor BP  · Activity as tolerated     Patient was instructed to call the [facility] for any changes in symptoms as noted in AVS.      Return in about 3 months (around 7/20/2021). or sooner if needed     Patient given educational materials - see patient instructions. We discussed the importance of weighing oneself and recording daily. We also discussed the importance of a low sodium diet, higher sodium foods to avoid and better low sodium food options. Patient verbalizes understanding of plan of care using teach back method, and is agreeable to the treatment plan.        Electronically signed by SILVIA Hernandez CNP on 4/20/2021 at 3:30 PM

## 2021-04-20 NOTE — PATIENT INSTRUCTIONS
You may receive a survey regarding the care you received during your visit. Your input is valuable to us. We encourage you to complete and return your survey. We hope you will choose us in the future for your healthcare needs. Continue:  · Continue current medications  · Daily weights and record  · Fluid restriction of 2 Liters per day  · Limit sodium in diet to around 1435-8428 mg/day  · Monitor BP  · Activity as tolerated     Call the Heart Failure Clinic for any of the following symptoms: [phone]   Weight gain of 2-3 pounds in 1 day or 5 pounds in 1 week   Increased shortness of breath   Shortness of breath while laying down   Cough   Chest pain   Swelling in feet, ankles or legs   Tenderness or bloating in the abdomen   Fatigue    Decreased appetite or nausea    Confusion       Start back on Metolazone routine. Take tomorrow and repeat on Friday.

## 2021-07-12 NOTE — PROGRESS NOTES
Venipuncture obtained from right AC. Blood work obtained. IV Bumex 2mg given and K+ 40Meq Po given per order. Patient tolerated procedure without complications or complaints.

## 2021-07-12 NOTE — PATIENT INSTRUCTIONS
You may receive a survey regarding the care you received during your visit. Your input is valuable to us. We encourage you to complete and return your survey. We hope you will choose us in the future for your healthcare needs. Continue:  · Continue current medications  · Daily weights and record  · Fluid restriction of 2 Liters per day  · Limit sodium in diet to around 2347-6407 mg/day  · Monitor BP  · Activity as tolerated     Call the Heart Failure Clinic for any of the following symptoms: [phone]   Weight gain of 2-3 pounds in 1 day or 5 pounds in 1 week   Increased shortness of breath   Shortness of breath while laying down   Cough   Chest pain   Swelling in feet, ankles or legs   Tenderness or bloating in the abdomen   Fatigue    Decreased appetite or nausea    Confusion       Get back on routine Metolazone - twice weekly. Call w/ update on Thursday.

## 2021-07-12 NOTE — PROGRESS NOTES
SURGERY      COLONOSCOPY  2019    at [address] GRAFT N/A 2019    CABG X3 MVR / MAZE performed by King Garcia MD at [address]  2020    CT BONE MARROW BIOPSY 2020 [facility] CT SCAN    TONSILLECTOMY      UPPER GASTROINTESTINAL ENDOSCOPY N/A 2019    EGD DIAGNOSTIC ONLY performed by Wyatt Charles MD at [address] [facility]     Family History   Problem Relation Age of Onset    Cancer Mother         breast    Diabetes Mother     Heart Disease Father     Diabetes Father     Cancer Maternal Grandmother         leukemia     Social History     Tobacco Use    Smoking status: Former Smoker     Packs/day: 0.25     Years: 10.00     Pack years: 2.50     Types: Cigarettes     Quit date: 1976     Years since quittin.5    Smokeless tobacco: Former User   Substance Use Topics    Alcohol use: No     Alcohol/week: 0.0 standard drinks     Current Outpatient Medications   Medication Sig Dispense Refill    isosorbide dinitrate (ISORDIL) 30 MG tablet Take 1 tablet by mouth 2 times daily 180 tablet 3    tamsulosin (FLOMAX) 0.4 MG capsule Take 1 capsule by mouth daily 90 capsule 3    potassium chloride (KLOR-CON M) 20 MEQ extended release tablet Take 1 tablet by mouth 2 times daily 180 tablet 3    carvedilol (COREG) 12.5 MG tablet Take 1 tablet by mouth 2 times daily 180 tablet 3    bumetanide (BUMEX) 2 MG tablet Take 1 tablet by mouth 2 times daily 180 tablet 3    atorvastatin (LIPITOR) 40 MG tablet Take 1 tablet by mouth daily 90 tablet 3    nitroGLYCERIN (NITROSTAT) 0.4 MG SL tablet Place 1 tablet under the tongue every 5 minutes as needed for Chest pain 25 tablet 3    lisinopril (PRINIVIL;ZESTRIL) 10 MG tablet Take 1 tablet by mouth daily 90 tablet 1    ferrous sulfate (IRON 325) 325 (65 Fe) MG tablet Take 1 tablet by mouth 2 times daily 180 tablet 3    metOLazone (ZAROXOLYN) 2.5 MG tablet Take 1 tab every Monday and Thursday AND as directed by clinic 60 tablet 2    Insulin Glargine (LANTUS SC) Inject 18 Units into the skin       Coenzyme Q10 (COQ-10) 50 MG CAPS Take by mouth daily       aspirin EC 81 MG EC tablet Take 1 tablet by mouth daily 30 tablet 3    acetaminophen (TYLENOL) 325 MG tablet Take 650 mg by mouth every 4 hours as needed for Pain      BD ULTRA-FINE MICRO PEN NEEDLE 32G X 6 MM MISC       latanoprost (XALATAN) 0.005 % ophthalmic solution Place 1 drop into both eyes nightly        Current Facility-Administered Medications   Medication Dose Route Frequency Provider Last Rate Last Admin    bumetanide (BUMEX) injection 2 mg  2 mg Intravenous Once SILVIA Obrien - CNP         Allergies   Allergen Reactions    Atorvastatin      Other reaction(s): Myalgia, ON NURSING HOME REPORT       SUBJECTIVE:   Review of Systems   Constitutional: Positive for fatigue. Negative for activity change and appetite change. Respiratory: Positive for shortness of breath. Negative for cough. Cardiovascular: Positive for leg swelling. Negative for chest pain and palpitations. Gastrointestinal: Negative for abdominal distention. Neurological: Negative for weakness, light-headedness and headaches. Hematological: Negative for adenopathy. Psychiatric/Behavioral: Negative for sleep disturbance. OBJECTIVE:   Today's Vitals:  BP (!) 160/78   Pulse 76   Ht 5' 11\" (1.803 m)   Wt 185 lb 9.6 oz (84.2 kg)   SpO2 98%   BMI 25.89 kg/m²     Physical Exam  Vitals reviewed. Constitutional:       General: He is not in acute distress. Appearance: Normal appearance. He is well-developed. He is not diaphoretic. HENT:      Head: Normocephalic and atraumatic. Eyes:      Conjunctiva/sclera: Conjunctivae normal.   Neck:      Comments: No JVD  Cardiovascular:      Rate and Rhythm: Normal rate and regular rhythm. Heart sounds: Normal heart sounds. No murmur heard. Pulmonary:      Effort: Pulmonary effort is normal. No respiratory distress. Breath sounds: Normal breath sounds. No wheezing or rales. Abdominal:      General: Bowel sounds are normal. There is no distension. Palpations: Abdomen is soft. Tenderness: There is no abdominal tenderness. Musculoskeletal:         General: Normal range of motion. Cervical back: Normal range of motion and neck supple. Right lower leg: Edema (1+ ankle) present. Left lower leg: Edema (1+ ankle) present. Skin:     General: Skin is warm and dry. Capillary Refill: Capillary refill takes less than 2 seconds. Neurological:      Mental Status: He is alert and oriented to person, place, and time. Coordination: Coordination normal.   Psychiatric:         Behavior: Behavior normal.         Wt Readings from Last 3 Encounters:   07/12/21 185 lb 9.6 oz (84.2 kg)   04/20/21 184 lb 6.4 oz (83.6 kg)   03/17/21 178 lb (80.7 kg)     BP Readings from Last 3 Encounters:   07/12/21 (!) 160/78   04/20/21 (!) 150/70   03/16/21 (!) 141/64     Pulse Readings from Last 3 Encounters:   07/12/21 76   04/20/21 73   03/16/21 68     Body mass index is 25.89 kg/m². [facility]: 9/2020 [facility]    12/2019 - EF 25-30%  5/2019 - 35-40%     CATH - 1/2019  = [facility] 1/28/19 K[facility] Wolf)  SUMMARY:  Severe triple-vessel coronary artery disease; severe  cardiomyopathy; moderate-to-severe mitral regurgitation.     PLAN:  1.  Bed rest.  2.  Optimal medical therapy. 3.  Risk factor management. 4.  CT surgery consultation for CABG/MVR.  5.  Consideration for balloon pump given the fact that there is  significant iliac disease for hemodynamic support if necessary. 6.  May need ICU care/inotropic therapy.   7.  We will need to establish revascularization plan or a palliative  care option, so the patient not chose for further management.     All the above was explained to the patient and the patient's family in  detail. Eunice Keys MD     D: 01/24/2019 15:29:39      Results reviewed:  BNP:   Lab Results   Component Value Date     10/17/2019     CBC:   Lab Results   Component Value Date    WBC 4.7 03/16/2021    RBC 4.25 03/16/2021    HGB 12.0 03/16/2021    HCT 38.3 03/16/2021     03/16/2021     CMP:    Lab Results   Component Value Date     03/23/2021    K 3.9 03/23/2021    K 4.0 01/23/2019    CL 99 03/23/2021    CO2 31 03/23/2021    BUN 63 03/23/2021    CREATININE 2.83 03/23/2021    LABGLOM 22 03/23/2021    LABGLOM 27 01/13/2020    GLUCOSE 131 03/23/2021    CALCIUM 8.4 03/23/2021     Hepatic Function Panel:    Lab Results   Component Value Date    ALKPHOS 95 03/16/2021    ALT 15 03/16/2021    AST 16 03/16/2021    PROT 7.7 03/16/2021    BILITOT 0.4 03/16/2021    BILIDIR <0.2 03/16/2021    LABALBU 3.2 03/16/2021     Magnesium:    Lab Results   Component Value Date    MG 2.2 12/02/2019     PT/INR:    Lab Results   Component Value Date    INR 1.13 09/08/2020     Lipids:    Lab Results   Component Value Date    TRIG 110 12/16/2019    HDL 40 12/16/2019    LDLCALC 72 12/16/2019       ASSESSMENT AND PLAN:   The patient's condition/symptoms are stable but SOB/SHANNON. Diagnosis Orders   1. CHF (congestive heart failure), NYHA class III, acute on chronic, systolic ([facility])     2. CHF (congestive heart failure), NYHA class III, chronic, systolic ([facility])  Basic Metabolic Panel   3. Ischemic cardiomyopathy     4. Essential hypertension, benign     5. CKD (chronic kidney disease) stage 4, GFR 15-29 ml/min ([facility])       Continue:  GDMT:   ACE/ARB/ARNI - Lisinopril 10/day - did not want Entresto d/t cost   BB - Coreg 12.5 BID   Diuretic - Bumex 2 BID, Metolazone 2.5 M/Th, Kcl 20 BID  AA - none  SGLT2 -  none  Vasodilator - Isordil 20 BID - increase to 30 BID  Continue Current medications  Stable but Hypervolemic - worsening SOB/SHANNON.   Not taking Metolazone routinely - maybe once/week if that  Bumex 2 mg IV today  Kcl 40 po today  BMP today - also draw Dr Gurinder Mcgowan labs for next week (planned to get in lab today)  NEED to take diuretics as scheduled. Right lung diminished - ?recurrebt pleural effusion = pt does not want CXR or thoracentesis = agrees w/ diuresis. BP elevated today - increase Isordil 30 BID  F/U w/ Lillette Ellie next March  F/U VV in Dec.    Tolerating above noted HF meds, no ill side effects noted. Will continue to monitor kidney function and electrolytes. Will optimize as tolerated. Pt is not compliant w/ medications. Total visit time of 30 minutes has been spent with patient on education of symptoms, management, medication, and plan of care; as well as review of chart: labs, ECHO, radiology reports, etc.   I personally spent more then 50% of the appt time face to face with the patient. · Daily weights  · Fluid restriction of 2 Liters per day  · Limit sodium in diet to around 6901-1179 mg/day  · Monitor BP  · Activity as tolerated     Patient was instructed to call the [employer] for any changes in symptoms as noted in AVS.      Return in about 4 months (around 11/12/2021). or sooner if needed     Patient given educational materials - see patient instructions. We discussed the importance of weighing oneself and recording daily. We also discussed the importance of a low sodium diet, higher sodium foods to avoid and better low sodium food options. Patient verbalizes understanding of plan of care using teach back method, and is agreeable to the treatment plan.        Electronically signed by SILVIA Obrien CNP on 7/12/2021 at 3:07 PM

## 2021-07-13 NOTE — TELEPHONE ENCOUNTER
Spoke with wife, Sheryl Goetz. Weight 181.5 lb today at 11:30am after breakfast. Weight at home yesterday 180 lb, before breakfast.  Patient did urinate a lot over the night and today, emptied urinal once through the night and twice today.    SOB is better today, seems more at rest.   Did not notice any swelling this AM.

## 2021-07-20 NOTE — PROGRESS NOTES
[facility]  [facility] OF [facility]  [facility]  [facility] [address] [address]  Dept: [phone]  Dept Fax: [phone]  Loc: [phone]     Subjective:      Chief Complaint:  Acacia Caballero is a 78 y. o. with abnormal immunoglobulin levels. On November 6, 2019 the patient had a serum protein electrophoresis performed. This found an M spike with additional restrictions in the gamma region. The monoclonal protein peak accounted for 0.97 g/dL of a total of 1.61 g/dL of a protein in the gamma region. Immunoglobulin electrophoresis gel pattern found an IgG type kappa monoclonal protein with additional faint bands in the IgM kappa and lambda region. In addition, the total gammaglobulin level was 1.81 g/dL. There is a mild elevation of the total gammaglobulin. On September 8, 2020 the patient underwent a bone marrow aspiration and biopsy. Surgical pathology confirmed a cellular marrow at 20% with trilineage hematopoiesis noted. Two percent of plasma cells were noted without any evidence of monoclonality. Adequate iron stores were also identified. The results were reviewed with the patient today. Well the 2% plasma cells were identified via aspirate differential there was no evidence of monoclonality as present by in situ hybridization for kappa or lambda RNA. The patient does continue to have a significantly elevated serum kappa free light chain. Given that there is no evidence of monoclonality in this bone marrow the free light change may possibly be related to a plasmacytoma, lymphoma producing light change including the potential lymphoblastic lymphoma. HPI:     Lito Paulino returns today for follow-up regarding his history of monoclonal gammopathy, leukopenia, and anemia. The previous bone marrow biopsy did not find any evidence of myeloma or malignancy.   He does have a noted IgM and IgG affect appropriate for the clinical situation. Data Analysis:        Assessment:   1. Monoclonal gammopathy. 2.  IgG kappa monoclonal protein. 3.  Elevated free kappa light chain. 4.  Chronic anemia. 5.  Leukopenia    Plan:   1. Continue to monitor for any of his development of malignancy such as plasmacytoma or lymphoma. 2.  Monitor hemoglobin hematocrit and for any evidence of blood loss. 3.  Monitor IgG kappa monoclonal protein as well as kappa free light chain. 4.  Monitor total WBC count and for signs of infection/fever. Multiple questions were answered throughout the course the consultation. By the end of the consultation, all the patient's questions had been answered. The patient was asked to call if there were any questions or concerns prior to the next scheduled clinic visit. Emily Ortez M.D. Medical Director: [facility]  Cancer Network [address] [facility], [address] [place] [address], [address] [place] [address] of the [facility]      **This report has been created using voice recognition software. It may contain minor errors which are inherent in voice recognition technology. **

## 2021-08-25 NOTE — TELEPHONE ENCOUNTER
Ok sent to [facility]  I also sent to [employer] yesterday, [facility] cancel this one, thanks!

## 2021-09-01 NOTE — PROGRESS NOTES
[facility]    [facility], [address]   [address] [facility] [address] [facility], [facility]  [phone]  Progress Note  9/1/2021 10:39 AM        Pt Name:    Chiquis Clayton  Birthdate:    [birth year]  Primary Care Physician:  Pb Eaton     Chief Complaint:   Chief Complaint   Patient presents with    Follow-up    Chronic Kidney Disease     CKD Stage IV        Background Information/Interval History:   69 yo white male with hx HTN, CKD IV, CAD s/p CABG, MVR, DM, hx smoking who is here for follow-up. He was very ill in 2018 and early 2019. Patient had cardiac arrest s/p intervention complicated by FATEMEH requiring dialysis 2018/2019. He subsequently had another cardiac arrest and had CABG on urgent basis along with MVR. He continued on dialysis (was started Dec 2018) until Feb 2019. Also followed with urology closely for BPH and urinary retention. He had chronic posey catheter which was subsequently removed per urology    Here for 6 months follow-up. Feels okay. No nausea, vomiting. No chest pain. No sig leg swelling. He is taking coreg, flomax, K-dur 20 meq BID.  Also taking bumex 2 mg po BID. On metolazone twice weekly. Following hematology/oncology for paraproteinemia evaluation. Has lost about 5 lbs in last 2 months.       Past History:  Past Medical History:   Diagnosis Date    CAD (coronary artery disease)     CHF (congestive heart failure) (HCC)     Chronic kidney disease     History of blood transfusion     Hyperlipidemia     Hypertension     Proteinuria     Type II or unspecified type diabetes mellitus without mention of complication, not stated as uncontrolled      Past Surgical History:   Procedure Laterality Date    CARDIAC SURGERY      [address]  2019    [address] GRAFT N/A 1/28/2019    CABG X3 MVR / MAZE performed by Rahda Lantigua MD at [address]  9/8/2020    CT BONE MARROW BIOPSY 9/8/2020 [facility] CT SCAN    TONSILLECTOMY      UPPER GASTROINTESTINAL ENDOSCOPY N/A 1/21/2019    EGD DIAGNOSTIC ONLY performed by Juan Frias MD at [facility]        VITALS:  BP (!) 169/79 (Site: Left Upper Arm, Position: Sitting, Cuff Size: Medium Adult)   Pulse 74   Temp 98.7 °F (37.1 °C)   Wt 180 lb (81.6 kg)   BMI 25.10 kg/m²   Wt Readings from Last 3 Encounters:   09/01/21 180 lb (81.6 kg)   07/20/21 182 lb 3.2 oz (82.6 kg)   07/12/21 185 lb 9.6 oz (84.2 kg)     Body mass index is 25.1 kg/m².      General Appearance: alert and cooperative with exam, appears comfortable, no distress  Oral: moist oral mucus membranes  Neck: No jugular venous distention  Lungs: Air entry B/L, no crackles or rales, no use of accessory muscles  Heart: S1, S2 heard  GI: soft, non-tender, no guarding  Extremities: No sig LE edema     Medications:  Current Outpatient Medications   Medication Sig Dispense Refill    lisinopril (PRINIVIL;ZESTRIL) 10 MG tablet Take 1 tablet by mouth once daily 90 tablet 3    lisinopril (PRINIVIL;ZESTRIL) 10 MG tablet Take 1 tablet by mouth daily 90 tablet 1    isosorbide dinitrate (ISORDIL) 30 MG tablet Take 1 tablet by mouth 2 times daily 180 tablet 3    tamsulosin (FLOMAX) 0.4 MG capsule Take 1 capsule by mouth daily 90 capsule 3    potassium chloride (KLOR-CON M) 20 MEQ extended release tablet Take 1 tablet by mouth 2 times daily 180 tablet 3    carvedilol (COREG) 12.5 MG tablet Take 1 tablet by mouth 2 times daily 180 tablet 3    bumetanide (BUMEX) 2 MG tablet Take 1 tablet by mouth 2 times daily 180 tablet 3    atorvastatin (LIPITOR) 40 MG tablet Take 1 tablet by mouth daily 90 tablet 3    nitroGLYCERIN (NITROSTAT) 0.4 MG SL tablet Place 1 tablet under the tongue every 5 minutes as needed for Chest pain 25 tablet 3    ferrous sulfate (IRON 325) 325 (65 Fe) MG tablet Take 1 tablet by mouth 2 times daily 180 tablet 3    metOLazone (ZAROXOLYN) 2.5 MG tablet Take 1 tab every Monday and Thursday AND as directed by clinic 60 tablet 2    Insulin Glargine (LANTUS SC) Inject 18 Units into the skin       Coenzyme Q10 (COQ-10) 50 MG CAPS Take by mouth daily       aspirin EC 81 MG EC tablet Take 1 tablet by mouth daily 30 tablet 3    acetaminophen (TYLENOL) 325 MG tablet Take 650 mg by mouth every 4 hours as needed for Pain      BD ULTRA-FINE MICRO PEN NEEDLE 32G X 6 MM MISC       latanoprost (XALATAN) 0.005 % ophthalmic solution Place 1 drop into both eyes nightly        No current facility-administered medications for this visit. Laboratory & Diagnostics:  0275: JH: R 10.5, L 10.1, 1.4 cm left kidney cyst  Creat 1.8, K 3.9, UACR ~ 500 mg/g  March 4 2019: Creat 1.9, K 4.5, Ca 7.8, Hb 8.2  April 2019: K 4.6, creat 1.9, ca 8.2, Hb 8.1  April 15, 2019: K 3.8, creat 1.5, Hb 9.3, ferritin ~ 200  May 2019: K 4.2, creat 2.1  June 2019: K 3.6, Hb 10.9  Aug 2019: K 3.7, creat 2.13    March 2020: K 3.8, creat 2.8, Hb 11.8  Aug 2020: K 4.3, Creat 2.64, Ca 8.9, Hb 11.9  Feb 2021: K 4.7, creat 2.85, phos 4.3, Ca 7.9, , Hb 11.7  Aug 2021: K 4.5, Creat 3.3, Ca 9.0, Hb 13.0     Impression/Plan:   1. CKD IV with the FATEMEH versus progression of CKD:  CKD is multifactorial from diabetic nephropathy, ischemic injuries, chronic cardiorenal syndrome. Creatinine is slightly higher this time. Will hold metolazone (tcpunn0r week). Continue with bumex 2 mg po BID. Repeat BMP in 2 weeks. 2. Chronic systolic CHF/cardiomyopathy:  secondary to chronic systolic congestive heart failure and also has diastolic dysfunction. Continue bumex. Stop metolazone. Wts improved. Clinically doing better. 3. HTN: elevated. Continue with lopressor and lisinopril   4. Anemia in CKD. stable hemoglobin  5  Proteinuria secondary to diabetic nephropathy   6. Left kidney cyst, simple  7.  CAD s/p CABG recently  8. Monoclonal gammopathy: seeing oncology.      Orders Placed This Encounter   Procedures    Basic Metabolic Panel    Hemoglobin and Hematocrit, Blood    Protein / creatinine ratio, urine     Return in about 6 months (around 3/1/2022).     Jose Johnson MD  [facility]

## 2021-09-10 NOTE — TELEPHONE ENCOUNTER
Dr. Delgado Nash told Casandra Holden to stop the [facility] 9/1/21 because patient was dehydrated. He gained 8 pounds and is filling up with fluid. Weight gain in his belly and he is coughing a lot. What should he do?

## 2021-09-22 NOTE — TELEPHONE ENCOUNTER
I spoke to Moshe Wynne about the information below. She expressed understanding and I am mailing the order to him.

## 2021-10-18 NOTE — TELEPHONE ENCOUNTER
I spoke to Wayne Jimenez. She said that Melina Lockett is in hospital at [place]. She wanted him to come to [facility] but she was told all the beds are full. She said he will get lab done when he gets out. I said it is okay just concentrate on getting him better and getting him home for now. You can view labs in [facility].

## 2021-10-27 NOTE — TELEPHONE ENCOUNTER
Jojo Floyd called today. She said Zack's blood pressure has been all over the place. He was in the hospital on the 14 th because he passed out at home. He came home on the 20 th. He was at [facility] in [facility]. [facility] and [facility] was full. They took him off of the Metalozone, potassium, Lisinopril changed to Amlodipine and Isosorbide they changed to Isosorbide Mononitarte. Patient weight was 181 pounds when he went to the hospital. He now weighs 188. 5. wife wants to know if he should go back on the Metolazone 2.5 mg twice a week? Having trouble breathing and a lot of phlegm, edema in feet and ankles. 10/21/21 8 am 182/78  10/22/21 8 :30 am 159/76  9 pm 142/69  10/23/21 161/79 9 am  12:30 pm 165/77  10:30 pm 173/80  10/24/21 157/88 8 am  12:30 pm 138/68  10 pm 171/76  10/25/21 163/75 8 am  11:30 am 159/74  6 pm 180/86  9:30 pm 165/82  10/26/21 163/77 7 am  6:45 pm 179/87  9:40 pm 166/77  10/27/21 167/83.

## 2021-10-27 NOTE — TELEPHONE ENCOUNTER
Yes ok to resume metolazone  Is he still on bumex and how much?  Was that also changed by [facility]  BMP this week  Also pls Schedule appt

## 2021-10-28 NOTE — TELEPHONE ENCOUNTER
Sheng Geiger is still taking Bumex 2 mg 1 tab bid. Patient had a bmp done yesterday. It is under the lab tab. He is scheduled to be seen on 11/3/21.

## 2021-10-29 NOTE — TELEPHONE ENCOUNTER
I spoke to Jose about the information below. She expressed understanding. Thena Chris will go to go Dr. Tk Storm office to get labs done.

## 2021-11-02 NOTE — PROGRESS NOTES
[facility] PROFESSIONAL SERVICES  [facility]   [address] [facility]   [address] [address]   Dept: [phone]   Dept Fax: [phone]: [phone]      Chief Complaint   Patient presents with    Follow-up     Chief complaint: \"Feel pretty good today\". Follow-up from recent hospitalization - episode upper abdominal pain with associated brief syncopal episode. Taken to OSH and then transferred to [facility] in [place] as no beds in [facility]. Cardiac work-up with no significant findings other than EF 35 % (prior 40 - 45%); negative Lexiscan stress. Work-up for cholecystitis was negative. Cardiologist:  Dr. Ana Lilia Lowe    Last seen in [address] [place] [address] clinic on 7/12/21. Notes from that visit note plan of care as follows:   GDMT:              ACE/ARB/ARNI - Lisinopril 10/day - did not want Entresto d/t cost              BB - Coreg 12.5 BID              Diuretic - Bumex 2 BID, Metolazone 2.5 M/Th, Kcl 20 BID  AA - none  SGLT2 -  none  Vasodilator - Isordil 20 BID - increase to 30 BID  Continue Current medications  Stable but Hypervolemic - worsening SOB/SHANNON. Not taking Metolazone routinely - maybe once/week if that  Bumex 2 mg IV today  Kcl 40 po today  BMP today - also draw Dr Toni Vail labs for next week (planned to get in lab today)  NEED to take diuretics as scheduled. Right lung diminished - ?recurrebt pleural effusion = pt does not want CXR or thoracentesis = agrees w/ diuresis. BP elevated today - increase Isordil 30 BID  F/U w/ Ana Lilia Lowe next March  F/U VV in Dec.    Patient last seen in the office on 3/11/2021 per Dr. Ana Lilia Lowe. Patient known to be status post CABG/MVR/maze on 1/2019. History of ICM, RVSP 59 mmHg. He presented for follow-up at that time. At that visit she noted that his blood pressure was improving. He was following with Dr. Sergio Jain of nephrology for CKD. He was taking and tolerating all medications. Denied chest pain or anginal symptoms. Noted chronic SHANNON that was stable. Continued to make urine daily. EKG noted sinus rhythm. Last creatinine was 2.85. He had had a prior cardiac arrest in the setting of CHF and mitral regurg/CAD. EF had improved to 40 to 45% with last assessment on 9/2020. No symptoms of syncope at this time. Did note leg pain when he walks. Right greater than left. Does have foot drop. No pager no major pain in the calf or thighs. Believes his walking distance is fine but he does get numbness that makes him stop and then the rest does improve his symptoms. No lower extremity ulcers or wounds. Cardiac rehab for continued exercise was recommended. Medical therapies were  considered to be fairly optimized with consideration of adding Aldactone if nephrology was okay with it. Repeat fasting lipid panel was ordered. He was to follow-up in the CHF clinic. ABIs were to be checked. Today's visit:   Subjective:  Dr. Agustin Aguilar; following - MML work-up negative. Still following.   Dr. Caryn Feng - seeing tomorrow  Mild SHANNON; chronic  No chest discomfort  Minimal bilateral ankle swelling  BP running on higher side - tolerating current meds  Making urine    General:   No fever, no chills, No unusual fatigue; weight stable  Pulmonary:    No dyspnea beyond baseline, no wheezing  Cardiac:    Denies recent chest discomfort or palpitations  GI:     No nausea or vomiting, no abdominal pain, no black or     tarry stools, no blood in stools  Neuro:     No dizziness or light headedness  Musculoskeletal:  No recent active issues, no new musculoskeletal pain  Extremities:   No unusual swelling or claudication symptoms in LE      Past Medical History:   Diagnosis Date    CAD (coronary artery disease)     CHF (congestive heart failure) (HCC)     Chronic kidney disease     History of blood transfusion     Hyperlipidemia     Hypertension     Proteinuria     Type II or unspecified type diabetes mellitus without mention of complication, not stated as uncontrolled Allergies   Allergen Reactions    Atorvastatin      Other reaction(s): Myalgia, ON NURSING HOME REPORT       Current Outpatient Medications   Medication Sig Dispense Refill    sertraline (ZOLOFT) 50 MG tablet Take 50 mg by mouth daily       isosorbide mononitrate (IMDUR) 30 MG extended release tablet Take 1 tablet by mouth daily 30 tablet 3    amLODIPine (NORVASC) 5 MG tablet Take 5 mg by mouth 2 times daily      tamsulosin (FLOMAX) 0.4 MG capsule Take 1 capsule by mouth daily 90 capsule 3    carvedilol (COREG) 12.5 MG tablet Take 1 tablet by mouth 2 times daily 180 tablet 3    atorvastatin (LIPITOR) 40 MG tablet Take 1 tablet by mouth daily 90 tablet 3    nitroGLYCERIN (NITROSTAT) 0.4 MG SL tablet Place 1 tablet under the tongue every 5 minutes as needed for Chest pain 25 tablet 3    ferrous sulfate (IRON 325) 325 (65 Fe) MG tablet Take 1 tablet by mouth 2 times daily 180 tablet 3    Insulin Glargine (LANTUS SC) Inject 18 Units into the skin       Coenzyme Q10 (COQ-10) 50 MG CAPS Take by mouth daily       aspirin EC 81 MG EC tablet Take 1 tablet by mouth daily 30 tablet 3    acetaminophen (TYLENOL) 325 MG tablet Take 650 mg by mouth every 4 hours as needed for Pain      BD ULTRA-FINE MICRO PEN NEEDLE 32G X 6 MM MISC       latanoprost (XALATAN) 0.005 % ophthalmic solution Place 1 drop into both eyes nightly       lisinopril (PRINIVIL;ZESTRIL) 10 MG tablet Take 0.5 tablets by mouth daily 30 tablet 3    potassium chloride (KLOR-CON M) 20 MEQ extended release tablet Take 1 tablet by mouth daily 90 tablet 1    metOLazone (ZAROXOLYN) 2.5 MG tablet Take 1 tablet by mouth Twice a Week 8 tablet 3    bumetanide (BUMEX) 2 MG tablet Take 1 tablet by mouth 2 times daily 60 tablet 3     No current facility-administered medications for this visit.        Social History     Socioeconomic History    Marital status:      Spouse name: None    Number of children: None    Years of education: None    Highest education level: None   Occupational History    None   Tobacco Use    Smoking status: Former Smoker     Packs/day: 0.25     Years: 10.00     Pack years: 2.50     Types: Cigarettes     Quit date: 1976     Years since quittin.8    Smokeless tobacco: Former User   Vaping Use    Vaping Use: Never used   Substance and Sexual Activity    Alcohol use: No     Alcohol/week: 0.0 standard drinks    Drug use: None    Sexual activity: None   Other Topics Concern    None   Social History Narrative    None     Social Determinants of Health     Financial Resource Strain:     Difficulty of Paying Living Expenses:    Food Insecurity:     Worried About Running Out of Food in the Last Year:     Ran Out of Food in the Last Year:    Transportation Needs:     Lack of Transportation (Medical):  Lack of Transportation (Non-Medical):    Physical Activity:     Days of Exercise per Week:     Minutes of Exercise per Session:    Stress:     Feeling of Stress :    Social Connections:     Frequency of Communication with Friends and Family:     Frequency of Social Gatherings with Friends and Family:     Attends Synagogue Services:     Active Member of Clubs or Organizations:     Attends Club or Organization Meetings:     Marital Status:    Intimate Partner Violence:     Fear of Current or Ex-Partner:     Emotionally Abused:     Physically Abused:     Sexually Abused:        Family History   Problem Relation Age of Onset    Cancer Mother         breast    Diabetes Mother     Heart Disease Father     Diabetes Father     Cancer Maternal Grandmother         leukemia       Blood pressure (!) 162/68, pulse 64, height 5' 11\" (1.803 m), weight 184 lb (83.5 kg). General:   Chronically ill-appearing. In no acute distress  Lungs:   Clear to auscultation  Heart:    Normal S1 S2, normal rate, regular rhythm.  No murmur,    rubs, or gallops  Abdomen:   Soft, non tender, no organomegalies, positive bowel sounds  Extremities:   1+ ankle edema, no cyanosis, good peripheral pulses  Neurological:   Awake, alert, oriented. No obvious focal deficits  Musculoskeletal:  No obvious deformities      11/1/2021 10:14 PM - PatrickHira Incoming Lab Results From Soft    Component Value Ref Range & Units Status Collected Lab   Sodium 136  135 - 145 meq/L Final 11/01/2021  1:42 PM [facility] - [address] [facility]   Potassium 3.8  3.5 - 5.2 meq/L Final 11/01/2021  1:42 PM [facility] - [address] [facility]   Chloride 97Low   98 - 111 meq/L Final 11/01/2021  1:42 PM [facility] - [address] [facility]   CO2 27  23 - 33 meq/L Final 11/01/2021  1:42 PM [facility] [address] [facility]   Glucose 176High   70 - 108 mg/dL Final 11/01/2021  1:42 PM [facility] [address] [facility]   BUN 70High   7 - 22 mg/dL Final 11/01/2021  1:42 PM [facility] - [address] [facility]   CREATININE 2.9High   0.4 - 1.2 mg/dL Final 11/01/2021  1:42 PM [facility] - [address] [facility]   Calcium 8.1Low   8.5 - 10.5 mg/dL Final 11/01/2021  1:42 PM [address] - [address] [facility]   Performed at [facility], [facility]     11/1/2021 10:15 PM - Deborah Nguyen Incoming Lab Results From Soft    Component Value Ref Range & Units Status Collected Lab   Est, Glom Filt Rate 21Abnormal   ml/min/1.73m2 Final 11/01/2021  1:42 PM [address] - [address] Lab       9/1/2021 10:25 AM - System User, Default    Contains abnormal data LIPID PANEL W CALCULATED LDL  Order: [id number]  (suggestion)  Information displayed in this report will not trend and will not trigger automated decision support. Ref Range & Units Value   CHOLESTEROL <200 mg/dL 156        TRIGLYCERIDES <150 mg/dL 104        HDL-C  mg/dL 23        LDL-C <100 mg/dL 112High         VLDL 6 - 50 mg/dL 21        CHOLESTEROL, TOTAL/HDL <4.4 Ratio 6.8High         Resulting [facility] - [address] [place] [address]     7/13/2021 12:28 PM - Deborah Nguyen Incoming Lab Results From Soft    Component Value Ref Range & Units Status Collected Lab Albumin 3.7  3.5 - 5.1 g/dL Final 07/12/2021  3:01 PM [facility] - [address] Lab   Total Bilirubin 0.4  0.3 - 1.2 mg/dL Final 07/12/2021  3:01 PM [facility] - [address] Lab   Bilirubin, Direct <0.2  0.0 - 0.3 mg/dL Final 07/12/2021  3:01 PM [facility] - [address] Lab   Alkaline Phosphatase 91  38 - 126 U/L Final 07/12/2021  3:01 PM [facility] [address] Lab   AST 17  5 - 40 U/L Final 07/12/2021  3:01 PM [facility] - [address] [facility]   ALT 22  11 - 66 U/L Final 07/12/2021  3:01 PM [facility] - [address] [facility]   Total Protein 7.8  6.1 - 8.0 g/dL Final 07/12/2021  3:01 PM [facility] [address] [facility]   Performed at [facility].[place], [address]     10/15/21:  Interface, Radiant Results - 10/15/2021 3:55 AM ISIAHT    Ariana Fox of this note might be different from the original.  Ultrasound abdomen complete:    CLINICAL: Concern for acute cholecystitis. TECHNIQUE: Transabdominal ultrasound of the abdomen was performed. COMPARISON:    FINDINGS:     The visualized liver is normal in appearance and size. There is normal direction of blood flow in the hepatic veins and in the portal vein. There is thickening of the gallbladder wall up to 0.6 cm. There are several echogenic shadow producing structures within the lumen of the gallbladder. No pericholecystic fluid is seen, and the sonographic Vallejo's sign is negative. The common bile duct measures 0.4 cm in diameter. There is no intrahepatic bile duct dilatation. The pancreas is not visible. The right kidney measures 9.8 x 5.2 x 5.4 cm. The right kidney looks normal.    There is no free fluid in the abdomen. There is anechoic material in the right pleural space. IMPRESSION:   1. Equivocal findings for acute cholecystitis which include the presence of gallstones with gallbladder wall thickening. Please correlate with laboratory values and clinical findings. 2. There is a right pleural effusion.         Dictated by: Ping Ha DO  Workstation ID:[id number]         10/15/21:  Interface, Radiant Results - 10/15/2021 5:08 PM EDT    Formatting of this note might be different from the original.  10/15/2021 2:30 PM    NUCLEAR MEDICINE GALLBLADDER HIDA SCAN:    INDICATION: Abdominal pain    COMMENT:    The patient was given IV 4.86 mCi of technetium 99m Choletec followed by imaging of the abdomen out to 100 minutes. There is good uptake and clearance of the tracer by the liver. The gallbladder and common bile duct are seen within 15 minutes and the small bowel starting at 60 minutes. No evidence of acute cholecystitis. IMPRESSION:     NO EVIDENCE OF ACUTE CHOLECYSTITIS. DICTATED BY: Marcos Wylie MDWorkstation NO:[id number]         EKG:       Lexiscan: 10/19/21  Maggie Busby MD     10/19/2021 10:45 AM   [address]     Nuclear Cardiology Department   EKG part [address] [place] [address]   with Nuclear Imaging     Pilar Kelly, Sex: male, : 1945   MRN: [id number]     Procedure Date:  10/19/2021     Indications: Chest pain     Procedure:   Procedure, indications, potential complications, and alternatives   were explained to the patient who appeared to understand.     Opportunity for questions was provided and informed consent was   obtained.  Pre-test EKG was reviewed and order given to proceed   with pharmacologic stress testing.  Patient was given   pharmacologic agent dose under direct supervision.       Clinical Data:   Lexiscan Dose:  0.4 mg. IV   Resting EKG:  SR, rate 75, nonspecific ST/T changes, poor R wave   progression   Post Infusion EKG:  No ischemic ST/T changes, max HR 92   Recovery EKG:  Unremarkable   Resting BP: 190/84   Maximum BP: 190/84   Symptoms: Dyspnea     The test was terminated upon completion of protocol. Conclusions:   1. Negative EKG part of Lexiscan Stress Test for ischemia.    2. Baseline uncontrolled hypertension     For complete results refer to Myocardial Perfusion Imaging   Report. Electronically signed by: Lacy Genao MD  10/19/2021 10:44 AM      [employer] [facility], [employer]   TRANSTHORACIC ECHOCARDIOGRAM     Name: Latanya Arias Gender: Male     MRN: [id number] Order #: [id number]     YOB: 1945 Study Date: 10/15/2021     Age: 78 year(s) Weight: 183 lbs.  (83.01 kg)     HR: 62 /min Height: 71 in. (180.3 cm)     BP: 159/85 mmHg BSA: 2.03 m2     Patient class: I Ordering provider: Vonda Wallace       Procedure Staff     Reading Group: [facility]     Reading Physician: Dao Caballero MD     Cardiac Sonographer: Jackson Torres RDCS     Peer Review:         Indication:   Syncope       Examination: ECHO COMPLETE-2D W DOP&COL FLOW       Measurements     Chambers   Measurement Value (Normal Range)     LADs (2D): 3.3 cm (3 cm-4 cm)   LADs lon.2 cm     IVSd (2D): 1.4 cm (0.6 cm-1.1 cm)   LVDd (2D): 4.6 cm (4.2 cm-5.9 cm)   LVDs (2D): 3.3 cm (2.1 cm-4 cm)   LVPWd (2D): 1.5 cm (0.6 cm-1 cm)   FS (2D): 28 %     LVEF (BP): 46 % (>=55 %)   Visual EF 35 %   RVDd(2D): 3.0 cm (1.9 cm-3.8 cm)   LVEDV: 160 ml   ( ml)   LVEDV Index: 21.7 ml/mÂ²     LVESV: 87 ml   (22-58 ml)   LVESV Index: 46.8 ml/mÂ²     LVSV: 73 ml   LAD s Transv.: 3.3 cm   LA Area: 23.8 cmÂ²   LA Volume: 70 ml   LA Vol Index: 34.5 ml/mÂ² (16 ml/mÂ²-28.4 ml/mÂ² ml/mÂ²)   RVd basilar: 3.6 cm   RVd mid: 3.9 cm   RVd lon.5 cm     Valves AV/MV   Measurement Value (Normal Range)     AV Vmax: 1.45 m/sec (100 cm/s-170 cm/s)   AV maxP mmHg     AV Vmean: 0.99 m/s -   AV meanP mmHg     AV VTI: 32.40 cm -   LVOT Vmax: 0.68 m/sec (70 cm/s-110 cm/s)   LVOT maxP mmHg     LVOT Vmean: 0.44   LVOT PGmean: 1   LVOT VTI: 15.60 cm (18 cm-22 cm)   AR (PHT): 391 msec     MV maxP mmHg     MV meanP mmHg     MV PHT: 0.057 msec     MVA (PHT): 3.9 cmÂ²     MV E' Septal: 0.03 m/sec   MV E' Lateral: 0.02 m/sec   MV E' mean: 0.02 m/sec   MV Vmax: 1.79 m/sec   MV Vmean: 1.04 m/sec   MV VTI: 40.40 cm   AR Lamb: 3.3 m/sÂ²   AR Vmax: 4.42 m/sec   AR Reg. Fraction: 78 %   Inferior Vena Cava: 2.7 cm     Valves TV/PV   Measurement Value (Normal Range)     TR Vmax: 4.26 m/s     TR PGmax: 73 mmHg     PV Vmax: 0.85 m/s (60 cm/s-90 cm/s)   PV Vmean: 0.46 m/s     PV PGmax: 3 mmHg     PV PGmean: 1 mmHg     PV VTI: 15.60 cm     PV Acc Time: 63 msec     Findings:     ~Left Ventricle: Upper limits of normal left ventricle size. Moderate concentric left ventricular hypertrophy. Moderate left ventricular systolic dysfunction. The ejection fraction is visually estimated to be 35 %. Diastolic Dysfunction. There is moderate to severe distal anterior, septal, and apical hypokinesis.     ~Right Ventricle: Upper limits of normal right ventricle size.     ~Left Atrium: Moderate enlargement of the left atrium.     ~Right Atrium: Right atrium not well visualized.     ~Mitral Valve: Normal status post mitral valve reconstruction. Mild mitral valve regurgitation.     ~Aortic Valve: Aortic valve not well visualized. Aortic valve sclerosis. No aortic valve stenosis. Moderate aortic valve regurgitation.     ~Tricuspid Valve: Mild tricuspid regurgitation. The pulmonary artery pressure is moderately increased.     ~Pulmonic Valve: Trivial pulmonic valve regurgitation.     ~Great Vessels: Aorta is not well visualized.     ~Pericardium: No significant pericardial effusion.       Conclusions:     Moderate concentric left ventricular hypertrophy. The ejection fraction is visually estimated to be 35 %. There is moderate to severe distal anterior, septal, and apical hypokinesis.  Diastolic Dysfunction. Moderate enlargement of the left atrium. Mitral valve s/p replacement or mitral valve reconstruction. Mild mitral valve regurgitation. Aortic valve sclerosis without significant stenosi. Moderate aortic valve regurgitation. Mild tricuspid regurgitation.  The pulmonary artery pressure is moderately increased. RVSP calcualted at 50-60 mmHg Trivial pulmonic valve regurgitation. Compared to previous Echocardiogram report from 12/2019, EF likely stable, AI possibly mildly worsened. Current study somewhat limited. Optison contrast used for some difficult images.       Electronically signed by MD Roberta Dakin on 10/17/2021 at 03:49 PM   Other Result Text      Echo: 9/2020 performed at the [facility] [address]  EF 40 to 45% with mild apical hypokinesis. Echo: 12/16/2019  Transthoracic Echocardiography Report (TTE)      Demographics      Patient Name  Anand Franks Gender             Male      MR #          [id number]   Race                                               Ethnicity      Account #     [de-identified]   Room Number      Accession     [id number]   Date of Study      12/13/2019   Number      Date of Birth 1945  Referring          [facility]                             Physician          Katrin Good CNP      Age           69 year(s)  Sonographer        ABBY Barrow, RDCS,                                                RDMS, RVT                                Interpreting       Brissa Hurd MD                             Physician     Procedure     Type of Study      TTE procedure:ECHOCARDIOGRAM COMPLETE 2D W DOPPLER W COLOR.      Procedure Date  Date: 12/13/2019 Start: 03:29 PM     Study Location: Echo Lab  Technical Quality: Limited visualization due to body habitus.     Indications:Congestive heart failure and Pleural effusion.     Additional Medical History:coronary artery bypass grafting, Maze procedure,  mitral valve replacement, diabetes, hypertension, hyperlipidemia, exsmoker,  NSTEMI, history of cardiac arrest, chronic kidney disease, anemia, ischemic  cardiomyopathy, congestive heart failure     Patient Status: Routine     Height: 71 inches Weight: 159 pounds BSA: 1.91 m^2 BMI: 22.18 kg/m^2     BP: 144/60 mmHg     Allergies    - See Epic.      Conclusions Summary   Ejection fraction was estimated at 25-30%. Left atrial size was severely dilated. S/p MV replacement - probable bioprosthetic. DOPPLER: The transmitral   velocity was within the normal range with no evidence for mitral stenosis   (mean gradient 6 mmHg, V max 2.0 m/s, P1/2T = 60 ms). There was mild   mitral regurgitation. There was mild-moderate aortic regurgitation. There was mild tricuspid regurgitation. Assuming RAP = 5 mmHg, the   estimated RVSP = 59 mmHg. Signature      ----------------------------------------------------------------   Electronically signed by Barb Escoto MD (Interpreting   physician) on 12/16/2019 at 05:36 PM   ----------------------------------------------------------------      Findings      Mitral Valve   S/p MV replacement - probable bioprosthetic. DOPPLER: The transmitral   velocity was within the normal range with no evidence for mitral stenosis   (mean gradient 6 mmHg, V max 2.0 m/s, P1/2T = 60 ms). There was mild   mitral regurgitation. Aortic Valve   The aortic valve was trileaflet with normal thickness and cuspal   separation. DOPPLER: Transaortic velocity was within the normal range with   no evidence of aortic stenosis. There was mild-moderate aortic   regurgitation. Tricuspid Valve   The tricuspid valve structure was normal with normal leaflet separation. DOPPLER: There was no evidence of tricuspid stenosis. There was mild   tricuspid regurgitation. Assuming RAP = 5 mmHg, the estimated RVSP = 59   mmHg. Pulmonic Valve   The pulmonic valve leaflets were not well seen. DOPPLER: The transpulmonic   velocity was within the normal range with no evidence for regurgitation. Left Atrium   Left atrial size was severely dilated. Left Ventricle   Normal left ventricular size and severely reduced systolic function. There was severe global hypokinesis. Mild concentric hypertrophy. Ejection fraction was estimated at 25-30%.       Right Atrium   Right atrial size was normal.      Right Ventricle   The right ventricular size was normal with normal systolic function and   wall thickness. Pericardial Effusion   The pericardium was normal in appearance with no evidence of a pericardial   effusion. Pleural Effusion   No evidence of pleural effusion. Aorta / Great Vessels   IVC size is within normal limits with normal respiratory phasic changes.      M-Mode/2D Measurements & Calculations      LV Diastolic   LV Systolic Dimension:    AV Cusp Separation: 1.6 cmLA   Dimension: 4.5 4.1 cm                    Dimension: 4.2 cmAO Root   cm             LV Volume Diastolic: 75.8 Dimension: 3 cmLA Area: 26.9 cm^2   LV FS:8.9 %    ml   LV PW          LV Volume Systolic: 52.8   Diastolic: 1.3 ml   cm             LV EDV/LV EDV Index: 65.7 RV Diastolic Dimension: 2.3 cm   Septum         ml/48 m^2LV ESV/LV ESV   Diastolic: 1.3 Index: 75.2 ml/39 m^2     LA/Aorta: 1.4   cm             EF Calculated: 19.7 %                                            LA volume/Index: 108.8 ml /57m^2     Doppler Measurements & Calculations      MV Peak E-Wave: 145 cm/s AV Peak Velocity: 131  LVOT Peak Velocity: 62.9   MV Peak A-Wave: 101 cm/s cm/s                   cm/s   MV E/A Ratio: 1.44       AV Peak Gradient: 6.86 LVOT Peak Gradient: 2 mmHg   MV Peak Gradient: 8.41   mmHg   mmHg                                            TV Peak E-Wave: 41.3 cm/s   MV Mean Gradient: 6 mmHg                        TV Peak A-Wave: 56.2 cm/s   MV Mean Velocity: 112   cm/s                                            TV Peak Gradient: 0.68   MV Deceleration Time:    AV P1/2t: 334 msec     mmHg   183 msec                 IVRT: 85 msec          TR Velocity:254 cm/s   MV P1/2t: 107 msec                              TR Gradient:25.81 mmHg   MVA by PHT:2.06 cm^2                            PV Peak Velocity: 58.9                            AV DVI (Vmax):0.48     cm/s PV Peak Gradient: 1.39                                                   mmHg      MR Velocity: 520 cm/s     [email or web address] [id number]    CATH - 1/2019  = [facility] 1/28/19 Shannan Muñoz  SUMMARY:  Severe triple-vessel coronary artery disease; severe  cardiomyopathy; moderate-to-severe mitral regurgitation.     PLAN:  1.  Bed rest.  2.  Optimal medical therapy. 3.  Risk factor management. 4.  CT surgery consultation for CABG/MVR.  5.  Consideration for balloon pump given the fact that there is  significant iliac disease for hemodynamic support if necessary. 6.  May need ICU care/inotropic therapy. 7.  We will need to establish revascularization plan or a palliative  care option, so the patient not chose for further management.     All the above was explained to the patient and the patient's family in  detail. Emma Thomas MD  D: 01/24/2019 15:29:39      3/18/21  PROCEDURE: VL SALOME BILATERAL LIMITED 1-2 LEVELS       CLINICAL INFORMATION: 57-year-old male with right leg numbness. PAD (peripheral artery disease) (HCC)       COMPARISON: No prior study.       TECHNIQUE: Ankle-brachial indices were calculated bilaterally. Doppler waveforms were also generated for both ankles. .       SALOME   RIGHT       JASON----->0.87   PTA----->0.92   TBI------>0.59       SALOME    LEFT       JASON----->0.95   PTA----->0.89   TBI------>0.65           Impression       1. Normal right SALOME measuring 0.9 to   2. Normal left SALOME measuring 0.95   3. Doppler:  Normal-appearing biphasic Doppler waveforms seen bilaterally.                 **This report has been created using voice recognition software.  It may contain minor errors which are inherent in voice recognition technology. **       Final report electronically signed by Dr Carie Gutiérrez on 3/18/2021 2:00 PM            Diagnosis Orders   1.  Hx of CABG  isosorbide mononitrate (IMDUR) 30 MG extended release tablet   2. Ischemic cardiomyopathy  isosorbide mononitrate (IMDUR) 30 MG extended release tablet       No orders of the defined types were placed in this encounter. Continue Dr Clarke Blocker current treatment plan:    Patient Instructions   Continue current medications as prescribed. See Dr. Filipe Reyes tomorrow as scheduled - adjust medications per his recommendations to better control your blood pressure. Continue to follow the heart failure clinic guidelines to control your symptoms of heart failure. Follow-up with CHF clinic as scheduled. Follow-up with Dr. Katie Au in March as scheduled or sooner if need. Return in about 4 months (around 3/10/2022), or if symptoms worsen or fail to improve, for Dr. Katie Au.       Eveline Martin, APRN - CNP

## 2021-11-02 NOTE — PATIENT INSTRUCTIONS
Continue current medications as prescribed. See Dr. Yun Mitchell tomorrow as scheduled - adjust medications per his recommendations to better control your blood pressure. Continue to follow the heart failure clinic guidelines to control your symptoms of heart failure. Follow-up with CHF clinic as scheduled. Follow-up with Dr. Tab Yuan in March as scheduled or sooner if need.

## 2021-11-03 NOTE — PROGRESS NOTES
[facility]    [facility], [address]   [facility]  [phone]  Progress Note  11/3/2021 10:03 AM        Pt Name:    Shad Segovia  Birthdate:    [birth year]  Primary Care Physician:  Paco Jasso     Chief Complaint:   Chief Complaint   Patient presents with    Follow-up    Chronic Kidney Disease     CKD Stage IV        Background Information/Interval History:   67 yo white male with hx HTN, CKD IV, CAD s/p CABG, MVR, DM, hx smoking who is here for follow-up. He was very ill in 2018 and early 2019. Patient had cardiac arrest s/p intervention complicated by FATEMEH requiring dialysis 2018/2019. He subsequently had another cardiac arrest and had CABG on urgent basis along with MVR. He continued on dialysis (was started Dec 2018) until Feb 2019. Also followed with urology closely for BPH and urinary retention. He had chronic posey catheter which was subsequently removed per urology    Since last office visit he was admitted at outside hospital with unresponsiveness. He says he had stress test at [facility]. EF is 35% as of Oct 17, 2021. Wife says they really did not find anything new at outside hospital. Creat was 3.3 on discharge. He saw CHF clinic yesterday. Taking bumex 2 mg BID. Not taking metolazone regularly. He is also on isosorbide. Patient brought some BP reading and most readings are in 150-170 range.         Past History:  Past Medical History:   Diagnosis Date    CAD (coronary artery disease)     CHF (congestive heart failure) (HCC)     Chronic kidney disease     History of blood transfusion     Hyperlipidemia     Hypertension     Proteinuria     Type II or unspecified type diabetes mellitus without mention of complication, not stated as uncontrolled      Past Surgical History:   Procedure Laterality Date    CARDIAC SURGERY      COLONOSCOPY  2019    at [address] GRAFT N/A 1/28/2019    CABG X3 MVR / MAZE performed by Subha Glez Deborah Wakefield MD at [address] [address] [address]  9/8/2020    CT BONE MARROW BIOPSY 9/8/2020 STR CT SCAN    TONSILLECTOMY      UPPER GASTROINTESTINAL ENDOSCOPY N/A 1/21/2019    EGD DIAGNOSTIC ONLY performed by Darlene Nam MD [facility] [address] [facility]        VITALS:  BP (!) 160/66 (Site: Right Upper Arm, Position: Sitting, Cuff Size: Medium Adult)   Pulse 60   Temp 97.2 °F (36.2 °C)   Wt 184 lb (83.5 kg)   SpO2 97%   BMI 25.66 kg/m²   Wt Readings from Last 3 Encounters:   11/03/21 184 lb (83.5 kg)   11/02/21 184 lb (83.5 kg)   09/01/21 180 lb (81.6 kg)     Body mass index is 25.66 kg/m².      General Appearance: alert and cooperative with exam, appears comfortable, no distress  Oral: moist oral mucus membranes  Neck: No jugular venous distention  Lungs: Air entry decreased  Heart: S1, S2 heard  GI: soft, non-tender, no guarding  Extremities: +chronic leg edema, but not much at this time, no sig thigh or presacral edema     Medications:  Current Outpatient Medications   Medication Sig Dispense Refill    sertraline (ZOLOFT) 50 MG tablet Take 50 mg by mouth daily       isosorbide mononitrate (IMDUR) 30 MG extended release tablet Take 1 tablet by mouth daily 30 tablet 3    amLODIPine (NORVASC) 5 MG tablet Take 5 mg by mouth 2 times daily      tamsulosin (FLOMAX) 0.4 MG capsule Take 1 capsule by mouth daily 90 capsule 3    carvedilol (COREG) 12.5 MG tablet Take 1 tablet by mouth 2 times daily 180 tablet 3    bumetanide (BUMEX) 2 MG tablet Take 1 tablet by mouth 2 times daily (Patient taking differently: Take 2 mg by mouth 2 times daily Takes two tabs BID) 180 tablet 3    atorvastatin (LIPITOR) 40 MG tablet Take 1 tablet by mouth daily 90 tablet 3    nitroGLYCERIN (NITROSTAT) 0.4 MG SL tablet Place 1 tablet under the tongue every 5 minutes as needed for Chest pain 25 tablet 3    ferrous sulfate (IRON 325) 325 (65 Fe) MG tablet Take 1 tablet by mouth 2 times daily 180 tablet 3    Insulin Glargine (LANTUS SC) Inject 18 Units into the skin       Coenzyme Q10 (COQ-10) 50 MG CAPS Take by mouth daily       aspirin EC 81 MG EC tablet Take 1 tablet by mouth daily 30 tablet 3    acetaminophen (TYLENOL) 325 MG tablet Take 650 mg by mouth every 4 hours as needed for Pain      BD ULTRA-FINE MICRO PEN NEEDLE 32G X 6 MM MISC       latanoprost (XALATAN) 0.005 % ophthalmic solution Place 1 drop into both eyes nightly        No current facility-administered medications for this visit. Laboratory & Diagnostics:  4317: RZ: R 10.5, L 10.1, 1.4 cm left kidney cyst  Creat 1.8, K 3.9, UACR ~ 500 mg/g  March 4 2019: Creat 1.9, K 4.5, Ca 7.8, Hb 8.2  April 2019: K 4.6, creat 1.9, ca 8.2, Hb 8.1  April 15, 2019: K 3.8, creat 1.5, Hb 9.3, ferritin ~ 200  May 2019: K 4.2, creat 2.1  June 2019: K 3.6, Hb 10.9  Aug 2019: K 3.7, creat 2.13    March 2020: K 3.8, creat 2.8, Hb 11.8  Aug 2020: K 4.3, Creat 2.64, Ca 8.9, Hb 11.9  Feb 2021: K 4.7, creat 2.85, phos 4.3, Ca 7.9, , Hb 11.7  Aug 2021: K 4.5, Creat 3.3, Ca 9.0, Hb 13.0  OCt 2021: K 3.5, Creat 3.3, Sodium 137, Albumin 3.1, phos 3.4, Hb 11.9     Impression/Plan:   1. CKD IV: CKD is multifactorial from diabetic nephropathy, ischemic injuries, chronic cardiorenal syndrome. Creatinine is slightly higher this time. Ok to continue with bumex 2 mg BID. Resume metolazone 2.5 mg twice weekly. Patient is diabetic with CKD IV, at very high risk of hyperkalemia with aldactone in setting of lisinopril  2. Chronic systolic CHF/cardiomyopathy:  secondary to chronic systolic congestive heart failure and also has diastolic dysfunction. Continue bumex and resume metolazone. 3. HTN: elevated. On lopressor and norvasc. Resume lisinopril 5 mg daily. BP at home is in 150-170 range. 4. Anemia in CKD. stable hemoglobin  5  Proteinuria secondary to diabetic nephropathy: resume lisinopril, recheck BMP 1 week  6. Left kidney cyst, simple  7.  CAD s/p CABG recently  8.  Monoclonal gammopathy: seeing oncology. 9. Hypokalemia due to diuretics, start KCL 20 meq daily    Orders Placed This Encounter   Procedures    Basic Metabolic Panel    Basic Metabolic Panel     Return in about 2 months (around 1/3/2022).     Sharon Alvarez MD  [facility]

## 2021-11-08 NOTE — PATIENT INSTRUCTIONS
You may receive a survey regarding the care you received during your visit. Your input is valuable to us. We encourage you to complete and return your survey. We hope you will choose us in the future for your healthcare needs.     Continue:  · Continue current medications  · Daily weights and record  · Fluid restriction of 2 Liters per day  · Limit sodium in diet to around 0169-2520 mg/day  · Monitor BP  · Activity as tolerated     Call the Heart Failure Clinic for any of the following symptoms: [phone]   Weight gain of 2-3 pounds in 1 day or 5 pounds in 1 week   Increased shortness of breath   Shortness of breath while laying down   Cough   Chest pain   Swelling in feet, ankles or legs   Tenderness or bloating in the abdomen   Fatigue    Decreased appetite or nausea    Confusion

## 2021-11-08 NOTE — PROGRESS NOTES
TELEHEALTH EVALUATION -- Audio/Visual (During GBVBF-91 public health emergency)    [address]        Visit Date: 11/8/2021  Cardiologist:  Dr. Patel Reed  Primary Care Physician: Dr. Bart Mcgovern is a 78 y.o. male who presents today for:  Chief Complaint   Patient presents with    Congestive Heart Failure       HPI:   Pilar Kelly is a 78 y.o. male who is seen via video virtual Doxy[employer] visit for a follow up patient visit in the heart failure clinic. Pilar Kelly was at home. Provider was present at CHF clinic. CHF clinic nurse assisted. HF: ICM, HFrEF (EF 35%, was 40-45% 9/2020), RVSP 61  Device: no - refused in past  HX: Cardiac arrest in 12/2018, 1/2019 - CABG x 3V, MV repair, MAZE- 1/28/19-Dr Dickey, DM, HTN, HLD, CKD IV (Luis Manuel Ball, did require Hemo x 2 months), Anemia, Former smoker (10 years total), MGUS (Dr Alessandro Thomas)     Dry wt:  165-170      Hospitalization r/t Heart Failure:   April 2019  Hx recurrent Pleural effusions = last tap 11/2019 (R side -1.8L off)  12/2019 = ECHO shows drop in EF 25-30% (from 35-40%).    1/2020 = saw Patel Reed, recommended LHC/RHC d/t declined EF, cant guarantee wont end up on dialysis - pt refused.  Pt refused Lifevest.    Recently in hospital for Syncope 10/2021 at [facility] = wife took him to bathroom, Ronnie Duarteter went unresponsive. Dtr started CPR, came around w/in few minutes. Took BP pills on empty stomach, wife ? BP bottoming out. Had been nauseated prior/not felt good all day. In ED creat 2.8, BUN 71 (not too far from baseline). Sent to [facility] d/t no beds here  Discharge report  Pilar Kelly is an 78year old male who has a history of hypertension, diabetes type 2, hyperlipidemia, CKD stage IIIb, transferred from [facility] for further work-up of syncope and abnormal CT abdomen. Patient presented to ED after having an episode of syncope.  Per report patient's wife took patient to the bathroom on his wheelchair, he was able to transfer from toilet back to his wheelchair. When patient's wife was bringing him out of the bathroom patient all of a sudden became unresponsive. Patient does not remember the event. He denies any prodromal symptoms. Wife also initiated CPR and called EMS. By the time EMS arrived patient was alert and awake. Patient denies any abdominal pain or chest pain. No palpitations. No shortness of breath or coughing. In the ED patient's lab work was essentially unremarkable except for mild transaminitis. Patient had VQ scan which showed low probability of PE. Patient also had CT abdomen pelvis which showed cholelithiasis with signs suspected    HOSPITAL COURSE AND TREATMENT:     1. Syncope: Likely due to dehydration per cardiology. VQ scan low probability  2. NSVT: Underwent stress test which did not show any reversible ischemia and showed EF of 35% and stable scar  3. Uncontrolled hypertension: Lisinopril discontinued due to underlying CKD 4 by nephrology. He started on Aldactone, Norvasc and isosorbide mononitrate. Is requested to stop lisinopril and isosorbide dinitrate on discharge. Patient to continue taking Bumex 2 mg twice a day as per home dose, Coreg  4. Abnormal CT abdomen at outside facility showing possible cholangitis: Ultrasound is equivocal and HIDA scan ruled out acute cholecystitis. General surgery evaluated the patient and did not recommend any further intervention  5. Insulin-dependent diabetes mellitus: Takes 18 units of Lantus as outpatient  6. Monoclonal gammopathy-follows with oncology as outpatient  7. ASHD s/p CABG  8. History of MVR  9. Chronic systolic congestive heart failure/ischemic cardiomyopathy: Follow-up with cardiology as outpatient for consideration of ICD  10. Pulmonary hypertension-RVSP 50 to 60 mmHg  11. History of BPH  Stopped Lisinopril, Metolazone.  Added Norvasc    Nile Vela - 11/3 - resumed lower dose Lisinopril  10/27 telephone encounter to Luciano Hernandez - Was up 7# and some mild swelling - resumed Metolazone  Concerns today: seems to be declining - more fatigued, more SHANNON. Needs help to shave even. Looks tired. Roller coaster past few years - \"cant get off of it\"  Notes sats were 88% after minimal exertion per wife (has home pulse ox). No O2 - never had   Activity: SHANNON w/ minimal - can walk short distances.     Diet: watches     Patient has:  Chest Pain: no  SOB: SHANNON  Orthopnea/PND: no  AMBREEN: no  Edema: no  Fatigue: yes   Abdominal bloating: no  Cough: no  Appetite: good  Any extra diuretic use: no  Weight gain: no  Home weight: stable  Home blood pressure: stable    Past Medical History:   Diagnosis Date    CAD (coronary artery disease)     CHF (congestive heart failure) (HCC)     Chronic kidney disease     History of blood transfusion     Hyperlipidemia     Hypertension     Proteinuria     Type II or unspecified type diabetes mellitus without mention of complication, not stated as uncontrolled      Past Surgical History:   Procedure Laterality Date    CARDIAC SURGERY      COLONOSCOPY  2019    at [address] GRAFT N/A 2019    CABG X3 MVR / MAZE performed by Yesenia Blake MD at [address]  2020    CT BONE MARROW BIOPSY 2020 [facility] CT SCAN    TONSILLECTOMY      UPPER GASTROINTESTINAL ENDOSCOPY N/A 2019    EGD DIAGNOSTIC ONLY performed by Dayami Gomes MD at [facility]     Family History   Problem Relation Age of Onset    Cancer Mother         breast    Diabetes Mother     Heart Disease Father     Diabetes Father     Cancer Maternal Grandmother         leukemia     Social History     Tobacco Use    Smoking status: Former Smoker     Packs/day: 0.25     Years: 10.00     Pack years: 2.50     Types: Cigarettes     Quit date: 1976     Years since quittin.8    Smokeless tobacco: Former User   Substance Use Topics    Alcohol use: No     Alcohol/week: 0.0 standard drinks     Current Outpatient Medications   Medication Sig Dispense Refill    lisinopril (PRINIVIL;ZESTRIL) 10 MG tablet Take 0.5 tablets by mouth daily 30 tablet 3    potassium chloride (KLOR-CON M) 20 MEQ extended release tablet Take 1 tablet by mouth daily 90 tablet 1    metOLazone (ZAROXOLYN) 2.5 MG tablet Take 1 tablet by mouth Twice a Week 8 tablet 3    bumetanide (BUMEX) 2 MG tablet Take 1 tablet by mouth 2 times daily 60 tablet 3    sertraline (ZOLOFT) 50 MG tablet Take 50 mg by mouth daily       isosorbide mononitrate (IMDUR) 30 MG extended release tablet Take 1 tablet by mouth daily 30 tablet 3    amLODIPine (NORVASC) 5 MG tablet Take 5 mg by mouth 2 times daily      tamsulosin (FLOMAX) 0.4 MG capsule Take 1 capsule by mouth daily 90 capsule 3    carvedilol (COREG) 12.5 MG tablet Take 1 tablet by mouth 2 times daily 180 tablet 3    atorvastatin (LIPITOR) 40 MG tablet Take 1 tablet by mouth daily 90 tablet 3    nitroGLYCERIN (NITROSTAT) 0.4 MG SL tablet Place 1 tablet under the tongue every 5 minutes as needed for Chest pain 25 tablet 3    ferrous sulfate (IRON 325) 325 (65 Fe) MG tablet Take 1 tablet by mouth 2 times daily 180 tablet 3    Insulin Glargine (LANTUS SC) Inject 18 Units into the skin       Coenzyme Q10 (COQ-10) 50 MG CAPS Take by mouth daily       aspirin EC 81 MG EC tablet Take 1 tablet by mouth daily 30 tablet 3    acetaminophen (TYLENOL) 325 MG tablet Take 650 mg by mouth every 4 hours as needed for Pain      BD ULTRA-FINE MICRO PEN NEEDLE 32G X 6 MM MISC       latanoprost (XALATAN) 0.005 % ophthalmic solution Place 1 drop into both eyes nightly        No current facility-administered medications for this visit. Allergies   Allergen Reactions    Atorvastatin      Other reaction(s): Myalgia, ON NURSING HOME REPORT       SUBJECTIVE:   Review of Systems   Constitutional: Positive for fatigue. Negative for activity change and appetite change. Respiratory: Positive for shortness of breath. Negative for cough. Cardiovascular: Positive for leg swelling (on/off mild LE). Negative for chest pain and palpitations. Gastrointestinal: Negative for abdominal distention. Neurological: Positive for weakness. Negative for light-headedness and headaches. Hematological: Negative for adenopathy. Psychiatric/Behavioral: Negative for sleep disturbance. OBJECTIVE:   Today's Vitals: There were no vitals taken for this visit. Physical Exam  Vitals reviewed. Constitutional:       General: He is not in acute distress. Appearance: Normal appearance. HENT:      Head: Normocephalic and atraumatic. Eyes:      Extraocular Movements: Extraocular movements intact. Pulmonary:      Effort: Pulmonary effort is normal.   Abdominal:      General: Abdomen is flat. There is no distension. Musculoskeletal:      Cervical back: Normal range of motion. Right lower leg: No edema (none per patient). Left lower leg: No edema (none per patient). Skin:     General: Skin is warm and dry. Neurological:      Mental Status: He is alert and oriented to person, place, and time. Psychiatric:         Mood and Affect: Mood normal.         Behavior: Behavior normal.         Wt Readings from Last 3 Encounters:   11/03/21 184 lb (83.5 kg)   11/02/21 184 lb (83.5 kg)   09/01/21 180 lb (81.6 kg)     BP Readings from Last 3 Encounters:   11/03/21 (!) 160/66   11/02/21 (!) 162/68   09/01/21 (!) 169/79     Pulse Readings from Last 3 Encounters:   11/03/21 60   11/02/21 64   09/01/21 74     There is no height or weight on file to calculate BMI.     Formatting of this note might be different from the original.   Procedure: NM MYOCARDIAL PERFUSION IMAGING     History: syncope, Cholecystitis, Syncope, unspecified syncope type, Coronary artery disease involving native heart without angina pectoris, unspecified vessel or lesion type, Primary hypertension, history of ischemic mild cardiomyopathy and atherosclerotic heart disease with previous coronary artery bypass procedure and aortic valve replacement January 2019. Comparison: none     Technique: 11.91 mCi technetium 99m Myoview IV for resting imaging and 39.8 mCi technetium 99m Myoview IV during the peak effect of Lexiscan     SPECT/tomographic images were obtained both for rest and stress imaging. Findings: There is a fixed perfusion defect on rest and stress images involving the left ventricular apex and inferior wall of the left ventricle ex. There is no reversible perfusion defect noted in the left ventricle myocardium. The left ventricle is dilated. On gated images, there is paradoxic wall motion involving the apex and inferior wall of the left ventricle. Wall motion in the remainder of the left ventricle is normal. The left ventricle ejection fraction is 35%. Impression: 1. Estimated left ventricular ejection fraction 35%. Fixed perfusion defect involving the apex and inferior wall left ventricle compatible with previous myocardial infarct. No reversible perfusion abnormality demonstrated. Dictated by Yoana Rondon M.D. Workstation [id number]      [facility]    12/2019 - EF 25-30%  5/2019 - 35-40%    STRESS at [facility]   Conclusions:   1. Negative EKG part of Lexiscan Stress Test for ischemia. 2. Baseline uncontrolled hypertension     For complete results refer to Myocardial Perfusion Imaging   Report.      Electronically signed by: Marcy Tapia MD  10/19/2021 10:44 AM     [address]  Specimen Collected: --   Date: 10/19/21           Results reviewed:  BNP:   Lab Results   Component Value Date     10/17/2019     CBC:   Lab Results   Component Value Date    WBC 6.1 07/12/2021    RBC 4.41 07/12/2021    HGB 13.0 08/23/2021    HCT 42.4 08/23/2021     07/12/2021     CMP:    Lab Results   Component Value Date     11/01/2021    K 3.8 11/01/2021    K 4.0 01/23/2019    CL 97 11/01/2021    CO2 27 11/01/2021    BUN 70 11/01/2021    CREATININE 2.9 11/01/2021    LABGLOM 21 11/01/2021    GLUCOSE 176 11/01/2021    CALCIUM 8.1 11/01/2021     Hepatic Function Panel:    Lab Results   Component Value Date    ALKPHOS 91 07/12/2021    ALT 22 07/12/2021    AST 17 07/12/2021    PROT 7.8 07/12/2021    BILITOT 0.4 07/12/2021    BILIDIR <0.2 07/12/2021    LABALBU 3.7 07/12/2021     Magnesium:    Lab Results   Component Value Date    MG 2.2 12/02/2019     PT/INR:    Lab Results   Component Value Date    INR 1.13 09/08/2020     Lipids:    Lab Results   Component Value Date    TRIG 110 12/16/2019    HDL 40 12/16/2019    LDLCALC 72 12/16/2019       ASSESSMENT AND PLAN:   The patient's condition/symptoms are Stable      Diagnosis Orders   1. CHF NYHA class III, chronic, systolic ([address]     2. Ischemic cardiomyopathy     3. Coronary artery disease involving native coronary artery of native heart without angina pectoris     4. Essential hypertension     5. CKD (chronic kidney disease) stage 4, GFR 15-29 ml/min ([facility])     6. Hx of CABG     7. H/O mitral valve replacement       Continue:  GDMT:   ACE/ARB/ARNI - Lisinopril 5/day   BB - Coreg 12.5 BID   Diuretic - Bumex 2 BID, Kcl 20/day - Metolazone 2.5 2x/week (taking weekly and PRN 2nd dose)   Hydralazine/Isos. - Imdur 30/day   Aldosterone- no   SGLT2 - no d/t CKD  Continue Current medications:  Norvasc     CHF systolic NYHA III/IV, EF 49% 10/2021 [facility]) - worsening SHANNON/fatigue. Recent admission for syncope = workup relatively unremarkable - ?r/t dehydration. Worsening Cardiorenal over past year or more. Refused lifevest/ICD in past - refused ischemic workup ([facility] in past d/t likeliness of dialysis).    Recent Stress - Negative    Stable, appears Euvolemic  Lab reviewed from hospitalization - stable however creatinine has slowly been climbing - creat baseline 2.8-3.0   Follows closely w/ Dr Luciaon Hernandez   Repeat blood work in this week for Luciano Hernandez  BP/HR stable   No med changes today   Continue diet/fluid adherence  Continue daily wts. ?need for O2, sats drop 88% w/ minimal activity - Wife requesting O2 eval = will check if [facility] [place] [facility] can do. Discussed Palliative Care - [facility] = handout mailed to pt. He will think about. F/U w/ Cardiology/[facility] in 4 mth  F/U in clinic in 6 months    ADDENDUM  11/15   Patient was evaluated 11/12  for the diagnosis of CHF. I entered a DME order for home oxygen because the diagnosis and testing requires the patient to have supplemental oxygen. Condition will improve or be benefited by oxygen use. The patient is  able to perform good mobility in a home setting and therefore does require the use of a portable oxygen system. The need for this equipment was discussed with the patient and he understands and is in agreement. Tolerating above noted HF meds, no ill side effects noted. Will continue to monitor kidney function and electrolytes. Will optimize as tolerated. Pt is compliant w/ medications. · Daily weights  · Fluid restriction of 2 Liters per day  · Limit sodium in diet to around 7106-1124 mg/day  · Monitor BP  · Activity as tolerated     Patient was instructed to call the [address] for any changes in symptoms as noted in AVS.      Return in about 6 months (around 5/8/2022). or sooner if needed     We discussed the importance of weighing oneself and recording daily. We also discussed the importance of a low sodium diet, higher sodium foods to avoid and appropriate low sodium food choices. Discussed use, benefit, and side effects of prescribed medications. All patient questions answered. Patient verbalizes understanding of plan of care using teach back method, and is agreeable to the treatment plan.      Pursuant to the emergency declaration under the [facility] [place], [address]5 waiver authority and the [employer] Appropriations Act, this Virtual  Visit was conducted, with patient's consent, to reduce the patient's risk of exposure to COVID-19 and provide continuity of care for an established patient. Services were provided through a video synchronous discussion virtually to substitute for in-person clinic visit. Total of 45 minutes of time was spent reviewing the chart and educating the patient about HF, medications, diet, exercise, and discussing the plan of care. I personally spent more then 50% of the appt time face to face with the patient counseling/coordinating patient's care.     Electronicallysigned by SILVIA Del Castillo CNP on 11/8/2021 at 4:39 PM

## 2021-11-09 NOTE — TELEPHONE ENCOUNTER
Called to [facility] [place] [facility] - they do home O2 eval but left to provider to interpret. Would prefer him have evaluation here as to better eval w/ our respiratory. Order for Home O2 eval placed.

## 2021-11-12 NOTE — PROGRESS NOTES
A home oxygen evaluation has been completed. []Patient is an inpatient. It is expected that the patient will be discharged within the next 48 hours. Qualified provider to write order for home prescription if patient qualifies. Social service/care managers will arrange for home oxygen. If patient is active, arrange for Home Medical supplier to assess for Oxygen Conserving Device per pulse oximetry. [x]Patient is an outpatient. Results will be faxed to the ordering provider. Qualified provider to write order for home prescription if patient qualifies and arranges for home oxygen. Patient was  on room air . SpO2 was 92 % on room air at rest. Patients SpO2 was 89% or above and did not qualify for home oxygen. Patient was walked for 5 minutes. SpO2 was 88 % during walking. Patients SpO2 was below 89% and qualified for home oxygen. Oxygen was applied at 1 lpm via nasal cannula to maintain a SpO2 between 90-92% while walking. Actual SpO2 was 92-94 %. Note: For any SpO2 at 96% see policy and procedure for possible qualifications.

## 2021-11-22 NOTE — TELEPHONE ENCOUNTER
Wife called patient needs a refill for the Amlodipine please. Patient will need this refill and [place] [facility]

## 2021-12-20 NOTE — PROGRESS NOTES
[facility] CANCER CENTER  CANCER NETWORK OF [facility]  [facility]  [facility], [place] [address] [address]  Dept: [phone]  Dept Fax: [phone]  Loc: [phone]     Subjective:      Chief Complaint:  Becky Vazquez is a 78 y. o. with abnormal immunoglobulin levels. On November 6, 2019 the patient had a serum protein electrophoresis performed. This found an M spike with additional restrictions in the gamma region. The monoclonal protein peak accounted for 0.97 g/dL of a total of 1.61 g/dL of a protein in the gamma region. Immunoglobulin electrophoresis gel pattern found an IgG type kappa monoclonal protein with additional faint bands in the IgM kappa and lambda region. In addition, the total gammaglobulin level was 1.81 g/dL. There is a mild elevation of the total gammaglobulin. On September 8, 2020 the patient underwent a bone marrow aspiration and biopsy. Surgical pathology confirmed a cellular marrow at 20% with trilineage hematopoiesis noted. Two percent of plasma cells were noted without any evidence of monoclonality. Adequate iron stores were also identified. The results were reviewed with the patient today. Well the 2% plasma cells were identified via aspirate differential there was no evidence of monoclonality as present by in situ hybridization for kappa or lambda RNA. The patient does continue to have a significantly elevated serum kappa free light chain. Given that there is no evidence of monoclonality in this bone marrow the free light change may possibly be related to a plasmacytoma, lymphoma producing light change including the potential lymphoblastic lymphoma. HPI:    He is here today for follow-up regarding his history of monoclonal gammopathy, leukopenia, and anemia.   A previous bone marrow biopsy was obtained that did not find clear evidence of malignancy however he does have a IgM and IgG monoclonal protein. He is also has elevated free kappa light chain levels. On today's evaluation the patient's general sense of wellbeing has been stable. He continues to have multiple comorbid medical conditions including cardiovascular disease. He complains of generalized weakness, fatigue, chronic pain, and dyspnea with exertion. His overall performance status is remained fairly level at ECOG level 1. Both his bowel bladder habits have been normal.  He denies any new skeletal pain. He has not had any blood in his stool or urine. The patient denies fever, cough, or other signs of infection. Laboratory studies from today were reviewed with the patient. The patient continues to have anemia. He does not report any evidence of abnormal bleeding or blood loss. Both his IgG M and IgG monoclonal protein levels are relatively stable. In addition his kappa free light chain is relatively stable. There is been no significant change in his hematological condition. His laboratory studies will continue to be monitored. PMH, SH, and FH:  I reviewed the patients medication list and allergy list as noted on the electronic medical record. The PMH, SH and FH were also reviewed as noted on the EMR. Review of Systems  Constitutional: Fatigue. HENT: Negative. Eyes: Negative. Respiratory: Dyspnea. Cardiovascular: Negative. Gastrointestinal: Negative. Genitourinary: Negative. Musculoskeletal: Chronic pain. Skin: Negative. Neurological: General weakness. Hematological: Negative. Psychiatric/Behavioral: Negative. Objective:   Physical Exam  Vitals:    11/29/21 1121   BP: (!) 179/71   Pulse: 66   Resp: 18   Temp: 97.8 °F (36.6 °C)   SpO2: 99%   Vitals reviewed and are stable. Constitutional: Elderly. No acute distress. HENT: Normocephalic and atraumatic. Eyes: Pupils appear equal. No scleral icterus. Pulmonary: Effort normal. No respiratory distress.  .   Musculoskeletal: Gait is abnormal. Muscle strength and tone grossly decreased. Neurological: Alert and oriented to person, place, and time. Judgment and thought content normal.  Skin: Scattered ecchymosis noted on bilateral upper forearms. Psychiatric: Mood and affect appropriate for the clinical situation. .      Data Analysis: The following laboratory studies were reviewed with the patient today:    Hematology 11/29/2021 8/23/2021 7/12/2021   WBC 5.0  6.1   RBC 4.10 (L)  4.41 (L)   HGB 11.3 (L) 13.0 (L) 12.4 (L)   HCT 36.9 (L) 42.4 39.9 (L)   MCV 90  90.5   RDW 16.4 (H)       170     Immunofixation                              M-spikes with an additional slight restriction in the gamma   region.  The monoclonal protein peaks account for 0.27 g/dL   and 1.01 g/dL, respectively, of the total 1.69 g/dL of   protein in the gamma region.  SHERYL gel pattern shows IgG   type kappa and IgM type kappa biclonal proteins with an   additional very faint band in IgM kappa. Ballard Qnt Free Light Chains. .................... 485.35 H   3.30-19.40    mg/L   Lambda Qnt Free Light Chains. ................. 35.35 H   5.71-26.30    mg/L   Kappa/Lambda Free Light Chain Ratio. ..... 13.73 H   0.26-1.65     Assessment:   1. Monoclonal gammopathy. 2.  IgG kappa monoclonal protein. 3.  Elevated free kappa light chain. 4.  Chronic anemia. 5.  Leukopenia    Plan:   1. Continue to monitor for any of his development of malignancy such as plasmacytoma or lymphoma. 2.  Monitor hemoglobin hematocrit and for any evidence of blood loss. 3.  Monitor IgG kappa monoclonal protein as well as kappa free light chain. 4.  Monitor total WBC count and for signs of infection/fever. Multiple questions were answered throughout the course the consultation. By the end of the consultation, all the patient's questions had been answered. The patient was asked to call if there were any questions or concerns prior to the next scheduled clinic visit.     Adriana Jones M.D. Medical Director: [facility]  Cancer Network [facility]  [address], 1 [facility], [address] [place] [address], [address] [place] [address], [address] of the [facility] at [facility]      **This report has been created using voice recognition software. It may contain minor errors which are inherent in voice recognition technology. **

## 2021-12-25 PROBLEM — U07.1 COVID-19: Status: ACTIVE | Noted: 2021-01-01

## 2021-12-25 NOTE — ED NOTES
ED to inpatient nurses report    Chief Complaint   Patient presents with    Shortness of Breath      Present to ED from home  LOC: alert and orientated to name, place, date  Vital signs   Vitals:    12/24/21 2359 12/25/21 0119 12/25/21 0139 12/25/21 0205   BP:  (!) 138/58 130/65 (!) 118/94   Pulse:  72  75   Resp: 22 22  20   Temp:       SpO2: 96% 96%  97%   Weight:          Oxygen Baseline 0L    Current needs required none Bipap/Cpap No  LDAs:   Peripheral IV 12/25/21 Right Forearm (Active)     Mobility: Requires assistance * 2  Pending ED orders: none  Present condition: stable    Electronically signed by Gerline Mortimer, RN on 12/25/2021 at 2:06 AM       Gerline Mortimer, RN  12/25/21 7752

## 2021-12-25 NOTE — ED NOTES
Patient resting in bed. Respirations easy and unlabored. No distress noted. Call light within reach.        Alessandro Anthony RN  12/25/21 2777

## 2021-12-25 NOTE — ED NOTES
Patient resting in bed. Respirations easy and unlabored. No distress noted. Call light within reach.        Alessandro Anthony RN  12/25/21 6728

## 2021-12-25 NOTE — PROGRESS NOTES
0230 - Skin assessment complete with charge RN. Stage 2 wounds noted to buttocks and redness also noted to groin. Patient had had BM. Cleansed and zinc ointment applied. Patient placed on L side. Will initiate q2 turns throughout the shift.

## 2021-12-25 NOTE — RT PROTOCOL NOTE
RT Inhaler-Nebulizer Bronchodilator Protocol Note    There is a bronchodilator order in the chart from a provider indicating to follow the RT Bronchodilator Protocol and there is an Initiate RT Inhaler-Nebulizer Bronchodilator Protocol order as well (see protocol at bottom of note). CXR Findings:  No results found. The findings from the last RT Protocol Assessment were as follows:   History Pulmonary Disease: None or smoker <15 pack years  Respiratory Pattern: Dyspnea on exertion or RR 21-25 bpm  Breath Sounds: Slightly diminished and/or crackles  Cough: Strong, spontaneous, non-productive  Indication for Bronchodilator Therapy: Decreased or absent breath sounds  Bronchodilator Assessment Score: 4    MDI changed to BID and PRN per protocol and score. Aerosolized bronchodilator medication orders have been revised according to the RT Inhaler-Nebulizer Bronchodilator Protocol below. Respiratory Therapist to perform RT Therapy Protocol Assessment initially then follow the protocol. Repeat RT Therapy Protocol Assessment PRN for score 0-3 or on second treatment, BID, and PRN for scores above 3. No Indications - adjust the frequency to every 6 hours PRN wheezing or bronchospasm, if no treatments needed after 48 hours then discontinue using Per Protocol order mode. If indication present, adjust the RT bronchodilator orders based on the Bronchodilator Assessment Score as indicated below. Use Inhaler orders unless patient has one or more of the following: on home nebulizer, not able to hold breath for 10 seconds, is not alert and oriented, cannot activate and use MDI correctly, or respiratory rate 25 breaths per minute or more, then use the equivalent nebulizer order(s) with same Frequency and PRN reasons based on the score. If a patient is on this medication at home then do not decrease Frequency below that used at home.     0-3 - enter or revise RT bronchodilator order(s) to equivalent RT Bronchodilator order with Frequency of every 4 hours PRN for wheezing or increased work of breathing using Per Protocol order mode. 4-6 - enter or revise RT Bronchodilator order(s) to two equivalent RT bronchodilator orders with one order with BID Frequency and one order with Frequency of every 4 hours PRN wheezing or increased work of breathing using Per Protocol order mode. 7-10 - enter or revise RT Bronchodilator order(s) to two equivalent RT bronchodilator orders with one order with TID Frequency and one order with Frequency of every 4 hours PRN wheezing or increased work of breathing using Per Protocol order mode. 11-13 - enter or revise RT Bronchodilator order(s) to one equivalent RT bronchodilator order with QID Frequency and an Albuterol order with Frequency of every 4 hours PRN wheezing or increased work of breathing using Per Protocol order mode. Greater than 13 - enter or revise RT Bronchodilator order(s) to one equivalent RT bronchodilator order with every 4 hours Frequency and an Albuterol order with Frequency of every 2 hours PRN wheezing or increased work of breathing using Per Protocol order mode. RT to enter RT Home Evaluation for COPD & MDI Assessment order using Per Protocol order mode.     Electronically signed by Mary Bueno RCP on 12/25/2021 at 9:15 AM

## 2021-12-25 NOTE — ED PROVIDER NOTES
[facility] [address] [address] EMERGENCY DEPT    EMERGENCY MEDICINE     Pt Name: Judd Martin  MRN: [id number]  [place] [birth year]  Date of evaluation: 12/24/2021  Provider: Ashanti Tadeo MD    CHIEF COMPLAINT       Chief Complaint   Patient presents with    Shortness of Breath       HISTORY OF PRESENT ILLNESS    Judd Martin is a pleasant 78 y.o. male   Presents to the emergency department from home complaining of cough and shortness of breath along with increased swelling to b/l legs. He denies any fever. He reports he is not sure if his cough is productive or not. He denies any chest pain. He reports the swelling has been present and getting worse over the last week. He reports he is urinating normally. He is vaccinated for covid, but hasn't got his booster yet. No other complaints, no other known exacerbating/relieving factors. Triage notes and Nursing notes were reviewed by myself. Any discrepancies are addressed above.     PAST MEDICAL HISTORY     Past Medical History:   Diagnosis Date    CAD (coronary artery disease)     CHF (congestive heart failure) (HCC)     Chronic kidney disease     History of blood transfusion     Hyperlipidemia     Hypertension     Proteinuria     Type II or unspecified type diabetes mellitus without mention of complication, not stated as uncontrolled        SURGICAL HISTORY       Past Surgical History:   Procedure Laterality Date    CARDIAC SURGERY      COLONOSCOPY  2019    at [address] GRAFT N/A 1/28/2019    CABG X3 MVR / MAZE performed by Chilo Serrano MD at [address]  9/8/2020    CT BONE MARROW BIOPSY 9/8/2020 [facility] CT SCAN    TONSILLECTOMY      UPPER GASTROINTESTINAL ENDOSCOPY N/A 1/21/2019    EGD DIAGNOSTIC ONLY performed by Susan Garcia MD at [facility]       CURRENT MEDICATIONS       Previous Medications    ACETAMINOPHEN (TYLENOL) 325 MG TABLET    Take 650 mg by mouth every 4 hours as needed for Pain AMLODIPINE (NORVASC) 5 MG TABLET    Take 1 tablet by mouth 2 times daily    AMLODIPINE (NORVASC) 5 MG TABLET    Take 1 tablet by mouth 2 times daily    ASPIRIN EC 81 MG EC TABLET    Take 1 tablet by mouth daily    ATORVASTATIN (LIPITOR) 40 MG TABLET    Take 1 tablet by mouth daily    BD ULTRA-FINE MICRO PEN NEEDLE 32G X 6 MM MISC        BUMETANIDE (BUMEX) 2 MG TABLET    Take 1 tablet by mouth 2 times daily    CARVEDILOL (COREG) 12.5 MG TABLET    Take 1 tablet by mouth 2 times daily    COENZYME Q10 (COQ-10) 50 MG CAPS    Take by mouth daily     FERROUS SULFATE (IRON 325) 325 (65 FE) MG TABLET    Take 1 tablet by mouth 2 times daily    INSULIN GLARGINE (LANTUS SC)    Inject 18 Units into the skin     ISOSORBIDE MONONITRATE (IMDUR) 30 MG EXTENDED RELEASE TABLET    Take 1 tablet by mouth daily    LATANOPROST (XALATAN) 0.005 % OPHTHALMIC SOLUTION    Place 1 drop into both eyes nightly     LISINOPRIL (PRINIVIL;ZESTRIL) 10 MG TABLET    Take 0.5 tablets by mouth daily    METOLAZONE (ZAROXOLYN) 2.5 MG TABLET    Take 1 tablet by mouth Twice a Week    NITROGLYCERIN (NITROSTAT) 0.4 MG SL TABLET    Place 1 tablet under the tongue every 5 minutes as needed for Chest pain    POTASSIUM CHLORIDE (KLOR-CON M) 20 MEQ EXTENDED RELEASE TABLET    Take 1 tablet by mouth daily    SERTRALINE (ZOLOFT) 50 MG TABLET    Take 50 mg by mouth daily     TAMSULOSIN (FLOMAX) 0.4 MG CAPSULE    Take 1 capsule by mouth daily       ALLERGIES     Atorvastatin    FAMILY HISTORY       Family History   Problem Relation Age of Onset    Cancer Mother         breast    Diabetes Mother     Heart Disease Father     Diabetes Father     Cancer Maternal Grandmother         leukemia        SOCIAL HISTORY       Social History     Socioeconomic History    Marital status:      Spouse name: Not on file    Number of children: Not on file    Years of education: Not on file    Highest education level: Not on file   Occupational History    Not on file Tobacco Use    Smoking status: Former Smoker     Packs/day: 0.25     Years: 10.00     Pack years: 2.50     Types: Cigarettes     Quit date: 1976     Years since quittin.9    Smokeless tobacco: Former User   Vaping Use    Vaping Use: Never used   Substance and Sexual Activity    Alcohol use: No     Alcohol/week: 0.0 standard drinks    Drug use: Not on file    Sexual activity: Not on file   Other Topics Concern    Not on file   Social History Narrative    Not on file     Social Determinants of Health     Financial Resource Strain:     Difficulty of Paying Living Expenses: Not on file   Food Insecurity:     Worried About [address] in the Last Year: Not on file    Sue of Food in the Last Year: Not on file   Transportation Needs:     Lack of Transportation (Medical): Not on file    Lack of Transportation (Non-Medical): Not on file   Physical Activity:     Days of Exercise per Week: Not on file    Minutes of Exercise per Session: Not on file   Stress:     Feeling of Stress : Not on file   Social Connections:     Frequency of Communication with Friends and Family: Not on file    Frequency of Social Gatherings with Friends and Family: Not on file    Attends Mandaeism Services: Not on file    Active Member of [address] [place] [address] or Organizations: Not on file    Attends Club or Organization Meetings: Not on file    Marital Status: Not on file   Intimate Partner Violence:     Fear of Current or Ex-Partner: Not on file    Emotionally Abused: Not on file    Physically Abused: Not on file    Sexually Abused: Not on file   Housing Stability:     Unable to Pay for Housing in the Last Year: Not on file    Number of [place] in the Last Year: Not on file    Unstable Housing in the Last Year: Not on file       REVIEW OF SYSTEMS     Review of Systems   Constitutional: Negative for chills and fever. Respiratory: Positive for cough and shortness of breath.     Cardiovascular: Positive for leg swelling. Negative for chest pain. Gastrointestinal: Negative for abdominal pain and vomiting. Musculoskeletal: Negative for back pain and neck pain. Skin: Negative for rash and wound. Neurological: Negative for weakness and numbness. All other systems reviewed and are negative. Except as noted above the remainder of the review of systems was reviewed and is. PHYSICAL EXAM    (up to 7 for level 4, 8 or more for level 5)     ED Triage Vitals [21 2328]   BP Temp Temp src Pulse Resp SpO2 Height Weight   (!) 181/72 97.8 °F (36.6 °C) -- 87 22 95 % -- 200 lb (90.7 kg)       Physical Exam  Vitals and nursing note reviewed. Constitutional:       General: He is awake. HENT:      Head: Normocephalic and atraumatic. Mouth/Throat:      Mouth: Mucous membranes are moist.   Eyes:      General: Lids are normal.      Conjunctiva/sclera: Conjunctivae normal.   Neck:      Vascular: No JVD. Trachea: No tracheal deviation. Cardiovascular:      Rate and Rhythm: Normal rate and regular rhythm. Pulses: Normal pulses. Heart sounds: No murmur heard. No friction rub. No gallop. Comments: No calf tenderness  Pulmonary:      Effort: Tachypnea present. Comments: Fine rales and rhonchi at bases bilaterally  Abdominal:      Palpations: Abdomen is soft. Tenderness: There is no abdominal tenderness. Musculoskeletal:      Cervical back: Neck supple. Right lower le+ Pitting Edema present. Left lower le+ Pitting Edema present. Skin:     General: Skin is warm. Findings: No rash. Neurological:      General: No focal deficit present. Mental Status: He is alert. Sensory: No sensory deficit. Motor: No weakness. Psychiatric:         Behavior: Behavior is cooperative.          DIAGNOSTIC RESULTS     EKG:(none if blank)  All EKG's are [facility] who either signs or Co-signs this chart in the absence of a cardiologist.    Flores Blazing: NSR at rate of 88, nonspecific st and t wave abnormality    RADIOLOGY: (none if blank)   Interpretation per the Radiologistbelow, if available at the time of this note:    XR CHEST (2 VW)   Final Result   1. Interstitial densities throughout the bilateral lungs greater on the    right than left most consistent with edema. Right basilar pleural and    parenchymal opacities likely representing small right basilar effusion    with atelectasis. Fluid within the minor fissure. Possible tiny left    basilar effusion. Overall findings are likely reflective of congestive    heart failure.       This document has been electronically signed by: Patel Lara DO on    12/25/2021 12:27 AM          LABS:  Labs Reviewed   COVID-19, RAPID - Abnormal; Notable for the following components:       Result Value    SARS-CoV-2, NAAT DETECTED (*)     All other components within normal limits   CBC WITH AUTO DIFFERENTIAL - Abnormal; Notable for the following components:    RBC 4.51 (*)     Hemoglobin 12.3 (*)     Hematocrit 39.2 (*)     MCHC 31.4 (*)     RDW-CV 16.9 (*)     RDW-SD 53.8 (*)     Lymphocytes Absolute 0.7 (*)     All other components within normal limits   BLOOD GAS, ARTERIAL - Abnormal; Notable for the following components:    pH, Blood Gas 7.47 (*)     PCO2 29 (*)     PO2 68 (*)     HCO3 21 (*)     All other components within normal limits   COMPREHENSIVE METABOLIC PANEL W/ REFLEX TO MG FOR LOW K - Abnormal; Notable for the following components:    Glucose 269 (*)     CREATININE 3.3 (*)     BUN 89 (*)     Sodium 130 (*)     Chloride 95 (*)     CO2 21 (*)     Calcium 8.2 (*)     AST 87 (*)     Albumin 3.1 (*)     All other components within normal limits   TROPONIN - Abnormal; Notable for the following components:    Troponin T 0.048 (*)     All other components within normal limits   BRAIN NATRIURETIC PEPTIDE - Abnormal; Notable for the following components:    Pro-BNP 88383.0 (*)     All other components within normal limits   GLOMERULAR FILTRATION RATE, ESTIMATED - Abnormal; Notable for the following components:    Est, Glom Filt Rate 18 (*)     All other components within normal limits   SPECIMEN REJECTION   ANION GAP   OSMOLALITY       All other labs were within normal range or not returned as of this dictation. Please note, any cultures that may have been sent were not resulted at the time of this patient visit. EMERGENCY DEPARTMENT COURSE andMedical Decision Making:     MDM/   Pt presents to the ER with CHF symptoms, h/o same, + cough, found to be covid positive, however does have rales on exam, complaining of progressive b/l LE edema for 1 week, bnp significantly elevated (more than previous), also worsening renal function. Patient oxygenating ok on home 1L [place], however he remains tachypneic. Will check bladder scan, only 160 ml. Sign out to Dr. Shiva Pal, message sent to Dr. Jaz Livingston for admit. Strict returnprecautions and follow up instructions were discussed with the patient with which the patient agrees      ED Medications administered this visit:    Medications   bumetanide (BUMEX) injection 2 mg (has no administration in time range)   nitroGLYCERIN 50 mg in dextrose 5% 250 mL infusion (has no administration in time range)         Procedures: (None if blank)    The patient was seen at a time when COVID-19 was severely surging in the region, placing a significant strain on available resources and personnel. CLINICAL IMPRESSION     1. Acute on chronic congestive heart failure, unspecified heart failure type ([facility] [address]    2. COVID-19    3. Acute on chronic renal insufficiency          DISPOSITION/PLAN   DISPOSITION    Admit    PATIENT REFERRED TO:  No follow-up provider specified.     DISCHARGE MEDICATIONS:  New Prescriptions    No medications on file     @[facility]([id number]:LAST:1)      (Please note that portions of this note were completed with a voice recognition program.  Efforts were

## 2021-12-25 NOTE — ED TRIAGE NOTES
Patient presents to ED with chief complaint of shortness of breath. Patient normally on 1L nasal cannula. Wife states that patient has had a hard time getting around lately and has had to use his oxygen at all times instead of PRN. Patient resting in bed. Denies pain. No distress noted. Call light within reach.

## 2021-12-25 NOTE — H&P
nonspecific changes. No acute ischemic changes. Patient reports no chest pain. Repeat level. Repeat EKG. CKD: Cr 3.3 with GFR 18 on admission, baseline 2.4-2.8 recently, Cr 3.3 on 10/20/2021. Appears to be near baseline. Repeat BMP in AM and trend as needed in the setting of diuresis. CAD s/p CABG x3 MVR/ MAZE 01/28/2019. On aspirin/statin/BB. Primary HTN: Uncontrolled. Per chart, recent outpatient BP ranging 150-170s. On Norvasc 5mg daily, Lisinopril 5 mg daily (?discontinued on discharge 10/20/2021 but noted on nephrology note 11/2021), Imdur 30mg , Bumex 2mg GUILLE, Coreg 12.5 BID.  -Lisinopril, Imdur, PO Bumex held due to labile BP. Coreg resumed in the setting of CHF. Pulmonary HTN: RVSP 50-60 mmHg. HLD: Lipid panel Chol 156, , HDL 23, . On Lipitor 40 mg daily. IDDMII: HbA1c 7.8 on 12/09/2021. Unclear if still on Lantus 18u daily. Per pharmacy, last refill in 2019.   -Start on medium SSI with hypoglycemia protocol. Monitor BG with inpatient goal 140-180. Med reconciliation to be done. Chronic normocytic anemia: Hb  12.3, near baseline. No obvious bleeding. Monitor. Monoclonal gammopathy/MGUS: Follows with Dr. Asif Staples. Last office visit 11/29/2021. Being monitored for malignant transformation. BPH: on Flomax. Code Status:FULL    Tele:   [x] yes             [] no    Diet: ADULT DIET; Regular;  Low Sodium (2 gm); 2000 ml    DVT prophylaxis: [x] Lovenox                                 [] SCDs                                 [] SQ Heparin                                 [] Encourage ambulation                                 [] Already on Anticoagulation      Disposition:      [x] TBD                             [] Home                             [] TCU                             [] Rehab                             [] Psych                             [] SNF                             [] [facility]                             [] Other-      History Of Present Illness:    77 yo M with history of ICM with HFrEF, CAD, CABG, CKD, HTN, T2DM presented to [facility] ED with chief complaints of shortness of breath x 1 week with associated cough and BLE swelling. ED course: presented afebrile and hemodynamically stable. Initially hypertensive to 181/72, later BP in 130s with mild tachypnea. Saturating >34% on 1L NC. Labs revealed mild hyponatremia, BUN 89 with CR 3.3. ProBNP 47807 (previously ~50279 in 2019), troponin 0.048. EKG NSR with nonspecific ST and T wave changes. COVID rapid positive (vaccinated). CXR with interstitial densities throughout the bilateral lungs R>L. Right basilar pleural effusion, fluid within minor fissures. Given Bumex 2 mg IV x1 dose in the ED. Admitted for CHF exacerbation in the setting of COVID. On evaluation, patient reports having progressively worsening dyspnea at rest and exertion and BLE edema for about month. Admits to orthopnea. No PND. No chest pain, palpitations, dizziness/lightheadedness, recent fall/syncope. Has had acute cough ~1 week with some \"phlegm\". No subjective fever, chills, nausea, vomiting, abdominal pain, diarrhea, loss of taste/smell. No known sick contact. Currently, feeling slightly chilly and weak. Reports compliance to medication and diet/fluid restrictions. Normally wears 1L NC at night but recently has been using oxygen continuously. Unclear if on oxygen for CHF or other reasons. Of note, recently had a visit to [place] for syncope, transferred to [facility]. Stress test was negative. TTE showed slightly reduced EF 35% (10/21) from 40-45% on prior TTE (09/2020).      Past Medical History:        Diagnosis Date    CAD (coronary artery disease)     CHF (congestive heart failure) (HCC)     Chronic kidney disease     History of blood transfusion     Hyperlipidemia     Hypertension     Proteinuria     Type II or unspecified type diabetes mellitus without mention of complication, not stated as uncontrolled        Past Surgical History:      Procedure Laterality Date    CARDIAC SURGERY      COLONOSCOPY  2019    at [address] GRAFT N/A 1/28/2019    CABG X3 MVR / MAZE performed by lFy Rondon MD at [address]  9/8/2020    CT BONE MARROW BIOPSY 9/8/2020 [facility] CT SCAN    TONSILLECTOMY      UPPER GASTROINTESTINAL ENDOSCOPY N/A 1/21/2019    EGD DIAGNOSTIC ONLY performed by Zoe Garcia MD at [facility]       Home Medications:   No current facility-administered medications on file prior to encounter.      Current Outpatient Medications on File Prior to Encounter   Medication Sig Dispense Refill    metOLazone (ZAROXOLYN) 2.5 MG tablet Take 1 tablet by mouth Twice a Week 8 tablet 3    amLODIPine (NORVASC) 5 MG tablet Take 1 tablet by mouth 2 times daily 60 tablet 0    amLODIPine (NORVASC) 5 MG tablet Take 1 tablet by mouth 2 times daily 180 tablet 3    lisinopril (PRINIVIL;ZESTRIL) 10 MG tablet Take 0.5 tablets by mouth daily 30 tablet 3    potassium chloride (KLOR-CON M) 20 MEQ extended release tablet Take 1 tablet by mouth daily 90 tablet 1    bumetanide (BUMEX) 2 MG tablet Take 1 tablet by mouth 2 times daily 60 tablet 3    sertraline (ZOLOFT) 50 MG tablet Take 50 mg by mouth daily       isosorbide mononitrate (IMDUR) 30 MG extended release tablet Take 1 tablet by mouth daily 30 tablet 3    tamsulosin (FLOMAX) 0.4 MG capsule Take 1 capsule by mouth daily 90 capsule 3    carvedilol (COREG) 12.5 MG tablet Take 1 tablet by mouth 2 times daily 180 tablet 3    atorvastatin (LIPITOR) 40 MG tablet Take 1 tablet by mouth daily 90 tablet 3    nitroGLYCERIN (NITROSTAT) 0.4 MG SL tablet Place 1 tablet under the tongue every 5 minutes as needed for Chest pain 25 tablet 3    ferrous sulfate (IRON 325) 325 (65 Fe) MG tablet Take 1 tablet by mouth 2 times daily 180 tablet 3    Insulin Glargine (LANTUS SC) Inject 18 Units into the skin       Coenzyme Q10 (COQ-10) 50 MG CAPS Take by mouth daily       aspirin EC 81 MG EC tablet Take 1 tablet by mouth daily 30 tablet 3    acetaminophen (TYLENOL) 325 MG tablet Take 650 mg by mouth every 4 hours as needed for Pain      BD ULTRA-FINE MICRO PEN NEEDLE 32G X 6 MM MISC       latanoprost (XALATAN) 0.005 % ophthalmic solution Place 1 drop into both eyes nightly          Allergies:    Atorvastatin    Social History:    reports that he quit smoking about 45 years ago. His smoking use included cigarettes. He has a 2.50 pack-year smoking history. He has quit using smokeless tobacco. He reports that he does not drink alcohol. Family History:       Problem Relation Age of Onset    Cancer Mother         breast    Diabetes Mother     Heart Disease Father     Diabetes Father     Cancer Maternal Grandmother         leukemia       Review of systems:   Pertinent positives as noted in the HPI. All other systems reviewed and negative. PHYSICAL EXAM:  BP (!) 160/69   Pulse 74   Temp 98.1 °F (36.7 °C) (Oral)   Resp 18   Wt 200 lb (90.7 kg)   SpO2 96%   BMI 27.89 kg/m²   General appearance: No apparent distress, appears stated age and cooperative. Acutely ill appearing. HEENT: Normal cephalic, atraumatic without obvious deformity. Extra ocular muscles intact. Conjunctivae/corneas clear. Neck: Supple, with full range of motion. No jugular venous distention. Trachea midline. Respiratory:  Mild increased respiratory effort. Clear to auscultation with bibasilar rales, bilaterally without Wheezes/Rhonchi. Cardiovascular: Regular rate and rhythm with normal S1/S2 without murmurs, rubs or gallops. Abdomen: Soft, non-tender, non-distended with normal bowel sounds. Musculoskeletal:  No clubbing, cyanosis bilaterally. 2+ edema bilaterally almost up to knees. Skin: Skin color, texture, turgor normal.  No rashes or lesions. Neurologic:  Neurovascularly intact without any focal sensory/motor deficits.  Cranial nerves: II-XII intact, grossly non-focal.  Psychiatric: Alert and oriented, thought content appropriate, normal insight  Capillary Refill: Brisk,< 3 seconds   Peripheral Pulses: +2 palpable, equal bilaterally     Labs:   Recent Labs     12/24/21  2340   WBC 8.5   HGB 12.3*   HCT 39.2*        Recent Labs     12/25/21  0024   *   K 4.3   CL 95*   CO2 21*   BUN 89*   CREATININE 3.3*   CALCIUM 8.2*     Recent Labs     12/25/21  0024   AST 87*   ALT 59   BILITOT 0.5   ALKPHOS 99     No results for input(s): INR in the last 72 hours. No results for input(s): [place] in the last 72 hours. Urinalysis:    Lab Results   Component Value Date    NITRU NEGATIVE 03/17/2021    WBCUA 2-4 03/17/2021    BACTERIA NONE SEEN 03/17/2021    RBCUA 0-2 03/17/2021    BLOODU NEGATIVE 03/17/2021    BLOODU trace 03/17/2021    SPECGRAV 1.014 03/17/2021    SPECGRAV 1.020 03/17/2021    GLUCOSEU neg 03/17/2021    GLUCOSEU Negative 03/11/2020       Radiology:   XR CHEST (2 VW)   Final Result   1. Interstitial densities throughout the bilateral lungs greater on the    right than left most consistent with edema. Right basilar pleural and    parenchymal opacities likely representing small right basilar effusion    with atelectasis. Fluid within the minor fissure. Possible tiny left    basilar effusion. Overall findings are likely reflective of congestive    heart failure. This document has been electronically signed by: Wily Flores DO on    12/25/2021 12:27 AM        XR CHEST (2 VW)    Result Date: 12/25/2021  2 view chest x-ray Comparison: None Findings: Interstitial densities throughout the bilateral lungs greater on the right than left. Right basilar pleural and parenchymal opacities likely representing small right basilar effusion with atelectasis. Fluid within the minor fissure. Possible tiny left basilar effusion. Cardiomegaly with pulmonary vascular prominence. Median sternotomy wires.  Atrial appendage closure device. Valvular prosthesis. No pneumothorax. Changes of CABG. No acute fracture. Spondylosis of the thoracic spine. 1. Interstitial densities throughout the bilateral lungs greater on the right than left most consistent with edema. Right basilar pleural and parenchymal opacities likely representing small right basilar effusion with atelectasis. Fluid within the minor fissure. Possible tiny left basilar effusion. Overall findings are likely reflective of congestive heart failure.  This document has been electronically signed by: Yaneth Lanier DO on 12/25/2021 12:27 AM            Electronically signed by   Karie Camargo MD   PGY2, Internal Medicine  12/25/2021

## 2021-12-25 NOTE — PLAN OF CARE
Hospitalist Update Note    Patient is a 79 yo M with history of ICM with HFrEF, CAD, CABG, CKD, HTN, MGUS, T2DM, former tobacco who presents with FATEMEH, ADHFrEF, and covid pneumonia (vaccinated, no booster per patient). He is requiring 1 liter. He is started on decadron, baricitinib (renally dosed), and given a dose of IV bumex 2mg and continued on lasix 80mg IV BID. EF was recently 35% [facility] - prior 40-45% 12/2020 with mild AR and mild MS (improved from 12/2019 25-30%)  Lovenox 30mg daily started for VTE ppx (renally adjusted). Procalcitonin is significantly elevated, will add on rocephin and doxy to cover CAP   QT prolonged (although per my read on EKG it is overestimating QT) - K and Mg wnl. Check iCa  Repeat BMP later today to monitor. Follow up on echo. Telemetry  Trop is chronically elevated, flat trend. Consider Cardiology consult vs Nephrology consult pending course. CKD with FATEMEH from volume overload vs hypoxia induced (in setting of nephrotoxic meds), vs progression of CKD from MGUS light chain disese? Creatinine slightly increased on repeat this evening. Hold additional dose of lasix. Monitor with repeat in AM and UOP overnight. Consider nephrology consult in am.  Strict I/O. Renal US ordered. Plan to obtain urine lytes in AM after time to wash diuretics out of system.      Naga Rizzo,   12/25/2021  8:01 PM

## 2021-12-25 NOTE — PROGRESS NOTES
[id number] - Attempted to call patient's spouse Jeanmarie Lim for update. No answer and mailbox is not setup.

## 2021-12-26 NOTE — PROGRESS NOTES
Patient wife called back. Updated her on patient status and explained new visitor protocol. All questions answered.

## 2021-12-26 NOTE — PROGRESS NOTES
Hospitalist Progress Note    Patient:  Yanick Mai    YOB: 1945  Unit/Bed:6A-09/009-A  MRN: [id number]    Acct: [de-identified]   PCP: Franklyn Sherwood    Date of Admission: 12/24/2021      Assessment/Plan:    · Acute Hypoxic Respiratory Failure: 2/2 covid, CAP and HF. · Covid 19 Pneumonia: with minimal O2 requirement  · Continue baricitinib with renal adjustment  · DVT ppx with renal adjusted lovenox  · pepcid daily  · Antibiotics as below  · Minimal O2 requirements, on 1L  · IS and acapella  · CAP, POA: multifocal, in setting of Covid. Continue rocedphin and doxy 7 and 5 days respectively. · Acute Decompensated Systolic HF: several recent echos (EF 35% at [facility] 10/17/21 with mod AR and mod LVH, 12/2020 EF 40-45%, 12/2019 EF 25-30% and elevated rvsp  · Follow up on echo. Telemetry. · Continue coreg. · HTN noted, will start isordil/hydralazine for afterload reduction (caution needed for ace/arb/ARNI/MCRA)  · LE edema: could be related to norvasc, vs hypoalbuminemia vs HF (or combination of all these factors). Will hold norvasc since starting on imdur/hydralazine. Also see above for HF   · Since Covid diagnosis, Doppler to rule out DVT  · FATEMEH on CKD vs progression of CKD: Cardiorenal syndrome, volume overload vs MGUS light chain disese vs hypoxia induced (in setting of nephrotoxic meds)  · Got bumex 2 IV in ED, then 80mg lasix 12/25 am  · 12/26 FeNa looks pre-renal - low urine sodium concerning for cardiorenal syndrome now after diuretics. · Agree with Nephrology on holding diuretics   · Renal US no obstruction  · UA reviewed, CK wnl. . Getting urine electrolytes. · Strict I/O, hold lisinopril, zaroxylin. Renal dosing medication. · Appreciate nephrology recommendations. · CAD s/p CABG x3 and MAZE (cardiogenic shock, IABP) 1/2019: continue home gdmt. Holding ACE due to above. Continue bb, statin, asa  · Hx of MV replacement: annuloplasty.   On asa , continue   · HTN: added imdur and hydralazine low dose with hold parameters. · IDDM2: lantus 18u at home   · Some steroid induced hyperglycemia, but not eating the best  · Add on lantus 10u, continue med dose SSI  · DM diet and monitor  · QT prolonged (although per my read on EKG it is overestimating QT) - K and Mg wnl. Check iCa and replace   · MGUS: IgG kappa disease, monitored - follows Dr. Ban Peguero  · Non-MI Troponin elevation: Trop is chronically elevated, flat trend. Recent stress 10/2021 at [facility] no ischemia. · Former tobacco use  · Physical Deconditioning: PT/OT consulted     Expected discharge date:  2-3 days    Disposition: TBD  [] Home  [] TCU  [] Rehab  [] Psych  [] SNF  [] [facility]  [] Other-    ===================================================================      Chief Complaint: SOB    Hospital Course: per HPI, \"70 yo M with history of ICM with HFrEF, CAD, CABG, CKD, HTN, T2DM presented to [facility] ED with chief complaints of shortness of breath x 1 week with associated cough and BLE swelling.     ED course: presented afebrile and hemodynamically stable. Initially hypertensive to 181/72, later BP in 130s with mild tachypnea. Saturating >34% on 1L NC. Labs revealed mild hyponatremia, BUN 89 with CR 3.3. ProBNP 32287 (previously ~74988 in 2019), troponin 0.048. EKG NSR with nonspecific ST and T wave changes. COVID rapid positive (vaccinated). CXR with interstitial densities throughout the bilateral lungs R>L. Right basilar pleural effusion, fluid within minor fissures. Given Bumex 2 mg IV x1 dose in the ED. Admitted for CHF exacerbation in the setting of COVID.     On evaluation, patient reports having progressively worsening dyspnea at rest and exertion and BLE edema for about month. Admits to orthopnea. No PND. No chest pain, palpitations, dizziness/lightheadedness, recent fall/syncope. Has had acute cough ~1 week with some \"phlegm\".  No subjective fever, chills, nausea, vomiting, abdominal pain, diarrhea, loss of taste/smell. No known sick contact. Currently, feeling slightly chilly and weak. Reports compliance to medication and diet/fluid restrictions. Normally wears 1L NC at night but recently has been using oxygen continuously. Unclear if on oxygen for CHF or other reasons. \"     Subjective (past 24 hours): Patient seems to be doing all right, he reports more coughing today however no sputum production. He does report shortness of breath especially with coughing. Mucinex has been added on. No diarrhea/constipation, chest pain, fevers or chills. Does report fatigue overall though. Medications:  Reviewed    Infusion Medications    sodium chloride      dextrose       Scheduled Medications    sodium chloride flush  5-40 mL IntraVENous 2 times per day    enoxaparin  30 mg SubCUTAneous Daily    aspirin EC  81 mg Oral Daily    atorvastatin  40 mg Oral Daily    carvedilol  12.5 mg Oral BID WC    potassium chloride  20 mEq Oral Daily    sertraline  50 mg Oral Daily    tamsulosin  0.4 mg Oral Daily    baricitinib  1 mg Oral Daily    insulin lispro  0-12 Units SubCUTAneous TID WC    insulin lispro  0-6 Units SubCUTAneous Nightly    dexamethasone  6 mg IntraVENous Q24H    cefTRIAXone (ROCEPHIN) IV  1,000 mg IntraVENous Q24H    doxycycline (VIBRAMYCIN) IV  100 mg IntraVENous Q12H    albuterol sulfate HFA  2 puff Inhalation BID    calcium replacement protocol   Other RX Placeholder     PRN Meds: sodium chloride flush, sodium chloride, polyethylene glycol, acetaminophen **OR** acetaminophen, glucose, dextrose, glucagon (rDNA), dextrose, albuterol sulfate HFA      ROS: reviewed from prior note, full ROS unchanged unless otherwise stated in hospital course/subjective portion.          Intake/Output Summary (Last 24 hours) at 12/26/2021 0715  Last data filed at 12/25/2021 2250  Gross per 24 hour   Intake 110 ml   Output --   Net 110 ml       Exam:  BP (!) 171/81   Pulse 81   Temp 98.3 °F (36.8 °C) (Oral)   Resp 20   Wt 197 lb (89.4 kg)   SpO2 99%   BMI 27.48 kg/m²     General appearance: Acute on chronically ill appearing. Fatigued appearing. Eyes:  PERRL. Conjunctivae/corneas clear. HENT: Head normal appearing. Nares normal. Oral mucosa moist.  Hearing intact. Neck: Supple, with full range of motion. Trachea midline. No gross JVD appreciated. Respiratory:  Normal effort, no rhonchi or wheezing. Diminished and rales bilaterally in lower field. Cardiovascular: Normal rate, regular rhythm with normal S1/S2 without murmurs. 1+ BLLE edema. Abdomen: Soft, non-tender, non-distended with normal bowel sounds. Musculoskeletal: No joint swelling or tenderness. Normal tone. No abnormal movements. Skin: Warm and dry. No rashes or lesions. Neurologic:  No focal sensory/motor deficits in the upper or lower extremities. Cranial nerves:  grossly non-focal 2-12. Psychiatric: Alert and oriented, normal insight and thought content. Flat affect. Capillary Refill: Brisk,< 3 seconds. Peripheral Pulses: +2 palpable, equal bilaterally. Labs:   Recent Labs     12/24/21  2340 12/25/21 0521   WBC 8.5 4.9   HGB 12.3* 10.3*   HCT 39.2* 33.2*    110*     Recent Labs     12/25/21  0024 12/25/21  0521 12/25/21  1713   * 135 132*   K 4.3 4.0 4.5   CL 95* 97* 94*   CO2 21* 21* 24   BUN 89* 90* 93*   CREATININE 3.3* 3.5* 3.6*   CALCIUM 8.2* 7.9* 8.2*     Recent Labs     12/25/21  0024   AST 87*   ALT 59   BILITOT 0.5   ALKPHOS 99     No results for input(s): INR in the last 72 hours.   Recent Labs     12/25/21  1713   CKTOTAL 111     Recent Labs     12/25/21  0521   PROCAL 19.26*      Lab Results   Component Value Date    NITRU NEGATIVE 12/25/2021    WBCUA 0-2 12/25/2021    BACTERIA NONE SEEN 12/25/2021    RBCUA 0-2 12/25/2021    BLOODU SMALL 12/25/2021    SPECGRAV 1.014 12/25/2021    GLUCOSEU neg 03/17/2021    GLUCOSEU Negative 03/11/2020       Radiology (48 hours):  XR CHEST (2 VW)    Result Date: 12/25/2021  1. Interstitial densities throughout the bilateral lungs greater on the right than left most consistent with edema. Right basilar pleural and parenchymal opacities likely representing small right basilar effusion with atelectasis. Fluid within the minor fissure. Possible tiny left basilar effusion. Overall findings are likely reflective of congestive heart failure. This document has been electronically signed by: Patel Lara DO on 12/25/2021 12:27 AM    US RENAL COMPLETE    Result Date: 12/25/2021  1. Mild right-sided renal cortical thinning. 2. Incompletely distended urinary bladder. Final report electronically signed by Dr. Joan Felix on 12/25/2021 11:06 PM       DVT prophylaxis:    [x] Lovenox  [] SCDs  [] SQ Heparin  [] Encourage ambulation   [] Already on Anticoagulation       Diet: ADULT DIET; Regular;  Low Sodium (2 gm); 2000 ml  Code Status: Full Code  PT/OT: ordered  Tele: yes  IVF: n/a    Electronically signed by Sarath Mora DO on 12/26/2021 at 7:15 AM

## 2021-12-26 NOTE — CONSULTS
CT BONE MARROW BIOPSY 2020 [facility] CT SCAN    TONSILLECTOMY      UPPER GASTROINTESTINAL ENDOSCOPY N/A 2019    EGD DIAGNOSTIC ONLY performed by Bryant Singh MD at [facility]     Social History     Socioeconomic History    Marital status:      Spouse name: Not on file    Number of children: Not on file    Years of education: Not on file    Highest education level: Not on file   Occupational History    Not on file   Tobacco Use    Smoking status: Former Smoker     Packs/day: 0.25     Years: 10.00     Pack years: 2.50     Types: Cigarettes     Quit date: 1976     Years since quittin.9    Smokeless tobacco: Former User   Vaping Use    Vaping Use: Never used   Substance and Sexual Activity    Alcohol use: No     Alcohol/week: 0.0 standard drinks    Drug use: Not on file    Sexual activity: Not on file   Other Topics Concern    Not on file   Social History Narrative    Not on file     Social Determinants of Health     Financial Resource Strain:     Difficulty of Paying Living Expenses: Not on file   Food Insecurity:     Worried About [address] in the Last Year: Not on file    [address] in the Last Year: Not on file   Transportation Needs:     Lack of Transportation (Medical): Not on file    Lack of Transportation (Non-Medical):  Not on file   Physical Activity:     Days of Exercise per Week: Not on file    Minutes of Exercise per Session: Not on file   Stress:     Feeling of Stress : Not on file   Social Connections:     Frequency of Communication with Friends and Family: Not on file    Frequency of Social Gatherings with Friends and Family: Not on file    Attends Jainism Services: Not on file    Active Member of Clubs or Organizations: Not on file    Attends Club or Organization Meetings: Not on file    Marital Status: Not on file   Intimate Partner Violence:     Fear of Current or Ex-Partner: Not on file    Emotionally Abused: Not on file   Jesse Rosales Physically Abused: Not on file    Sexually Abused: Not on file   Housing Stability:     Unable to Pay for Housing in the Last Year: Not on file    Number of Places Lived in the Last Year: Not on file    Unstable Housing in the Last Year: Not on file     Family History   Problem Relation Age of Onset    Cancer Mother         breast    Diabetes Mother     Heart Disease Father     Diabetes Father     Cancer Maternal Grandmother         leukemia     Medications & Allergies      Prior to Admission medications    Medication Sig Start Date End Date Taking?  Authorizing Provider   metOLazone (ZAROXOLYN) 2.5 MG tablet Take 1 tablet by mouth Twice a Week 11/29/21   Porfirio Felty, APRN - CNP   amLODIPine (NORVASC) 5 MG tablet Take 1 tablet by mouth 2 times daily 11/22/21   Sherryle Fountain, MD   amLODIPine (NORVASC) 5 MG tablet Take 1 tablet by mouth 2 times daily 11/22/21   Sherryle Fountain, MD   lisinopril (PRINIVIL;ZESTRIL) 10 MG tablet Take 0.5 tablets by mouth daily 11/3/21   Sherryle Fountain, MD   potassium chloride (KLOR-CON M) 20 MEQ extended release tablet Take 1 tablet by mouth daily 11/3/21   Sherryle Fountain, MD   bumetanide (BUMEX) 2 MG tablet Take 1 tablet by mouth 2 times daily 11/3/21   Sherryle Fountain, MD   sertraline (ZOLOFT) 50 MG tablet Take 50 mg by mouth daily  9/28/21   Historical Provider, MD   isosorbide mononitrate (IMDUR) 30 MG extended release tablet Take 1 tablet by mouth daily 11/2/21   SILVIA Unger CNP   tamsulosin (FLOMAX) 0.4 MG capsule Take 1 capsule by mouth daily 3/17/21   SILVIA Campos CNP   carvedilol (COREG) 12.5 MG tablet Take 1 tablet by mouth 2 times daily 3/11/21   SILVIA Lay CNP   atorvastatin (LIPITOR) 40 MG tablet Take 1 tablet by mouth daily 3/11/21   SILVIA Lay CNP   nitroGLYCERIN (NITROSTAT) 0.4 MG SL tablet Place 1 tablet under the tongue every 5 minutes as needed for Chest pain 3/11/21   SILVIA Lay - CNP   ferrous sulfate (IRON 325) 325 (65 Fe) MG tablet Take 1 tablet by mouth 2 times daily 1/13/21   Kirsten Hammans, APRN - CNP   Insulin Glargine (LANTUS SC) Inject 18 Units into the skin     Historical Provider, MD   Coenzyme Q10 (COQ-10) 50 MG CAPS Take by mouth daily     Historical Provider, MD   aspirin EC 81 MG EC tablet Take 1 tablet by mouth daily 2/19/19 11/29/21  Mady Melendez MD   acetaminophen (TYLENOL) 325 MG tablet Take 650 mg by mouth every 4 hours as needed for Pain    Historical Provider, MD   BD ULTRA-FINE MICRO PEN NEEDLE 32G X 6 MM MISC  7/9/18   Historical Provider, MD   latanoprost (XALATAN) 0.005 % ophthalmic solution Place 1 drop into both eyes nightly  9/11/17   Historical Provider, MD     Allergies: Atorvastatin  IP meds : Scheduled Meds:   guaiFENesin  600 mg Oral BID    sodium chloride flush  5-40 mL IntraVENous 2 times per day    enoxaparin  30 mg SubCUTAneous Daily    aspirin EC  81 mg Oral Daily    atorvastatin  40 mg Oral Daily    carvedilol  12.5 mg Oral BID WC    potassium chloride  20 mEq Oral Daily    sertraline  50 mg Oral Daily    tamsulosin  0.4 mg Oral Daily    baricitinib  1 mg Oral Daily    insulin lispro  0-12 Units SubCUTAneous TID WC    insulin lispro  0-6 Units SubCUTAneous Nightly    dexamethasone  6 mg IntraVENous Q24H    cefTRIAXone (ROCEPHIN) IV  1,000 mg IntraVENous Q24H    doxycycline (VIBRAMYCIN) IV  100 mg IntraVENous Q12H    albuterol sulfate HFA  2 puff Inhalation BID    calcium replacement protocol   Other RX Placeholder     Continuous Infusions:   sodium chloride      dextrose       Review of Systems Physical Exam   Review of Systems   Unable to perform ROS: Mental status change    Physical Exam  Vitals reviewed. Constitutional:       General: He is not in acute distress. Appearance: Normal appearance. He is not diaphoretic. HENT:      Head: Normocephalic and atraumatic.       Right Ear: External ear normal.      Left Ear: External ear normal.      Nose: Nose normal.      Mouth/Throat:      Mouth: Mucous membranes are moist.   Eyes:      General: No scleral icterus. Right eye: No discharge. Left eye: No discharge. Conjunctiva/sclera: Conjunctivae normal.   Neck:      Thyroid: No thyromegaly. Vascular: No JVD. Cardiovascular:      Rate and Rhythm: Normal rate and regular rhythm. Heart sounds: Normal heart sounds. No murmur heard. Pulmonary:      Effort: Pulmonary effort is normal. No respiratory distress. Breath sounds: Normal breath sounds. No stridor. Chest:      Chest wall: No tenderness. Abdominal:      General: Bowel sounds are normal. There is no distension. Palpations: Abdomen is soft. Tenderness: There is no abdominal tenderness. Musculoskeletal:         General: Swelling present. No tenderness. Cervical back: Normal range of motion and neck supple. Right lower leg: Edema present. Left lower leg: Edema present. Skin:     General: Skin is warm and dry. Findings: No erythema or rash. Neurological:      General: No focal deficit present. Mental Status: He is alert.       Comments: Grossly intact   Psychiatric:         Mood and Affect: Mood normal.         Behavior: Behavior normal.           Vitals:    12/26/21 1145   BP: (!) 140/63   Pulse: 69   Resp: 20   Temp: 98.5 °F (36.9 °C)   SpO2: 95%     Labs, Radiology and Tests       Recent Labs     12/24/21  2340 12/25/21  0521 12/26/21  0733   WBC 8.5 4.9 8.2   RBC 4.51* 3.75* 4.48*   HGB 12.3* 10.3* 12.3*   HCT 39.2* 33.2* 38.4*   MCV 86.9 88.5 85.7   MCH 27.3 27.5 27.5   MCHC 31.4* 31.0* 32.0*    110* 146     Recent Labs     12/25/21  0024 12/25/21  0024 12/25/21  0521 12/25/21  1713 12/26/21  0733   *   < > 135 132* 132*   K 4.3   < > 4.0 4.5 4.4   CL 95*   < > 97* 94* 95*   CO2 21*   < > 21* 24 20*   BUN 89*   < > 90* 93* 102*   CREATININE 3.3*   < > 3.5* 3.6* 3.7*   CALCIUM 8.2*   < > 7.9* 8.2* 8.3*   PROT 6.6  --   --   --  7.1   LABALBU 3.1*  --   --   --  3.1*   BILITOT 0.5  --   --   --  0.5   ALKPHOS 99  --   --   --  106   AST 87*  --   --   --  45*   ALT 59  --   --   --  47    < > = values in this interval not displayed. Radiology : Chest x-ray reviewed by me shows prominence of pulmonary vasculature right more than the left    Other : Old lab data have been reviewed and noted patient's baseline creatinine has been running close to 2.8-3.3 within the last 6 months. Echocardiogram 9/20 shows ejection fraction 40-45%    Assessment    1 Renal -chronic kidney disease stage IV with possibly mild acute kidney injury may be related to the diuretic use. Still renal function closer to baseline  ? Chest x-ray shows some congestion but clinically does not look overtly congested does have some edema  ? As the renal function is rising for now I will hold the diuretics can give diuretics if needed  ? Follow BMP in the morning. BUN rising more due to the steroids    2 Electrolytes -within normal limits discontinue potassium supplements for now  3 Mild metabolic acidosis secondary to CKD. 4 Essential hypertension appears to be stable  5 Hx of diabetes mellitus  6 Acute on chronic systolic and diastolic congestive heart failure  7 COVID-19 pneumonia  8 Meds reviewed and discussed with patient to some degree and nursing staff      **This report has been created using voice recognition software. It maycontain minor  errors which are inherent in voice recognition technology. **    Andrew Franks MD,M.D  [facility].

## 2021-12-27 NOTE — PROGRESS NOTES
Pharmacy Renal Adjustment    Pk Calle is a 78 y.o. male. Pharmacy renally adjust the following medications per P&T approved policy: famotidine    Height:   Ht Readings from Last 1 Encounters:   11/29/21 5' 11\" (1.803 m)     Weight:  Wt Readings from Last 1 Encounters:   12/27/21 193 lb 6.4 oz (87.7 kg)     Recent Labs     12/25/21  1713 12/26/21  0733   BUN 93* 102*   CREATININE 3.6* 3.7*     Estimated Creatinine Clearance: 19 mL/min (A) (based on SCr of 3.7 mg/dL [facility])).   Calculated CrCl:    Assessment:  FATEMEH on CKD    Plan:   Decrease famotidine from 20mg daily to 20mg every other day

## 2021-12-27 NOTE — CARE COORDINATION
12/27/21, 9:23 AM EST  DISCHARGE PLANNING EVALUATION:    Brandee Amezcua       Admitted: 12/24/2021/ 2312   Hospital day: 2   Location: -09/009-A Reason for admit: Acute on chronic renal insufficiency [N28.9, N18.9]  Acute on chronic congestive heart failure, unspecified heart failure type ([facility] [address] [I50.9]  COVID-19 [U07.1]   PMH:  has a past medical history of CAD (coronary artery disease), CHF (congestive heart failure) ([facility] Chronic kidney disease, History of blood transfusion, Hyperlipidemia, Hypertension, Proteinuria, and Type II or unspecified type diabetes mellitus without mention of complication, not stated as uncontrolled. Procedure: No.  Barriers to Discharge: To ER with SOB. Found Covid +. CKD and CHF. States vaccinated but no booster yet. Creat 3.3. Trop 0.048. BNP 43,102. O2 at home at 1L. Nephrology consulted. PT/OT. Max assist x 2 when up. Currently O2 at 1/2L/NC. PCP: Cali Rivas  Readmission Risk Score: 18.6 ( )%    Patient Goals/Plan/Treatment Preferences: Met with pt today as he is in bed. HOB up and no O2 required at this time. Pt very soft spoken and weak appearing. Slow in responses. States he is from home with spouse and no current services. He has no home services. He states he  has a walker and report indicates that he is  Wheelchair bound. It was reported that he does have home O2. He states his spouse drives. He has a PCP. SW consult placed for likelihood of need for services at discharge. Transportation/Food Security/Housekeeping Addressed:  No issues identified.

## 2021-12-27 NOTE — PROGRESS NOTES
[address] [address]  INPATIENT PHYSICAL THERAPY  EVALUATION  STR 6A CAPACITY EBOLA - 6A--A    Time In: 1844  Time Out: 0453  Timed Code Treatment Minutes: 18 Minutes  Minutes: 28        Date: 2021  Patient Name: Bronwyn Rutledge,  Gender:  male        MRN: [id number]  : 1945  (78 y.o.)      Referring Practitioner: Whit Chen MD  Diagnosis: Acute on chronic renal insufficiency  Additional Pertinent Hx: Per H&P :  ED course: presented afebrile and hemodynamically stable. Initially hypertensive to 181/72, later BP in 130s with mild tachypnea. Saturating >34% on 1L NC. Labs revealed mild hyponatremia, BUN 89 with CR 3.3. ProBNP 01139 (previously ~35511 in 2019), troponin 0.048. EKG NSR with nonspecific ST and T wave changes. COVID rapid positive (vaccinated). CXR with interstitial densities throughout the bilateral lungs R>L. Right basilar pleural effusion, fluid within minor fissures. Given Bumex 2 mg IV x1 dose in the ED. Admitted for CHF exacerbation in the setting of COVID. On evaluation, patient reports having progressively worsening dyspnea at rest and exertion and BLE edema for about month. Admits to orthopnea. No PND. No chest pain, palpitations, dizziness/lightheadedness, recent fall/syncope. Has had acute cough ~1 week with some \"phlegm\". No subjective fever, chills, nausea, vomiting, abdominal pain, diarrhea, loss of taste/smell. No known sick contact. Currently, feeling slightly chilly and weak. Reports compliance to medication and diet/fluid restrictions. Normally wears 1L NC at night but recently has been using oxygen continuously. Unclear if on oxygen for CHF or other reasons. Of note, recently had a visit to [place] for syncope, transferred to [facility]. Stress test was negative. TTE showed slightly reduced EF 35% (10/21) from 40-45% on prior TTE (2020).      Restrictions/Precautions:  Restrictions/Precautions: Isolation,Fall Risk  Position Activity Restriction  Other position/activity restrictions: Droplet +    Subjective:  Chart Reviewed: Yes  Patient assessed for rehabilitation services?: Yes  Family / Caregiver Present: No  Subjective: RN approved session. Pt agreeable for PT eval, in bed with breakfast at onset of session. Pt observed to have delay in response to questions, and required reorientation to conversation. Pt observed to be very fatigued. Pt a questionable historian. RN in to assist with boosting pt in bed, states he was w/c bound at baseline. Pt observed to have a jerky-tremor with bed mobility, when asked states he had this at home. RN notified of this. Communicated to case management, who is consulting social work to get a better idea of josiah pt's PLOF. General:  Overall Orientation Status: Impaired  Orientation Level: Disoriented to time,Disoriented to place,Oriented to situation (unable to state birthday)  Follows Commands: Impaired    Vision: Within Functional Limits    Hearing: Exceptions to [facility]  Hearing Exceptions: Hard of hearing/hearing concerns     Pain: denies    Vitals: Oxygen: 95% baseline, maintained above 90% entire time  Heart Rate: 75 bpm, 80 bpm following mobility   *Pt on 1L NC    Social/Functional History:    Lives With: Spouse,Family (daughter)  Type of Home: House  Home Layout: One level  Home Access: Stairs to enter with rails  Entrance Stairs - Number of Steps: \"a couple\" pt unable to state  Entrance Stairs - Rails:  (uni rail)  Home Equipment: 4 wheeled [employer] Help From: Family        Ambulation Assistance: Independent  Transfer Assistance: Independent    Active : No     Additional Comments: Pt a questionable historian. Pt states he was using 4WW to get around indep PTA, however once RN states he utilized a w/c at home, pt states he used a w/c and only ambulated short distances with the 4ww.  Pt says his wife and daughter are at home with him and able to assist.    OBJECTIVE:  Range of Motion:  Bilateral Lower Extremity: WFL    Strength:  Bilateral Lower Extremity: Not Tested   *Unable to be formally assessed, as pt required max reorientation to cues and tasks     Balance:  Static Sitting Balance:  Maximum Assistance   *Pt sat EOB for less than 1 minute with significant lean to the R and inability to maintain upright static seated balance with max A at the shoulder girdle to assist. Pt noted to have a jerky-tremor movement with supine <->sit, resolved with seated rest. Max verbal cues, assistance at the trunk, to scoot anteriorly, and for limb alignment with minimal improvement with seated balance. Pt quickly returned to supine secondary to inability to maintain upright seated balance with max assistance. Bed Mobility:  Rolling to Left: Maximum Assistance, with head of bed flat, with rail   Rolling to Right: Maximum Assistance, with head of bed flat, with rail   Supine to Sit: Maximum Assistance, with head of bed raised, with increased time for completion  Sit to Supine: Maximum Assistance, with head of bed flat, with increased time for completion    *Max a at the hips to increase ROM with roll to adjust bedding   *Max A for force production and to initiate all aspects of bed mobility with max cues to orient to task with little improvement in initiation of assisting with transfer. *Max Ax2 to boost pt superiorly in bed. Transfers:  Not Tested   *Unsafe to attempt secondary to poor seated balance and requiring MAX cues for orientation to task and cues with minimal carryover. Ambulation:  Not Tested  *Unsafe to attempt secondary to poor seated balance and requiring MAX cues for orientation to task and cues with minimal carryover. Functional Outcome Measures: Completed  AM-PAC Inpatient Mobility Raw Score : 8  AM-PAC Inpatient T-Scale Score : 28.52    ASSESSMENT:  Activity Tolerance:  Patient tolerance of  treatment: poor.  Pt with minimal communication

## 2021-12-27 NOTE — PROGRESS NOTES
Hospitalist Progress Note    Patient:  Bronwyn Rutledge    YOB: 1945  Unit/Bed:6A-09/009-A  MRN: [id number]    Acct: [de-identified]   PCP: Sari Nguyen    Date of Admission: 12/24/2021      Assessment/Plan:    · Acute Hypoxic Respiratory Failure: 2/2 covid, CAP and HF. · Covid 19 Pneumonia: with minimal O2 requirement  · Continue baricitinib with renal adjustment  · DVT ppx with renal adjusted lovenox  · pepcid daily  · Antibiotics as below  · Minimal O2 requirements, patient currently on room air at time of encounter  · IS and acapella  · Repeat CXR  · CAP, POA: multifocal, in setting of Covid. Continue rocephin and doxycycline 6 and 4 days, respectively. · Ordered DuoNeb  · Acute Decompensated Systolic HF: several recent echos (EF 35% at [facility] 10/17/21 with mod AR and mod LVH, 12/2020 EF 40-45%, 12/2019 EF 25-30% and elevated rvsp  · Reviewed echo from 12/27/2021; normal LV size and wall thickness; severe global hypokinesis of left ventricle noted with severely reduced systolic function; ejection fraction visually estimated 30-35%; mildly dilated left atrium, moderate aortic regurgitation noted. · Continue coreg. · HTN noted, will start isordil/hydralazine for afterload reduction (caution needed for ace/arb/ARNI/MCRA)  · LE edema: could be related to norvasc vs hypoalbuminemia vs HF (or combination of all these factors). Will hold norvasc since starting on imdur/hydralazine. Also see above for    · Since Covid diagnosis, Doppler to rule out DVT  · FATEMEH on CKD vs progression of CKD: Cardiorenal syndrome, volume overload vs MGUS light chain disese vs hypoxia induced (in setting of nephrotoxic meds)  · Got bumex 2 IV in ED, then 80mg lasix 12/25 am  · 12/26 FeNa looks pre-renal - low urine sodium concerning for cardiorenal syndrome now after diuretics. · Agree with Nephrology on holding diuretics   · Renal US no obstruction  · UA reviewed, CK wnl. . Getting urine electrolytes.    · Strict I/O, hold lisinopril, zaroxylin. Renal dosing medication. · Appreciate nephrology recommendations. · CAD s/p CABG x3 and MAZE (cardiogenic shock, IABP) 1/2019: continue home gdmt. Holding ACE due to above. Continue bb, statin, asa  · Hx of MV replacement: annuloplasty. On asa , continue   · HTN: added imdur and hydralazine low dose with hold parameters. · IDDM2: lantus 18u at home   · Some steroid induced hyperglycemia, but not eating the best  · Add on lantus 10u, continue med dose SSI  · DM diet and monitor  · QT prolonged (although per my read on EKG it is overestimating QT) - K and Mg wnl. Check iCa and replace   · MGUS: IgG kappa disease, monitored - follows Dr. Jim Peralta  · Non-MI Troponin elevation: Trop is chronically elevated, flat trend. Recent stress 10/2021 at [facility] no ischemia. · Former tobacco use  · Physical Deconditioning: PT/OT consulted   · Dysphagia: Patient had trouble swallowing food this a.m.; ordered n.p.o. diet, IV medications, and placed consult for SLP to evaluate and treat    Expected discharge date:  2-3 days    Disposition: TBD  [] Home  [] TCU  [] Rehab  [] Psych  [] SNF  [] [place]  [] Other-    ===================================================================      Chief Complaint: SOB    Hospital Course: per HPI, \"70 yo M with history of ICM with HFrEF, CAD, CABG, CKD, HTN, T2DM presented to [facility] ED with chief complaints of shortness of breath x 1 week with associated cough and BLE swelling.     ED course: presented afebrile and hemodynamically stable. Initially hypertensive to 181/72, later BP in 130s with mild tachypnea. Saturating >34% on 1L NC. Labs revealed mild hyponatremia, BUN 89 with CR 3.3. ProBNP 29305 (previously ~00773 in 2019), troponin 0.048. EKG NSR with nonspecific ST and T wave changes. COVID rapid positive (vaccinated). CXR with interstitial densities throughout the bilateral lungs R>L. Right basilar pleural effusion, fluid within minor fissures. Given Bumex 2 mg IV x1 dose in the ED. Admitted for CHF exacerbation in the setting of COVID.     On evaluation, patient reports having progressively worsening dyspnea at rest and exertion and BLE edema for about month. Admits to orthopnea. No PND. No chest pain, palpitations, dizziness/lightheadedness, recent fall/syncope. Has had acute cough ~1 week with some \"phlegm\". No subjective fever, chills, nausea, vomiting, abdominal pain, diarrhea, loss of taste/smell. No known sick contact. Currently, feeling slightly chilly and weak. Reports compliance to medication and diet/fluid restrictions. Normally wears 1L NC at night but recently has been using oxygen continuously. Unclear if on oxygen for CHF or other reasons. \"     Subjective (past 24 hours): Patient seen this morning alongside Dr. Johnnie Rudd. Patient states that he is doing well overall. However, patient appears somnolent, is slow to answer questions, and could not answer where he was. Patient knew what year it was. He continues to report coughing without sputum production. He does report shortness of breath especially with coughing. During encounter, patient repeatedly moans as if he were in pain; when directly asked if he was in pain, patient however denied. He denies any diarrhea/constipation, chest pain, fevers or chills. Does report fatigue overall though.       Medications:  Reviewed    Infusion Medications    sodium chloride 25 mL (12/27/21 8056)    dextrose       Scheduled Medications    famotidine  20 mg Oral Every Other Day    ipratropium-albuterol  1 ampule Inhalation Q4H WA    guaiFENesin  600 mg Oral BID    hydrALAZINE  10 mg Oral 3 times per day    isosorbide dinitrate  10 mg Oral BID    insulin glargine  10 Units SubCUTAneous Nightly    sodium chloride flush  5-40 mL IntraVENous 2 times per day    enoxaparin  30 mg SubCUTAneous Daily    aspirin EC  81 mg Oral Daily    atorvastatin  40 mg Oral Daily    carvedilol  12.5 mg Oral BID   sertraline  50 mg Oral Daily    tamsulosin  0.4 mg Oral Daily    baricitinib  1 mg Oral Daily    insulin lispro  0-12 Units SubCUTAneous TID WC    insulin lispro  0-6 Units SubCUTAneous Nightly    dexamethasone  6 mg IntraVENous Q24H    cefTRIAXone (ROCEPHIN) IV  1,000 mg IntraVENous Q24H    doxycycline (VIBRAMYCIN) IV  100 mg IntraVENous Q12H    calcium replacement protocol   Other RX Placeholder     PRN Meds: sodium chloride flush, sodium chloride, polyethylene glycol, acetaminophen **OR** acetaminophen, glucose, dextrose, glucagon (rDNA), dextrose, albuterol sulfate HFA      ROS: Reviewed from prior note, full ROS unchanged unless otherwise stated in hospital course/subjective portion. Intake/Output Summary (Last 24 hours) at 12/27/2021 1226  Last data filed at 12/27/2021 0349  Gross per 24 hour   Intake 150 ml   Output 0 ml   Net 150 ml       Exam:  BP (!) 163/99   Pulse 72   Temp 97.8 °F (36.6 °C) (Axillary)   Resp 16   Wt 193 lb 6.4 oz (87.7 kg)   SpO2 92%   BMI 26.97 kg/m²     General appearance: Acute on chronically ill appearing. Fatigued appearing. Eyes:  PERRL. Conjunctivae/corneas clear. HENT: Head normal appearing. Nares normal. Oral mucosa moist.  Hearing intact. Neck: Supple, with full range of motion. Trachea midline. No gross JVD appreciated. Respiratory:  Normal effort, no rhonchi or wheezing. Diminished and rales bilaterally in lower field. Cardiovascular: Normal rate, regular rhythm with normal S1/S2 without murmurs. 2+ BLLE edema. Abdomen: Soft, non-tender, non-distended with normal bowel sounds. Musculoskeletal: No joint swelling or tenderness. Normal tone. No abnormal movements. Skin: Warm and dry. No rashes or lesions. Neurologic:  No focal sensory/motor deficits in the upper or lower extremities. Cranial nerves:  grossly non-focal 2-12. Psychiatric: Alert and oriented, normal insight and thought content. Flat affect.    Capillary Refill: Brisk,< 3 seconds. Peripheral Pulses: +2 palpable, equal bilaterally. Labs:   Recent Labs     12/25/21  0521 12/26/21  0733 12/27/21  0634   WBC 4.9 8.2 9.3   HGB 10.3* 12.3* 12.4*   HCT 33.2* 38.4* 38.8*   * 146 117*     Recent Labs     12/25/21  1713 12/26/21  0733 12/27/21  0634   * 132* 132*   K 4.5 4.4 4.7   CL 94* 95* 97*   CO2 24 20* 19*   BUN 93* 102* 118*   CREATININE 3.6* 3.7* 4.0*   CALCIUM 8.2* 8.3* 8.3*   PHOS  --  4.8*  --      Recent Labs     12/25/21  0024 12/26/21  0733 12/27/21  0634   AST 87* 45* 43*   ALT 59 47 51   BILIDIR  --   --  <0.2   BILITOT 0.5 0.5 0.4   ALKPHOS 99 106 101     No results for input(s): INR in the last 72 hours. Recent Labs     12/25/21  1713   CKTOTAL 111     Recent Labs     12/25/21  0521   PROCAL 19.26*      Lab Results   Component Value Date    NITRU NEGATIVE 12/25/2021    WBCUA 0-2 12/25/2021    BACTERIA NONE SEEN 12/25/2021    RBCUA 0-2 12/25/2021    BLOODU SMALL 12/25/2021    SPECGRAV 1.014 12/25/2021    GLUCOSEU neg 03/17/2021    GLUCOSEU Negative 03/11/2020       Radiology (48 hours):  XR CHEST (2 VW)    Result Date: 12/27/2021  FINDINGS:   There are stable median sternotomy wires and mediastinal surgical clips. There is stable atrial appendage clip and valvuloplasty. There is a small pleural effusion on the right which appears similar to prior exam. There are stable patchy opacities at the   bilateral lung bases. The cardiac silhouette is enlarged, similar to prior exam. As osseous structures appear grossly intact.         Impression   Stable small right pleural effusion and bibasilar patchy opacities.                 **This report has been created using voice recognition software. It may contain minor errors which are inherent in voice recognition technology. **       Final report electronically signed by Dr. Sergio Loyola MD on 12/27/2021 11:56 AM         Result Date: 12/25/2021  1.  Interstitial densities throughout the bilateral lungs greater on the right than left most consistent with edema. Right basilar pleural and parenchymal opacities likely representing small right basilar effusion with atelectasis. Fluid within the minor fissure. Possible tiny left basilar effusion. Overall findings are likely reflective of congestive heart failure. This document has been electronically signed by: Wellington Damian DO on 12/25/2021 12:27 AM      BILATERAL LOWER EXTREMITY VENOUS DOPPLER  Impression   No evidence of a DVT.                 **This report has been created using voice recognition software. It may contain minor errors which are inherent in voice recognition technology. **       Final report electronically signed by Dr. Macey Luu on 12/27/2021 7:23 AM       US RENAL COMPLETE    Result Date: 12/25/2021  1. Mild right-sided renal cortical thinning. 2. Incompletely distended urinary bladder.  Final report electronically signed by Dr. Emmanuel Trivedi on 12/25/2021 11:06 PM       DVT prophylaxis:    [x] Lovenox  [] SCDs  [] SQ Heparin  [] Encourage ambulation   [] Already on Anticoagulation       Diet: Diet NPO  Code Status: Full Code  PT/OT: ordered  Tele: yes  IVF: n/a    Electronically signed by Daphnie Al DPM on 12/27/2021 at 12:26 PM

## 2021-12-27 NOTE — CARE COORDINATION
DISCHARGE/PLANNING EVALUATION  12/27/21, 3:04 PM EST    Reason for Referral: pt very weak, may need placement  Mental Status: alert, appears tired  Decision Making: self with family support  Family/Social/Home Environment: lives at home with wife and daughter. Pt reports he has two children. Current Services including food security, transportation and housekeeping: wife and daughter assist patient with housekeeping, cooking  Current Equipment:walker, wheelchair, bedside commode, lift chair, hospital bed  Payment Source:[facility] Medicare  Concerns or Barriers to Discharge: covid +  Post acute provider list with quality measures, geographic area and applicable managed care information provided. Questions regarding selection process answered:provided    Teach Back Method used with Jagdeep Chong and wife Jules Ricardo regarding care plan and needs  Patient and wife verbalize understanding of the plan of care and contribute to goal setting. Patient goals, treatment preferences and discharge plan: Patient and wife agreeable to ECF. Pt reports he has been to [facility] in the past, SW did let pt know unsure that they can take pt's with COVID, but would check. Pt okay with SW contacting wife to discuss further    SW did talk with wife, reports patient has been getting weaker, she and daughter have been helping patient at home. Wife reports patient goes to the Cong[facility] Failure clinic with [facility] and reports he has used [facility] in the past for wounds on his legs. Wife agreeable to [facility] referral to [facility] if they can take COVID and [facility] [place]. SW call [facility], spoke with Sharon Ricci, they cannot take COVID pts. Call to [address] at [place], spoke with Gerber Arzola, they are working on opening a COVID units, reports it should be ready in a couple days. Referral made and clinicals faxed to [phone].     Electronically signed by Twilla Phoenix on 12/27/2021 at 3:04 PM

## 2021-12-27 NOTE — PROGRESS NOTES
[facility]   Nephrology progress note  12/27/2021, 10:10 AM      Pt Name:    Robin Marie  MRN:     [id number]     YOB: 1945  Admit Date:    12/24/2021 11:12 PM  Primary Care Physician:  Brian Rodríguez   Room number  6A-09/009-A    Chief Complaint: Nephrology following for FATEMEH/CKD    Subjective:  Patient seen and examined  Seen earlier today during rounds  Patient is on nasal cannula  No acute distress  Awake but does not appear to be fully oriented      Objective:  24HR INTAKE/OUTPUT:    Intake/Output Summary (Last 24 hours) at 12/27/2021 1010  Last data filed at 12/27/2021 0349  Gross per 24 hour   Intake 150 ml   Output 0 ml   Net 150 ml     I/O last 3 completed shifts: In: 150 [P.O.:150]  Out: 0   No intake/output data recorded. Admission weight: 200 lb (90.7 kg)  Wt Readings from Last 3 Encounters:   12/27/21 193 lb 6.4 oz (87.7 kg)   11/29/21 188 lb (85.3 kg)   11/03/21 184 lb (83.5 kg)     Body mass index is 26.97 kg/m².     Physical examination  VITALS:     Vitals:    12/27/21 0538 12/27/21 0600 12/27/21 0810 12/27/21 0945   BP: (!) 154/69   (!) 163/99   Pulse:    72   Resp:   16 16   Temp:    97.8 °F (36.6 °C)   TempSrc:    Axillary   SpO2:   98% 97%   Weight:  193 lb 6.4 oz (87.7 kg)       General Appearance: alert and cooperative with exam, appears comfortable, no distress  Mouth/Throat: Oral mucosa moist  Lungs: Diminished, no use of accessory muscles  Heart:  S1, S2 heard  GI: soft, non-tender, no guarding  Extremities: No significant LE edema      Lab Data  CBC:   Recent Labs     12/25/21  0521 12/26/21  0733 12/27/21  0634   WBC 4.9 8.2 9.3   HGB 10.3* 12.3* 12.4*   HCT 33.2* 38.4* 38.8*   * 146 117*     BMP:  Recent Labs     12/25/21  0024 12/25/21  0521 12/25/21  1713 12/26/21  0733 12/27/21  0634   *   < > 132* 132* 132*   K 4.3   < > 4.5 4.4 4.7   CL 95*   < > 94* 95* 97*   CO2 21*   < > 24 20* 19*   BUN 89*   < > 93* 102* 118*   CREATININE 3.3*   < > 3.6* 3.7* 4.0*   GLUCOSE 269*   < > 189* 179* 134*   CALCIUM 8.2*   < > 8.2* 8.3* 8.3*   MG 2.2  --   --   --   --    PHOS  --   --   --  4.8*  --     < > = values in this interval not displayed. Hepatic:   Recent Labs     12/25/21  0024 12/26/21  0733 12/27/21  0634   LABALBU 3.1* 3.1* 3.3*   AST 87* 45* 43*   ALT 59 47 51   BILITOT 0.5 0.5 0.4   ALKPHOS 99 106 101         Meds:  Infusion:    sodium chloride 25 mL (12/27/21 0958)    dextrose       Meds:    famotidine  20 mg Oral Every Other Day    guaiFENesin  600 mg Oral BID    hydrALAZINE  10 mg Oral 3 times per day    isosorbide dinitrate  10 mg Oral BID    insulin glargine  10 Units SubCUTAneous Nightly    sodium chloride flush  5-40 mL IntraVENous 2 times per day    enoxaparin  30 mg SubCUTAneous Daily    aspirin EC  81 mg Oral Daily    atorvastatin  40 mg Oral Daily    carvedilol  12.5 mg Oral BID WC    sertraline  50 mg Oral Daily    tamsulosin  0.4 mg Oral Daily    baricitinib  1 mg Oral Daily    insulin lispro  0-12 Units SubCUTAneous TID WC    insulin lispro  0-6 Units SubCUTAneous Nightly    dexamethasone  6 mg IntraVENous Q24H    cefTRIAXone (ROCEPHIN) IV  1,000 mg IntraVENous Q24H    doxycycline (VIBRAMYCIN) IV  100 mg IntraVENous Q12H    albuterol sulfate HFA  2 puff Inhalation BID    calcium replacement protocol   Other RX Placeholder     Meds prn: sodium chloride flush, sodium chloride, polyethylene glycol, acetaminophen **OR** acetaminophen, glucose, dextrose, glucagon (rDNA), dextrose, albuterol sulfate HFA       Impression and Plan:  1. FATEMEH on CKD 4.   Serum creatinine and BUN are both rising  Patient on minimal nasal cannula/supplemental oxygen  Chest x-ray showed some congestion but clinically patient does not appear to be overtly fluid overloaded  We will give gentle IV fluid hydration for 24 hours  If still no improvement in renal function then will likely need to start renal replacement therapy  Elevated BUN is also due to steroids  check with hospitalist team if aspirin can be held in case patient requires tunneled catheter placement    2. Azotemia due to FATEMEH/CKD and steroids  3. Mild metabolic acidosis. Will start patient on oral sodium bicarbonate  4. Insulin-dependent diabetes mellitus  5. Chronic systolic cardiomyopathy with EF 30 to 35%  6. Mild thrombocytopenia  7. Diabetic nephropathy  8. Proteinuria secondary to diabetic nephropathy  9.   Monoclonal gammopathy seeing oncology/Dr. Elizabeth Ulloa    D/W patient    Jose G Wright MD  [facility]

## 2021-12-27 NOTE — PROGRESS NOTES
This RN was about to give patient morning medications. Patient asked for a drink of orange juice before taking medications. Patient started choking and coughing. This RN talked to Dr. Shelbi Sena expressing concern about giving pt medications and This RN talked about speech eval for pt.

## 2021-12-27 NOTE — PLAN OF CARE
Problem: Discharge Planning:  Goal: Discharged to appropriate level of care  Description: Discharged to appropriate level of care  Outcome: Completed   See SW note 12/27.

## 2021-12-27 NOTE — PROGRESS NOTES
Patient wife Albertina Hansen reached on phone and updated on patient status and all questions addressed.

## 2021-12-27 NOTE — PROGRESS NOTES
RN assisted pt to try and take very small Coreg pill. Pt did not tolerate well. Coughing noticed, immediately after swallowing. Pt also had a very hard time even trying to swallow. Will keep NPO until speech therapy evaluates.

## 2021-12-28 NOTE — PROGRESS NOTES
[facility] 62  INPATIENT OCCUPATIONAL THERAPY  [facility] 6A CAPACITY [facility]OLA  EVALUATION    Time:   Time In: 9  Time Out: 0845  Timed Code Treatment Minutes: 0 Minutes  Minutes: 13          Date: 2021  Patient Name: Lee Ricardo,   Gender: male      MRN: [id number]  : 1945  (78 y.o.)  Referring Practitioner: Emmanuel Yu MD  Diagnosis: acute on chronic renal insufficiency  Additional Pertinent Hx: Per H&P:75 yo M with history of ICM with HFrEF, CAD, CABG, CKD, HTN, T2DM presented to [facility] ED with chief complaints of shortness of breath x 1 week with associated cough and BLE swelling. CXR with interstitial densities throughout the bilateral lungs R>L. Right basilar pleural effusion, fluid within minor fissures. Restrictions/Precautions:  Restrictions/Precautions: Isolation,Fall Risk  Position Activity Restriction  Other position/activity restrictions: Droplet +    Subjective  Chart Reviewed: Yes,Orders,Progress Notes,History and Physical  Patient assessed for rehabilitation services?: Yes  Family / Caregiver Present: No    Subjective: Pt supine in bed upon arrival, initially coughing. Pt currently NPO until SLP evals. Pt severely weak/deconditioned, unable to tolerate activity this date. Significantly soft spoken as well.     Pain:  Pain Assessment  Patient Currently in Pain: Denies    Vitals: Oxygen: 97% on 1L  Heart Rate: 71 bpm   All vitals remained stable throughout    Social/Functional History:  Lives With: Spouse,Family (daughter)  Type of Home: House  Home Layout: One level  Home Access: Stairs to enter with rails  Entrance Stairs - Number of Steps: \"a couple\" pt unable to state  Entrance Stairs - Rails:  (uni rail)  Home Equipment: 4 wheeled walker,Cane,Wheelchair-manual,Hospital bed,Lift chair   Bathroom Equipment: Commode    Receives Help From: Family  ADL Assistance: Needs assistance (pt reported independent with feeding/grooming, family assists with all other ADLs)  Ambulation OT Role,Plan of Care,Home Exercise Program  Barriers to Learning: cognition    Equipment Recommendations:  Equipment Needed: No    Plan:  Times per week: 1-2  Current Treatment Recommendations: Tahir Dawkins Training,Patient/Caregiver Education & Training,Self-Care / ADL,Positioning. See long-term goal time frame for expected duration of plan of care. If no long-term goals established, a short length of stay is anticipated. Goals:     Short term goals  Time Frame for Short term goals: by discharge  Short term goal 1: Pt will complete simple grooming/feeding (if cleared for diet) with minimal assistance and AE prn to increase independence and ease with washing face. Short term goal 2: Pt will complete BUE A/AROM exercises X5 reps to increase overall strength/endurance needed for ADL tasks. Short term goal 3: Pt will tolerate further assessment of EOB sitting balance by OTR when appropriate. Following session, patient left in safe position with all fall risk precautions in place.

## 2021-12-28 NOTE — PROGRESS NOTES
Hospitalist Progress Note    Patient:  Romelia Rico    YOB: 1945  Unit/Bed:6A-09/009-A  MRN: [id number]    Acct: [de-identified]   PCP: Jase Ocampo    Date of Admission: 12/24/2021      Assessment/Plan:    · Acute Hypoxic Respiratory Failure: 2/2 covid, CAP and HF. · Covid 19 Pneumonia: with minimal O2 requirement  · Unable to use baricitinib due to renal insufficiency (got a few doses). · CRP is increasing. Consider alternative process (such as MGUS)  · Decadron IV 6mg daily. · DVT ppx with renal adjusted lovenox  · pepcid daily  · Antibiotics as below  · Minimal O2 requirements, on 1L  · IS and acapella  · CAP, POA: multifocal, in setting of Covid. Continue rocedphin and doxy 7 and 5 days respectively. · Acute Decompensated Systolic HF: several recent echos (EF 35% at [facility] 10/17/21 with mod AR and mod LVH, 12/2020 EF 40-45%, 12/2019 EF 25-30% and elevated rvsp  · 30-35% EF on 12/26 echo. · Continue coreg. · HTN noted, will start isordil/hydralazine for afterload reduction (caution needed for ace/arb/ARNI/MCRA)  · Moderate AR: this valve had prior annuloplasty. Consider Cardiology consult once acute renal issues managed. Decrease afterload. · LE edema: could be related to norvasc, vs hypoalbuminemia vs HF (or combination of all these factors). Will hold norvasc since starting on imdur/hydralazine. Also see above for HF   · Since Covid diagnosis, Doppler to rule out DVT - negative. · Will ultimately likely need dialyzed. · FATEMEH on CKD vs progression of CKD: Cardiorenal syndrome, volume overload vs MGUS light chain disese vs hypoxia induced (in setting of nephrotoxic meds). Significant uremia. · Got bumex 2 IV in ED, then 80mg lasix 12/25 am  · 12/26 FeNa looks pre-renal - low urine sodium concerning for cardiorenal syndrome now after diuretics. · Agree with Nephrology on holding diuretics   · Fluids gently on 12/28  · Renal US no obstruction  · UA reviewed, CK wnl. · Strict I/O, hold lisinopril, zaroxylin. Renal dosing medication. · Appreciate nephrology recommendations. Likely temp HD cath in next few days. No immediate need for dialysis. · CAD s/p CABG x3 and MAZE (cardiogenic shock, IABP) 1/2019: continue home gdmt. Holding ACE due to above. Continue bb, statin, asa  · Hx of MV replacement: annuloplasty. On asa , continue   · HTN: added isordil and hydralazine low dose with hold parameters. Monitor and adjust accordingly. · IDDM2: lantus 18u at home   · Some steroid induced hyperglycemia, but not eating the best  · Add on lantus 10u, continue med dose SSI  · DM diet and monitor - now that diet resumed may need increased lantus. · QT prolonged (although per my read on EKG it is overestimating QT) - K and Mg wnl. · Mild Thrombocytopenia: likely 2/2 plasma cell dyscrasia, renal disease, and covid. Continue to monitor closely. · MGUS: IgG kappa disease, monitored - follows Dr. Jim Peralta  · Non-MI Troponin elevation: Trop is chronically elevated, flat trend. Recent stress 10/2021 at [facility] no ischemia. · Former tobacco use  · Physical Deconditioning: PT/OT consulted   · Goals of care: Discussed with wife patient status and future work-up/procedures including temporary dialysis catheter, possibility of prolonged dialysis, impaired swallowing and risk of aspiration, worsening cardiomyopathy. Patient has been hesitant to change CODE STATUS in the past.  Has had 3 prior cardiac arrests. Wife is agreeable to having palliative care evaluation. At this time given patient's uremia do not feel it is appropriate to discuss CODE STATUS. Full code as for now.      Expected discharge date:  >2days days    Disposition: TBD  [] Home  [] TCU  [] Rehab  [] Psych  [] SNF  [] [place]  [] Other-    ===================================================================      Chief Complaint: SOB    Hospital Course: per HPI, \"68 yo M with history of ICM with HFrEF, CAD, CABG, CKD, HTN, T2DM presented to [facility] ED with chief complaints of shortness of breath x 1 week with associated cough and BLE swelling.     ED course: presented afebrile and hemodynamically stable. Initially hypertensive to 181/72, later BP in 130s with mild tachypnea. Saturating >34% on 1L NC. Labs revealed mild hyponatremia, BUN 89 with CR 3.3. ProBNP 76040 (previously ~26398 in 2019), troponin 0.048. EKG NSR with nonspecific ST and T wave changes. COVID rapid positive (vaccinated). CXR with interstitial densities throughout the bilateral lungs R>L. Right basilar pleural effusion, fluid within minor fissures. Given Bumex 2 mg IV x1 dose in the ED. Admitted for CHF exacerbation in the setting of COVID.     On evaluation, patient reports having progressively worsening dyspnea at rest and exertion and BLE edema for about month. Admits to orthopnea. No PND. No chest pain, palpitations, dizziness/lightheadedness, recent fall/syncope. Has had acute cough ~1 week with some \"phlegm\". No subjective fever, chills, nausea, vomiting, abdominal pain, diarrhea, loss of taste/smell. No known sick contact. Currently, feeling slightly chilly and weak. Reports compliance to medication and diet/fluid restrictions. Normally wears 1L NC at night but recently has been using oxygen continuously. Unclear if on oxygen for CHF or other reasons. \"     Subjective (past 24 hours): Patient again appears fatigued, however more comfortable than he was yesterday. Reports less coughing and breathing easier today. Had swallow study this morning and has been cleared for modified diet.       Medications:  Reviewed    Infusion Medications    sodium chloride 50 mL/hr at 12/28/21 1243    sodium chloride Stopped (12/27/21 1336)    dextrose       Scheduled Medications    famotidine  20 mg Oral Every Other Day    ipratropium-albuterol  1 ampule Inhalation Q4H WA    hydrALAZINE  25 mg Oral 3 times per day    heparin (porcine)  5,000 Units SubCUTAneous BID    guaiFENesin  600 mg Oral BID    isosorbide dinitrate  10 mg Oral BID    insulin glargine  10 Units SubCUTAneous Nightly    sodium chloride flush  5-40 mL IntraVENous 2 times per day    [Held by provider] aspirin EC  81 mg Oral Daily    atorvastatin  40 mg Oral Daily    carvedilol  12.5 mg Oral BID WC    sertraline  50 mg Oral Daily    tamsulosin  0.4 mg Oral Daily    [Held by provider] baricitinib  1 mg Oral Daily    insulin lispro  0-12 Units SubCUTAneous TID     insulin lispro  0-6 Units SubCUTAneous Nightly    dexamethasone  6 mg IntraVENous Q24H    cefTRIAXone (ROCEPHIN) IV  1,000 mg IntraVENous Q24H    doxycycline (VIBRAMYCIN) IV  100 mg IntraVENous Q12H    calcium replacement protocol   Other RX Placeholder     PRN Meds: sodium chloride flush, sodium chloride, polyethylene glycol, acetaminophen **OR** acetaminophen, glucose, dextrose, glucagon (rDNA), dextrose, albuterol sulfate HFA      ROS: reviewed from prior note, full ROS unchanged unless otherwise stated in hospital course/subjective portion. Intake/Output Summary (Last 24 hours) at 12/28/2021 1806  Last data filed at 12/28/2021 1433  Gross per 24 hour   Intake 300 ml   Output --   Net 300 ml       Exam:  BP (!) 146/67   Pulse 67   Temp 97.3 °F (36.3 °C) (Axillary)   Resp 18   Wt 190 lb 3.2 oz (86.3 kg)   SpO2 95%   BMI 26.53 kg/m²     General appearance: Acute on chronically ill appearing. Fatigued appearing. Eyes:  PERRL. Conjunctivae/corneas clear. HENT: Head normal appearing. Nares normal. Oral mucosa moist.  Hearing intact. Neck: Supple, with full range of motion. Trachea midline. No gross JVD appreciated. Respiratory:  Normal effort, no rhonchi or wheezing. Diminished and rales bilaterally in lower fields. Cardiovascular: Normal rate, regular rhythm with normal S1/S2 without murmurs. 1-2+ BLLE edema - worse.    Abdomen: Soft, non-tender, non-distended with normal bowel sounds. Musculoskeletal: No joint swelling or tenderness. Normal tone. No abnormal movements. Skin: Warm and dry. No rashes or lesions. Neurologic:  No focal sensory/motor deficits in the upper or lower extremities. Cranial nerves:  grossly non-focal 2-12. Psychiatric: Alert and oriented, normal insight and thought content. Flat affect. Capillary Refill: Brisk,< 3 seconds. Peripheral Pulses: +2 palpable, equal bilaterally. Labs:   Recent Labs     12/26/21 0733 12/27/21 0634 12/28/21 0719   WBC 8.2 9.3 8.6   HGB 12.3* 12.4* 12.2*   HCT 38.4* 38.8* 38.3*    117* 100*     Recent Labs     12/26/21 0733 12/27/21 0634 12/28/21 0719   * 132* 133*   K 4.4 4.7 4.4   CL 95* 97* 97*   CO2 20* 19* 19*   * 118* 128*   CREATININE 3.7* 4.0* 4.1*   CALCIUM 8.3* 8.3* 8.5   PHOS 4.8*  --   --      Recent Labs     12/26/21 0733 12/27/21 0634 12/28/21 0719   AST 45* 43* 32   ALT 47 51 45   BILIDIR  --  <0.2 <0.2   BILITOT 0.5 0.4 0.4   ALKPHOS 106 101 87     No results for input(s): INR in the last 72 hours. No results for input(s): Rosette Castor in the last 72 hours. No results for input(s): PROCAL in the last 72 hours. Lab Results   Component Value Date    NITRU NEGATIVE 12/25/2021    WBCUA 0-2 12/25/2021    BACTERIA NONE SEEN 12/25/2021    RBCUA 0-2 12/25/2021    BLOODU SMALL 12/25/2021    SPECGRAV 1.014 12/25/2021    GLUCOSEU neg 03/17/2021    GLUCOSEU Negative 03/11/2020       Radiology (48 hours):  XR CHEST (2 VW)    Result Date: 12/25/2021  1. Interstitial densities throughout the bilateral lungs greater on the right than left most consistent with edema. Right basilar pleural and parenchymal opacities likely representing small right basilar effusion with atelectasis. Fluid within the minor fissure. Possible tiny left basilar effusion. Overall findings are likely reflective of congestive heart failure.  This document has been electronically signed by: Tammie Ferrell DO on

## 2021-12-28 NOTE — CARE COORDINATION
Discharge Planning Update: Following for Covid. Pt extremely weak. Pt and spouse agreeable for ECF at discharge. SW is following. Presently afebrile. On 1L/NC with sat 96%. PT/OT consulted and PT recommending therapy stay due to decreased functional mobility. Baricitinib, Rocephin, Dexamethasone, Doxy, Duonebs. Palliative Care consulted. Continue to follow.

## 2021-12-28 NOTE — PROGRESS NOTES
[address] [id number]  SPEECH THERAPY  STRZ 6A CAPACITY EBOLA  Modified Barium Swallow + Dysphagia tx    SLP Individual Minutes  Time In: 9378  Time Out: 5285  Minutes: 25  Timed Code Treatment Minutes: 0 Minutes       Date: 2021  Patient Name: Robin Marie      CSN: [id number]   : 1945  (78 y.o.)  Gender: male   Referring Physician:  Kobi Lindsey DO  Diagnosis: Acute on Chronic Renal Insufficiency   Secondary Diagnosis: Dysphagia   Precautions: Aspiration Risk   History of Present Illness/Injury: Patient admitted with above diagnosis. Per chart review, \"70 yo M with history of ICM with HFrEF, CAD, CABG, CKD, HTN, T2DM presented to [facility] ED with chief complaints of shortness of breath x 1 week with associated cough and BLE swelling.     ED course: presented afebrile and hemodynamically stable. Initially hypertensive to 181/72, later BP in 130s with mild tachypnea. Saturating >34% on 1L NC. Labs revealed mild hyponatremia, BUN 89 with CR 3.3. ProBNP 22906 (previously ~44649 in 2019), troponin 0.048. EKG NSR with nonspecific ST and T wave changes. COVID rapid positive (vaccinated). CXR with interstitial densities throughout the bilateral lungs R>L. Right basilar pleural effusion, fluid within minor fissures. Given Bumex 2 mg IV x1 dose in the ED. Admitted for CHF exacerbation in the setting of COVID.     On evaluation, patient reports having progressively worsening dyspnea at rest and exertion and BLE edema for about month. Admits to orthopnea. No PND. No chest pain, palpitations, dizziness/lightheadedness, recent fall/syncope. Has had acute cough ~1 week with some \"phlegm\". No subjective fever, chills, nausea, vomiting, abdominal pain, diarrhea, loss of taste/smell. No known sick contact. Currently, feeling slightly chilly and weak. Reports compliance to medication and diet/fluid restrictions. Normally wears 1L NC at night but recently has been using oxygen continuously.  Unclear if on oxygen for CHF or other reasons.     Of note, recently had a visit to [place] for syncope, transferred to [address] [place] [address]. Stress test was negative. TTE showed slightly reduced EF 35% (10/21) from 40-45% on prior TTE (09/2020). RN assisted pt to try and take very small Coreg pill. Pt did not tolerate well. Coughing noticed, immediately after swallowing. Pt also had a very hard time even trying to swallow. Will keep NPO until speech therapy evaluates. \" ST recommended completion of MBS d/t overt s/s of aspiration observed following cup-sip of thin liquids to further assess pharyngeal swallow function and determine safest level of po intake.      has a past medical history of CAD (coronary artery disease), CHF (congestive heart failure) ([address] Chronic kidney disease, History of blood transfusion, Hyperlipidemia, Hypertension, Proteinuria, and Type II or unspecified type diabetes mellitus without mention of complication, not stated as uncontrolled. Current Diet: NPO pending completion of MBS    Pain: No pain reported. SUBJECTIVE:  ELAYNE العراقي approved completion of MBS. Patient arrived to fluoro suite via bed. Patient awake and alert. Agreeable to MBS. OBJECTIVE:    Respiratory Status:  Nasal Canula  (1L)     Behavioral Observation:  Alert and Confused    PATIENT WAS EVALUATED USING:  BARIUM: Thin liquids via tsp/cup/straw, mildly thick liquids via cup/straw, moderately thick liquids via cup, puree, soft texture, mixed consistency, and hard/coarse texture.      ORAL PREPARATION PHASE:  Impaired:  Slow Mastication, Uncoordinated Mastication, Decreased Bolus Control and Decreased Bolus Formation    ORAL PHASE: Uncoordinated AP Movement and Slow AP Movement     ORAL PHASE KRISTINE SCORE: (Dysphagia outcome and severity scale)  5 = Mild Dysphagia - May have one diet consistency restricted - Mild oral residue but clears    PHARYNGEAL PHASE:  Impaired: Delayed Swallow, Decreased Airway Protection, Decreased Epiglottic Inversion, Decreased Hyolaryngeal Elevation, Decreased Tongue Based Retraction, Residue in the Valleculae, Residue in the Pyriform Sinus, Residue Along the Posterior Pharyngeal Wall and Decrease Pharyngeal Constriction, and Reduced thyrohyoid approximation      PHARYNGEAL PHASE KRISTINE SCORE: (Dysphagia outcome and severity scale)  3 = Moderate Dysphagia - Two or more diet consistencies restricted - May exhibit one or more of the following: Moderate residue clears with cue, Airway penetration to the level of the vocal folds wihtout cough with tow or more consistencies, Aspiration with two consistencies with weak or no reflexive cough, Aspiration of one consistency, no cough and airway penetration with one consistency, no cough    EVIDENCE FOR LARYNGEAL PENETRATION AND/OR ASPIRATION:  Laryngeal penetration evident with thin liquids, mildly thick liquids  Audible aspiration evident with thin liquids     PENETRATION-ASPIRATION SCALE (PAS):   Thin Liquids: 6 = Material enters the airway, passes below the vocal folds, and is ejected into the larynx or out of the airway  Mildly Thick Liquids:  5 = Material enters the airway, contacts the vocal folds, and is not ejected from the airway  Moderately Thick Liquids: 1 = Material does not enter the airway  Puree:  1 = Material does not enter the airway  Soft Solid (juice of mixed consistency):  3 = Material enters the airway, remains above the vocal folds, and is not ejected from the airway  Hard Solid: 1 = Material does not enter the airway    ESOPHAGEAL PHASE:   No significant findings    ATTEMPTED TECHNIQUES:  Small Bolus Size Effective    Straw Ineffective    Cup Effective    Chin Tuck Effective and Ineffective Semi-effective with cup sips,   Ineffective with straw sips   Head Turn Not Attempted    Spoon Presentations Effective    Volitional Cough Ineffective    Spontaneous Cough Ineffective           DIAGNOSTIC IMPRESSIONS:  Patient presents with evidence of a mild oral dysphagia and moderate pharyngeal dysphagia based on skilled clinical findings outlined above. Patient demonstrated evidence of coordinated/prolonged mastication, decreased bolus formation, and slow/uncoordinated AP transit resulting in mild oral residue remaining on lingual body that clears with additional swallows. Patient with decreased bolus control/containment resulting in premature spillage into the pharynx to the level of the pyriforms which was further compromised by delayed swallow initiation. Patient with decreased hyolaryngeal elevation resulting in incomplete epiglottic inversion and decreased airway protection. Patient with decreased thyrohyoid approximation resulting in reduced airway protection and residue remaining in the pyriforms. Patient with deep penetration to the level of the vocal folds DURING/AFTER the swallow with thin and mildly thick liquids that does not clear with residue remaining on underside of the epiglottis placing patient at HIGH risk of aspiration. Patient with throat clear following deep penetration; however, penetrated material not cleared from laryngeal vestibule. Trace posterior tracheal aspiration observed with thin liquids DURING the swallow - cough effective in clearing tracheal aspiration; however, remain in laryngeal vestibule. ST attempted to utilize chin tuck with thin liquids; however, patient require MAX verbal cues. Chin tuck with thin liquids via cup, semi-effective in reducing penetration. Chin tuck with thin liquids via straw is NOT effective. Patient with decreased base of tongue retraction and pharyngeal constriction resulting in pharyngeal residue remaining in vallecula, along posterior pharyngeal wall, and pyriforms. Liquid wash and double swallow effective in clearing/reducing pharyngeal residue; however, patient required verbal cue to utilize strategy.  Recommend patient initiate a soft and bite-size diet with moderately thick liquids with direct 1:1 assistance to carryover of recommended swallow strategies. Dysphagia tx: Following completion of MBS, ST completed review of swallow anatomy and physiology via use of computer monitor. ST defined aspiration and penetration and explained risk factors associated with aspiration (pneumonia, pulmonary decline, respiratory failure, extended/frequent hospitalizations, etc). ST then reviewed results from [address] ST discussed diet recommendations as well as recommended swallow strategies to improve safety and efficiency. Patient stated understanding and was in agreement with POC moving forward. *post evaluation, patient without respiratory distress upon leaving room; RN Kalyn Gann notified re: clinical findings and recommendations from the assessment; verbal receptiveness noted       Diet Recommendations:  Soft and Bite-Size with moderately thick liquids   Strategies:  Full Upright Position, Small Bite/Sip, Multiple Swallow, Medications Whole with Puree, Direct 1:1 Supervision, Alternate Solids and Liquids, Limit Distractions and Monitor for Fatigue   Rehabilitation Potential: fair    EDUCATION:  Learner: Patient  Education:  Reviewed results and recommendations of this evaluation, Reviewed diet and strategies, Reviewed signs, symptoms and risks of aspiration, Reviewed ST goals and Plan of Care, Education Related to Potential Risks and Complications Due to Impairment/Illness/Injury, Education Related to Prevention of Recurrence of Impairment/Illness/Injury and Education Related to [employer] and Wellness  Evaluation of Education: Verbalizes understanding, Demonstrates with assistance, Needs further instruction and Family not present    PLAN:  Skilled SLP intervention on acute care 3-5 x per week or until goals met and/or pt plateaus in function. Specific interventions for next session may include: pharyngeal strengthening exercises, and po trials. PATIENT GOAL:    Did not state.   Will further assess during treatment. SHORT TERM GOALS:  Short-term Goals  Timeframe for Short-term Goals: 2 weeks  Goal 1: Patiane will consume a soft and bite-size diet with moderately thick liquids while implementing small bites/sips, upright position, alternation of bites/sips, and double swallow with no overt s/s of aspiration and adequate endurance to meet nutrition/hydration needs  Goal 2: Patient will complete advanced PO trials with timely/coordinated mastication, complete bolus clearance, and timely swallow initation and no overt s/s of aspiration and adequate endurance for potential diet advancement  Goal 3: Patient will complete pharyngeal strengthening exericses (effortful, soni, CTAR, shaker) x10 reps to improve pharyngeal constriction, timeliness of the swallow, and overall airway protection for potential diet advancement to least restrictive diet.       LONG TERM GOALS:  No long term goals recommended d/t dickson Jeong ([address]) Reji Watkins M.A., [address]

## 2021-12-28 NOTE — PROGRESS NOTES
[address] [address]  SPEECH THERAPY  STRZ 6A CAPACITY EBOLA  Clinical Swallow Evaluation      SLP Individual Minutes  Time In: 8794  Time Out: 5518  Minutes: 8  Timed Code Treatment Minutes: 0 Minutes       Date: 2021  Patient Name: Yanick Mai      CSN: [id number]   : 1945  (78 y.o.)  Gender: male   Referring Physician:  Yosef Alvarez DO  Diagnosis: Acute on Chronic Renal Insufficiency   Secondary Diagnosis: Dysphagia     History of Present Illness/Injury: Patient admitted with above diagnosis. Per chart review, \"68 yo M with history of ICM with HFrEF, CAD, CABG, CKD, HTN, T2DM presented to [facility] ED with chief complaints of shortness of breath x 1 week with associated cough and BLE swelling.     ED course: presented afebrile and hemodynamically stable. Initially hypertensive to 181/72, later BP in 130s with mild tachypnea. Saturating >34% on 1L NC. Labs revealed mild hyponatremia, BUN 89 with CR 3.3. ProBNP 96384 (previously ~51908 in 2019), troponin 0.048. EKG NSR with nonspecific ST and T wave changes. COVID rapid positive (vaccinated). CXR with interstitial densities throughout the bilateral lungs R>L. Right basilar pleural effusion, fluid within minor fissures. Given Bumex 2 mg IV x1 dose in the ED. Admitted for CHF exacerbation in the setting of COVID.     On evaluation, patient reports having progressively worsening dyspnea at rest and exertion and BLE edema for about month. Admits to orthopnea. No PND. No chest pain, palpitations, dizziness/lightheadedness, recent fall/syncope. Has had acute cough ~1 week with some \"phlegm\". No subjective fever, chills, nausea, vomiting, abdominal pain, diarrhea, loss of taste/smell. No known sick contact. Currently, feeling slightly chilly and weak. Reports compliance to medication and diet/fluid restrictions. Normally wears 1L NC at night but recently has been using oxygen continuously.  Unclear if on oxygen for CHF or other reasons.     Of note, recently had a visit to [place] for syncope, transferred to [address] [place] [address]. Stress test was negative. TTE showed slightly reduced EF 35% (10/21) from 40-45% on prior TTE (09/2020). RN assisted pt to try and take very small Coreg pill. Pt did not tolerate well. Coughing noticed, immediately after swallowing. Pt also had a very hard time even trying to swallow. Will keep NPO until speech therapy evaluates. \" ST consulted to complete clinical swallow evaluation to further assess swallow function and recommend safest level of po intake and/or need for instrumental evaluation d/t concerns for aspiration. Past Medical History:   Diagnosis Date    CAD (coronary artery disease)     CHF (congestive heart failure) (HCC)     Chronic kidney disease     History of blood transfusion     Hyperlipidemia     Hypertension     Proteinuria     Type II or unspecified type diabetes mellitus without mention of complication, not stated as uncontrolled        SUBJECTIVE:  RN Anil Alejandra approved completion of clinical swallow evaluation. Upon arrival to room, patient awake in bed. Patient denies difficulty swallowing and is agreeable to evaluation. Pleasant and cooperative throughout with noted confusion evident. OBJECTIVE:    Pain:  No pain reported.     Current Diet: NPO pending completion of clinical swallow evaluation     Respiratory Status:  Nasal Canula (1L)    Behavioral Observation:  Alert and Confused    Oral Mechanism Evaluation:      Facial / Labial WFL    Lingual Impaired Decreased ROM and Coordination    Dentition WFL    Velum WFL    Vocal Quality Impaired Hoarse/breathy vocal quality   Sensation Not Tested    Cough Not Tested      Patient Evaluated Using:  Ice chip and Thin liquids via cup     Oral Phase:  Impaired:  Impaired AP Movement, Impaired Mastication and Reduced Bolus Formation    Pharyngeal Phase: Impaired:  Delayed Swallow    Signs and Symptoms of Laryngeal Penetration/Aspiration: Throat Clear, Immediate Cough and Wet Vocal Quality    Impresssions: Patient presents with moderate oral dysphagia with inability to fully discern potential presence of pharyngeal phase deficits without formal instrumentation. Patient demonstrated evidence of decreased bolus control/containment, slow/uncoordinated mastication, poor bolus manipulation and formation, suspected delayed swallow initiation and concerns for reduced airway protection d/t immediate coughing/throat clearing observed across 3/3 trials of thin liquids via cup. No further consistencies were completed d/t concerns for aspiration/penetration. Of course pharyngeal dysfunction and totality of airway invasion events cannot be formally assessed without presence of instrumental evaluation, therefore at this time recommend patient complete MBS to further assess pharyngeal swallow function to determine safest level of po intake. Recommend patient remain strict NPO until completion of instrumental evaluation d/t concerns for airway invasion events.      *post evaluation, patient without respiratory distress upon leaving room; RN Raleigh Hernández notified re: clinical findings and recommendations from the assessment; verbal receptiveness noted       RECOMMENDATIONS/ASSESSMENT:  Instrumental Evaluation: Modified Barium Swallow (MBS)  Diet Recommendations:  NPO pending completion of MBS   Strategies:  Recommend NPO and Strategies pending MBS completion   Rehabilitation Potential: fair    EDUCATION:  Learner: Patient  Education:  Reviewed results and recommendations of this evaluation, Reviewed diet and strategies, Reviewed signs, symptoms and risks of aspiration, Reviewed ST goals and Plan of Care, Education Related to Prevention of Recurrence of Impairment/Illness/Injury and Education Related to [employer] and Wellness  Evaluation of Education: [employer] understanding and Needs further instruction    PLAN:  Skilled SLP intervention on acute care 3-5 x per week or until goals met and/or pt plateaus in function. Specific interventions for next session may include: MBS. PATIENT GOAL:    Did not state. Will further assess during treatment.     SHORT TERM GOALS:  Short-term Goals  Timeframe for Short-term Goals: 1-2 session  Goal 1: Patient will complete MBS to further assess pharyngeal swallow function to recommend safest level of po intake and swallow strategies    LONG TERM GOALS:  No long term goals recommended d/t short KAREN Jeong ([facility] 587) [address], M.A., [address]

## 2021-12-28 NOTE — PROGRESS NOTES
Update received from primary nurse. Eileen Schilder is alert but disoriented to situation. Call placed to pt's wife Jennifer Echevarria. No answer received and unable to leave a message. Will continue to attempt to reach wife.

## 2021-12-28 NOTE — PROGRESS NOTES
[facility]   Nephrology progress note  12/28/2021, 11:24 AM      Pt Name:    Breonna Nobles  MRN:     [id number]     YOB: 1945  Admit Date:    12/24/2021 11:12 PM  Primary Care Physician:  Michelle Garcia   Room number  6A-09/009-A    Chief Complaint: Nephrology following for FATEMEH/CKD    Subjective:  Patient seen and examined  Seen earlier today during rounds  Patient is awake no acute distress  Only requiring 1 L of nasal cannula  No acute distress      Objective:  24HR INTAKE/OUTPUT:      Intake/Output Summary (Last 24 hours) at 12/28/2021 1124  Last data filed at 12/27/2021 2121  Gross per 24 hour   Intake 753.28 ml   Output --   Net 753.28 ml     I/O last 3 completed shifts: In: 753.3 [I.V.:244.1; IV Piggyback:509.2]  Out: -   No intake/output data recorded. Admission weight: 200 lb (90.7 kg)  Wt Readings from Last 3 Encounters:   12/28/21 190 lb 3.2 oz (86.3 kg)   11/29/21 188 lb (85.3 kg)   11/03/21 184 lb (83.5 kg)     Body mass index is 26.53 kg/m².     Physical examination  VITALS:     Vitals:    12/27/21 2311 12/28/21 0330 12/28/21 0600 12/28/21 0945   BP: (!) 151/79 (!) 162/73  (!) 164/72   Pulse: 63 63  72   Resp: 18 18  20   Temp: 97.6 °F (36.4 °C) 97 °F (36.1 °C)  97.8 °F (36.6 °C)   TempSrc: Oral Oral  Axillary   SpO2: 96% 96%  96%   Weight:   190 lb 3.2 oz (86.3 kg)      General Appearance: alert and cooperative with exam, appears comfortable, no distress  Mouth/Throat: Oral mucosa moist  Lungs: Diminished, without use of accessory muscles, no labored breathing  GI: soft, non-tender, no guarding  Extremities: No significant LE edema      Lab Data  CBC:   Recent Labs     12/26/21  0733 12/27/21  0634 12/28/21  0719   WBC 8.2 9.3 8.6   HGB 12.3* 12.4* 12.2*   HCT 38.4* 38.8* 38.3*    117* 100*     BMP:  Recent Labs     12/26/21  0733 12/27/21  0634 12/28/21  0719   * 132* 133*   K 4.4 4.7 4.4   CL 95* 97* 97*   CO2 20* 19* 19*   * 118* 128* CREATININE 3.7* 4.0* 4.1*   GLUCOSE 179* 134* 161*   CALCIUM 8.3* 8.3* 8.5   PHOS 4.8*  --   --      Hepatic:   Recent Labs     12/26/21  0733 12/27/21  0634 12/28/21  0719   LABALBU 3.1* 3.3* 3.0*   AST 45* 43* 32   ALT 47 51 45   BILITOT 0.5 0.4 0.4   ALKPHOS 106 101 87         Meds:  Infusion:    sodium chloride      sodium chloride Stopped (12/27/21 1336)    dextrose       Meds:    famotidine  20 mg Oral Every Other Day    ipratropium-albuterol  1 ampule Inhalation Q4H WA    hydrALAZINE  25 mg Oral 3 times per day    heparin (porcine)  5,000 Units SubCUTAneous BID    guaiFENesin  600 mg Oral BID    isosorbide dinitrate  10 mg Oral BID    insulin glargine  10 Units SubCUTAneous Nightly    sodium chloride flush  5-40 mL IntraVENous 2 times per day    [Held by provider] aspirin EC  81 mg Oral Daily    atorvastatin  40 mg Oral Daily    carvedilol  12.5 mg Oral BID WC    sertraline  50 mg Oral Daily    tamsulosin  0.4 mg Oral Daily    [Held by provider] baricitinib  1 mg Oral Daily    insulin lispro  0-12 Units SubCUTAneous TID WC    insulin lispro  0-6 Units SubCUTAneous Nightly    dexamethasone  6 mg IntraVENous Q24H    cefTRIAXone (ROCEPHIN) IV  1,000 mg IntraVENous Q24H    doxycycline (VIBRAMYCIN) IV  100 mg IntraVENous Q12H    calcium replacement protocol   Other RX Placeholder     Meds prn: sodium chloride flush, sodium chloride, polyethylene glycol, acetaminophen **OR** acetaminophen, glucose, dextrose, glucagon (rDNA), dextrose, albuterol sulfate HFA       Impression and Plan:  1. FATEMEH on CKD 4.   Serum creatinine and BUN are both rising  Patient on minimal nasal cannula/supplemental oxygen  Chest x-ray showed some congestion but clinically patient does not appear to be overtly fluid overloaded  Patient only requiring 1 L of nasal cannula  Clinically does not appear to be fluid overloaded  We will start IV fluids today at 50 mL/h for 48 hours and monitor response  Aspirin currently on hold in case if patient needs dialysis catheter    2. Azotemia due to FATEMEH/CKD and steroids  3. Mild metabolic acidosis. Add sodium bicarbonate 1300 twice daily  4. Insulin-dependent diabetes mellitus  5. Chronic systolic cardiomyopathy with EF 30 to 35%  6. Mild thrombocytopenia  7. Diabetic nephropathy  8. Proteinuria secondary to diabetic nephropathy  9.   Monoclonal gammopathy seeing oncology/Dr. Jonel Garcia    D/W patient and RN    Farzana Orosco MD  [facility]

## 2021-12-29 NOTE — CARE COORDINATION
Discharge Planning Update: Following for Covid/CAP and HF. No Baricitinib due to renal insufficiency (had some but dc'd). CRP continuing to rise, up to 15.44 yesterday. Cont Decadron and Lovenox. Rocephin and Doxy. Coreg. IS and Acapella. Pt very weak. ST following for dysphagia, keep NPO until MBS. Completed yesterday, await recs from [address] [place] [address]  Nephrology following for FATEMEH. Added sodium bicarb yesterday for met acidosis. IVF at 50/hr. Presently afebrile. VSS. On 1L/NC with sat in 90's. SW to follow for possible placement for therapy needs at discharge. Wife is agreeable to this.

## 2021-12-29 NOTE — PROGRESS NOTES
[facility]  INPATIENT PHYSICAL THERAPY  DAILY NOTE  STRZ 6A CAPACITY EBOLA - 6A--A    Time In: 0805  Time Out: 8606  Timed Code Treatment Minutes: 30 Minutes  Minutes: 30          Date: 2021  Patient Name: Nicolette Koroma,  Gender:  male        MRN: [id number]  : 1945  (78 y.o.)     Referring Practitioner: Silver Land MD  Diagnosis: Acute on chronic renal insufficiency  Additional Pertinent Hx: Per H&P :  ED course: presented afebrile and hemodynamically stable. Initially hypertensive to 181/72, later BP in 130s with mild tachypnea. Saturating >34% on 1L NC. Labs revealed mild hyponatremia, BUN 89 with CR 3.3. ProBNP 54151 (previously ~73121 in 2019), troponin 0.048. EKG NSR with nonspecific ST and T wave changes. COVID rapid positive (vaccinated). CXR with interstitial densities throughout the bilateral lungs R>L. Right basilar pleural effusion, fluid within minor fissures. Given Bumex 2 mg IV x1 dose in the ED. Admitted for CHF exacerbation in the setting of COVID. On evaluation, patient reports having progressively worsening dyspnea at rest and exertion and BLE edema for about month. Admits to orthopnea. No PND. No chest pain, palpitations, dizziness/lightheadedness, recent fall/syncope. Has had acute cough ~1 week with some \"phlegm\". No subjective fever, chills, nausea, vomiting, abdominal pain, diarrhea, loss of taste/smell. No known sick contact. Currently, feeling slightly chilly and weak. Reports compliance to medication and diet/fluid restrictions. Normally wears 1L NC at night but recently has been using oxygen continuously. Unclear if on oxygen for CHF or other reasons. Of note, recently had a visit to [place] for syncope, transferred to [facility]. Stress test was negative. TTE showed slightly reduced EF 35% (10/21) from 40-45% on prior TTE (2020).      Prior Level of Function:  Lives With: Spouse,Family (daughter)  Type of Home: House  Home Layout: One level  Home Access: Stairs to enter with rails  Entrance Stairs - Number of Steps: \"a couple\" pt unable to state  Entrance Stairs - Rails:  (uni rail)  Home Equipment: 4 wheeled walker,Cane,Wheelchair-manual,Hospital bed,Lift chair   Bathroom Equipment: Commode    Receives Help From: Family  ADL Assistance: Needs assistance (pt reported independent with feeding/grooming, family assists with all other ADLs)  Ambulation Assistance: Needs assistance  Transfer Assistance: Needs assistance  Active : No  Additional Comments: Pt a questionable historian. Per chart review, family assists with ADLs and transfers to w/c. Pt initially reported used walker within home, then later stated require assist of both wife and daughter to get to w/c. Restrictions/Precautions:  Restrictions/Precautions: Isolation,Fall Risk  Position Activity Restriction  Other position/activity restrictions: Droplet +     SUBJECTIVE: RN approved session. Pt was laying in bed, eating breakfast, but pleasant and agreeable to therapy. Pt had BM and so extra time was taken rolling in bed to complete florentino care and repositioning. PAIN: 0/10: denies pain     Vitals: Oxygen: NC 1. 5L *PO2 >90% throughout session     OBJECTIVE:  Bed Mobility:  Rolling to Left: Maximum Assistance, X 1, with rail, with verbal cues    Rolling to Right: Maximum Assistance, X 1, with rail, with verbal cues    Supine to Sit: Maximum Assistance, X 1, with head of bed raised, with verbal cues , with increased time for completion  Sit to Supine: Maximum Assistance, X 1, with head of bed flat, with verbal cues , with increased time for completion   Scooting: Maximum Assistance, X 1, with head of bed flat, with verbal cues , with increased time for completion, bed in trendelenburg    Transfers:  Not Tested   *Not appropriate at this time    Ambulation:  Not Tested    Balance:  Static Sitting Balance:   Max A x1 with occasional Min A for short period of time with verbal cues and tactile cues for hand placement    Exercise:  Patient was guided in 1 set(s) 10 reps of exercise to both lower extremities. Long arc quads. Exercises were completed for increased independence with functional mobility. Functional Outcome Measures: Completed  AM-PAC Inpatient Mobility Raw Score : 8  AM-PAC Inpatient T-Scale Score : 28.52    ASSESSMENT:  Assessment: Patient progressing toward established goals. Activity Tolerance:  Patient tolerance of  treatment: fair. Pt fatigues quickly, requires maximal verbal cues for upright posture     Equipment Recommendations:Equipment Needed: No (pt states he has a 4ww and w/c, continue to assess needs)  Discharge Recommendations: Continue to assess pending progress and Subacte/Skilled Nursing Facility  Plan: Times per week: 3-5x C  Current Treatment Recommendations: Strengthening,Neuromuscular Re-education,Home Exercise Program,Safety Education & [place] Training,Patient/Caregiver Education & Training,Functional Mobility Training,Equipment Evaluation, Education, & procurement    Patient Education  Patient Education: [employer] Mobility, Verbal Exercise Instruction, Health Promotion and Wellness Education    Goals:  Patient goals : asked multiple times, pt unable to state  Short term goals  Time Frame for Short term goals:  By hospital d/c  Short term goal 1: Pt to complete all bed mobility tasks with CGA for improved indep with adjusting himself in bed  Short term goal 2: Pt to demo ability to maintain static sitting balance at midline for ~5 minutes with SBA for improved ability to complete self care tasks at the EOB  Short term goal 3: Pt to demo the ability to follow commands to complete x10 reps of LE exercise with SBA for improved strength and self efficacy  Short term goal 4: PT to assess transfers as able  Long term goals  Time Frame for Long term goals : NA due to short ELOS    Following session, patient left in safe position with all fall

## 2021-12-29 NOTE — RT PROTOCOL NOTE
RT Inhaler-Nebulizer Bronchodilator Protocol Note    There is a bronchodilator order in the chart from a provider indicating to follow the RT Bronchodilator Protocol and there is an Initiate RT Inhaler-Nebulizer Bronchodilator Protocol order as well (see protocol at bottom of note). CXR Findings:  No results found. The findings from the last RT Protocol Assessment were as follows:   History Pulmonary Disease: None or smoker <15 pack years  Respiratory Pattern: Dyspnea on exertion or RR 21-25 bpm  Breath Sounds: Slightly diminished and/or crackles  Cough: Strong, spontaneous, non-productive  Indication for Bronchodilator Therapy: None  Bronchodilator Assessment Score: 4    Aerosolized bronchodilator medication orders have been revised according to the RT Inhaler-Nebulizer Bronchodilator Protocol below. Respiratory Therapist to perform RT Therapy Protocol Assessment initially then follow the protocol. Repeat RT Therapy Protocol Assessment PRN for score 0-3 or on second treatment, BID, and PRN for scores above 3. No Indications - adjust the frequency to every 6 hours PRN wheezing or bronchospasm, if no treatments needed after 48 hours then discontinue using Per Protocol order mode. If indication present, adjust the RT bronchodilator orders based on the Bronchodilator Assessment Score as indicated below. Use Inhaler orders unless patient has one or more of the following: on home nebulizer, not able to hold breath for 10 seconds, is not alert and oriented, cannot activate and use MDI correctly, or respiratory rate 25 breaths per minute or more, then use the equivalent nebulizer order(s) with same Frequency and PRN reasons based on the score. If a patient is on this medication at home then do not decrease Frequency below that used at home.     0-3 - enter or revise RT bronchodilator order(s) to equivalent RT Bronchodilator order with Frequency of every 4 hours PRN for wheezing or increased work of breathing using Per Protocol order mode. 4-6 - enter or revise RT Bronchodilator order(s) to two equivalent RT bronchodilator orders with one order with BID Frequency and one order with Frequency of every 4 hours PRN wheezing or increased work of breathing using Per Protocol order mode. 7-10 - enter or revise RT Bronchodilator order(s) to two equivalent RT bronchodilator orders with one order with TID Frequency and one order with Frequency of every 4 hours PRN wheezing or increased work of breathing using Per Protocol order mode. 11-13 - enter or revise RT Bronchodilator order(s) to one equivalent RT bronchodilator order with QID Frequency and an Albuterol order with Frequency of every 4 hours PRN wheezing or increased work of breathing using Per Protocol order mode. Greater than 13 - enter or revise RT Bronchodilator order(s) to one equivalent RT bronchodilator order with every 4 hours Frequency and an Albuterol order with Frequency of every 2 hours PRN wheezing or increased work of breathing using Per Protocol order mode. RT to enter RT Home Evaluation for COPD & MDI Assessment order using Per Protocol order mode.     Electronically signed by Navya Islas RCP on 12/29/2021 at 12:40 PM

## 2021-12-29 NOTE — PROGRESS NOTES
Hospitalist Progress Note    Patient:  Nicolette Koroma    YOB: 1945  Unit/Bed:-09/009-A  MRN: [id number]    Acct: [de-identified]   PCP: Tez Current    Date of Admission: 12/24/2021      Assessment/Plan:    Acute Hypoxic Respiratory Failure: 2/2 covid, CAP and HF. Covid 19 Pneumonia: with minimal O2 requirement  Decadron IV 6mg daily. DVT ppx with renal adjusted lovenox  pepcid daily  Minimal O2 requirements, on 1L. Will d/c that as he is satting high 90s. IS and acapella  On abx for possible superimposed bacterial infection. Will treat for 5-7 days    Acute Decompensated Systolic HF:   -several recent echos (EF 35% at [facility] 10/17/21 with mod AR and mod LVH, 12/2020 EF 40-45%, 12/2019 EF 25-30% and elevated rvsp  -30-35% EF on 12/26 echo. - Continue coreg. Essential HTN   - on BB and isordil/hydralazine    Moderate AR: this valve had prior annuloplasty. Consider Cardiology consult once acute renal issues managed. Decrease afterload. FATEMEH on CKD vs progression of CKD- improving  - no urgent need for HD  - On IVF  - Nephrology assisting    CAD s/p CABG x3 and MAZE (cardiogenic shock, IABP) 1/2019  - continue home gdmt. Holding ACE due to above. Continue bb, statin, asa    Hx of MV replacement: annuloplasty. On asa , continue     IDDM2:  Continue home dosed insulin  - ISS    QT prolonged (although per my read on EKG it is overestimating QT) - K and Mg wnl.   - recheck EKG in am    Mild Thrombocytopenia: likely 2/2 plasma cell dyscrasia, renal disease, and covid. Continue to monitor closely. MGUS: IgG kappa disease, monitored - follows Dr. Jim Peralta    Chronic troponin elevation: Trop is chronically elevated, flat trend. Recent stress 10/2021 at [facility] no ischemia. Former tobacco use    Physical Deconditioning: PT/OT consulted     Goals of care:  Full code, palliative following    Expected discharge date:  >2days days    Disposition: TBD  [] Home  [] TCU  [] Rehab  [] Psych  [] SNF  [] [facility]  [] Other-    ===================================================================      Chief Complaint: SOB    Hospital Course: per HPI, \"70 yo M with history of ICM with HFrEF, CAD, CABG, CKD, HTN, T2DM presented to [facility] ED with chief complaints of shortness of breath x 1 week with associated cough and BLE swelling.     ED course: presented afebrile and hemodynamically stable. Initially hypertensive to 181/72, later BP in 130s with mild tachypnea. Saturating >34% on 1L NC. Labs revealed mild hyponatremia, BUN 89 with CR 3.3. ProBNP 68231 (previously ~32242 in 2019), troponin 0.048. EKG NSR with nonspecific ST and T wave changes. COVID rapid positive (vaccinated). CXR with interstitial densities throughout the bilateral lungs R>L. Right basilar pleural effusion, fluid within minor fissures. Given Bumex 2 mg IV x1 dose in the ED. Admitted for CHF exacerbation in the setting of COVID.     On evaluation, patient reports having progressively worsening dyspnea at rest and exertion and BLE edema for about month. Admits to orthopnea. No PND. No chest pain, palpitations, dizziness/lightheadedness, recent fall/syncope. Has had acute cough ~1 week with some \"phlegm\". No subjective fever, chills, nausea, vomiting, abdominal pain, diarrhea, loss of taste/smell. No known sick contact. Currently, feeling slightly chilly and weak. Reports compliance to medication and diet/fluid restrictions. Normally wears 1L NC at night but recently has been using oxygen continuously. Unclear if on oxygen for CHF or other reasons. \"     Subjective (past 24 hours): Patient again appears fatigued, however more comfortable than he was yesterday. Reports less coughing and breathing easier today. Had swallow study this morning and has been cleared for modified diet.       Medications:  Reviewed    Infusion Medications    sodium chloride 50 mL/hr at 12/29/21 0606    sodium chloride Stopped (12/27/21 1336)    dextrose Scheduled Medications    famotidine  20 mg Oral Every Other Day    ipratropium-albuterol  1 ampule Inhalation Q4H WA    hydrALAZINE  25 mg Oral 3 times per day    heparin (porcine)  5,000 Units SubCUTAneous BID    guaiFENesin  600 mg Oral BID    isosorbide dinitrate  10 mg Oral BID    insulin glargine  10 Units SubCUTAneous Nightly    sodium chloride flush  5-40 mL IntraVENous 2 times per day    [Held by provider] aspirin EC  81 mg Oral Daily    atorvastatin  40 mg Oral Daily    carvedilol  12.5 mg Oral BID WC    sertraline  50 mg Oral Daily    tamsulosin  0.4 mg Oral Daily    [Held by provider] baricitinib  1 mg Oral Daily    insulin lispro  0-12 Units SubCUTAneous TID WC    insulin lispro  0-6 Units SubCUTAneous Nightly    dexamethasone  6 mg IntraVENous Q24H    cefTRIAXone (ROCEPHIN) IV  1,000 mg IntraVENous Q24H    doxycycline (VIBRAMYCIN) IV  100 mg IntraVENous Q12H    calcium replacement protocol   Other RX Placeholder     PRN Meds: sodium chloride flush, sodium chloride, polyethylene glycol, acetaminophen **OR** acetaminophen, glucose, dextrose, glucagon (rDNA), dextrose, albuterol sulfate HFA      ROS: reviewed from prior note, full ROS unchanged unless otherwise stated in hospital course/subjective portion. Intake/Output Summary (Last 24 hours) at 12/29/2021 1210  Last data filed at 12/28/2021 2000  Gross per 24 hour   Intake 586.43 ml   Output --   Net 586.43 ml       Exam:  BP (!) 156/69   Pulse 67   Temp 97.4 °F (36.3 °C) (Oral)   Resp 17   Wt 204 lb 4.8 oz (92.7 kg)   SpO2 98%   BMI 28.49 kg/m²     General appearance: Acute on chronically ill appearing. Fatigued appearing. Eyes:  PERRL. Conjunctivae/corneas clear. HENT: Head normal appearing. Nares normal. Oral mucosa moist.  Hearing intact. Neck: Supple, with full range of motion. Trachea midline. No gross JVD appreciated. Respiratory:  Normal effort, no rhonchi or wheezing.    Diminished and rales bilaterally in lower fields. Cardiovascular: Normal rate, regular rhythm with normal S1/S2 without murmurs. 1-2+ BLLE edema - worse. Abdomen: Soft, non-tender, non-distended with normal bowel sounds. Musculoskeletal: No joint swelling or tenderness. Normal tone. No abnormal movements. Skin: Warm and dry. No rashes or lesions. Neurologic:  No focal sensory/motor deficits in the upper or lower extremities. Cranial nerves:  grossly non-focal 2-12. Psychiatric: Alert and oriented, normal insight and thought content. Flat affect. Capillary Refill: Brisk,< 3 seconds. Peripheral Pulses: +2 palpable, equal bilaterally. Labs:   Recent Labs     12/27/21  0634 12/28/21  0719   WBC 9.3 8.6   HGB 12.4* 12.2*   HCT 38.8* 38.3*   * 100*     Recent Labs     12/27/21  0634 12/28/21  0719 12/29/21  0651   * 133* 136   K 4.7 4.4 4.2   CL 97* 97* 100   CO2 19* 19* 21*   * 128* 131*   CREATININE 4.0* 4.1* 3.5*   CALCIUM 8.3* 8.5 8.2*     Recent Labs     12/27/21  0634 12/28/21  0719   AST 43* 32   ALT 51 45   BILIDIR <0.2 <0.2   BILITOT 0.4 0.4   ALKPHOS 101 87     No results for input(s): INR in the last 72 hours. No results for input(s): Lauren Anchors in the last 72 hours. No results for input(s): PROCAL in the last 72 hours. Lab Results   Component Value Date    NITRU NEGATIVE 12/25/2021    WBCUA 0-2 12/25/2021    BACTERIA NONE SEEN 12/25/2021    RBCUA 0-2 12/25/2021    BLOODU SMALL 12/25/2021    SPECGRAV 1.014 12/25/2021    GLUCOSEU neg 03/17/2021    GLUCOSEU Negative 03/11/2020       Radiology (48 hours):  XR CHEST (2 VW)    Result Date: 12/25/2021  1. Interstitial densities throughout the bilateral lungs greater on the right than left most consistent with edema. Right basilar pleural and parenchymal opacities likely representing small right basilar effusion with atelectasis. Fluid within the minor fissure. Possible tiny left basilar effusion.  Overall findings are likely

## 2021-12-29 NOTE — PROGRESS NOTES
Spoke with Kasi hBandari wife on phone. Discussed goals of care and code status. Jose reports that Sherice Arevalo had been getting weaker at home, to the point of needing her daughters help to transfer him to the wheelchair. She stated that Sherice Arevalo will need an ECF at discharge for rehab. Discussed code status. She stated she and Sherice Arevalo have discussed it recently and Sherice Arevalo had said he was \"getting tired\" but would not say to change his code status from full code. Jose stated she thinks he's afraid to change his code status, even though he knows his body can't take much more. Jose has support from her daughter, who lives with her, and her son that lives in [place]. Encouraged Jose to talk to her children and to Sherice Arevalo regarding code status and long term goals if Sherice Arevalo goes to an ECF for rehab. She appreciated the call and update. Palliative care will reach out again to follow up.

## 2021-12-29 NOTE — PROGRESS NOTES
Hepatic:   Recent Labs     12/27/21  0634 12/28/21  0719   LABALBU 3.3* 3.0*   AST 43* 32   ALT 51 45   BILITOT 0.4 0.4   ALKPHOS 101 87         Meds:  Infusion:    sodium chloride 50 mL/hr at 12/29/21 0606    sodium chloride Stopped (12/27/21 1336)    dextrose       Meds:    famotidine  20 mg Oral Every Other Day    ipratropium-albuterol  1 ampule Inhalation Q4H WA    hydrALAZINE  25 mg Oral 3 times per day    heparin (porcine)  5,000 Units SubCUTAneous BID    guaiFENesin  600 mg Oral BID    isosorbide dinitrate  10 mg Oral BID    insulin glargine  10 Units SubCUTAneous Nightly    sodium chloride flush  5-40 mL IntraVENous 2 times per day    [Held by provider] aspirin EC  81 mg Oral Daily    atorvastatin  40 mg Oral Daily    carvedilol  12.5 mg Oral BID WC    sertraline  50 mg Oral Daily    tamsulosin  0.4 mg Oral Daily    [Held by provider] baricitinib  1 mg Oral Daily    insulin lispro  0-12 Units SubCUTAneous TID WC    insulin lispro  0-6 Units SubCUTAneous Nightly    dexamethasone  6 mg IntraVENous Q24H    cefTRIAXone (ROCEPHIN) IV  1,000 mg IntraVENous Q24H    doxycycline (VIBRAMYCIN) IV  100 mg IntraVENous Q12H    calcium replacement protocol   Other RX Placeholder     Meds prn: sodium chloride flush, sodium chloride, polyethylene glycol, acetaminophen **OR** acetaminophen, glucose, dextrose, glucagon (rDNA), dextrose, albuterol sulfate HFA       Impression and Plan:  1. FATEMEH on CKD 4. Serum creatinine improved down to 3.5  BUN did not rise as much in last 24 hours  Anticipate further improvement with IV fluids  We will continue with normal saline at low-dose  Will hold off starting dialysis for now  Anticipate improvement with above measures  Will reevaluate labs in a.m. Clinically does not appear to be fluid overloaded    2. Azotemia due to FATEMEH/CKD and steroids: As above  3. Mild metabolic acidosis. Improved  4. Insulin-dependent diabetes mellitus  5.   Chronic systolic cardiomyopathy with EF 30 to 35%  6. Mild thrombocytopenia  7. Diabetic nephropathy  8. Proteinuria secondary to diabetic nephropathy  9.   Monoclonal gammopathy seeing oncology/Dr. Jeffery Escoto    Discussed with patient      Radha Farmer MD  [facility]

## 2021-12-29 NOTE — CARE COORDINATION
12/29/21, 2:49 PM EST    DISCHARGE PLANNING EVALUATION      Call to Lisa at [place], Mely Henderson not in, JOEY spoke with Lesli, reports she does not think their COVID until will open until Monday now. She did ask SW to faxed clinicals. JOEY did fax these to [phone].

## 2021-12-30 NOTE — PROGRESS NOTES
Hospitalist Progress Note    Patient:  Cassie Castellanos    YOB: 1945  Unit/Bed:6A-09/009-A  MRN: [id number]    Acct: [de-identified]   PCP: Ene Dubois    Date of Admission: 12/24/2021      Assessment/Plan:    Acute Hypoxic Respiratory Failure: 2/2 covid, CAP and HF. Covid 19 Pneumonia: with minimal O2 requirement  Decadron IV 6mg daily. DVT ppx with renal adjusted lovenox  pepcid daily  Minimal O2 requirements, on 1L. Will d/c that as he is satting high 90s. IS and acapella  On abx for possible superimposed bacterial infection. Will treat for 5-7 days    Acute Decompensated Systolic HF:   -several recent echos (EF 35% at [facility] 10/17/21 with mod AR and mod LVH, 12/2020 EF 40-45%, 12/2019 EF 25-30% and elevated rvsp  -30-35% EF on 12/26 echo. - Continue coreg. Essential HTN   - on BB and isordil/hydralazine    Moderate AR: this valve had prior annuloplasty. Consider Cardiology consult once acute renal issues managed. Decrease afterload. FATEMEH on CKD vs progression of CKD- improving  - no urgent need for HD  - On IVF  - Nephrology assisting    12/30: Renal function improving. CAD s/p CABG x3 and MAZE (cardiogenic shock, IABP) 1/2019  - continue home gdmt. Holding ACE due to above. Continue bb, statin, asa    Hx of MV replacement: annuloplasty. On asa , continue     IDDM2:  Continue home dosed insulin  - ISS    QT prolonged (although per my read on EKG it is overestimating QT) - K and Mg wnl.   - recheck EKG in am    Mild Thrombocytopenia: likely 2/2 plasma cell dyscrasia, renal disease, and covid. Continue to monitor closely. MGUS: IgG kappa disease, monitored - follows Dr. Nurys Davila    Chronic troponin elevation: Trop is chronically elevated, flat trend. Recent stress 10/2021 at [facility] no ischemia. Former tobacco use    Physical Deconditioning: PT/OT consulted     Goals of care: Full code, palliative following    Disposition: Patient is improving.  When nephrology is ok, will proceed Reviewed    Infusion Medications    sodium chloride 50 mL/hr at 12/30/21 9159    sodium chloride Stopped (12/27/21 1336)    dextrose       Scheduled Medications    doxycycline hyclate  100 mg Oral 2 times per day    dexamethasone  6 mg Oral Daily    insulin glargine  18 Units SubCUTAneous Nightly    famotidine  20 mg Oral Every Other Day    hydrALAZINE  25 mg Oral 3 times per day    heparin (porcine)  5,000 Units SubCUTAneous BID    guaiFENesin  600 mg Oral BID    isosorbide dinitrate  10 mg Oral BID    sodium chloride flush  5-40 mL IntraVENous 2 times per day    [Held by provider] aspirin EC  81 mg Oral Daily    atorvastatin  40 mg Oral Daily    carvedilol  12.5 mg Oral BID WC    sertraline  50 mg Oral Daily    tamsulosin  0.4 mg Oral Daily    insulin lispro  0-12 Units SubCUTAneous TID WC    insulin lispro  0-6 Units SubCUTAneous Nightly    cefTRIAXone (ROCEPHIN) IV  1,000 mg IntraVENous Q24H    calcium replacement protocol   Other RX Placeholder     PRN Meds: ipratropium-albuterol, sodium chloride flush, sodium chloride, polyethylene glycol, acetaminophen **OR** acetaminophen, glucose, dextrose, glucagon (rDNA), dextrose, albuterol sulfate HFA      ROS: reviewed from prior note, full ROS unchanged unless otherwise stated in hospital course/subjective portion. Intake/Output Summary (Last 24 hours) at 12/30/2021 1037  Last data filed at 12/30/2021 0949  Gross per 24 hour   Intake 2309.37 ml   Output 1300 ml   Net 1009.37 ml       Exam:  BP (!) 160/63   Pulse 63   Temp 97.3 °F (36.3 °C) (Oral)   Resp 15   Wt 193 lb 4.8 oz (87.7 kg)   SpO2 98%   BMI 26.96 kg/m²     General appearance: Acute on chronically ill appearing. Fatigued appearing. Eyes:  PERRL. Conjunctivae/corneas clear. HENT: Head normal appearing. Nares normal. Oral mucosa moist.  Hearing intact. Neck: Supple, with full range of motion. Trachea midline. No gross JVD appreciated.   Respiratory:  Normal effort, no reflective of congestive heart failure. This document has been electronically signed by: Rachid Flores DO on 12/25/2021 12:27 AM    US RENAL COMPLETE    Result Date: 12/25/2021  1. Mild right-sided renal cortical thinning. 2. Incompletely distended urinary bladder. Final report electronically signed by Dr. Dione Power on 12/25/2021 11:06 PM       DVT prophylaxis:    [x] Lovenox  [] SCDs  [] SQ Heparin  [] Encourage ambulation   [] Already on Anticoagulation       Diet: ADULT DIET; Dysphagia - Soft and Bite Sized;  Moderately Thick (Honey)  Code Status: Full Code  PT/OT: ordered  Tele: yes  IVF: n/a    Electronically signed by Yonas Chan MD on 12/30/2021 at 10:37 AM

## 2021-12-30 NOTE — PROGRESS NOTES
Paged Dr. Jamal Humphries via perfect serve, 1801: Pt w/DTI to sacral, turning q2h, but c/o pain 8/10. Tried tylenol but pain continues an 8/10. May you please add pain med prn if possible? TY    1803: Received order for a one time dose of norco 5-325. Orders entered into Epic.

## 2021-12-30 NOTE — PROGRESS NOTES
Paged Dr. Gerber Reese to notify: lab results are in!  & Crt 3.2    Awaiting response. 1106: MD replied to acknowledge page.

## 2021-12-30 NOTE — PROGRESS NOTES
Renal Progress Note  [facility]    Patient :  Breonna Barrera; 78 y.o. MRN# [id number]  Location:  [facility]48/733-E  Attending:  Darryn Bryson MD  Admit Date:  12/24/2021   Hospital Day: 5      Subjective:     Nephrology is following the patient for FATEMEH/CKD IV. Patient seen and examined. States he feels ok. Denies SOB. On room air. He is weak.     Outpatient Medications:     Medications Prior to Admission: metOLazone (ZAROXOLYN) 2.5 MG tablet, Take 1 tablet by mouth Twice a Week  amLODIPine (NORVASC) 5 MG tablet, Take 1 tablet by mouth 2 times daily  amLODIPine (NORVASC) 5 MG tablet, Take 1 tablet by mouth 2 times daily  lisinopril (PRINIVIL;ZESTRIL) 10 MG tablet, Take 0.5 tablets by mouth daily  potassium chloride (KLOR-CON M) 20 MEQ extended release tablet, Take 1 tablet by mouth daily  bumetanide (BUMEX) 2 MG tablet, Take 1 tablet by mouth 2 times daily  sertraline (ZOLOFT) 50 MG tablet, Take 50 mg by mouth daily   isosorbide mononitrate (IMDUR) 30 MG extended release tablet, Take 1 tablet by mouth daily  tamsulosin (FLOMAX) 0.4 MG capsule, Take 1 capsule by mouth daily  carvedilol (COREG) 12.5 MG tablet, Take 1 tablet by mouth 2 times daily  atorvastatin (LIPITOR) 40 MG tablet, Take 1 tablet by mouth daily  nitroGLYCERIN (NITROSTAT) 0.4 MG SL tablet, Place 1 tablet under the tongue every 5 minutes as needed for Chest pain  ferrous sulfate (IRON 325) 325 (65 Fe) MG tablet, Take 1 tablet by mouth 2 times daily  Insulin Glargine (LANTUS SC), Inject 18 Units into the skin   Coenzyme Q10 (COQ-10) 50 MG CAPS, Take by mouth daily   aspirin EC 81 MG EC tablet, Take 1 tablet by mouth daily  acetaminophen (TYLENOL) 325 MG tablet, Take 650 mg by mouth every 4 hours as needed for Pain  BD ULTRA-FINE MICRO PEN NEEDLE 32G X 6 MM MISC,   latanoprost (XALATAN) 0.005 % ophthalmic solution, Place 1 drop into both eyes nightly     Current Medications:     Scheduled Meds:    doxycycline hyclate  100 mg Oral 2 times per day    dexamethasone  6 mg Oral Daily    insulin glargine  18 Units SubCUTAneous Nightly    famotidine  20 mg Oral Every Other Day    hydrALAZINE  25 mg Oral 3 times per day    heparin (porcine)  5,000 Units SubCUTAneous BID    guaiFENesin  600 mg Oral BID    isosorbide dinitrate  10 mg Oral BID    sodium chloride flush  5-40 mL IntraVENous 2 times per day    [Held by provider] aspirin EC  81 mg Oral Daily    atorvastatin  40 mg Oral Daily    carvedilol  12.5 mg Oral BID WC    sertraline  50 mg Oral Daily    tamsulosin  0.4 mg Oral Daily    insulin lispro  0-12 Units SubCUTAneous TID WC    insulin lispro  0-6 Units SubCUTAneous Nightly    cefTRIAXone (ROCEPHIN) IV  1,000 mg IntraVENous Q24H    calcium replacement protocol   Other RX Placeholder     Continuous Infusions:    sodium chloride Stopped (21 1336)    dextrose       PRN Meds:  ipratropium-albuterol, sodium chloride flush, sodium chloride, polyethylene glycol, acetaminophen **OR** acetaminophen, glucose, dextrose, glucagon (rDNA), dextrose, albuterol sulfate HFA    Input/Output:       I/O last 3 completed shifts: In: 2009.4 [P.O.:300; I.V.:1331.1; IV Piggyback:378.3]  Out: 1300 [Urine:1300].       Patient Vitals for the past 96 hrs (Last 3 readings):   Weight   21 0430 193 lb 4.8 oz (87.7 kg)   21 0522 204 lb 4.8 oz (92.7 kg)   21 0600 190 lb 3.2 oz (86.3 kg)       Vital Signs:   Temperature:  Temp: 97.5 °F (36.4 °C)  TMax:   Temp (24hrs), Av.3 °F (36.3 °C), Min:96.1 °F (35.6 °C), Max:97.6 °F (36.4 °C)    Respirations:  Resp: 16  Pulse:   Pulse: 63  BP:    BP: (!) 162/69  BP Range: Systolic (48PPK), OKL:118 , Min:132 , RFC:596       Diastolic (34QDM), MAYRA:99, Min:59, Max:72      Physical Examination:     General:  Awake, alert, no acute distress, weak  HEENT: NC/AT/ MMM  Chest:               Diminished breath sounds  Cardiac:  S1 S2   Abdomen: Soft, non-tender,  Neuro:  No facial droop,   SKIN:  No rashes, good skin turgor. Extremities:  No edema, no cyanosis    Labs:       Recent Labs     12/28/21  0719 12/30/21  0634   WBC 8.6 5.3   RBC 4.43* 3.98*   HGB 12.2* 10.6*   HCT 38.3* 34.6*   MCV 86.5 86.9   MCH 27.5 26.6   MCHC 31.9* 30.6*   * 115*   MPV  --  12.7*      BMP:   Recent Labs     12/29/21  0651 12/30/21  0634 12/30/21  1000    138 135   K 4.2 4.0 4.1    103 103   CO2 21* 21* 18*   * >336* 119*   CREATININE 3.5* 3.3* 3.2*   GLUCOSE 209* 161* 196*   CALCIUM 8.2* 8.1* 8.0*      Phosphorus:   No results for input(s): PHOS in the last 72 hours. Magnesium:  No results for input(s): MG in the last 72 hours.   Albumin:    Recent Labs     12/28/21  0719   LABALBU 3.0*     BNP:      Lab Results   Component Value Date     10/17/2019     CARLOS:    No results found for: CARLOS  SPEP:  Lab Results   Component Value Date    PROT 6.5 12/28/2021     UPEP:   No results found for: LABPE  C3:   No results found for: C3  C4:   No results found for: C4  MPO ANCA:   No results found for: MPO  PR3 ANCA:   No results found for: PR3  Anti-GBM:   No results found for: GBMABIGG  Hep BsAg:         Lab Results   Component Value Date    HEPBSAG Negative 02/15/2019     Hep C AB:        No results found for: HEPCAB    Urinalysis/Chemistries:      Lab Results   Component Value Date    NITRU NEGATIVE 12/25/2021    COLORU YELLOW 12/25/2021    PHUR 5.0 12/25/2021    LABCAST NONE SEEN 12/25/2021    LABCAST NONE SEEN 12/25/2021    WBCUA 0-2 12/25/2021    RBCUA 0-2 12/25/2021    MUCUS OBSCURED 01/24/2019    YEAST NONE SEEN 12/25/2021    BACTERIA NONE SEEN 12/25/2021    CLARITYU clear 03/17/2021    CLARITYU Clear 03/11/2020    SPECGRAV 1.014 12/25/2021    LEUKOCYTESUR NEGATIVE 12/25/2021    UROBILINOGEN 0.2 12/25/2021    BILIRUBINUR NEGATIVE 12/25/2021    BILIRUBINUR neg 03/17/2021    BILIRUBINUR Negative 12/16/2019    BLOODU SMALL 12/25/2021    GLUCOSEU neg 03/17/2021    GLUCOSEU Negative 03/11/2020    Sujata Yeh

## 2021-12-30 NOTE — CARE COORDINATION
12/30/21, 1:57 PM EST    DISCHARGE PLANNING EVALUATION    IJEOMA called Lisa at [facility], spoke with Ben Bowden, ijeoma recommended starting Precert, Ben Bowden states Precert was started on 34-05-79

## 2021-12-30 NOTE — RT PROTOCOL NOTE
RT Inhaler-Nebulizer Bronchodilator Protocol Note    There is a bronchodilator order in the chart from a provider indicating to follow the RT Bronchodilator Protocol and there is an Initiate RT Inhaler-Nebulizer Bronchodilator Protocol order as well (see protocol at bottom of note). CXR Findings:  No results found. The findings from the last RT Protocol Assessment were as follows:   History Pulmonary Disease: None or smoker <15 pack years  Respiratory Pattern: Dyspnea on exertion or RR 21-25 bpm  Breath Sounds: Clear breath sounds  Cough: Strong, productive  Indication for Bronchodilator Therapy: None  Bronchodilator Assessment Score: 3    Aerosolized bronchodilator medication orders have been revised according to the RT Inhaler-Nebulizer Bronchodilator Protocol below. Respiratory Therapist to perform RT Therapy Protocol Assessment initially then follow the protocol. Repeat RT Therapy Protocol Assessment PRN for score 0-3 or on second treatment, BID, and PRN for scores above 3. No Indications - adjust the frequency to every 6 hours PRN wheezing or bronchospasm, if no treatments needed after 48 hours then discontinue using Per Protocol order mode. If indication present, adjust the RT bronchodilator orders based on the Bronchodilator Assessment Score as indicated below. Use Inhaler orders unless patient has one or more of the following: on home nebulizer, not able to hold breath for 10 seconds, is not alert and oriented, cannot activate and use MDI correctly, or respiratory rate 25 breaths per minute or more, then use the equivalent nebulizer order(s) with same Frequency and PRN reasons based on the score. If a patient is on this medication at home then do not decrease Frequency below that used at home.     0-3 - enter or revise RT bronchodilator order(s) to equivalent RT Bronchodilator order with Frequency of every 4 hours PRN for wheezing or increased work of breathing using Per Protocol order mode.        4-6 - enter or revise RT Bronchodilator order(s) to two equivalent RT bronchodilator orders with one order with BID Frequency and one order with Frequency of every 4 hours PRN wheezing or increased work of breathing using Per Protocol order mode. 7-10 - enter or revise RT Bronchodilator order(s) to two equivalent RT bronchodilator orders with one order with TID Frequency and one order with Frequency of every 4 hours PRN wheezing or increased work of breathing using Per Protocol order mode. 11-13 - enter or revise RT Bronchodilator order(s) to one equivalent RT bronchodilator order with QID Frequency and an Albuterol order with Frequency of every 4 hours PRN wheezing or increased work of breathing using Per Protocol order mode. Greater than 13 - enter or revise RT Bronchodilator order(s) to one equivalent RT bronchodilator order with every 4 hours Frequency and an Albuterol order with Frequency of every 2 hours PRN wheezing or increased work of breathing using Per Protocol order mode. RT to enter RT Home Evaluation for COPD & MDI Assessment order using Per Protocol order mode.     Electronically signed by Tyshawn Vilchis RCP on 12/30/2021 at 8:06 AM

## 2021-12-30 NOTE — CARE COORDINATION
Discharge Planning Update:    Hospitalist and nephro following. Planning [facility] if accepted, covid unit to open early next week. SW following and plans to request precert starting today. . , creatinine 3.2. CRP 7.99. Inhaler. IV rocephin. PO decadron. Doxycycline.

## 2021-12-30 NOTE — PROGRESS NOTES
Updated Sergey Diamond, spouse on unit about pt's health to the best of my ability. No further concerns at this time.

## 2021-12-30 NOTE — PROGRESS NOTES
[facility] 6A [facility] [place]  Occupational Therapy  Daily Note  Time:   Time In: 7281  Time Out: 1188  Timed Code Treatment Minutes: 28 Minutes  Minutes: 32          Date: 2021  Patient Name: Yi Davey,   Gender: male      Room: [facility]009-A  MRN: [id number]  : 1945  (78 y.o.)  Referring Practitioner: Samy Hall MD  Diagnosis: acute on chronic renal insufficiency  Additional Pertinent Hx: Per H&P:75 yo M with history of ICM with HFrEF, CAD, CABG, CKD, HTN, T2DM presented to [address] [place] [address] ED with chief complaints of shortness of breath x 1 week with associated cough and BLE swelling. CXR with interstitial densities throughout the bilateral lungs R>L. Right basilar pleural effusion, fluid within minor fissures. Restrictions/Precautions:  Restrictions/Precautions: Isolation,Fall Risk  Position Activity Restriction  Other position/activity restrictions: Droplet +     SUBJECTIVE: Nurse Haydee Osman ok'd session, In bed upon arrival, agreeable to OT session, wife present    PAIN: 0/10:     Vitals: Patient on Room Air  upon arrival to room. Patient O2 sats at 93 %. Upon activity patient maintaining. COGNITION: Slow Processing    ADL:   Grooming: Minimal Assistance. A for thoroughness of combing hair and brushing teeth sitting up in bed. ADDITIONAL ACTIVITIES:  Guided patient through BUE AROM this date x5 reps x1 set in bed in order to increase BUE strength and improve activity tolerance for ADLs and homemaking tasks. ASSESSMENT:     Activity Tolerance:  Patient tolerance of  treatment: fair.  Limited due to fatigue      Discharge Recommendations: Continue to assess pending progress and ECF with OT  Equipment Recommendations: Equipment Needed: No  Plan: Times per week: 1-2  Current Treatment Recommendations: Elizebeth Cap Training,Patient/Caregiver Education & Training,Self-Care / ADL,Positioning    Patient Education  Patient Education: ADL's and Home Exercise Program    Goals  Short term goals  Time Frame for Short term goals: by discharge  Short term goal 1: Pt will complete simple grooming/feeding (if cleared for diet) with minimal assistance and AE prn to increase independence and ease with washing face. Short term goal 2: Pt will complete BUE A/AROM exercises X5 reps to increase overall strength/endurance needed for ADL tasks. Short term goal 3: Pt will tolerate further assessment of EOB sitting balance by OTR when appropriate. Following session, patient left in safe position with all fall risk precautions in place.

## 2021-12-31 NOTE — PROGRESS NOTES
code, palliative following    Disposition: ECF placement, precert in process    Expected discharge date:  >2days days    Disposition: TBD  [] Home  [] TCU  [] Rehab  [] Psych  [] SNF  [] [place]  [] Other-    ===================================================================      Chief Complaint: SOB    Hospital Course: per HPI, \"68 yo M with history of ICM with HFrEF, CAD, CABG, CKD, HTN, T2DM presented to [facility] ED with chief complaints of shortness of breath x 1 week with associated cough and BLE swelling.     ED course: presented afebrile and hemodynamically stable. Initially hypertensive to 181/72, later BP in 130s with mild tachypnea. Saturating >34% on 1L NC. Labs revealed mild hyponatremia, BUN 89 with CR 3.3. ProBNP 91007 (previously ~44370 in 2019), troponin 0.048. EKG NSR with nonspecific ST and T wave changes. COVID rapid positive (vaccinated). CXR with interstitial densities throughout the bilateral lungs R>L. Right basilar pleural effusion, fluid within minor fissures. Given Bumex 2 mg IV x1 dose in the ED. Admitted for CHF exacerbation in the setting of COVID.     On evaluation, patient reports having progressively worsening dyspnea at rest and exertion and BLE edema for about month. Admits to orthopnea. No PND. No chest pain, palpitations, dizziness/lightheadedness, recent fall/syncope. Has had acute cough ~1 week with some \"phlegm\". No subjective fever, chills, nausea, vomiting, abdominal pain, diarrhea, loss of taste/smell. No known sick contact. Currently, feeling slightly chilly and weak. Reports compliance to medication and diet/fluid restrictions. Normally wears 1L NC at night but recently has been using oxygen continuously. Unclear if on oxygen for CHF or other reasons. \"     Subjective (past 24 hours): Patient again appears fatigued, however more comfortable than he was yesterday. Reports less coughing and breathing easier today.   Had swallow study this morning and has been cleared for modified diet. Medications:  Reviewed    Infusion Medications    sodium chloride Stopped (12/27/21 1336)    dextrose       Scheduled Medications    doxycycline hyclate  100 mg Oral 2 times per day    dexamethasone  6 mg Oral Daily    insulin glargine  18 Units SubCUTAneous Nightly    famotidine  20 mg Oral Every Other Day    hydrALAZINE  25 mg Oral 3 times per day    heparin (porcine)  5,000 Units SubCUTAneous BID    guaiFENesin  600 mg Oral BID    isosorbide dinitrate  10 mg Oral BID    sodium chloride flush  5-40 mL IntraVENous 2 times per day    [Held by provider] aspirin EC  81 mg Oral Daily    atorvastatin  40 mg Oral Daily    carvedilol  12.5 mg Oral BID WC    sertraline  50 mg Oral Daily    tamsulosin  0.4 mg Oral Daily    insulin lispro  0-12 Units SubCUTAneous TID WC    insulin lispro  0-6 Units SubCUTAneous Nightly    cefTRIAXone (ROCEPHIN) IV  1,000 mg IntraVENous Q24H    calcium replacement protocol   Other RX Placeholder     PRN Meds: ipratropium-albuterol, sodium chloride flush, sodium chloride, polyethylene glycol, acetaminophen **OR** acetaminophen, glucose, dextrose, glucagon (rDNA), dextrose, albuterol sulfate HFA      ROS: reviewed from prior note, full ROS unchanged unless otherwise stated in hospital course/subjective portion. Intake/Output Summary (Last 24 hours) at 12/31/2021 1331  Last data filed at 12/31/2021 1250  Gross per 24 hour   Intake 240 ml   Output 575 ml   Net -335 ml       Exam:  BP (!) 178/72   Pulse 67   Temp 97.7 °F (36.5 °C) (Oral)   Resp 18   Wt 195 lb 12.8 oz (88.8 kg)   SpO2 98%   BMI 27.31 kg/m²     General appearance: Acute on chronically ill appearing. Fatigued appearing. Eyes:  PERRL. Conjunctivae/corneas clear. HENT: Head normal appearing. Nares normal. Oral mucosa moist.  Hearing intact. Neck: Supple, with full range of motion. Trachea midline. No gross JVD appreciated.   Respiratory:  Normal effort, no rhonchi or wheezing. Diminished and rales bilaterally in lower fields. Cardiovascular: Normal rate, regular rhythm with normal S1/S2 without murmurs. 1-2+ BLLE edema - worse. Abdomen: Soft, non-tender, non-distended with normal bowel sounds. Musculoskeletal: No joint swelling or tenderness. Normal tone. No abnormal movements. Skin: Warm and dry. No rashes or lesions. Neurologic:  No focal sensory/motor deficits in the upper or lower extremities. Cranial nerves:  grossly non-focal 2-12. Psychiatric: Alert and oriented, normal insight and thought content. Flat affect. Capillary Refill: Brisk,< 3 seconds. Peripheral Pulses: +2 palpable, equal bilaterally. Labs:   Recent Labs     12/30/21  0634 12/30/21  2145   WBC 5.3 5.7   HGB 10.6* 11.5*   HCT 34.6* 36.5*   * 118*     Recent Labs     12/30/21  0634 12/30/21  1000 12/31/21  0631    135 136   K 4.0 4.1 4.0    103 104   CO2 21* 18* 20*   BUN >336* 119* 111*   CREATININE 3.3* 3.2* 2.9*   CALCIUM 8.1* 8.0* 8.2*     No results for input(s): AST, ALT, BILIDIR, BILITOT, ALKPHOS in the last 72 hours. No results for input(s): INR in the last 72 hours. No results for input(s): Heart Tarik in the last 72 hours. No results for input(s): PROCAL in the last 72 hours. Lab Results   Component Value Date    NITRU NEGATIVE 12/25/2021    WBCUA 0-2 12/25/2021    BACTERIA NONE SEEN 12/25/2021    RBCUA 0-2 12/25/2021    BLOODU SMALL 12/25/2021    SPECGRAV 1.014 12/25/2021    GLUCOSEU neg 03/17/2021    GLUCOSEU Negative 03/11/2020       Radiology (48 hours):  XR CHEST (2 VW)    Result Date: 12/25/2021  1. Interstitial densities throughout the bilateral lungs greater on the right than left most consistent with edema. Right basilar pleural and parenchymal opacities likely representing small right basilar effusion with atelectasis. Fluid within the minor fissure. Possible tiny left basilar effusion.  Overall findings are likely reflective of congestive heart failure. This document has been electronically signed by: Lori Hernandez DO on 12/25/2021 12:27 AM    US RENAL COMPLETE    Result Date: 12/25/2021  1. Mild right-sided renal cortical thinning. 2. Incompletely distended urinary bladder. Final report electronically signed by Dr. [place] Hunter on 12/25/2021 11:06 PM       DVT prophylaxis:    [x] Lovenox  [] SCDs  [] SQ Heparin  [] Encourage ambulation   [] Already on Anticoagulation       Diet: ADULT DIET; Dysphagia - Soft and Bite Sized;  Moderately Thick (Honey)  Code Status: Full Code  PT/OT: ordered  Tele: yes  IVF: n/a    Electronically signed by Siddhartha Salazar MD on 12/31/2021 at 1:31 PM

## 2021-12-31 NOTE — FLOWSHEET NOTE
[facility] 88 PROGRESS NOTE      Patient: Pk Calle  Room #: 6A-09/009-A            YOB: 1945  Age: 78 y.o. Gender: male            Admit Date & Time: 12/24/2021 11:12 PM    Assessment:   Interventions:   Outcomes:       rounding on 6 A. Patient approachable in room,   Patient not using oxygen and seemed to think he was improving. Patient responses seemed somewhat reserved, or possibly a bit slow. Discussed family. His wife had also caught the covid virus. Patient accepted offer of prayer readily. Expressed appreciation for prayer. Plan:    1. Continue to visit as opportunity allows. Electronically signed by Cornelia Bernstein on 12/30/2021 at 7:08 PM.  [address] [employer]  [phone]                 12/30/21 1817   Encounter Summary   Services provided to: Patient   Referral/Consult From: Rounding   Support System Spouse; Children   Continue Visiting   (12/30/2021  P)   Complexity of Encounter Low   Length of Encounter 30 minutes   Spiritual Assessment Completed Yes   Routine   Type Initial   Assessment Calm; Approachable;Passive; Anxious; Loneliness   Intervention Active listening;Explored feelings, thoughts, concerns;Explored coping resources;Nurtured hope;Prayer;Sustaining presence/ Ministry of presence   Outcome Acceptance;Comfort;Expressed gratitude;Engaged in conversation;Coping;Less anxious, less agitated

## 2021-12-31 NOTE — PROGRESS NOTES
Renal Progress Note  [facility]    Patient :  Rj Enrique; 78 y.o. MRN# [id number]  Location:  4J-55/548-D  Attending:  Tasha Capellan MD  Admit Date:  12/24/2021   Hospital Day: 6      Subjective:     Nephrology is following the patient for FATEMEH/CKD IV. Patient seen and examined. Feeling ok. On room air. Denies SOB.     Outpatient Medications:     Medications Prior to Admission: metOLazone (ZAROXOLYN) 2.5 MG tablet, Take 1 tablet by mouth Twice a Week  amLODIPine (NORVASC) 5 MG tablet, Take 1 tablet by mouth 2 times daily  amLODIPine (NORVASC) 5 MG tablet, Take 1 tablet by mouth 2 times daily  lisinopril (PRINIVIL;ZESTRIL) 10 MG tablet, Take 0.5 tablets by mouth daily  potassium chloride (KLOR-CON M) 20 MEQ extended release tablet, Take 1 tablet by mouth daily  bumetanide (BUMEX) 2 MG tablet, Take 1 tablet by mouth 2 times daily  sertraline (ZOLOFT) 50 MG tablet, Take 50 mg by mouth daily   isosorbide mononitrate (IMDUR) 30 MG extended release tablet, Take 1 tablet by mouth daily  tamsulosin (FLOMAX) 0.4 MG capsule, Take 1 capsule by mouth daily  carvedilol (COREG) 12.5 MG tablet, Take 1 tablet by mouth 2 times daily  atorvastatin (LIPITOR) 40 MG tablet, Take 1 tablet by mouth daily  nitroGLYCERIN (NITROSTAT) 0.4 MG SL tablet, Place 1 tablet under the tongue every 5 minutes as needed for Chest pain  ferrous sulfate (IRON 325) 325 (65 Fe) MG tablet, Take 1 tablet by mouth 2 times daily  Insulin Glargine (LANTUS SC), Inject 18 Units into the skin   Coenzyme Q10 (COQ-10) 50 MG CAPS, Take by mouth daily   aspirin EC 81 MG EC tablet, Take 1 tablet by mouth daily  acetaminophen (TYLENOL) 325 MG tablet, Take 650 mg by mouth every 4 hours as needed for Pain  BD ULTRA-FINE MICRO PEN NEEDLE 32G X 6 MM MISC,   latanoprost (XALATAN) 0.005 % ophthalmic solution, Place 1 drop into both eyes nightly     Current Medications:     Scheduled Meds:    hydrALAZINE  50 mg Oral 3 times per day    dexamethasone 6 mg Oral Daily    insulin glargine  18 Units SubCUTAneous Nightly    famotidine  20 mg Oral Every Other Day    heparin (porcine)  5,000 Units SubCUTAneous BID    guaiFENesin  600 mg Oral BID    isosorbide dinitrate  10 mg Oral BID    sodium chloride flush  5-40 mL IntraVENous 2 times per day    [Held by provider] aspirin EC  81 mg Oral Daily    atorvastatin  40 mg Oral Daily    carvedilol  12.5 mg Oral BID WC    sertraline  50 mg Oral Daily    tamsulosin  0.4 mg Oral Daily    insulin lispro  0-12 Units SubCUTAneous TID WC    insulin lispro  0-6 Units SubCUTAneous Nightly    calcium replacement protocol   Other RX Placeholder     Continuous Infusions:    sodium chloride Stopped (21 1336)    dextrose       PRN Meds:  ipratropium-albuterol, sodium chloride flush, sodium chloride, polyethylene glycol, acetaminophen **OR** acetaminophen, glucose, dextrose, glucagon (rDNA), dextrose, albuterol sulfate HFA    Input/Output:       I/O last 3 completed shifts: In: 240 [P.O.:240]  Out: 975 [Urine:975]. Patient Vitals for the past 96 hrs (Last 3 readings):   Weight   21 0545 195 lb 12.8 oz (88.8 kg)   21 0430 193 lb 4.8 oz (87.7 kg)   21 0522 204 lb 4.8 oz (92.7 kg)       Vital Signs:   Temperature:  Temp: 97.7 °F (36.5 °C)  TMax:   Temp (24hrs), Av.2 °F (36.2 °C), Min:97 °F (36.1 °C), Max:97.7 °F (36.5 °C)    Respirations:  Resp: 18  Pulse:   Pulse: 67  BP:    BP: (!) 178/72  BP Range: Systolic (60NVY), UZM:074 , Min:156 , KGK:288       Diastolic (73ALL), DHM:20, Min:64, Max:137      Physical Examination:     General:  Awake, alert, no acute distress, weak  HEENT: NC/AT/ MMM  Chest:               Diminished breath sounds  Cardiac:  S1 S2   Abdomen: Soft, non-tender,  Neuro:  No facial droop,   SKIN:  No rashes, good skin turgor.   Extremities:  No edema, no cyanosis    Labs:       Recent Labs     21  0634 21  2145   WBC 5.3 5.7   RBC 3.98* 4.16*   HGB 10.6* 11.5* HCT 34.6* 36.5*   MCV 86.9 87.7   MCH 26.6 27.6   MCHC 30.6* 31.5*   * 118*   MPV 12.7* 13.3*      BMP:   Recent Labs     12/30/21  0634 12/30/21  1000 12/31/21  0631    135 136   K 4.0 4.1 4.0    103 104   CO2 21* 18* 20*   BUN >336* 119* 111*   CREATININE 3.3* 3.2* 2.9*   GLUCOSE 161* 196* 229*   CALCIUM 8.1* 8.0* 8.2*      Phosphorus:   No results for input(s): PHOS in the last 72 hours. Magnesium:  No results for input(s): MG in the last 72 hours. Albumin:    No results for input(s): LABALBU in the last 72 hours.   BNP:      Lab Results   Component Value Date     10/17/2019     CARLOS:    No results found for: CARLOS  SPEP:  Lab Results   Component Value Date    PROT 6.5 12/28/2021     UPEP:   No results found for: LABPE  C3:   No results found for: C3  C4:   No results found for: C4  MPO ANCA:   No results found for: MPO  PR3 ANCA:   No results found for: PR3  Anti-GBM:   No results found for: GBMABIGG  Hep BsAg:         Lab Results   Component Value Date    HEPBSAG Negative 02/15/2019     Hep C AB:        No results found for: HEPCAB    Urinalysis/Chemistries:      Lab Results   Component Value Date    NITRU NEGATIVE 12/25/2021    COLORU YELLOW 12/25/2021    PHUR 5.0 12/25/2021    LABCAST NONE SEEN 12/25/2021    LABCAST NONE SEEN 12/25/2021    WBCUA 0-2 12/25/2021    RBCUA 0-2 12/25/2021    MUCUS OBSCURED 01/24/2019    YEAST NONE SEEN 12/25/2021    BACTERIA NONE SEEN 12/25/2021    CLARITYU clear 03/17/2021    CLARITYU Clear 03/11/2020    SPECGRAV 1.014 12/25/2021    LEUKOCYTESUR NEGATIVE 12/25/2021    UROBILINOGEN 0.2 12/25/2021    BILIRUBINUR NEGATIVE 12/25/2021    BILIRUBINUR neg 03/17/2021    BILIRUBINUR Negative 12/16/2019    BLOODU SMALL 12/25/2021    GLUCOSEU neg 03/17/2021    GLUCOSEU Negative 03/11/2020    KETUA NEGATIVE 12/25/2021    AMORPHOUS URATES 01/27/2019     Urine Sodium:   No results found for: ASTER  Urine Potassium:  No results found for: KUR  Urine Chloride:  No results found for: CLUR  Urine Osmolarity:   Lab Results   Component Value Date    OSMOU 482 12/26/2021     Urine Protein:   No components found for: TOTALPROTEIN, URINE   Urine Creatinine:     Lab Results   Component Value Date    LABCREA 181.0 12/13/2018     Urine Eosinophils:  No components found for: UEOS        Impression and Plan:  1. FATEMEH on CKD IV: Improving, no need for RRT  -Bun trending down  ( BUN of > 336 was a lab error)    2. Mild metabolic acidosis, will monitor  3. HTN : increase hydralazine to 50 mg TID  4. COVID-19 +   5. Chronic systolic CHF  6. DM  7. Anemia  8. Monoclonal gammopathy        Please don't hesitate to call with any questions.   Electronically signed by Rolly Beck DO on 12/31/2021 at 3:25 PM

## 2022-01-01 ENCOUNTER — TELEPHONE (OUTPATIENT)
Dept: CARDIOLOGY CLINIC | Age: 77
End: 2022-01-01

## 2022-01-01 ENCOUNTER — TELEPHONE (OUTPATIENT)
Dept: NEPHROLOGY | Age: 77
End: 2022-01-01

## 2022-01-01 VITALS
RESPIRATION RATE: 18 BRPM | DIASTOLIC BLOOD PRESSURE: 79 MMHG | HEART RATE: 80 BPM | SYSTOLIC BLOOD PRESSURE: 173 MMHG | WEIGHT: 188.9 LBS | TEMPERATURE: 97.3 F | OXYGEN SATURATION: 98 % | BODY MASS INDEX: 26.35 KG/M2

## 2022-01-01 LAB
ANION GAP SERPL CALCULATED.3IONS-SCNC: 10 MEQ/L (ref 8–16)
ANION GAP SERPL CALCULATED.3IONS-SCNC: 10 MEQ/L (ref 8–16)
ANION GAP SERPL CALCULATED.3IONS-SCNC: 12 MEQ/L (ref 8–16)
ANION GAP SERPL CALCULATED.3IONS-SCNC: 9 MEQ/L (ref 8–16)
BLOOD CULTURE, ROUTINE: NORMAL
BUN BLDV-MCNC: 102 MG/DL (ref 7–22)
BUN BLDV-MCNC: 67 MG/DL (ref 7–22)
BUN BLDV-MCNC: 71 MG/DL (ref 7–22)
BUN BLDV-MCNC: 78 MG/DL (ref 7–22)
BUN BLDV-MCNC: 81 MG/DL (ref 7–22)
BUN BLDV-MCNC: 92 MG/DL (ref 7–22)
C-REACTIVE PROTEIN: 2.35 MG/DL (ref 0–1)
C-REACTIVE PROTEIN: 3.19 MG/DL (ref 0–1)
C-REACTIVE PROTEIN: 4.02 MG/DL (ref 0–1)
CALCIUM SERPL-MCNC: 8 MG/DL (ref 8.5–10.5)
CALCIUM SERPL-MCNC: 8.1 MG/DL (ref 8.5–10.5)
CALCIUM SERPL-MCNC: 8.2 MG/DL (ref 8.5–10.5)
CALCIUM SERPL-MCNC: 8.3 MG/DL (ref 8.5–10.5)
CALCIUM SERPL-MCNC: 8.4 MG/DL (ref 8.5–10.5)
CALCIUM SERPL-MCNC: 8.5 MG/DL (ref 8.5–10.5)
CHLORIDE BLD-SCNC: 104 MEQ/L (ref 98–111)
CHLORIDE BLD-SCNC: 106 MEQ/L (ref 98–111)
CHLORIDE BLD-SCNC: 107 MEQ/L (ref 98–111)
CHLORIDE BLD-SCNC: 108 MEQ/L (ref 98–111)
CHLORIDE BLD-SCNC: 108 MEQ/L (ref 98–111)
CHLORIDE BLD-SCNC: 110 MEQ/L (ref 98–111)
CO2: 18 MEQ/L (ref 23–33)
CO2: 21 MEQ/L (ref 23–33)
CO2: 21 MEQ/L (ref 23–33)
CO2: 22 MEQ/L (ref 23–33)
CO2: 22 MEQ/L (ref 23–33)
CO2: 23 MEQ/L (ref 23–33)
CREAT SERPL-MCNC: 2.4 MG/DL (ref 0.4–1.2)
CREAT SERPL-MCNC: 2.6 MG/DL (ref 0.4–1.2)
CREAT SERPL-MCNC: 2.8 MG/DL (ref 0.4–1.2)
GFR SERPL CREATININE-BSD FRML MDRD: 22 ML/MIN/1.73M2
GFR SERPL CREATININE-BSD FRML MDRD: 24 ML/MIN/1.73M2
GFR SERPL CREATININE-BSD FRML MDRD: 27 ML/MIN/1.73M2
GLUCOSE BLD-MCNC: 100 MG/DL (ref 70–108)
GLUCOSE BLD-MCNC: 104 MG/DL (ref 70–108)
GLUCOSE BLD-MCNC: 117 MG/DL (ref 70–108)
GLUCOSE BLD-MCNC: 118 MG/DL (ref 70–108)
GLUCOSE BLD-MCNC: 120 MG/DL (ref 70–108)
GLUCOSE BLD-MCNC: 139 MG/DL (ref 70–108)
GLUCOSE BLD-MCNC: 140 MG/DL (ref 70–108)
GLUCOSE BLD-MCNC: 146 MG/DL (ref 70–108)
GLUCOSE BLD-MCNC: 147 MG/DL (ref 70–108)
GLUCOSE BLD-MCNC: 155 MG/DL (ref 70–108)
GLUCOSE BLD-MCNC: 159 MG/DL (ref 70–108)
GLUCOSE BLD-MCNC: 168 MG/DL (ref 70–108)
GLUCOSE BLD-MCNC: 176 MG/DL (ref 70–108)
GLUCOSE BLD-MCNC: 191 MG/DL (ref 70–108)
GLUCOSE BLD-MCNC: 212 MG/DL (ref 70–108)
GLUCOSE BLD-MCNC: 236 MG/DL (ref 70–108)
GLUCOSE BLD-MCNC: 236 MG/DL (ref 70–108)
GLUCOSE BLD-MCNC: 276 MG/DL (ref 70–108)
GLUCOSE BLD-MCNC: 281 MG/DL (ref 70–108)
GLUCOSE BLD-MCNC: 281 MG/DL (ref 70–108)
GLUCOSE BLD-MCNC: 41 MG/DL (ref 70–108)
GLUCOSE BLD-MCNC: 49 MG/DL (ref 70–108)
GLUCOSE BLD-MCNC: 57 MG/DL (ref 70–108)
GLUCOSE BLD-MCNC: 59 MG/DL (ref 70–108)
GLUCOSE BLD-MCNC: 64 MG/DL (ref 70–108)
GLUCOSE BLD-MCNC: 70 MG/DL (ref 70–108)
GLUCOSE BLD-MCNC: 75 MG/DL (ref 70–108)
GLUCOSE BLD-MCNC: 98 MG/DL (ref 70–108)
POTASSIUM SERPL-SCNC: 3.8 MEQ/L (ref 3.5–5.2)
POTASSIUM SERPL-SCNC: 4.1 MEQ/L (ref 3.5–5.2)
POTASSIUM SERPL-SCNC: 4.1 MEQ/L (ref 3.5–5.2)
POTASSIUM SERPL-SCNC: 4.3 MEQ/L (ref 3.5–5.2)
POTASSIUM SERPL-SCNC: 4.3 MEQ/L (ref 3.5–5.2)
POTASSIUM SERPL-SCNC: 4.4 MEQ/L (ref 3.5–5.2)
SODIUM BLD-SCNC: 136 MEQ/L (ref 135–145)
SODIUM BLD-SCNC: 137 MEQ/L (ref 135–145)
SODIUM BLD-SCNC: 138 MEQ/L (ref 135–145)
SODIUM BLD-SCNC: 139 MEQ/L (ref 135–145)
SODIUM BLD-SCNC: 139 MEQ/L (ref 135–145)
SODIUM BLD-SCNC: 140 MEQ/L (ref 135–145)

## 2022-01-01 PROCEDURE — 6370000000 HC RX 637 (ALT 250 FOR IP): Performed by: STUDENT IN AN ORGANIZED HEALTH CARE EDUCATION/TRAINING PROGRAM

## 2022-01-01 PROCEDURE — 97530 THERAPEUTIC ACTIVITIES: CPT

## 2022-01-01 PROCEDURE — 97110 THERAPEUTIC EXERCISES: CPT

## 2022-01-01 PROCEDURE — 36415 COLL VENOUS BLD VENIPUNCTURE: CPT

## 2022-01-01 PROCEDURE — 80048 BASIC METABOLIC PNL TOTAL CA: CPT

## 2022-01-01 PROCEDURE — 6370000000 HC RX 637 (ALT 250 FOR IP): Performed by: INTERNAL MEDICINE

## 2022-01-01 PROCEDURE — 99231 SBSQ HOSP IP/OBS SF/LOW 25: CPT | Performed by: INTERNAL MEDICINE

## 2022-01-01 PROCEDURE — 92526 ORAL FUNCTION THERAPY: CPT

## 2022-01-01 PROCEDURE — 2580000003 HC RX 258: Performed by: STUDENT IN AN ORGANIZED HEALTH CARE EDUCATION/TRAINING PROGRAM

## 2022-01-01 PROCEDURE — 6360000002 HC RX W HCPCS: Performed by: INTERNAL MEDICINE

## 2022-01-01 PROCEDURE — 99233 SBSQ HOSP IP/OBS HIGH 50: CPT | Performed by: INTERNAL MEDICINE

## 2022-01-01 PROCEDURE — 1200000003 HC TELEMETRY R&B

## 2022-01-01 PROCEDURE — 99232 SBSQ HOSP IP/OBS MODERATE 35: CPT | Performed by: INTERNAL MEDICINE

## 2022-01-01 PROCEDURE — 6370000000 HC RX 637 (ALT 250 FOR IP): Performed by: OPHTHALMOLOGY

## 2022-01-01 PROCEDURE — 86140 C-REACTIVE PROTEIN: CPT

## 2022-01-01 PROCEDURE — 99232 SBSQ HOSP IP/OBS MODERATE 35: CPT | Performed by: OPHTHALMOLOGY

## 2022-01-01 PROCEDURE — 82948 REAGENT STRIP/BLOOD GLUCOSE: CPT

## 2022-01-01 PROCEDURE — 99232 SBSQ HOSP IP/OBS MODERATE 35: CPT | Performed by: HOSPITALIST

## 2022-01-01 RX ORDER — HYDRALAZINE HYDROCHLORIDE 50 MG/1
50 TABLET, FILM COATED ORAL EVERY 8 HOURS SCHEDULED
Qty: 90 TABLET | Refills: 3
Start: 2022-01-01

## 2022-01-01 RX ORDER — BUMETANIDE 1 MG/1
1 TABLET ORAL 2 TIMES DAILY
Status: DISCONTINUED | OUTPATIENT
Start: 2022-01-01 | End: 2022-01-01 | Stop reason: HOSPADM

## 2022-01-01 RX ORDER — INSULIN GLARGINE 100 [IU]/ML
10 INJECTION, SOLUTION SUBCUTANEOUS NIGHTLY
Qty: 10 ML | Refills: 3
Start: 2022-01-01

## 2022-01-01 RX ORDER — HYDRALAZINE HYDROCHLORIDE 20 MG/ML
10 INJECTION INTRAMUSCULAR; INTRAVENOUS EVERY 6 HOURS PRN
Status: DISCONTINUED | OUTPATIENT
Start: 2022-01-01 | End: 2022-01-01 | Stop reason: HOSPADM

## 2022-01-01 RX ORDER — IPRATROPIUM BROMIDE AND ALBUTEROL SULFATE 2.5; .5 MG/3ML; MG/3ML
3 SOLUTION RESPIRATORY (INHALATION) EVERY 4 HOURS PRN
Qty: 360 ML | Refills: 0
Start: 2022-01-01

## 2022-01-01 RX ORDER — ISOSORBIDE DINITRATE 10 MG/1
10 TABLET ORAL 2 TIMES DAILY
Qty: 90 TABLET | Refills: 3
Start: 2022-01-01

## 2022-01-01 RX ORDER — INSULIN GLARGINE 100 [IU]/ML
10 INJECTION, SOLUTION SUBCUTANEOUS NIGHTLY
Status: DISCONTINUED | OUTPATIENT
Start: 2022-01-01 | End: 2022-01-01 | Stop reason: HOSPADM

## 2022-01-01 RX ORDER — BUMETANIDE 1 MG/1
1 TABLET ORAL 2 TIMES DAILY
Qty: 30 TABLET | Refills: 3
Start: 2022-01-01

## 2022-01-01 RX ORDER — AMLODIPINE BESYLATE 10 MG/1
10 TABLET ORAL DAILY
Status: DISCONTINUED | OUTPATIENT
Start: 2022-01-01 | End: 2022-01-01 | Stop reason: HOSPADM

## 2022-01-01 RX ORDER — INSULIN GLARGINE 100 [IU]/ML
15 INJECTION, SOLUTION SUBCUTANEOUS NIGHTLY
Status: DISCONTINUED | OUTPATIENT
Start: 2022-01-01 | End: 2022-01-01

## 2022-01-01 RX ORDER — AMLODIPINE BESYLATE 10 MG/1
10 TABLET ORAL DAILY
Qty: 30 TABLET | Refills: 3
Start: 2022-01-01

## 2022-01-01 RX ORDER — AMLODIPINE BESYLATE 5 MG/1
5 TABLET ORAL DAILY
Status: DISCONTINUED | OUTPATIENT
Start: 2022-01-01 | End: 2022-01-01

## 2022-01-01 RX ADMIN — CARVEDILOL 12.5 MG: 6.25 TABLET, FILM COATED ORAL at 16:15

## 2022-01-01 RX ADMIN — ATORVASTATIN CALCIUM 40 MG: 40 TABLET, FILM COATED ORAL at 08:15

## 2022-01-01 RX ADMIN — HEPARIN SODIUM 5000 UNITS: 5000 INJECTION INTRAVENOUS; SUBCUTANEOUS at 20:24

## 2022-01-01 RX ADMIN — FAMOTIDINE 20 MG: 20 TABLET ORAL at 08:30

## 2022-01-01 RX ADMIN — INSULIN LISPRO 2 UNITS: 100 INJECTION, SOLUTION INTRAVENOUS; SUBCUTANEOUS at 12:22

## 2022-01-01 RX ADMIN — HEPARIN SODIUM 5000 UNITS: 5000 INJECTION INTRAVENOUS; SUBCUTANEOUS at 20:46

## 2022-01-01 RX ADMIN — BUMETANIDE 1 MG: 1 TABLET ORAL at 08:47

## 2022-01-01 RX ADMIN — HYDRALAZINE HYDROCHLORIDE 50 MG: 50 TABLET ORAL at 13:39

## 2022-01-01 RX ADMIN — CARVEDILOL 12.5 MG: 6.25 TABLET, FILM COATED ORAL at 10:35

## 2022-01-01 RX ADMIN — TAMSULOSIN HYDROCHLORIDE 0.4 MG: 0.4 CAPSULE ORAL at 08:32

## 2022-01-01 RX ADMIN — ISOSORBIDE DINITRATE 10 MG: 10 TABLET ORAL at 08:32

## 2022-01-01 RX ADMIN — Medication 15 G: at 07:00

## 2022-01-01 RX ADMIN — ATORVASTATIN CALCIUM 40 MG: 40 TABLET, FILM COATED ORAL at 08:33

## 2022-01-01 RX ADMIN — ASPIRIN 81 MG: 81 TABLET, COATED ORAL at 08:39

## 2022-01-01 RX ADMIN — ISOSORBIDE DINITRATE 10 MG: 10 TABLET ORAL at 08:15

## 2022-01-01 RX ADMIN — DEXAMETHASONE 6 MG: 4 TABLET ORAL at 08:14

## 2022-01-01 RX ADMIN — HYDRALAZINE HYDROCHLORIDE 50 MG: 50 TABLET ORAL at 21:30

## 2022-01-01 RX ADMIN — ACETAMINOPHEN 650 MG: 325 TABLET ORAL at 16:34

## 2022-01-01 RX ADMIN — HYDRALAZINE HYDROCHLORIDE 50 MG: 50 TABLET ORAL at 05:12

## 2022-01-01 RX ADMIN — SODIUM CHLORIDE, PRESERVATIVE FREE 10 ML: 5 INJECTION INTRAVENOUS at 10:35

## 2022-01-01 RX ADMIN — CARVEDILOL 12.5 MG: 6.25 TABLET, FILM COATED ORAL at 08:30

## 2022-01-01 RX ADMIN — INSULIN GLARGINE 15 UNITS: 100 INJECTION, SOLUTION SUBCUTANEOUS at 20:47

## 2022-01-01 RX ADMIN — HEPARIN SODIUM 5000 UNITS: 5000 INJECTION INTRAVENOUS; SUBCUTANEOUS at 08:15

## 2022-01-01 RX ADMIN — HYDRALAZINE HYDROCHLORIDE 50 MG: 50 TABLET ORAL at 21:58

## 2022-01-01 RX ADMIN — CARVEDILOL 12.5 MG: 6.25 TABLET, FILM COATED ORAL at 16:50

## 2022-01-01 RX ADMIN — ISOSORBIDE DINITRATE 10 MG: 10 TABLET ORAL at 09:07

## 2022-01-01 RX ADMIN — SODIUM CHLORIDE, PRESERVATIVE FREE 10 ML: 5 INJECTION INTRAVENOUS at 09:39

## 2022-01-01 RX ADMIN — SODIUM CHLORIDE, PRESERVATIVE FREE 10 ML: 5 INJECTION INTRAVENOUS at 21:00

## 2022-01-01 RX ADMIN — ISOSORBIDE DINITRATE 10 MG: 10 TABLET ORAL at 20:58

## 2022-01-01 RX ADMIN — SERTRALINE 50 MG: 50 TABLET, FILM COATED ORAL at 09:08

## 2022-01-01 RX ADMIN — HYDRALAZINE HYDROCHLORIDE 50 MG: 50 TABLET ORAL at 05:45

## 2022-01-01 RX ADMIN — SODIUM CHLORIDE, PRESERVATIVE FREE 10 ML: 5 INJECTION INTRAVENOUS at 20:58

## 2022-01-01 RX ADMIN — Medication 15 G: at 07:01

## 2022-01-01 RX ADMIN — HEPARIN SODIUM 5000 UNITS: 5000 INJECTION INTRAVENOUS; SUBCUTANEOUS at 21:30

## 2022-01-01 RX ADMIN — BUMETANIDE 1 MG: 1 TABLET ORAL at 17:02

## 2022-01-01 RX ADMIN — ISOSORBIDE DINITRATE 10 MG: 10 TABLET ORAL at 08:30

## 2022-01-01 RX ADMIN — DEXAMETHASONE 6 MG: 4 TABLET ORAL at 08:30

## 2022-01-01 RX ADMIN — HYDRALAZINE HYDROCHLORIDE 50 MG: 50 TABLET ORAL at 05:20

## 2022-01-01 RX ADMIN — AMLODIPINE BESYLATE 5 MG: 5 TABLET ORAL at 08:15

## 2022-01-01 RX ADMIN — TAMSULOSIN HYDROCHLORIDE 0.4 MG: 0.4 CAPSULE ORAL at 08:15

## 2022-01-01 RX ADMIN — GUAIFENESIN 600 MG: 600 TABLET, EXTENDED RELEASE ORAL at 09:08

## 2022-01-01 RX ADMIN — ASPIRIN 81 MG: 81 TABLET, COATED ORAL at 08:47

## 2022-01-01 RX ADMIN — CARVEDILOL 12.5 MG: 6.25 TABLET, FILM COATED ORAL at 16:54

## 2022-01-01 RX ADMIN — ATORVASTATIN CALCIUM 40 MG: 40 TABLET, FILM COATED ORAL at 08:30

## 2022-01-01 RX ADMIN — AMLODIPINE BESYLATE 10 MG: 5 TABLET ORAL at 10:35

## 2022-01-01 RX ADMIN — HYDRALAZINE HYDROCHLORIDE 50 MG: 50 TABLET ORAL at 14:35

## 2022-01-01 RX ADMIN — GUAIFENESIN 600 MG: 600 TABLET, EXTENDED RELEASE ORAL at 21:51

## 2022-01-01 RX ADMIN — HYDRALAZINE HYDROCHLORIDE 50 MG: 50 TABLET ORAL at 21:51

## 2022-01-01 RX ADMIN — BUMETANIDE 1 MG: 1 TABLET ORAL at 10:34

## 2022-01-01 RX ADMIN — INSULIN LISPRO 2 UNITS: 100 INJECTION, SOLUTION INTRAVENOUS; SUBCUTANEOUS at 17:22

## 2022-01-01 RX ADMIN — HEPARIN SODIUM 5000 UNITS: 5000 INJECTION INTRAVENOUS; SUBCUTANEOUS at 21:50

## 2022-01-01 RX ADMIN — SODIUM CHLORIDE, PRESERVATIVE FREE 10 ML: 5 INJECTION INTRAVENOUS at 08:57

## 2022-01-01 RX ADMIN — AMLODIPINE BESYLATE 10 MG: 5 TABLET ORAL at 08:32

## 2022-01-01 RX ADMIN — INSULIN GLARGINE 18 UNITS: 100 INJECTION, SOLUTION SUBCUTANEOUS at 21:33

## 2022-01-01 RX ADMIN — HEPARIN SODIUM 5000 UNITS: 5000 INJECTION INTRAVENOUS; SUBCUTANEOUS at 10:35

## 2022-01-01 RX ADMIN — INSULIN GLARGINE 18 UNITS: 100 INJECTION, SOLUTION SUBCUTANEOUS at 22:55

## 2022-01-01 RX ADMIN — HEPARIN SODIUM 5000 UNITS: 5000 INJECTION INTRAVENOUS; SUBCUTANEOUS at 08:59

## 2022-01-01 RX ADMIN — BUMETANIDE 1 MG: 1 TABLET ORAL at 20:23

## 2022-01-01 RX ADMIN — TAMSULOSIN HYDROCHLORIDE 0.4 MG: 0.4 CAPSULE ORAL at 08:47

## 2022-01-01 RX ADMIN — SODIUM CHLORIDE, PRESERVATIVE FREE 10 ML: 5 INJECTION INTRAVENOUS at 10:21

## 2022-01-01 RX ADMIN — SERTRALINE 50 MG: 50 TABLET, FILM COATED ORAL at 10:35

## 2022-01-01 RX ADMIN — SODIUM CHLORIDE, PRESERVATIVE FREE 10 ML: 5 INJECTION INTRAVENOUS at 08:33

## 2022-01-01 RX ADMIN — ATORVASTATIN CALCIUM 40 MG: 40 TABLET, FILM COATED ORAL at 09:07

## 2022-01-01 RX ADMIN — ATORVASTATIN CALCIUM 40 MG: 40 TABLET, FILM COATED ORAL at 10:35

## 2022-01-01 RX ADMIN — SODIUM CHLORIDE, PRESERVATIVE FREE 10 ML: 5 INJECTION INTRAVENOUS at 20:28

## 2022-01-01 RX ADMIN — ISOSORBIDE DINITRATE 10 MG: 10 TABLET ORAL at 08:47

## 2022-01-01 RX ADMIN — TAMSULOSIN HYDROCHLORIDE 0.4 MG: 0.4 CAPSULE ORAL at 09:07

## 2022-01-01 RX ADMIN — BUMETANIDE 1 MG: 1 TABLET ORAL at 20:46

## 2022-01-01 RX ADMIN — SERTRALINE 50 MG: 50 TABLET, FILM COATED ORAL at 08:30

## 2022-01-01 RX ADMIN — SERTRALINE 50 MG: 50 TABLET, FILM COATED ORAL at 08:33

## 2022-01-01 RX ADMIN — AMLODIPINE BESYLATE 10 MG: 5 TABLET ORAL at 08:47

## 2022-01-01 RX ADMIN — HEPARIN SODIUM 5000 UNITS: 5000 INJECTION INTRAVENOUS; SUBCUTANEOUS at 21:59

## 2022-01-01 RX ADMIN — CARVEDILOL 12.5 MG: 6.25 TABLET, FILM COATED ORAL at 09:07

## 2022-01-01 RX ADMIN — SERTRALINE 50 MG: 50 TABLET, FILM COATED ORAL at 08:16

## 2022-01-01 RX ADMIN — ISOSORBIDE DINITRATE 10 MG: 10 TABLET ORAL at 21:51

## 2022-01-01 RX ADMIN — HYDRALAZINE HYDROCHLORIDE 50 MG: 50 TABLET ORAL at 13:29

## 2022-01-01 RX ADMIN — INSULIN GLARGINE 10 UNITS: 100 INJECTION, SOLUTION SUBCUTANEOUS at 20:28

## 2022-01-01 RX ADMIN — CARVEDILOL 12.5 MG: 6.25 TABLET, FILM COATED ORAL at 08:47

## 2022-01-01 RX ADMIN — HYDRALAZINE HYDROCHLORIDE 50 MG: 50 TABLET ORAL at 15:33

## 2022-01-01 RX ADMIN — FAMOTIDINE 20 MG: 20 TABLET ORAL at 08:47

## 2022-01-01 RX ADMIN — HEPARIN SODIUM 5000 UNITS: 5000 INJECTION INTRAVENOUS; SUBCUTANEOUS at 10:21

## 2022-01-01 RX ADMIN — TAMSULOSIN HYDROCHLORIDE 0.4 MG: 0.4 CAPSULE ORAL at 10:34

## 2022-01-01 RX ADMIN — FAMOTIDINE 20 MG: 20 TABLET ORAL at 08:40

## 2022-01-01 RX ADMIN — TAMSULOSIN HYDROCHLORIDE 0.4 MG: 0.4 CAPSULE ORAL at 08:30

## 2022-01-01 RX ADMIN — DEXAMETHASONE 6 MG: 4 TABLET ORAL at 09:08

## 2022-01-01 RX ADMIN — SERTRALINE 50 MG: 50 TABLET, FILM COATED ORAL at 08:47

## 2022-01-01 RX ADMIN — ATORVASTATIN CALCIUM 40 MG: 40 TABLET, FILM COATED ORAL at 08:47

## 2022-01-01 RX ADMIN — GUAIFENESIN 600 MG: 600 TABLET, EXTENDED RELEASE ORAL at 08:30

## 2022-01-01 RX ADMIN — ASPIRIN 81 MG: 81 TABLET, COATED ORAL at 10:35

## 2022-01-01 RX ADMIN — SODIUM CHLORIDE, PRESERVATIVE FREE 10 ML: 5 INJECTION INTRAVENOUS at 21:52

## 2022-01-01 RX ADMIN — ISOSORBIDE DINITRATE 10 MG: 10 TABLET ORAL at 21:30

## 2022-01-01 RX ADMIN — HEPARIN SODIUM 5000 UNITS: 5000 INJECTION INTRAVENOUS; SUBCUTANEOUS at 08:30

## 2022-01-01 RX ADMIN — HYDRALAZINE HYDROCHLORIDE 50 MG: 50 TABLET ORAL at 06:44

## 2022-01-01 RX ADMIN — SODIUM CHLORIDE, PRESERVATIVE FREE 10 ML: 5 INJECTION INTRAVENOUS at 21:42

## 2022-01-01 RX ADMIN — ISOSORBIDE DINITRATE 10 MG: 10 TABLET ORAL at 21:58

## 2022-01-01 RX ADMIN — HYDRALAZINE HYDROCHLORIDE 50 MG: 50 TABLET ORAL at 20:30

## 2022-01-01 RX ADMIN — SODIUM CHLORIDE, PRESERVATIVE FREE 10 ML: 5 INJECTION INTRAVENOUS at 08:16

## 2022-01-01 RX ADMIN — CARVEDILOL 12.5 MG: 6.25 TABLET, FILM COATED ORAL at 16:34

## 2022-01-01 RX ADMIN — ISOSORBIDE DINITRATE 10 MG: 10 TABLET ORAL at 10:35

## 2022-01-01 RX ADMIN — ISOSORBIDE DINITRATE 10 MG: 10 TABLET ORAL at 20:24

## 2022-01-01 RX ADMIN — HYDRALAZINE HYDROCHLORIDE 50 MG: 50 TABLET ORAL at 16:49

## 2022-01-01 RX ADMIN — HEPARIN SODIUM 5000 UNITS: 5000 INJECTION INTRAVENOUS; SUBCUTANEOUS at 08:33

## 2022-01-01 RX ADMIN — HYDRALAZINE HYDROCHLORIDE 50 MG: 50 TABLET ORAL at 06:24

## 2022-01-01 RX ADMIN — HYDRALAZINE HYDROCHLORIDE 50 MG: 50 TABLET ORAL at 20:58

## 2022-01-01 RX ADMIN — CARVEDILOL 12.5 MG: 6.25 TABLET, FILM COATED ORAL at 08:32

## 2022-01-01 RX ADMIN — AMLODIPINE BESYLATE 5 MG: 5 TABLET ORAL at 18:28

## 2022-01-01 RX ADMIN — CARVEDILOL 12.5 MG: 6.25 TABLET, FILM COATED ORAL at 17:16

## 2022-01-01 RX ADMIN — CARVEDILOL 12.5 MG: 6.25 TABLET, FILM COATED ORAL at 08:14

## 2022-01-01 RX ADMIN — HYDRALAZINE HYDROCHLORIDE 50 MG: 50 TABLET ORAL at 05:04

## 2022-01-01 RX ADMIN — INSULIN GLARGINE 15 UNITS: 100 INJECTION, SOLUTION SUBCUTANEOUS at 21:31

## 2022-01-01 ASSESSMENT — PAIN SCALES - GENERAL
PAINLEVEL_OUTOF10: 0
PAINLEVEL_OUTOF10: 3
PAINLEVEL_OUTOF10: 0
PAINLEVEL_OUTOF10: 5

## 2022-01-01 NOTE — PROGRESS NOTES
[facility]   Nephrology progress note  1/1/2022, 12:28 PM      Pt Name:    Cassie Castellanos  MRN:     [id number]     [place]:    1945  Admit Date:    12/24/2021 11:12 PM  Primary Care Physician:  Ene Dubois   Room number  6A-09/009-A    Chief Complaint: Nephrology following for FATEMEH/CKD    Subjective:  Patient seen and examined  Seen earlier today during rounds  Patient is awake no acute distress      Objective:  24HR INTAKE/OUTPUT:      Intake/Output Summary (Last 24 hours) at 1/1/2022 1228  Last data filed at 1/1/2022 2978  Gross per 24 hour   Intake 240 ml   Output 2100 ml   Net -1860 ml     I/O last 3 completed shifts: In: 240 [P.O.:240]  Out: 2100 [Urine:2100]  No intake/output data recorded. Admission weight: 200 lb (90.7 kg)  Wt Readings from Last 3 Encounters:   12/31/21 195 lb 12.8 oz (88.8 kg)   11/29/21 188 lb (85.3 kg)   11/03/21 184 lb (83.5 kg)     Body mass index is 27.31 kg/m².     Physical examination  VITALS:     Vitals:    01/01/22 0455 01/01/22 0644 01/01/22 0902 01/01/22 1203   BP: (!) 155/57 (!) 155/57 (!) 183/68 (!) 177/69   Pulse: 63  67 69   Resp: 18  16    Temp: 97.8 °F (36.6 °C)  97.8 °F (36.6 °C) 97.6 °F (36.4 °C)   TempSrc: Oral  Oral Oral   SpO2: 96%  97% 99%   Weight:         General Appearance: alert and cooperative with exam, appears comfortable, no distress  Mouth/Throat: Oral mucosa moist  Lungs: Diminished, without use of accessory muscles, no labored breathing  GI: soft, non-tender, no guarding  Extremities: No significant LE edema      Lab Data  CBC:   Recent Labs     12/30/21  0634 12/30/21  2145   WBC 5.3 5.7   HGB 10.6* 11.5*   HCT 34.6* 36.5*   * 118*     BMP:  Recent Labs     12/30/21  1000 12/31/21  0631 01/01/22  0600    136 139   K 4.1 4.0 3.8    104 106   CO2 18* 20* 21*   * 111* 102*   CREATININE 3.2* 2.9* 2.8*   GLUCOSE 196* 229* 98   CALCIUM 8.0* 8.2* 8.1*     Hepatic:   No results for input(s): LABALBU, AST, ALT, ALB, BILITOT, ALKPHOS in the last 72 hours. Meds:  Infusion:    sodium chloride Stopped (12/27/21 1336)    dextrose       Meds:    hydrALAZINE  50 mg Oral 3 times per day    dexamethasone  6 mg Oral Daily    insulin glargine  18 Units SubCUTAneous Nightly    famotidine  20 mg Oral Every Other Day    heparin (porcine)  5,000 Units SubCUTAneous BID    guaiFENesin  600 mg Oral BID    isosorbide dinitrate  10 mg Oral BID    sodium chloride flush  5-40 mL IntraVENous 2 times per day    [Held by provider] aspirin EC  81 mg Oral Daily    atorvastatin  40 mg Oral Daily    carvedilol  12.5 mg Oral BID WC    sertraline  50 mg Oral Daily    tamsulosin  0.4 mg Oral Daily    insulin lispro  0-12 Units SubCUTAneous TID WC    insulin lispro  0-6 Units SubCUTAneous Nightly    calcium replacement protocol   Other RX Placeholder     Meds prn: ipratropium-albuterol, sodium chloride flush, sodium chloride, polyethylene glycol, acetaminophen **OR** acetaminophen, glucose, dextrose, glucagon (rDNA), dextrose, albuterol sulfate HFA       Impression and Plan:  1. FATEMEH on CKD 4. BUN and creatinine both improving  No acute need for renal placement therapy   check labs in a.m.    2.  Azotemia due to FATEMEH/CKD and steroids: As above, improving  3. Mild metabolic acidosis. Improved  4. Insulin-dependent diabetes mellitus  5. Chronic systolic cardiomyopathy with EF 30 to 35%  6. Mild thrombocytopenia  7. Diabetic nephropathy  8. Proteinuria secondary to diabetic nephropathy  9. Monoclonal gammopathy seeing oncology/Dr. Little Loco  10.   Awaiting pre-CERT    Discussed with patient      Pavan Plummer MD  [facility]

## 2022-01-01 NOTE — PROGRESS NOTES
Patients wife came to visit and was updated on patients plan of care. Questions answered at bedside.

## 2022-01-01 NOTE — PROGRESS NOTES
Hospitalist Progress Note    Patient:  Katarzyna Cordova    YOB: 1945  Unit/Bed:6A-09/009-A  MRN: [id number]    Acct: [de-identified]   PCP: Ramiro Lombardo    Date of Admission: 12/24/2021      Assessment/Plan:    Acute Hypoxic Respiratory Failure: 2/2 covid, CAP and HF. Covid 19 Pneumonia: with minimal O2 requirement  Decadron IV 6mg daily. DVT ppx with renal adjusted lovenox  pepcid daily  IS and acapella  On abx for possible superimposed bacterial infection. Will treat for 5-7 days    12/31: d/c abx. On RA. Doing well. 1/1/22: off O2  Acute Decompensated Systolic HF:   -several recent echos (EF 35% at [facility] 10/17/21 with mod AR and mod LVH, 12/2020 EF 40-45%, 12/2019 EF 25-30% and elevated rvsp  -30-35% EF on 12/26 echo. - Continue coreg. Essential HTN   - on BB and isordil/hydralazine    Moderate AR: this valve had prior annuloplasty. Consider Cardiology consult once acute renal issues managed. Decrease afterload. FATEMEH on CKD vs progression of CKD- improving  - no urgent need for HD  - improving  - Nephrology assisting    12/30: Renal function improving. 12/31: Urine output increasing, renal function improving. CAD s/p CABG x3 and MAZE (cardiogenic shock, IABP) 1/2019  - continue home gdmt. Holding ACE due to above. Continue bb, statin, asa    Hx of MV replacement: annuloplasty. On asa , continue     IDDM2:  Continue home dosed insulin  - ISS    QT prolonged  - K and Mg wnl. Mild Thrombocytopenia: likely 2/2 plasma cell dyscrasia, renal disease, and covid. Continue to monitor closely. MGUS: IgG kappa disease, monitored - follows Dr. Jannet Montalvo    Chronic troponin elevation: Trop is chronically elevated, flat trend. Recent stress 10/2021 at [facility] no ischemia. Former tobacco use    Physical Deconditioning: PT/OT consulted     Goals of care:  Full code, palliative following    Disposition: ECF placement, precert in process    Expected discharge date:  >2days days    Disposition: TBD  [] Home  [] TCU  [] Rehab  [] Psych  [] SNF  [] [place]  [] Other-    ===================================================================      Chief Complaint: SOB    Hospital Course: per HPI, \"70 yo M with history of ICM with HFrEF, CAD, CABG, CKD, HTN, T2DM presented to [facility] ED with chief complaints of shortness of breath x 1 week with associated cough and BLE swelling.     ED course: presented afebrile and hemodynamically stable. Initially hypertensive to 181/72, later BP in 130s with mild tachypnea. Saturating >34% on 1L NC. Labs revealed mild hyponatremia, BUN 89 with CR 3.3. ProBNP 47140 (previously ~74038 in 2019), troponin 0.048. EKG NSR with nonspecific ST and T wave changes. COVID rapid positive (vaccinated). CXR with interstitial densities throughout the bilateral lungs R>L. Right basilar pleural effusion, fluid within minor fissures. Given Bumex 2 mg IV x1 dose in the ED. Admitted for CHF exacerbation in the setting of COVID.     On evaluation, patient reports having progressively worsening dyspnea at rest and exertion and BLE edema for about month. Admits to orthopnea. No PND. No chest pain, palpitations, dizziness/lightheadedness, recent fall/syncope. Has had acute cough ~1 week with some \"phlegm\". No subjective fever, chills, nausea, vomiting, abdominal pain, diarrhea, loss of taste/smell. No known sick contact. Currently, feeling slightly chilly and weak. Reports compliance to medication and diet/fluid restrictions. Normally wears 1L NC at night but recently has been using oxygen continuously. Unclear if on oxygen for CHF or other reasons. \"     Subjective (past 24 hours): Patient again appears fatigued, however more comfortable than he was yesterday. Reports less coughing and breathing easier today. Had swallow study this morning and has been cleared for modified diet.       Medications:  Reviewed    Infusion Medications    sodium chloride Stopped (12/27/21 1336)    dextrose       Scheduled Medications    hydrALAZINE  50 mg Oral 3 times per day    dexamethasone  6 mg Oral Daily    insulin glargine  18 Units SubCUTAneous Nightly    famotidine  20 mg Oral Every Other Day    heparin (porcine)  5,000 Units SubCUTAneous BID    guaiFENesin  600 mg Oral BID    isosorbide dinitrate  10 mg Oral BID    sodium chloride flush  5-40 mL IntraVENous 2 times per day    [Held by provider] aspirin EC  81 mg Oral Daily    atorvastatin  40 mg Oral Daily    carvedilol  12.5 mg Oral BID WC    sertraline  50 mg Oral Daily    tamsulosin  0.4 mg Oral Daily    insulin lispro  0-12 Units SubCUTAneous TID     insulin lispro  0-6 Units SubCUTAneous Nightly    calcium replacement protocol   Other RX Placeholder     PRN Meds: ipratropium-albuterol, sodium chloride flush, sodium chloride, polyethylene glycol, acetaminophen **OR** acetaminophen, glucose, dextrose, glucagon (rDNA), dextrose, albuterol sulfate HFA      ROS: reviewed from prior note, full ROS unchanged unless otherwise stated in hospital course/subjective portion. Intake/Output Summary (Last 24 hours) at 1/1/2022 0746  Last data filed at 1/1/2022 0620  Gross per 24 hour   Intake 240 ml   Output 2100 ml   Net -1860 ml       Exam:  BP (!) 155/57   Pulse 63   Temp 97.8 °F (36.6 °C) (Oral)   Resp 18   Wt 195 lb 12.8 oz (88.8 kg)   SpO2 96%   BMI 27.31 kg/m²     General appearance: Acute on chronically ill appearing. Fatigued appearing. Eyes:  PERRL. Conjunctivae/corneas clear. HENT: Head normal appearing. Nares normal. Oral mucosa moist.  Hearing intact. Neck: Supple, with full range of motion. Trachea midline. No gross JVD appreciated. Respiratory:  Normal effort, no rhonchi or wheezing. Diminished and rales bilaterally in lower fields. Cardiovascular: Normal rate, regular rhythm with normal S1/S2 without murmurs. 1-2+ BLLE edema - worse.    Abdomen: Soft, non-tender, non-distended with normal bowel sounds. Musculoskeletal: No joint swelling or tenderness. Normal tone. No abnormal movements. Skin: Warm and dry. No rashes or lesions. Neurologic:  No focal sensory/motor deficits in the upper or lower extremities. Cranial nerves:  grossly non-focal 2-12. Psychiatric: Alert and oriented, normal insight and thought content. Flat affect. Capillary Refill: Brisk,< 3 seconds. Peripheral Pulses: +2 palpable, equal bilaterally. Labs:   Recent Labs     12/30/21  0634 12/30/21  2145   WBC 5.3 5.7   HGB 10.6* 11.5*   HCT 34.6* 36.5*   * 118*     Recent Labs     12/30/21  0634 12/30/21  1000 12/31/21  0631    135 136   K 4.0 4.1 4.0    103 104   CO2 21* 18* 20*   BUN >336* 119* 111*   CREATININE 3.3* 3.2* 2.9*   CALCIUM 8.1* 8.0* 8.2*     No results for input(s): AST, ALT, BILIDIR, BILITOT, ALKPHOS in the last 72 hours. No results for input(s): INR in the last 72 hours. No results for input(s): Ethelene Soulier in the last 72 hours. No results for input(s): PROCAL in the last 72 hours. Lab Results   Component Value Date    NITRU NEGATIVE 12/25/2021    WBCUA 0-2 12/25/2021    BACTERIA NONE SEEN 12/25/2021    RBCUA 0-2 12/25/2021    BLOODU SMALL 12/25/2021    SPECGRAV 1.014 12/25/2021    GLUCOSEU neg 03/17/2021    GLUCOSEU Negative 03/11/2020       Radiology (48 hours):  XR CHEST (2 VW)    Result Date: 12/25/2021  1. Interstitial densities throughout the bilateral lungs greater on the right than left most consistent with edema. Right basilar pleural and parenchymal opacities likely representing small right basilar effusion with atelectasis. Fluid within the minor fissure. Possible tiny left basilar effusion. Overall findings are likely reflective of congestive heart failure. This document has been electronically signed by: Elsie Simms DO on 12/25/2021 12:27 AM    US RENAL COMPLETE    Result Date: 12/25/2021  1. Mild right-sided renal cortical thinning.  2. Incompletely distended urinary bladder. Final report electronically signed by Dr. Bridgette Steele on 12/25/2021 11:06 PM       DVT prophylaxis:    [x] Lovenox  [] SCDs  [] SQ Heparin  [] Encourage ambulation   [] Already on Anticoagulation       Diet: ADULT DIET; Dysphagia - Soft and Bite Sized;  Moderately Thick (Honey)  Code Status: Full Code  PT/OT: ordered  Tele: yes  IVF: n/a    Electronically signed by Marga Moore MD on 1/1/2022 at 7:46 AM

## 2022-01-02 NOTE — PROGRESS NOTES
Hospitalist Progress Note    Patient:  Delia Crystal    YOB: 1945  Unit/Bed:6A-09/009-A  MRN: [id number]    Acct: [de-identified]   PCP: Jackie Harrington    Date of Admission: 12/24/2021      Assessment/Plan:    Acute Hypoxic Respiratory Failure: 2/2 covid, CAP and HF. Covid 19 Pneumonia: with minimal O2 requirement  Decadron orraly   DVT ppx with renal adjusted lovenox  pepcid daily  IS and acapella  On abx for possible superimposed bacterial infection. Will treat for 5-7 days    12/31: d/c abx. On RA. Doing well. 1/1/22: off O2  Acute Decompensated Systolic HF:   -several recent echos (EF 35% at [facility] 10/17/21 with mod AR and mod LVH, 12/2020 EF 40-45%, 12/2019 EF 25-30% and elevated rvsp  -30-35% EF on 12/26 echo. - Continue coreg. Essential HTN   - on BB and isordil/hydralazine    Moderate AR: this valve had prior annuloplasty. Consider Cardiology consult once acute renal issues managed. Decrease afterload. FATEMEH on CKD vs progression of CKD- improving  - no urgent need for HD  - improving  - Nephrology assisting    12/30: Renal function improving. 12/31: Urine output increasing, renal function improving. CAD s/p CABG x3 and MAZE (cardiogenic shock, IABP) 1/2019  - continue home gdmt. Holding ACE due to above. Continue bb, statin, asa    Hx of MV replacement: annuloplasty. On asa , continue     IDDM2:  Continue home dosed insulin  - ISS    QT prolonged  - K and Mg wnl. Mild Thrombocytopenia: likely 2/2 plasma cell dyscrasia, renal disease, and covid. Continue to monitor closely. MGUS: IgG kappa disease, monitored - follows Dr. Quirino Chanel    Chronic troponin elevation: Trop is chronically elevated, flat trend. Recent stress 10/2021 at [facility] no ischemia. Former tobacco use    Physical Deconditioning: PT/OT consulted     Goals of care:  Full code, palliative following    Disposition: ECF placement, precert in process    Expected discharge date:  >2days days    Disposition: TBD  [] Home  [] TCU  [] Rehab  [] Psych  [] SNF  [] [facility]  [] Other-    ===================================================================      Chief Complaint: SOB    Hospital Course: per HPI, \"70 yo M with history of ICM with HFrEF, CAD, CABG, CKD, HTN, T2DM presented to [facility] ED with chief complaints of shortness of breath x 1 week with associated cough and BLE swelling.     ED course: presented afebrile and hemodynamically stable. Initially hypertensive to 181/72, later BP in 130s with mild tachypnea. Saturating >34% on 1L NC. Labs revealed mild hyponatremia, BUN 89 with CR 3.3. ProBNP 45072 (previously ~46953 in 2019), troponin 0.048. EKG NSR with nonspecific ST and T wave changes. COVID rapid positive (vaccinated). CXR with interstitial densities throughout the bilateral lungs R>L. Right basilar pleural effusion, fluid within minor fissures. Given Bumex 2 mg IV x1 dose in the ED. Admitted for CHF exacerbation in the setting of COVID.     On evaluation, patient reports having progressively worsening dyspnea at rest and exertion and BLE edema for about month. Admits to orthopnea. No PND. No chest pain, palpitations, dizziness/lightheadedness, recent fall/syncope. Has had acute cough ~1 week with some \"phlegm\". No subjective fever, chills, nausea, vomiting, abdominal pain, diarrhea, loss of taste/smell. No known sick contact. Currently, feeling slightly chilly and weak. Reports compliance to medication and diet/fluid restrictions. Normally wears 1L NC at night but recently has been using oxygen continuously. Unclear if on oxygen for CHF or other reasons. \"     Subjective (past 24 hours): Patient again appears fatigued, however more comfortable than he was yesterday.    No new issues    Medications:  Reviewed    Infusion Medications    sodium chloride Stopped (12/27/21 1336)    dextrose       Scheduled Medications    hydrALAZINE  50 mg Oral 3 times per day    dexamethasone  6 mg Oral Daily    insulin glargine  18 Units SubCUTAneous Nightly    famotidine  20 mg Oral Every Other Day    heparin (porcine)  5,000 Units SubCUTAneous BID    guaiFENesin  600 mg Oral BID    isosorbide dinitrate  10 mg Oral BID    sodium chloride flush  5-40 mL IntraVENous 2 times per day    [Held by provider] aspirin EC  81 mg Oral Daily    atorvastatin  40 mg Oral Daily    carvedilol  12.5 mg Oral BID WC    sertraline  50 mg Oral Daily    tamsulosin  0.4 mg Oral Daily    insulin lispro  0-12 Units SubCUTAneous TID WC    insulin lispro  0-6 Units SubCUTAneous Nightly    calcium replacement protocol   Other RX Placeholder     PRN Meds: ipratropium-albuterol, sodium chloride flush, sodium chloride, polyethylene glycol, acetaminophen **OR** acetaminophen, glucose, dextrose, glucagon (rDNA), dextrose, albuterol sulfate HFA      ROS: reviewed from prior note, full ROS unchanged unless otherwise stated in hospital course/subjective portion. Intake/Output Summary (Last 24 hours) at 1/2/2022 8224  Last data filed at 1/2/2022 0307  Gross per 24 hour   Intake 0 ml   Output 250 ml   Net -250 ml       Exam:  BP (!) 168/72   Pulse 65   Temp 97.5 °F (36.4 °C) (Oral)   Resp 18   Wt 192 lb (87.1 kg)   SpO2 96%   BMI 26.78 kg/m²     General appearance: Acute on chronically ill appearing. Fatigued appearing. Eyes:  PERRL. Conjunctivae/corneas clear. HENT: Head normal appearing. Nares normal. Oral mucosa moist.  Hearing intact. Neck: Supple, with full range of motion. Trachea midline. No gross JVD appreciated. Respiratory:  Normal effort, no rhonchi or wheezing. Diminished and rales bilaterally in lower fields. Cardiovascular: Normal rate, regular rhythm with normal S1/S2 without murmurs. 1-2+ BLLE edema - worse. Abdomen: Soft, non-tender, non-distended with normal bowel sounds. Musculoskeletal: No joint swelling or tenderness. Normal tone. No abnormal movements. Skin: Warm and dry.  No rashes or lesions. Neurologic:  No focal sensory/motor deficits in the upper or lower extremities. Cranial nerves:  grossly non-focal 2-12. Psychiatric: Alert and oriented, normal insight and thought content. Flat affect. Capillary Refill: Brisk,< 3 seconds. Peripheral Pulses: +2 palpable, equal bilaterally. Labs:   Recent Labs     12/30/21  2145   WBC 5.7   HGB 11.5*   HCT 36.5*   *     Recent Labs     12/30/21  1000 12/31/21  0631 01/01/22  0600    136 139   K 4.1 4.0 3.8    104 106   CO2 18* 20* 21*   * 111* 102*   CREATININE 3.2* 2.9* 2.8*   CALCIUM 8.0* 8.2* 8.1*     No results for input(s): AST, ALT, BILIDIR, BILITOT, ALKPHOS in the last 72 hours. No results for input(s): INR in the last 72 hours. No results for input(s): Maggie Keara in the last 72 hours. No results for input(s): PROCAL in the last 72 hours. Lab Results   Component Value Date    NITRU NEGATIVE 12/25/2021    WBCUA 0-2 12/25/2021    BACTERIA NONE SEEN 12/25/2021    RBCUA 0-2 12/25/2021    BLOODU SMALL 12/25/2021    SPECGRAV 1.014 12/25/2021    GLUCOSEU neg 03/17/2021    GLUCOSEU Negative 03/11/2020       Radiology (48 hours):  XR CHEST (2 VW)    Result Date: 12/25/2021  1. Interstitial densities throughout the bilateral lungs greater on the right than left most consistent with edema. Right basilar pleural and parenchymal opacities likely representing small right basilar effusion with atelectasis. Fluid within the minor fissure. Possible tiny left basilar effusion. Overall findings are likely reflective of congestive heart failure. This document has been electronically signed by: Sandhya Gupta DO on 12/25/2021 12:27 AM    US RENAL COMPLETE    Result Date: 12/25/2021  1. Mild right-sided renal cortical thinning. 2. Incompletely distended urinary bladder.  Final report electronically signed by Dr. Ai Braga on 12/25/2021 11:06 PM       DVT prophylaxis:    [x] Lovenox  [] SCDs  [] SQ Heparin  [] Encourage ambulation   [] Already on Anticoagulation       Diet: ADULT DIET; Dysphagia - Soft and Bite Sized;  Moderately Thick (Honey)  Code Status: Full Code  PT/OT: ordered  Tele: yes  IVF: n/a    Electronically signed by Jacky Wallace MD on 1/2/2022 at 7:58 AM

## 2022-01-02 NOTE — PROGRESS NOTES
[facility]   Nephrology progress note  1/2/2022, 12:36 PM      Pt Name:    Jase Freeman  MRN:     [id number]     [place]:    1945  Admit Date:    12/24/2021 11:12 PM  Primary Care Physician:  Norberto Kaplan   Room number  6A-09/009-A    Chief Complaint: Nephrology following for FATEMEH/CKD    Subjective:  Patient seen and examined  Seen earlier today during rounds  Patient is awake no acute distress  On room air      Objective:  24HR INTAKE/OUTPUT:      Intake/Output Summary (Last 24 hours) at 1/2/2022 1236  Last data filed at 1/2/2022 0307  Gross per 24 hour   Intake 0 ml   Output 250 ml   Net -250 ml     I/O last 3 completed shifts: In: 0   Out: 250 [Urine:250]  No intake/output data recorded. Admission weight: 200 lb (90.7 kg)  Wt Readings from Last 3 Encounters:   01/02/22 192 lb (87.1 kg)   11/29/21 188 lb (85.3 kg)   11/03/21 184 lb (83.5 kg)     Body mass index is 26.78 kg/m².     Physical examination  VITALS:     Vitals:    01/01/22 2317 01/02/22 0307 01/02/22 0820 01/02/22 1224   BP: (!) 157/66 (!) 168/72 (!) 173/60 (!) 179/75   Pulse: 63 65 74 68   Resp: 18 18 18 20   Temp: 97.5 °F (36.4 °C) 97.5 °F (36.4 °C) 97.6 °F (36.4 °C) 95.9 °F (35.5 °C)   TempSrc: Oral Oral  Axillary   SpO2: 97% 96% 96% 95%   Weight: 192 lb 9.6 oz (87.4 kg) 192 lb (87.1 kg)       General Appearance: alert and cooperative with exam, appears comfortable, no distress  Mouth/Throat: Oral mucosa moist  Lungs: Diminished, without use of accessory muscles, no labored breathing  GI: soft, non-tender, no guarding  Extremities: No significant LE edema      Lab Data  CBC:   Recent Labs     12/30/21  2145   WBC 5.7   HGB 11.5*   HCT 36.5*   *     BMP:  Recent Labs     12/31/21  0631 01/01/22  0600 01/02/22  0801    139 139   K 4.0 3.8 4.1    106 108   CO2 20* 21* 22*   * 102* 92*   CREATININE 2.9* 2.8* 2.6*   GLUCOSE 229* 98 59*   CALCIUM 8.2* 8.1* 8.5     Hepatic:   No results for input(s): LABALBU, AST, ALT, ALB, BILITOT, ALKPHOS in the last 72 hours. Meds:  Infusion:    sodium chloride Stopped (12/27/21 1336)    dextrose       Meds:    hydrALAZINE  50 mg Oral 3 times per day    dexamethasone  6 mg Oral Daily    insulin glargine  18 Units SubCUTAneous Nightly    famotidine  20 mg Oral Every Other Day    heparin (porcine)  5,000 Units SubCUTAneous BID    guaiFENesin  600 mg Oral BID    isosorbide dinitrate  10 mg Oral BID    sodium chloride flush  5-40 mL IntraVENous 2 times per day    [Held by provider] aspirin EC  81 mg Oral Daily    atorvastatin  40 mg Oral Daily    carvedilol  12.5 mg Oral BID WC    sertraline  50 mg Oral Daily    tamsulosin  0.4 mg Oral Daily    insulin lispro  0-12 Units SubCUTAneous TID WC    insulin lispro  0-6 Units SubCUTAneous Nightly    calcium replacement protocol   Other RX Placeholder     Meds prn: ipratropium-albuterol, sodium chloride flush, sodium chloride, polyethylene glycol, acetaminophen **OR** acetaminophen, glucose, dextrose, glucagon (rDNA), dextrose, albuterol sulfate HFA       Impression and Plan:  1. FATEMEH on CKD 4. BUN and creatinine both improving  Renal function continues to improve  Patient is on room air  No acute need for dialysis at this time    2. Azotemia due to FATEMEH/CKD and steroids: As above, improving  3. Mild metabolic acidosis. Improved  4. Insulin-dependent diabetes mellitus  5. Chronic systolic cardiomyopathy with EF 30 to 35%  6. Mild thrombocytopenia  7. Diabetic nephropathy  8. Proteinuria secondary to diabetic nephropathy  9. Monoclonal gammopathy seeing oncology/Dr. Padmini Mckoy  10.   Awaiting pre-CERT    Discussed with patient      Lory Bob MD  [facility]

## 2022-01-02 NOTE — PROGRESS NOTES
Patients wife Soto Garcia is here for visitation, updates were given and all questions were answered. Patients wife expressed code status options, palliative care consult was placed.

## 2022-01-03 NOTE — CARE COORDINATION
Discharge Planning Update:     Hospitalist following for COVID. Nephrology also following. Palliative Care following. Pt is now on room air, sats 95%. Patient goals, treatment preferences and discharge plan: Planning discharge to [facility] at discharge. Precert is in progress. SW following.

## 2022-01-03 NOTE — PROGRESS NOTES
Pt's wife Sergey Diamond here for visitation, updates given and all questions answered to the best of my ability.

## 2022-01-03 NOTE — PROGRESS NOTES
PROGRESS NOTE      Patient:  Romero Peterson      Unit/Bed:6A-09/009-A    YOB: 1945    MRN: [id number]       Acct: [de-identified]     PCP: Hailey Rice    Date of Admission: 12/24/2021      Assessment/Plan:    Anticipated Discharge: pending [address] Problems    Diagnosis Date Noted    Acute respiratory failure with hypoxia ([address] [J96.01]     Community acquired pneumonia [J18.9]     COVID-19 [U07.1] 12/25/2021       Acute Hypoxic Respiratory Failure secondary to Covid-19 pneumonia and CHF  - CXR 12/27: Stable small right pleural effusion and bibasilar patchy opacities  - Currently on RA   - Continue Decadron. Lovenox for DVT prophylaxis with renal adjustment. Pepcid for GI prophylaxis. - Completed course of Rocephin and Doxycycline for possible superimposed bacterial infection.   - Encourage incentive spirometer and Acapella    Acute Decompensated Systolic Heart Failure  - CXR on admission: interstitial densities throughout bilateral lungs, greater on right, most consistent with edema  - Several recent echos (EF 35% at [facility] 10/17/21 with mod AR and mod LVH, 12/2020 EF 40-45%, 12/2019 EF 25-30% and elevated RVSP, 12/26 30-35% EF)  - Continue Coreg. Holding Lisinopril and diuresis due to FATEMEH. FATEMEH on CKD vs progression of CKD  - Improving. Creatinine down to 2.4, BUN down to 81. No need for renal replacement therapy at this time.   - Nephrology consulted - appreciate input.   - Hold nephrotoxic agents. Renally adjust medications as needed. Azotemia   - Due to FATEMEH/CKD and steroids  - As above, improving    Mild metabolic acidosis  - Improved    Coronary artery disease s/p CABG x 3 and MAZE procedure  - History of cardiogenic shock, IABP in 1/2019  - Continue aspirin, Lipitor, Coreg.  Holding Lisinopril due to FATEMEH    Primary Hypertension  - Blood pressures elevated  - Continue Norvasc, Coreg, Isordil, and Hydralazine.   - Holding Lisinopril due to FATEMEH.     Moderate aortic regurgitation  - S/p annuloplasty  - Decrease afterload. Continue aspirin. History of mitral valve replacement  - S/p annuloplasty  - Continue aspirin     Insulin-dependent diabetes mellitus  - Blood sugars labile - hypoglycemic overnight  - Will decrease Lantus from 18 to 15 units QHS. Switch to low SSI  - Hypoglycemia protocol in place.      Prolonged QT  - K and Mg WNL  - Monitor drugs that can prolong QT  - Continue telemetry     Mild Thrombocytopenia  - Likely secondary to plasma cell dyscrasia, renal disease, and Covid. - Continue to monitor closely     MGUS  - IgG kappa disease, monitored   - Follows Dr. Camryn Castillo. Follow-up as previously planned.     Chronic troponin elevation  - Troponin is chronically elevated, flat trend  - Recent stress 10/2021 at [facility] no ischemia  - Monitor for chest pain. Maintain telemetry    BPH  - Continue Flomax    Depression  - Continue Zoloft    Former tobacco use     Physical Deconditioning  - PT/OT consulted      Goals of care: Full code, palliative following     Disposition: ECF placement, precert in process      Chief Complaint: SOB    Hospital Course:   per HPI, \"68 yo M with history of ICM with HFrEF, CAD, CABG, CKD, HTN, T2DM presented to [facility] ED with chief complaints of shortness of breath x 1 week with associated cough and BLE swelling.     ED course: presented afebrile and hemodynamically stable. Initially hypertensive to 181/72, later BP in 130s with mild tachypnea. Saturating >34% on 1L NC. Labs revealed mild hyponatremia, BUN 89 with CR 3.3. ProBNP 36515 (previously ~84529 in 2019), troponin 0.048. EKG NSR with nonspecific ST and T wave changes. COVID rapid positive (vaccinated). CXR with interstitial densities throughout the bilateral lungs R>L. Right basilar pleural effusion, fluid within minor fissures. Given Bumex 2 mg IV x1 dose in the ED.  Admitted for CHF exacerbation in the setting of COVID.     On evaluation, patient reports having progressively worsening dyspnea at rest and exertion and BLE edema for about month. Admits to orthopnea. No PND. No chest pain, palpitations, dizziness/lightheadedness, recent fall/syncope. Has had acute cough ~1 week with some \"phlegm\". No subjective fever, chills, nausea, vomiting, abdominal pain, diarrhea, loss of taste/smell. No known sick contact. Currently, feeling slightly chilly and weak. Reports compliance to medication and diet/fluid restrictions. Normally wears 1L NC at night but recently has been using oxygen continuously. Unclear if on oxygen for CHF or other reasons. \"     Subjective:  Patient seen and examined. No acute events overnight. Patient feeling well and not requiring oxygen. He has no acute complaints at this time. Awaiting PreCert. Medications:  Reviewed    Infusion Medications    sodium chloride Stopped (12/27/21 1336)    dextrose       Scheduled Medications    insulin glargine  15 Units SubCUTAneous Nightly    insulin lispro  0-6 Units SubCUTAneous TID WC    insulin lispro  0-3 Units SubCUTAneous Nightly    amLODIPine  5 mg Oral Daily    hydrALAZINE  50 mg Oral 3 times per day    famotidine  20 mg Oral Every Other Day    heparin (porcine)  5,000 Units SubCUTAneous BID    isosorbide dinitrate  10 mg Oral BID    sodium chloride flush  5-40 mL IntraVENous 2 times per day    [Held by provider] aspirin EC  81 mg Oral Daily    atorvastatin  40 mg Oral Daily    carvedilol  12.5 mg Oral BID WC    sertraline  50 mg Oral Daily    tamsulosin  0.4 mg Oral Daily    calcium replacement protocol   Other RX Placeholder     PRN Meds: ipratropium-albuterol, sodium chloride flush, sodium chloride, polyethylene glycol, acetaminophen **OR** acetaminophen, glucose, dextrose, glucagon (rDNA), dextrose, albuterol sulfate HFA      Intake/Output Summary (Last 24 hours) at 1/3/2022 1735  Last data filed at 1/3/2022 1343  Gross per 24 hour   Intake 735 ml   Output --   Net 735 ml       Diet:  ADULT DIET;  Dysphagia - Soft and Bite Sized; Moderately Thick (Honey)    Exam:  BP (!) 178/62   Pulse 78   Temp 97.7 °F (36.5 °C)   Resp 18   Wt 188 lb 14.4 oz (85.7 kg)   SpO2 100%   BMI 26.35 kg/m²     Physical Exam   General appearance: Acute on chronically ill appearing. Fatigued appearing. Eyes:  PERRL. Conjunctivae/corneas clear. HENT: Head normal appearing. Nares normal. Oral mucosa moist.  Hearing intact. Neck: Supple, with full range of motion. Trachea midline. No gross JVD appreciated. Respiratory:  Normal effort, no rhonchi or wheezing. Diminished and rales bilaterally in lower fields. Cardiovascular: Normal rate, regular rhythm with normal S1/S2 without murmurs. 1+ BLE edema. Abdomen: Soft, non-tender, non-distended with normal bowel sounds. Musculoskeletal: No joint swelling or tenderness. Normal tone. No abnormal movements. Skin: Warm and dry. No rashes or lesions. Neurologic:  No focal sensory/motor deficits in the upper or lower extremities. Cranial nerves:  grossly non-focal 2-12. Psychiatric: Alert and oriented, normal insight and thought content. Flat affect. Capillary Refill: Brisk,< 3 seconds. Peripheral Pulses: +2 palpable, equal bilaterally. Labs:   No results for input(s): WBC, HGB, HCT, PLT in the last 72 hours. Recent Labs     01/01/22  0600 01/02/22  0801 01/03/22  0709    139 140   K 3.8 4.1 4.3    108 110   CO2 21* 22* 21*   * 92* 81*   CREATININE 2.8* 2.6* 2.4*   CALCIUM 8.1* 8.5 8.4*     No results for input(s): AST, ALT, BILIDIR, BILITOT, ALKPHOS in the last 72 hours. No results for input(s): INR in the last 72 hours. No results for input(s): [employer] in the last 72 hours.     Urinalysis:      Lab Results   Component Value Date    NITRU NEGATIVE 12/25/2021    WBCUA 0-2 12/25/2021    BACTERIA NONE SEEN 12/25/2021    RBCUA 0-2 12/25/2021    BLOODU SMALL 12/25/2021    SPECGRAV 1.014 12/25/2021    GLUCOSEU neg 03/17/2021    GLUCOSEU Negative 03/11/2020 Radiology:  FL MODIFIED BARIUM SWALLOW W VIDEO   Final Result   1. Laryngeal penetration of honey thick, nectar thick, thin and soft barium without evidence of aspiration. 2. Additional recommendations from the speech therapist will follow. **This report has been created using voice recognition software. It may contain minor errors which are inherent in voice recognition technology. **      Final report electronically signed by Dr. Joan Felix on 12/28/2021 1:07 PM      XR CHEST PORTABLE   Final Result   Stable small right pleural effusion and bibasilar patchy opacities. **This report has been created using voice recognition software. It may contain minor errors which are inherent in voice recognition technology. **      Final report electronically signed by Dr. Ross Baer MD on 12/27/2021 11:56 AM      VL DUP LOWER EXTREMITY VENOUS BILATERAL   Final Result   No evidence of a DVT. **This report has been created using voice recognition software. It may contain minor errors which are inherent in voice recognition technology. **      Final report electronically signed by Dr. Joel Raymond on 12/27/2021 7:23 AM      US RENAL COMPLETE   Final Result   1. Mild right-sided renal cortical thinning. 2. Incompletely distended urinary bladder. Final report electronically signed by Dr. Joan Felix on 12/25/2021 11:06 PM      XR CHEST (2 VW)   Final Result   1. Interstitial densities throughout the bilateral lungs greater on the    right than left most consistent with edema. Right basilar pleural and    parenchymal opacities likely representing small right basilar effusion    with atelectasis. Fluid within the minor fissure. Possible tiny left    basilar effusion. Overall findings are likely reflective of congestive    heart failure.       This document has been electronically signed by: Patel Lara DO on    12/25/2021 12:27 AM          Diet: ADULT DIET; Dysphagia - Soft and Bite Sized; Moderately Thick (Honey)    DVT prophylaxis: [x] Lovenox                                 [] SCDs                                 [] SQ Heparin                                 [] Encourage ambulation           [] Already on Anticoagulation     Disposition:    [] Home       [] TCU       [] Rehab       [] Psych       [x] SNF       [] [place]       [] Other-    Code Status: Full Code    PT/OT Eval Status: on board      Electronically signed by Dheeraj Jacobson MD on 1/3/2022 at 5:35 PM    This note was electronically signed. Parts of this note may have been dictated by use of voice recognition software and electronically transcribed. The note may contain errors not detected in proofreading. Please refer to my supervising physician's documentation if my documentation differs.

## 2022-01-03 NOTE — PROGRESS NOTES
[facility]   Nephrology progress note  1/3/2022, 12:26 PM      Pt Name:    Cassie Castellanos  MRN:     [id number]     [place]:    1945  Admit Date:    12/24/2021 11:12 PM  Primary Care Physician:  Ene Dubois   Room number  6A-09/009-A    Chief Complaint: Nephrology following for FATEMEH/CKD    Subjective:  Patient seen and examined  Seen earlier today during rounds  On room air  No acute distress      Objective:  24HR INTAKE/OUTPUT:      Intake/Output Summary (Last 24 hours) at 1/3/2022 1226  Last data filed at 1/3/2022 0820  Gross per 24 hour   Intake 485 ml   Output --   Net 485 ml     I/O last 3 completed shifts: In: 240 [P.O.:240]  Out: -   I/O this shift:  In: 245 [P.O.:245]  Out: -   Admission weight: 200 lb (90.7 kg)  Wt Readings from Last 3 Encounters:   01/03/22 188 lb 14.4 oz (85.7 kg)   11/29/21 188 lb (85.3 kg)   11/03/21 184 lb (83.5 kg)     Body mass index is 26.35 kg/m². Physical examination  VITALS:     Vitals:    01/02/22 2343 01/03/22 0615 01/03/22 0810 01/03/22 1158   BP: (!) 165/69  (!) 193/82 (!) 166/66   Pulse: 66  80 74   Resp: 24  20 20   Temp: 98.4 °F (36.9 °C)  98 °F (36.7 °C) 96 °F (35.6 °C)   TempSrc: Oral  Oral Axillary   SpO2: 100%  96% 95%   Weight:  188 lb 14.4 oz (85.7 kg)       General Appearance: alert and cooperative with exam, appears comfortable, no distress  Mouth/Throat: Oral mucosa moist  Lungs: Diminished, without use of accessory muscles, no labored breathing  GI: soft, non-tender, no guarding  Extremities: No significant LE edema      Lab Data  CBC:   No results for input(s): WBC, HGB, HCT, PLT in the last 72 hours.   BMP:  Recent Labs     01/01/22  0600 01/02/22  0801 01/03/22  0709    139 140   K 3.8 4.1 4.3    108 110   CO2 21* 22* 21*   * 92* 81*   CREATININE 2.8* 2.6* 2.4*   GLUCOSE 98 59* 57*   CALCIUM 8.1* 8.5 8.4*     Hepatic:   No results for input(s): LABALBU, AST, ALT, ALB, BILITOT, ALKPHOS in the last 72 hours.      Meds:  Infusion:    sodium chloride Stopped (12/27/21 1336)    dextrose       Meds:    amLODIPine  5 mg Oral Daily    hydrALAZINE  50 mg Oral 3 times per day    insulin glargine  18 Units SubCUTAneous Nightly    famotidine  20 mg Oral Every Other Day    heparin (porcine)  5,000 Units SubCUTAneous BID    isosorbide dinitrate  10 mg Oral BID    sodium chloride flush  5-40 mL IntraVENous 2 times per day    [Held by provider] aspirin EC  81 mg Oral Daily    atorvastatin  40 mg Oral Daily    carvedilol  12.5 mg Oral BID WC    sertraline  50 mg Oral Daily    tamsulosin  0.4 mg Oral Daily    insulin lispro  0-12 Units SubCUTAneous TID WC    insulin lispro  0-6 Units SubCUTAneous Nightly    calcium replacement protocol   Other RX Placeholder     Meds prn: ipratropium-albuterol, sodium chloride flush, sodium chloride, polyethylene glycol, acetaminophen **OR** acetaminophen, glucose, dextrose, glucagon (rDNA), dextrose, albuterol sulfate HFA       Impression and Plan:  1. FATEMEH on CKD 4. BUN and creatinine both improving  Creatinine down to 2.4, BUN down to 81  No need for renal replacement therapy  Awaiting pre-CERT for discharge  We will continue to monitor renal function while he is here  Discussed with patient no immediate need for renal replacement therapy now but will likely need in the future    2. Azotemia due to FATEMEH/CKD and steroids: As above, improving  3. Mild metabolic acidosis. Improved  4. Insulin-dependent diabetes mellitus  5. Chronic systolic cardiomyopathy with EF 30 to 35%  6. Mild thrombocytopenia  7. Diabetic nephropathy  8. Proteinuria secondary to diabetic nephropathy  9. Monoclonal gammopathy seeing oncology/Dr. Thea Barros  10.   Awaiting pre-CERT    Discussed with patient      Alfonso Muir MD  [facility]

## 2022-01-03 NOTE — PROGRESS NOTES
Pt. Removed from [facility] isolation per [facility] guidelines as approved by Dr. Trinity Duffy. 53

## 2022-01-03 NOTE — CARE COORDINATION
1/3/22, 11:55 AM EST    DISCHARGE PLANNING EVALUATION    Spoke Patient's wife and updated that he has been removed from isolation and confirmed that plan is for him to go to [facility] and pre-cert is in process. Spoke with Alexandre Abreu at [facility] and that patient is out of isolation. They have pre-cert in progress.

## 2022-01-04 NOTE — CARE COORDINATION
1/4/22, 2:56 PM EST    DISCHARGE PLANNING EVALUATION      Spoke with Tawana at [facility] regarding pre-cert. Tad Mota will check on pre-cert and get back.

## 2022-01-04 NOTE — CARE COORDINATION
Discharge Planning Update:     Hospitalist following for COVID. Pt is now out of isolation. PT/OT, recommending discharge to SNF. Awaiting precert. Patient goals, treatment preferences and discharge plan: Planning discharge to [facility] at discharge. Precert is in progress. SW following.

## 2022-01-04 NOTE — PROGRESS NOTES
[facility]   Nephrology progress note  1/4/2022, 12:37 PM      Pt Name:    Romero Peterson  MRN:     [id number]     Birthdate:    [birth year]  Admit Date:    12/24/2021 11:12 PM  Primary Care Physician:  Hailey Rice   Room number  6A-09/009-A    Chief Complaint: Nephrology following for FATEMEH/CKD    Subjective:  Patient seen and examined  Seen earlier today during rounds  On room air      Objective:  24HR INTAKE/OUTPUT:      Intake/Output Summary (Last 24 hours) at 1/4/2022 1237  Last data filed at 1/4/2022 0949  Gross per 24 hour   Intake 950 ml   Output 300 ml   Net 650 ml     I/O last 3 completed shifts: In: 845 [P.O.:845]  Out: 300 [Urine:300]  I/O this shift:  In: 350 [P.O.:350]  Out: -   Admission weight: 200 lb (90.7 kg)  Wt Readings from Last 3 Encounters:   01/03/22 188 lb 14.4 oz (85.7 kg)   11/29/21 188 lb (85.3 kg)   11/03/21 184 lb (83.5 kg)     Body mass index is 26.35 kg/m². Physical examination  VITALS:     Vitals:    01/03/22 2345 01/04/22 0500 01/04/22 0815 01/04/22 1058   BP: (!) 141/65 (!) 176/69 (!) 152/58 (!) 145/56   Pulse: 66 69 74 65   Resp: 18 18 17 17   Temp: 97.7 °F (36.5 °C) 97.7 °F (36.5 °C) 97.6 °F (36.4 °C) 97.5 °F (36.4 °C)   TempSrc: Oral Oral Oral Oral   SpO2: 97% 95% 98% 97%   Weight:         General Appearance: alert and cooperative with exam, appears comfortable, no distress  Mouth/Throat: Oral mucosa moist  Lungs: Diminished, without use of accessory muscles, no labored breathing  GI: soft, non-tender, no guarding  Extremities: No significant LE edema      Lab Data  CBC:   No results for input(s): WBC, HGB, HCT, PLT in the last 72 hours.   BMP:  Recent Labs     01/02/22  0801 01/03/22  0709 01/04/22  0645    140 136   K 4.1 4.3 4.3    110 108   CO2 22* 21* 18*   BUN 92* 81* 78*   CREATININE 2.6* 2.4* 2.4*   GLUCOSE 59* 57* 120*   CALCIUM 8.5 8.4* 8.3*     Hepatic:   No results for input(s): LABALBU, AST, ALT, ALB, BILITOT, ALKPHOS in the last 72 hours.      Meds:  Infusion:    sodium chloride Stopped (12/27/21 1336)    dextrose       Meds:    insulin glargine  15 Units SubCUTAneous Nightly    insulin lispro  0-6 Units SubCUTAneous TID     insulin lispro  0-3 Units SubCUTAneous Nightly    amLODIPine  10 mg Oral Daily    hydrALAZINE  50 mg Oral 3 times per day    famotidine  20 mg Oral Every Other Day    heparin (porcine)  5,000 Units SubCUTAneous BID    isosorbide dinitrate  10 mg Oral BID    sodium chloride flush  5-40 mL IntraVENous 2 times per day    aspirin EC  81 mg Oral Daily    atorvastatin  40 mg Oral Daily    carvedilol  12.5 mg Oral BID WC    sertraline  50 mg Oral Daily    tamsulosin  0.4 mg Oral Daily    calcium replacement protocol   Other RX Placeholder     Meds prn: ipratropium-albuterol, sodium chloride flush, sodium chloride, polyethylene glycol, acetaminophen **OR** acetaminophen, glucose, dextrose, glucagon (rDNA), dextrose, albuterol sulfate HFA       Impression and Plan:  1. FATEMEH on CKD 4. BUN and creatinine both improving  Creatinine down to 2.4, stable now  No need for renal replacement therapy  Awaiting pre-CERT for discharge      2. Azotemia due to FATEMEH/CKD and steroids: As above, improving. No uremic signs  3. Mild metabolic acidosis. Improved  4. Insulin-dependent diabetes mellitus  5. Chronic systolic cardiomyopathy with EF 30 to 35%: will resume bumex 1 mg BID and adjust if needed  6. Mild thrombocytopenia  7. Diabetic nephropathy  8. Proteinuria secondary to diabetic nephropathy  9. Monoclonal gammopathy seeing oncology/Dr. Eddie Rm  10.   Awaiting pre-CERT    Discussed with patient      Cecile Kim MD  [facility]

## 2022-01-04 NOTE — CARE COORDINATION
1/4/22, 2:59 PM EST    DISCHARGE PLANNING EVALUATION      Left message at [facility] for Stephany Standing regarding referral that was made yesterday.

## 2022-01-04 NOTE — PROGRESS NOTES
[address] [address]  INPATIENT PHYSICAL THERAPY  DAILY NOTE  STRZ 6A CAPACITY EBOLA - 6A--A    Time In: 1042  Time Out: 1451  Timed Code Treatment Minutes: 25 Minutes  Minutes: 25          Date: 2022  Patient Name: Breonna Barrera,  Gender:  male        MRN: [id number]  : 1945  (78 y.o.)     Referring Practitioner: Ronan Kirk MD  Diagnosis: Acute on chronic renal insufficiency  Additional Pertinent Hx: Per H&P :  ED course: presented afebrile and hemodynamically stable. Initially hypertensive to 181/72, later BP in 130s with mild tachypnea. Saturating >34% on 1L NC. Labs revealed mild hyponatremia, BUN 89 with CR 3.3. ProBNP 82057 (previously ~82805 in 2019), troponin 0.048. EKG NSR with nonspecific ST and T wave changes. COVID rapid positive (vaccinated). CXR with interstitial densities throughout the bilateral lungs R>L. Right basilar pleural effusion, fluid within minor fissures. Given Bumex 2 mg IV x1 dose in the ED. Admitted for CHF exacerbation in the setting of COVID. On evaluation, patient reports having progressively worsening dyspnea at rest and exertion and BLE edema for about month. Admits to orthopnea. No PND. No chest pain, palpitations, dizziness/lightheadedness, recent fall/syncope. Has had acute cough ~1 week with some \"phlegm\". No subjective fever, chills, nausea, vomiting, abdominal pain, diarrhea, loss of taste/smell. No known sick contact. Currently, feeling slightly chilly and weak. Reports compliance to medication and diet/fluid restrictions. Normally wears 1L NC at night but recently has been using oxygen continuously. Unclear if on oxygen for CHF or other reasons. Of note, recently had a visit to [place] for syncope, transferred to [facility]. Stress test was negative. TTE showed slightly reduced EF 35% (10/21) from 40-45% on prior TTE (2020).      Prior Level of Function:  Lives With: Spouse,Family (daughter)  Type of Home: House  Home Layout: One level  Home Access: Stairs to enter with rails  Entrance Stairs - Number of Steps: \"a couple\" pt unable to state  Entrance Stairs - Rails:  (uni rail)  Home Equipment: 4 wheeled walker,Cane,Wheelchair-manual,Hospital bed,Lift chair   Bathroom Equipment: Commode    Receives Help From: Family  ADL Assistance: Needs assistance (pt reported independent with feeding/grooming, family assists with all other ADLs)  Ambulation Assistance: Needs assistance  Transfer Assistance: Needs assistance  Active : No  Additional Comments: Pt a questionable historian. Per chart review, family assists with ADLs and transfers to w/c. Pt initially reported used walker within home, then later stated require assist of both wife and daughter to get to w/c. Restrictions/Precautions:  Restrictions/Precautions: Isolation,Fall Risk  Position Activity Restriction  Other position/activity restrictions: Out of isolation     SUBJECTIVE: RN approved session, pt is supine in bed, confused, agreeable to PT. Pt anxious during mobility. PAIN: denies    Vitals: Oxygen: room air 98% at rest, 96% after mobility    OBJECTIVE:  Bed Mobility:  Rolling to Left: Moderate Assistance   Supine to Sit: Moderate Assistance  Sit to Supine: Moderate Assistance   Scooting: Maximum Assistance, to EOB    Transfers:  Attempted sit to stand transfer x 2 with RW, pt unable, resistant and anxious    Balance:  Static Sitting Balance:  Stand By Assistance, x 10 minutes    Functional Outcome Measures: Completed  AM-PAC Inpatient Mobility Raw Score : 8  AM-PAC Inpatient T-Scale Score : 28.52    ASSESSMENT:  Assessment: Patient progressing toward established goals. Activity Tolerance:  Patient tolerance of  treatment: fair. Anxious during mobility, weak, fatigues quickly.      Equipment Recommendations:Equipment Needed: No (pt states he has a 4ww and w/c, continue to assess needs)  Discharge Recommendations: Subacte/Skilled Nursing Facility  Plan: Times per week: 3-5x C  Current Treatment Recommendations: Strengthening,Neuromuscular Re-education,Home Exercise Program,Safety Education & [place] [employer] Training,Patient/Caregiver Education & Training,Functional Mobility Training,Equipment Evaluation, Education, & procurement    Patient Education  Patient Education: [employer] Mobility, Transfers, Pursed Lip Breathing    Goals:  Patient goals : asked multiple times, pt unable to state  Short term goals  Time Frame for Short term goals: By hospital d/c  Short term goal 1: Pt to complete all bed mobility tasks with CGA for improved indep with adjusting himself in bed  Short term goal 2: Pt to sit EOB x 20 minutes to improve upright tolerance in prep for transfer/gait training. Short term goal 3: PT to assess transfers. Short term goal 4: PT to assess gait. Long term goals  Time Frame for Long term goals : NA due to short ELOS    Following session, patient left in safe position with all fall risk precautions in place.

## 2022-01-04 NOTE — PROGRESS NOTES
[address] [address]  INPATIENT SPEECH THERAPY  STRZ 6A CAPACITY EBOLA  DAILY NOTE    TIME   SLP Individual Minutes  Time In: 1330  Time Out: 5118  Minutes: 12  Timed Code Treatment Minutes: 0 Minutes       Date: 2022  Patient Name: Nicolette Koroma      CSN: [id number]   : 1945  (78 y.o.)  Gender: male   Referring Physician:  Claire Lieberman DO  Diagnosis: Acute on Chronic Renal Insufficiency   Secondary Diagnosis: Dysphagia   Precautions: Aspiration Risk  Current Diet: Soft and Bite-Size with Moderately Thick Liquids   Swallowing Strategies: Full Upright Position, Small Bite/Sip, Multiple Swallow, Medications Whole with Puree, Direct 1:1 Supervision, Alternate Solids and Liquids, Limit Distractions and Monitor for Fatigue    Date of Last MBS/FEES: 2021    Pain:  No pain reported. Subjective:  ELAYNE Akers approved completion of dysphagia tx this date. Upon arrival to room, patient sitting upright in bed awake with thin liquids + ice chips in hospital jug. ST removed thin liquids + ice chips from mug d/t patient currently on moderately thick liquids based on most recent MBS. Patient agreeable to dysphagia tx this date; however, requesting to be placed on bedpan to have BM resulting in early discontinuation of dysphagia session. Nursing Education:   ST completed review for moderately thick liquids based on most recent MBS results from 2021. ELAYNE Akers reporting patient does not like thickened liquids and has been completing medication pass with medications whole with thin liquids. ST completed review of heightened aspiration risk with medications whole with thin liquids d/t poor bolus control and impaired airway protection identified on MBS. ST reviewed risks associated with aspiration - pneumonia, pulmonary decline, increased O2 demands, and death.  ST to follow-up to complete pharyngeal strengthening exercises to improve overall pharyngeal integrity prior to completing repeat instrumental evaluation. ST update patient's care board to reflect diet recommendations of soft and bite-size with moderately thick liquids and swallow strategies; verbal receptiveness noted. Short-Term Goals:  SHORT TERM GOAL #1:  Goal 1: Martina Montanez will consume a soft and bite-size diet with moderately thick liquids while implementing small bites/sips, upright position, alternation of bites/sips, and double swallow with no overt s/s of aspiration and adequate endurance to meet nutrition/hydration needs  INTERVENTIONS: ST completed review of rationale for thickened liquids at this time based on most recent MBS. Patient stated understanding. Patient identified 4/4 swallow strategies when given multiple choice cue in a FO2. Patient required moderate verbal cues to carryover small sips and utilization of double swallow. With moderately thick liquids via cup sip, patient demonstrated evidence of adequate labial seal, suspected adequate bolus control/containment, suspected timely swallow initiation, suspected adequate hyolaryngeal elevation upon manual palpation, and suspected pharyngeal residue d/t presence of throat clear x1 following the swallow. Of course pharyngeal dysfunction and totality of airway invasion events cannot be formally assessed without presence instrumental evaluation; however, most recent MBS completed on 12/28/2021 with initial dysphagia tx post MBS completed this date therefore patient is not appropriate for repeat instrumental evaluation at this time. *recommend patient continues with a soft and bite-size diet with moderately thick liquids with direct 1:1 assistance, small bites/sips, alternation of bites/sips, double swallow, and medications whole in puree.      *post evaluation, patient without respiratory distress upon leaving room; ELAYNE Mccurdy notified re: clinical findings and recommendations from the assessment; verbal receptiveness noted       SHORT TERM GOAL #2:  Goal 2: Patient will complete advanced PO trials with timely/coordinated mastication, complete bolus clearance, and timely swallow initation and no overt s/s of aspiration and adequate endurance for potential diet advancement  INTERVENTIONS: Did not address this date d/t focus on other goals. SHORT TERM GOAL #3:  Goal 3: Patient will complete pharyngeal strengthening exericses (effortful, soni, CTAR, shaker) x10 reps to improve pharyngeal constriction, timeliness of the swallow, and overall airway protection for potential diet advancement to least restrictive diet. INTERVENTIONS: ST provided skilled instruction regarding how to complete effortful swallow and rationale for effortful swallow this date. Patient completed effortful swallow with and without moderately thick liquids this date with fair success. ST encouraged patient to complete 10 effortful swallows 3-5x/day to improve pharyngeal swallow function - ST updated care board to encourage HEP. Effortful Swallow: 5 reps x 1 set      Long-Term Goals:  No long term goals recommended d/t short ELOS              EDUCATION:  Learner: Patient  Education:  Reviewed results and recommendations of this evaluation, Reviewed diet and strategies, Reviewed signs, symptoms and risks of aspiration, Reviewed ST goals and Plan of Care, Education Related to Potential Risks and Complications Due to Impairment/Illness/Injury, Education Related to Prevention of Recurrence of Impairment/Illness/Injury and Education Related to [employer] and Wellness  Evaluation of Education: [employer] understanding, Needs further instruction and Family not present    ASSESSMENT/PLAN:  Activity Tolerance:  Patient tolerance of  treatment: fair. Shortened session d/t patient requesting to be placed on bedpan for BM. Assessment/Plan: Patient progressing toward established goals.   Continues to require skilled care of licensed speech pathologist to progress toward achievement of established goals and plan of care. .     Plan for Next Session: Pharyngeal Strengthening Exercises, PO trials.       [facility]) [address] M[address]

## 2022-01-04 NOTE — PROGRESS NOTES
[facility] 6A CAPACITY EBOLA  Occupational Therapy  Daily Note  Time:   Time In: 1001  Time Out: 1026  Timed Code Treatment Minutes: 25 Minutes  Minutes: 25          Date: 2022  Patient Name: Cassie Castellanos,   Gender: male      Room: [facility]-A  MRN: [id number]  : 1945  (78 y.o.)  Referring Practitioner: Karie Camargo MD  Diagnosis: acute on chronic renal insufficiency  Additional Pertinent Hx: Per H&P:77 yo M with history of ICM with HFrEF, CAD, CABG, CKD, HTN, T2DM presented to [facility] ED with chief complaints of shortness of breath x 1 week with associated cough and BLE swelling. CXR with interstitial densities throughout the bilateral lungs R>L. Right basilar pleural effusion, fluid within minor fissures. Restrictions/Precautions:  Restrictions/Precautions: Isolation,Fall Risk  Position Activity Restriction  Other position/activity restrictions: Out of isolation     SUBJECTIVE: nurse Debora Lara ok'd session, In bed upon arrival, agreeable to OT session, Water pitcher with ice and straw found on tray table, Nurse Maria Eugenia notified and stated \"I am allowing it\"    PAIN: 0/10:     Vitals: Patient on Room Air  upon arrival to room. Patient O2 sats at 95 %. Upon activity patient maintaining O2. COGNITION: Slow Processing    ADL:   Grooming: Minimal Assistance. A for thoroughness, combed hair sitting up in bed. ADDITIONAL ACTIVITIES:  Guided patient through B UE A/AROM, x10 reps x1 set sitting up in bed, in order to improve UE ROM & challenge activity tolerance required for UB dressing skills. Labored movement    Assistance for repositioning in supine due to being uncomfotable      ASSESSMENT:     Activity Tolerance:  Patient tolerance of  treatment: fair.  Easily fatigued      Discharge Recommendations: ECF with OT  Equipment Recommendations: Equipment Needed: No  Plan: Times per week: 1-2  Current Treatment Recommendations: Rachel Granados Training,Patient/Caregiver Education & Training,Self-Care / ADL,Positioning    Patient Education  Patient Education: ADL's and Home Exercise Program    Goals  Short term goals  Time Frame for Short term goals: by discharge  Short term goal 1: Pt will complete simple grooming/feeding (if cleared for diet) with minimal assistance and AE prn to increase independence and ease with washing face. Short term goal 2: Pt will complete BUE A/AROM exercises X5 reps to increase overall strength/endurance needed for ADL tasks. Short term goal 3: Pt will tolerate further assessment of EOB sitting balance by OTR when appropriate. Following session, patient left in safe position with all fall risk precautions in place.

## 2022-01-05 NOTE — PROGRESS NOTES
PROGRESS NOTE      Patient:  Breonna Barefoot      Unit/Bed:6A-09/009-A    YOB: 1945    MRN: [id number]       Acct: [de-identified]     PCP: Warren Hooker    Date of Admission: 12/24/2021      Assessment/Plan:    Anticipated Discharge: pending [address] [employer] Problems    Diagnosis Date Noted    Acute respiratory failure with hypoxia ([facility] [address] [J96.01]     Community acquired pneumonia [J18.9]     COVID-19 [U07.1] 12/25/2021    Acute on chronic congestive heart failure ([address] [I50.9] 04/25/2019       Acute Hypoxic Respiratory Failure secondary to Covid-19 pneumonia and CHF  - CXR 12/27: Stable small right pleural effusion and bibasilar patchy opacities  - Currently on RA   - Continue Decadron. Lovenox for DVT prophylaxis with renal adjustment. Pepcid for GI prophylaxis. - Completed course of Rocephin and Doxycycline for possible superimposed bacterial infection.   - Encourage incentive spirometer and Acapella    Acute Decompensated Systolic Heart Failure  - CXR on admission: interstitial densities throughout bilateral lungs, greater on right, most consistent with edema  - Several recent echos (EF 35% at [facility] 10/17/21 with mod AR and mod LVH, 12/2020 EF 40-45%, 12/2019 EF 25-30% and elevated RVSP, 12/26 30-35% EF)  - Continue Coreg. Holding Lisinopril  due to FATEMEH. FATEMEH on CKD vs progression of CKD  - Improving. Creatinine down to 2.4, BUN down to 71. No need for renal replacement therapy at this time.   - Nephrology consulted - appreciate input.   - Hold nephrotoxic agents. Renally adjust medications as needed. Azotemia   - Due to FATEMEH/CKD and steroids  - As above, improving    Mild metabolic acidosis  - Improved    Coronary artery disease s/p CABG x 3 and MAZE procedure  - History of cardiogenic shock, IABP in 1/2019  - Continue aspirin, Lipitor, Coreg.  Holding Lisinopril due to FATEMEH    Primary Hypertension  - Blood pressures elevated  - Continue Norvasc, Coreg, Isordil, and Hydralazine.   - Holding Lisinopril due to FATEMEH.     Moderate aortic regurgitation  - S/p annuloplasty  - Decrease afterload. Continue aspirin. History of mitral valve replacement  - S/p annuloplasty  - Continue aspirin     Insulin-dependent diabetes mellitus  - Blood sugars labile - hypoglycemic overnight  - Will decrease Lantus from 15 to 10 units QHS. Switch to low SSI  - Hypoglycemia protocol in place.      Prolonged QT  - K and Mg WNL  - Monitor drugs that can prolong QT  - Continue telemetry     Mild Thrombocytopenia  - Likely secondary to plasma cell dyscrasia, renal disease, and Covid. - Continue to monitor closely     MGUS  - IgG kappa disease, monitored   - Follows Dr. Thea Barros. Follow-up as previously planned.     Chronic troponin elevation  - Troponin is chronically elevated, flat trend  - Recent stress 10/2021 at [facility] no ischemia  - Monitor for chest pain. Maintain telemetry    BPH  - Continue Flomax    Depression  - Continue Zoloft    Former tobacco use     Physical Deconditioning  - PT/OT consulted      Goals of care: Full code, palliative following     Disposition: ECF placement, pre-CERT approved. Patient is good for discharge tomorrow       Chief Complaint: SOB    Hospital Course:   per HPI, \"68 yo M with history of ICM with HFrEF, CAD, CABG, CKD, HTN, T2DM presented to [facility] ED with chief complaints of shortness of breath x 1 week with associated cough and BLE swelling.     ED course: presented afebrile and hemodynamically stable. Initially hypertensive to 181/72, later BP in 130s with mild tachypnea. Saturating >34% on 1L NC. Labs revealed mild hyponatremia, BUN 89 with CR 3.3. ProBNP 84660 (previously ~60470 in 2019), troponin 0.048. EKG NSR with nonspecific ST and T wave changes. COVID rapid positive (vaccinated). CXR with interstitial densities throughout the bilateral lungs R>L. Right basilar pleural effusion, fluid within minor fissures. Given Bumex 2 mg IV x1 dose in the ED.  Admitted for CHF exacerbation in the setting of COVID.     On evaluation, patient reports having progressively worsening dyspnea at rest and exertion and BLE edema for about month. Admits to orthopnea. No PND. No chest pain, palpitations, dizziness/lightheadedness, recent fall/syncope. Has had acute cough ~1 week with some \"phlegm\". No subjective fever, chills, nausea, vomiting, abdominal pain, diarrhea, loss of taste/smell. No known sick contact. Currently, feeling slightly chilly and weak. Reports compliance to medication and diet/fluid restrictions. Normally wears 1L NC at night but recently has been using oxygen continuously. Unclear if on oxygen for CHF or other reasons. \"     Subjective:  Patient seen and examined. No issues overnight.   Awaiting pre-CERT    Medications:  Reviewed    Infusion Medications    sodium chloride Stopped (12/27/21 1336)    dextrose       Scheduled Medications    bumetanide  1 mg Oral BID    insulin glargine  15 Units SubCUTAneous Nightly    insulin lispro  0-6 Units SubCUTAneous TID WC    insulin lispro  0-3 Units SubCUTAneous Nightly    amLODIPine  10 mg Oral Daily    hydrALAZINE  50 mg Oral 3 times per day    famotidine  20 mg Oral Every Other Day    heparin (porcine)  5,000 Units SubCUTAneous BID    isosorbide dinitrate  10 mg Oral BID    sodium chloride flush  5-40 mL IntraVENous 2 times per day    aspirin EC  81 mg Oral Daily    atorvastatin  40 mg Oral Daily    carvedilol  12.5 mg Oral BID WC    sertraline  50 mg Oral Daily    tamsulosin  0.4 mg Oral Daily    calcium replacement protocol   Other RX Placeholder     PRN Meds: hydrALAZINE, ipratropium-albuterol, sodium chloride flush, sodium chloride, polyethylene glycol, acetaminophen **OR** acetaminophen, glucose, dextrose, glucagon (rDNA), dextrose, albuterol sulfate HFA      Intake/Output Summary (Last 24 hours) at 1/5/2022 1632  Last data filed at 1/4/2022 1830  Gross per 24 hour   Intake 350 ml   Output --   Net 350 ml       Diet:  ADULT DIET; Dysphagia - Soft and Bite Sized; Moderately Thick (Honey)    Exam:  BP (!) 145/49   Pulse 67   Temp 97.7 °F (36.5 °C) (Oral)   Resp 18   Wt 188 lb 14.4 oz (85.7 kg)   SpO2 99%   BMI 26.35 kg/m²     Physical Exam   General appearance: Acute on chronically ill appearing. Fatigued appearing. Eyes:  PERRL. Conjunctivae/corneas clear. HENT: Head normal appearing. Nares normal. Oral mucosa moist.  Hearing intact. Neck: Supple, with full range of motion. Trachea midline. No gross JVD appreciated. Respiratory:  Normal effort, no rhonchi or wheezing. Diminished and rales bilaterally in lower fields. Cardiovascular: Normal rate, regular rhythm with normal S1/S2 without murmurs. 1+ BLE edema. Abdomen: Soft, non-tender, non-distended with normal bowel sounds. Musculoskeletal: No joint swelling or tenderness. Normal tone. No abnormal movements. Skin: Warm and dry. No rashes or lesions. Neurologic:  No focal sensory/motor deficits in the upper or lower extremities. Cranial nerves:  grossly non-focal 2-12. Psychiatric: Alert and oriented, normal insight and thought content. Flat affect. Capillary Refill: Brisk,< 3 seconds. Peripheral Pulses: +2 palpable, equal bilaterally. Labs:   No results for input(s): WBC, HGB, HCT, PLT in the last 72 hours. Recent Labs     01/03/22  0709 01/04/22  0645 01/05/22  0717    136 138   K 4.3 4.3 4.4    108 107   CO2 21* 18* 22*   BUN 81* 78* 71*   CREATININE 2.4* 2.4* 2.4*   CALCIUM 8.4* 8.3* 8.2*     No results for input(s): AST, ALT, BILIDIR, BILITOT, ALKPHOS in the last 72 hours. No results for input(s): INR in the last 72 hours. No results for input(s): Ana Johnnie in the last 72 hours.     Urinalysis:      Lab Results   Component Value Date    NITRU NEGATIVE 12/25/2021    WBCUA 0-2 12/25/2021    BACTERIA NONE SEEN 12/25/2021    RBCUA 0-2 12/25/2021    BLOODU SMALL 12/25/2021    SPECGRAV 1.014 12/25/2021    GLUCOSEU neg 03/17/2021 GLUCOSEU Negative 03/11/2020       Radiology:  FL MODIFIED BARIUM SWALLOW W VIDEO   Final Result   1. Laryngeal penetration of honey thick, nectar thick, thin and soft barium without evidence of aspiration. 2. Additional recommendations from the speech therapist will follow. **This report has been created using voice recognition software. It may contain minor errors which are inherent in voice recognition technology. **      Final report electronically signed by Dr. Liang Goldstein on 12/28/2021 1:07 PM      XR CHEST PORTABLE   Final Result   Stable small right pleural effusion and bibasilar patchy opacities. **This report has been created using voice recognition software. It may contain minor errors which are inherent in voice recognition technology. **      Final report electronically signed by Dr. Aviva Morrow MD on 12/27/2021 11:56 AM      VL DUP LOWER EXTREMITY VENOUS BILATERAL   Final Result   No evidence of a DVT. **This report has been created using voice recognition software. It may contain minor errors which are inherent in voice recognition technology. **      Final report electronically signed by Dr. Sharona Saunders on 12/27/2021 7:23 AM      US RENAL COMPLETE   Final Result   1. Mild right-sided renal cortical thinning. 2. Incompletely distended urinary bladder. Final report electronically signed by Dr. Liang Goldstein on 12/25/2021 11:06 PM      XR CHEST (2 VW)   Final Result   1. Interstitial densities throughout the bilateral lungs greater on the    right than left most consistent with edema. Right basilar pleural and    parenchymal opacities likely representing small right basilar effusion    with atelectasis. Fluid within the minor fissure. Possible tiny left    basilar effusion. Overall findings are likely reflective of congestive    heart failure.       This document has been electronically signed by: Fidencio Harrison DO on    12/25/2021 12:27 AM Diet: ADULT DIET; Dysphagia - Soft and Bite Sized;  Moderately Thick (Honey)    DVT prophylaxis: [x] Lovenox                                 [] SCDs                                 [] SQ Heparin                                 [] Encourage ambulation           [] Already on Anticoagulation     Disposition:    [] Home       [] TCU       [] Rehab       [] Psych       [x] SNF       [] [place]       [] Other-    Code Status: Full Code    PT/OT Eval Status: on board      Electronically signed by Travis Peterson MD on 1/5/2022 at 4:32 PM

## 2022-01-05 NOTE — DISCHARGE INSTR - COC
of native heart without angina pectoris I25.10    Non-rheumatic mitral regurgitation I34.0    Encounter for continuous venovenous hemodiafiltration (CVVHD) ([facility]) Z99.2    Hyperphosphatemia E83.39    Leucocytosis D72.829    Severe malnutrition ([facility]) E43    Anemia in chronic kidney disease N18.9, D63.1    Ischemic cardiomyopathy I25.5    Hx of CABG Z95.1    H/O maze procedure Z98.890    H/O mitral valve replacement Z95.2    Coronary artery disease involving coronary bypass graft of native heart without angina pectoris I25.810    Hyponatremia E87.1    Acute on chronic congestive heart failure ([facility]) I50.9    Acute on chronic combined systolic and diastolic CHF, NYHA class 2 ([facility]) I50.43    MGUS (monoclonal gammopathy of unknown significance) D47.2    Elevated blood protein R77.9    CKD (chronic kidney disease) stage 4, GFR 15-29 ml/min ([facility]) N18.4    SHANNON (dyspnea on exertion) R06.00    Fatigue R53.83    COVID-19 U07.1    Acute respiratory failure with hypoxia ([facility]) J96.01    Community acquired pneumonia J18.9       Isolation/Infection:   Isolation            Contact          Patient Infection Status       Infection Onset Added Last Indicated Last Indicated By Review Planned Expiration Resolved Resolved By    MRSA 01/23/19 01/23/19 04/25/19 MRSA by PCR        Nares 1/2019, 2/2019, 4/2019  Rectal 1/2019, 2/2019    VRE 01/23/19 01/23/19 04/25/19 VRE Screen by PCR        pcr 1/2019, 2/2019  Feces Aminah 4/2019    Resolved    COVID-19 12/24/21 12/25/21 12/24/21 COVID-19, Rapid   01/03/22 Po Hinds RN    +12/24    COVID-19 (Rule Out) 12/24/21 12/24/21 12/24/21 COVID-19, Rapid (Ordered)   12/25/21 Rule-Out Test Resulted            Nurse Assessment:  Last Vital Signs: BP (!) 145/49   Pulse 67   Temp 97.7 °F (36.5 °C) (Oral)   Resp 18   Wt 188 lb 14.4 oz (85.7 kg)   SpO2 99%   BMI 26.35 kg/m²     Last documented pain score (0-10 scale): Pain Level: 0  Last Weight:   Wt Readings from Last 1 Encounters: 01/03/22 188 lb 14.4 oz (85.7 kg)     Mental Status:  oriented    IV Access:  - None    Nursing Mobility/ADLs:  Walking   Dependent  Transfer  Dependent  Bathing  Dependent  Dressing  Dependent  Toileting  Dependent  Feeding  Assisted  Med Admin  Assisted  Med Delivery   whole    Wound Care Documentation and Therapy:  Wound 02/12/19 Toe (Comment  which one) Right blister with red ring  (Active)   Number of days: 1058       Wound 02/14/19 Elbow Left 2.5 cm diameter, round skin tear. (Active)   Number of days: 1056       Wound 04/25/19 Toe (Comment  which one) Right (Active)   Number of days: 985       Wound 12/25/21 Buttocks red (Active)   Wound Etiology Pressure Stage  2 01/05/22 0800   Wound Cleansed Soap and water 01/05/22 0800   Dressing/Treatment Open to air 01/05/22 0800   Wound Assessment Pink/red 01/05/22 0800   Odor None 01/05/22 0800   María-wound Assessment Blanchable erythema 12/27/21 1400   Number of days: 11       Wound 12/25/21 Coccyx red (Active)   Wound Etiology Pressure Stage  2 01/05/22 0800   Wound Cleansed Soap and water 01/05/22 0800   Dressing/Treatment Open to air 01/05/22 0800   Wound Assessment Pink/red 01/05/22 0800   Odor None 01/05/22 0800   María-wound Assessment Blanchable erythema 12/27/21 1930   Number of days: 11        Elimination:  Continence: Bowel: No  Bladder: No  Urinary Catheter:  ***  External catheter  Colostomy/Ileostomy/Ileal Conduit: No       Date of Last BM: ***    Intake/Output Summary (Last 24 hours) at 1/5/2022 1508  Last data filed at 1/4/2022 1830  Gross per 24 hour   Intake 350 ml   Output --   Net 350 ml     I/O last 3 completed shifts: In: 700 [P.O.:700]  Out: 300 [Urine:300]    Safety Concerns:      At Risk for Falls    Impairments/Disabilities:      None    Nutrition Therapy:  Current Nutrition Therapy:   - Oral Diet:  Dental Soft    Routes of Feeding: Oral  Liquids: Nectar Thick Liquids  Daily Fluid Restriction: no  Last Modified Barium Swallow with Video (Video Swallowing Test): done on 12/28    Treatments at the Time of Hospital Discharge:   Respiratory Treatments: ***  Oxygen Therapy:  is not on home oxygen therapy. Ventilator:    - No ventilator support    Rehab Therapies: PT, Speech, OT  Weight Bearing Status/Restrictions: No weight bearing restirctions  Other Medical Equipment (for information only, NOT a DME order):  wheelchair  Other Treatments: ***    Patient's personal belongings (please select all that are sent with patient):  Glasses    RN SIGNATURE:  Electronically signed by Jovani Carreon RN on 1/6/22 at 11:04 AM EST    CASE MANAGEMENT/SOCIAL WORK SECTION    Inpatient Status Date: 12/25/2021    Readmission Risk Assessment Score:  Readmission Risk              Risk of Unplanned Readmission:  25           Discharging to Facility/ Agency   Name: Ivon Leon at [facility]  Address: [address] [place] [address]  Phone: [phone]    Dialysis Facility (if applicable)   Name:  Address:  Dialysis Schedule:  Phone:  Fax:    / signature: Electronically signed by Dixon Ha. THEA Abbasi on 1/5/22 at 3:09 PM EST    PHYSICIAN SECTION    Prognosis: Good    Condition at Discharge: Stable    Rehab Potential (if transferring to Rehab): Good    Recommended Labs or Other Treatments After Discharge: BMP in one week    Physician Certification: I certify the above information and transfer of Andrea Mireles  is necessary for the continuing treatment of the diagnosis listed and that he requires [facility] for less 30 days.      Update Admission H&P: No change in H&P    PHYSICIAN SIGNATURE:  Electronically signed by Bill Ga MD on 1/6/22 at 9:55 AM EST

## 2022-01-05 NOTE — FLOWSHEET NOTE
01/05/22 0437   Provider Notification   Reason for Communication New orders   Provider Name Dr. Argenis Rogers   Provider Notification Physician   Method of Communication Secure Message   Response See orders   Notification Time [id number]   Pt in room 9908 6061 has a BP of 173/68, can a PRN be ordered? thanks.

## 2022-01-05 NOTE — PROGRESS NOTES
[address] [address]  INPATIENT PHYSICAL THERAPY  DAILY NOTE  STRZ 6A CAPACITY EBOLA - 6A--A    Time In: 8748  Time Out: 1011  Timed Code Treatment Minutes: 23 Minutes  Minutes: 23        Date: 2022  Patient Name: Bronwyn Rutledge,  Gender:  male        MRN: [id number]  : 1945  (78 y.o.)     Referring Practitioner: Whit Chen MD  Diagnosis: Acute on chronic renal insufficiency  Additional Pertinent Hx: Per H&P :  ED course: presented afebrile and hemodynamically stable. Initially hypertensive to 181/72, later BP in 130s with mild tachypnea. Saturating >34% on 1L NC. Labs revealed mild hyponatremia, BUN 89 with CR 3.3. ProBNP 41880 (previously ~65967 in 2019), troponin 0.048. EKG NSR with nonspecific ST and T wave changes. COVID rapid positive (vaccinated). CXR with interstitial densities throughout the bilateral lungs R>L. Right basilar pleural effusion, fluid within minor fissures. Given Bumex 2 mg IV x1 dose in the ED. Admitted for CHF exacerbation in the setting of COVID. On evaluation, patient reports having progressively worsening dyspnea at rest and exertion and BLE edema for about month. Admits to orthopnea. No PND. No chest pain, palpitations, dizziness/lightheadedness, recent fall/syncope. Has had acute cough ~1 week with some \"phlegm\". No subjective fever, chills, nausea, vomiting, abdominal pain, diarrhea, loss of taste/smell. No known sick contact. Currently, feeling slightly chilly and weak. Reports compliance to medication and diet/fluid restrictions. Normally wears 1L NC at night but recently has been using oxygen continuously. Unclear if on oxygen for CHF or other reasons. Of note, recently had a visit to [place] for syncope, transferred to [facility]. Stress test was negative. TTE showed slightly reduced EF 35% (10/21) from 40-45% on prior TTE (2020).      Prior Level of Function:  Lives With: Spouse,Family (daughter)  Type of Home: House  Home Layout: One level  Home Access: Stairs to enter with rails  Entrance Stairs - Number of Steps: \"a couple\" pt unable to state  Entrance Stairs - Rails:  (uni rail)  Home Equipment: 4 wheeled walker,Cane,Wheelchair-manual,Hospital bed,Lift chair   Bathroom Equipment: Commode    Receives Help From: Family  ADL Assistance: Needs assistance (pt reported independent with feeding/grooming, family assists with all other ADLs)  Ambulation Assistance: Needs assistance  Transfer Assistance: Needs assistance  Active : No  Additional Comments: Pt a questionable historian. Per chart review, family assists with ADLs and transfers to w/c. Pt initially reported used walker within home, then later stated require assist of both wife and daughter to get to w/c. Restrictions/Precautions:  Restrictions/Precautions: Isolation,Fall Risk  Position Activity Restriction  Other position/activity restrictions: Out of isolation     SUBJECTIVE: Pt c/o being really cold on first attempt, per RN, pt's blood sugar was 40 but he received 2 tubes of glucose gel and had breakfast.  Returned later and pt still c/o'ing of being cold, temp 97.5 at 8 AM per flowsheet. Pt anxious and moaning during session. PAIN: denies    Vitals: Oxygen: room air, 99% at rest, /62 at rest, recheck at end of session 171/79    OBJECTIVE:  Bed Mobility:  Rolling to Left: Maximum Assistance   Supine to Sit: Maximum Assistance; unable to complete full upright positioning EOB due to pt anxiety, stating \"I can't\", c/o being cold    Functional Outcome Measures: Completed  AM-PAC Inpatient Mobility Raw Score : 8  AM-PAC Inpatient T-Scale Score : 28.52     ASSESSMENT:  Assessment: Patient progressing toward established goals. Pt anxious, education on the importance of mobility. Activity Tolerance:  Patient tolerance of  treatment: fair.       Equipment Recommendations:Equipment Needed: No (pt states he has a 4ww and w/c, continue to assess needs)  Discharge Recommendations: Subacte/Skilled Nursing Facility  Plan: Times per week: 3-5x GM  Current Treatment Recommendations: Strengthening,Neuromuscular Re-education,Home Exercise Program,Safety Education & Monna Fennel Training,Patient/Caregiver Education & Training,Functional Mobility Training,Equipment Evaluation, Education, & procurement    Patient Education  Patient Education: Bed Mobility    Goals:  Patient goals : asked multiple times, pt unable to state  Short term goals  Time Frame for Short term goals: By hospital d/c  Short term goal 1: Pt to complete all bed mobility tasks with CGA for improved indep with adjusting himself in bed  Short term goal 2: Pt to sit EOB x 20 minutes to improve upright tolerance in prep for transfer/gait training. Short term goal 3: PT to assess transfers. Short term goal 4: PT to assess gait. Long term goals  Time Frame for Long term goals : NA due to short ELOS    Following session, patient left in safe position with all fall risk precautions in place.

## 2022-01-05 NOTE — PROGRESS NOTES
Pt's wife Yg Richardsonmartinez here for visitation, updates given and all questions answered to the best of my ability.

## 2022-01-05 NOTE — PROGRESS NOTES
Pt's Wife Bill Montanez updated and would like  to call her today regarding placement. No other concerns at this time.

## 2022-01-05 NOTE — PROGRESS NOTES
PROGRESS NOTE      Patient:  Romelia Rico      Unit/Bed:6A-09/009-A    YOB: 1945    MRN: [id number]       Acct: [de-identified]     PCP: Jase Ocampo    Date of Admission: 12/24/2021      Assessment/Plan:    Anticipated Discharge: pending [address] Problems    Diagnosis Date Noted    Acute respiratory failure with hypoxia ([facility] [J96.01]     Community acquired pneumonia [J18.9]     COVID-19 [U07.1] 12/25/2021    Acute on chronic congestive heart failure ([address] [I50.9] 04/25/2019       Acute Hypoxic Respiratory Failure secondary to Covid-19 pneumonia and CHF  - CXR 12/27: Stable small right pleural effusion and bibasilar patchy opacities  - Currently on RA   - Continue Decadron. Lovenox for DVT prophylaxis with renal adjustment. Pepcid for GI prophylaxis. - Completed course of Rocephin and Doxycycline for possible superimposed bacterial infection.   - Encourage incentive spirometer and Acapella    Acute Decompensated Systolic Heart Failure  - CXR on admission: interstitial densities throughout bilateral lungs, greater on right, most consistent with edema  - Several recent echos (EF 35% at [facility] 10/17/21 with mod AR and mod LVH, 12/2020 EF 40-45%, 12/2019 EF 25-30% and elevated RVSP, 12/26 30-35% EF)  - Continue Coreg. Holding Lisinopril and diuresis due to FATEMEH. FATEEMH on CKD vs progression of CKD  - Improving. Creatinine down to 2.4, BUN down to 81. No need for renal replacement therapy at this time.   - Nephrology consulted - appreciate input.   - Hold nephrotoxic agents. Renally adjust medications as needed. Azotemia   - Due to FATEMHE/CKD and steroids  - As above, improving    Mild metabolic acidosis  - Improved    Coronary artery disease s/p CABG x 3 and MAZE procedure  - History of cardiogenic shock, IABP in 1/2019  - Continue aspirin, Lipitor, Coreg.  Holding Lisinopril due to FATEMEH    Primary Hypertension  - Blood pressures elevated  - Continue Norvasc, Coreg, Isordil, and Hydralazine.   - Holding Lisinopril due to FATEMEH.     Moderate aortic regurgitation  - S/p annuloplasty  - Decrease afterload. Continue aspirin. History of mitral valve replacement  - S/p annuloplasty  - Continue aspirin     Insulin-dependent diabetes mellitus  - Blood sugars labile - hypoglycemic overnight  - Will decrease Lantus from 18 to 15 units QHS. Switch to low SSI  - Hypoglycemia protocol in place.      Prolonged QT  - K and Mg WNL  - Monitor drugs that can prolong QT  - Continue telemetry     Mild Thrombocytopenia  - Likely secondary to plasma cell dyscrasia, renal disease, and Covid. - Continue to monitor closely     MGUS  - IgG kappa disease, monitored   - Follows Dr. Micaela Gregg. Follow-up as previously planned.     Chronic troponin elevation  - Troponin is chronically elevated, flat trend  - Recent stress 10/2021 at [facility] no ischemia  - Monitor for chest pain. Maintain telemetry    BPH  - Continue Flomax    Depression  - Continue Zoloft    Former tobacco use     Physical Deconditioning  - PT/OT consulted      Goals of care: Full code, palliative following     Disposition: ECF placement, precert in process      Chief Complaint: SOB    Hospital Course:   per HPI, \"70 yo M with history of ICM with HFrEF, CAD, CABG, CKD, HTN, T2DM presented to [facility] ED with chief complaints of shortness of breath x 1 week with associated cough and BLE swelling.     ED course: presented afebrile and hemodynamically stable. Initially hypertensive to 181/72, later BP in 130s with mild tachypnea. Saturating >34% on 1L NC. Labs revealed mild hyponatremia, BUN 89 with CR 3.3. ProBNP 68940 (previously ~78097 in 2019), troponin 0.048. EKG NSR with nonspecific ST and T wave changes. COVID rapid positive (vaccinated). CXR with interstitial densities throughout the bilateral lungs R>L. Right basilar pleural effusion, fluid within minor fissures. Given Bumex 2 mg IV x1 dose in the ED.  Admitted for CHF exacerbation in the setting of Sized; Moderately Thick (Honey)    Exam:  BP (!) 172/77   Pulse 68   Temp 97.7 °F (36.5 °C) (Oral)   Resp 18   Wt 188 lb 14.4 oz (85.7 kg)   SpO2 96%   BMI 26.35 kg/m²     Physical Exam   General appearance: Acute on chronically ill appearing. Fatigued appearing. Eyes:  PERRL. Conjunctivae/corneas clear. HENT: Head normal appearing. Nares normal. Oral mucosa moist.  Hearing intact. Neck: Supple, with full range of motion. Trachea midline. No gross JVD appreciated. Respiratory:  Normal effort, no rhonchi or wheezing. Diminished and rales bilaterally in lower fields. Cardiovascular: Normal rate, regular rhythm with normal S1/S2 without murmurs. 1+ BLE edema. Abdomen: Soft, non-tender, non-distended with normal bowel sounds. Musculoskeletal: No joint swelling or tenderness. Normal tone. No abnormal movements. Skin: Warm and dry. No rashes or lesions. Neurologic:  No focal sensory/motor deficits in the upper or lower extremities. Cranial nerves:  grossly non-focal 2-12. Psychiatric: Alert and oriented, normal insight and thought content. Flat affect. Capillary Refill: Brisk,< 3 seconds. Peripheral Pulses: +2 palpable, equal bilaterally. Labs:   No results for input(s): WBC, HGB, HCT, PLT in the last 72 hours. Recent Labs     01/02/22  0801 01/03/22  0709 01/04/22  0645    140 136   K 4.1 4.3 4.3    110 108   CO2 22* 21* 18*   BUN 92* 81* 78*   CREATININE 2.6* 2.4* 2.4*   CALCIUM 8.5 8.4* 8.3*     No results for input(s): AST, ALT, BILIDIR, BILITOT, ALKPHOS in the last 72 hours. No results for input(s): INR in the last 72 hours. No results for input(s): Ankush Arron in the last 72 hours.     Urinalysis:      Lab Results   Component Value Date    NITRU NEGATIVE 12/25/2021    WBCUA 0-2 12/25/2021    BACTERIA NONE SEEN 12/25/2021    RBCUA 0-2 12/25/2021    BLOODU SMALL 12/25/2021    SPECGRAV 1.014 12/25/2021    GLUCOSEU neg 03/17/2021    GLUCOSEU Negative 03/11/2020 Radiology:  FL MODIFIED BARIUM SWALLOW W VIDEO   Final Result   1. Laryngeal penetration of honey thick, nectar thick, thin and soft barium without evidence of aspiration. 2. Additional recommendations from the speech therapist will follow. **This report has been created using voice recognition software. It may contain minor errors which are inherent in voice recognition technology. **      Final report electronically signed by Dr. Kali Pringle on 12/28/2021 1:07 PM      XR CHEST PORTABLE   Final Result   Stable small right pleural effusion and bibasilar patchy opacities. **This report has been created using voice recognition software. It may contain minor errors which are inherent in voice recognition technology. **      Final report electronically signed by Dr. Yuli Najera MD on 12/27/2021 11:56 AM      VL DUP LOWER EXTREMITY VENOUS BILATERAL   Final Result   No evidence of a DVT. **This report has been created using voice recognition software. It may contain minor errors which are inherent in voice recognition technology. **      Final report electronically signed by Dr. Ana Vora on 12/27/2021 7:23 AM      US RENAL COMPLETE   Final Result   1. Mild right-sided renal cortical thinning. 2. Incompletely distended urinary bladder. Final report electronically signed by Dr. Kali Pringle on 12/25/2021 11:06 PM      XR CHEST (2 VW)   Final Result   1. Interstitial densities throughout the bilateral lungs greater on the    right than left most consistent with edema. Right basilar pleural and    parenchymal opacities likely representing small right basilar effusion    with atelectasis. Fluid within the minor fissure. Possible tiny left    basilar effusion. Overall findings are likely reflective of congestive    heart failure.       This document has been electronically signed by: Evelia Snyder DO on    12/25/2021 12:27 AM          Diet: ADULT DIET; Dysphagia - Soft and Bite Sized; Moderately Thick (Honey)    DVT prophylaxis: [x] Lovenox                                 [] SCDs                                 [] SQ Heparin                                 [] Encourage ambulation           [] Already on Anticoagulation     Disposition:    [] Home       [] TCU       [] Rehab       [] Psych       [x] SNF       [] [place]       [] Other-    Code Status: Full Code    PT/OT Eval Status: on board      Electronically signed by Glen Monroy MD on 1/4/2022 at 8:04 PM    This note was electronically signed. Parts of this note may have been dictated by use of voice recognition software and electronically transcribed. The note may contain errors not detected in proofreading. Please refer to my supervising physician's documentation if my documentation differs.

## 2022-01-05 NOTE — CARE COORDINATION
1/5/22, 4:47 PM EST    DISCHARGE PLANNING EVALUATION    Ivania Tapia will be discharged to [facility] tomorrow. Requesting transport for tomorrow at 11:00am.  Spouse is aware of discharge. Will let her know a definite time tomorrow. Called and left a message for Tawana at the facility to make her aware of potential discharge time. Spoke with his nurse and Dr. Courtney Monique and they are both agreeable to discharge plan for tomorrow.

## 2022-01-05 NOTE — CARE COORDINATION
Discharge Planning Update:     Hospitalist following for COVID. Pt is now out of isolation. PT/OT, recommending discharge to SNF. Awaiting precert.      Patient goals, treatment preferences and discharge plan: Planning discharge to [facility] at discharge.  Precert is in progress. SW following.

## 2022-01-06 NOTE — CARE COORDINATION
1/6/22, 9:24 AM EST    Patient goals/plan/ treatment preferences discussed by  and . Patient goals/plan/ treatment preferences reviewed with patient/ family. Patient/ family verbalize understanding of discharge plan and are in agreement with goal/plan/treatment preferences. Understanding was demonstrated using the teach back method. AVS provided by RN at time of discharge, which includes all necessary medical information pertaining to the patients current course of illness, treatment, post-discharge goals of care, and treatment preferences. Services After Discharge  Services At/After Discharge: [facility] ambulance ([place] [facility] [place])   IMM Letter  IMM Letter given to Patient/Family/Significant other/Guardian/POA/by[de-identified]   IMM Letter date given[de-identified] 01/06/22  IMM Letter time given[de-identified] 6521       Alex Valentin will be discharged today to [address] at [place]. He will be skilled at the facility under his [facility] [place] Medicare benefit. He will be transported by ambulance. Discharge instructions and transport forms are attached to blue discharge packet. Left a message for admissions at the facility to make them aware of discharge. Spoke with patient and his spouse and they are both agreeable to discharge plan.

## 2022-01-06 NOTE — DISCHARGE SUMMARY
[place] for syncope, transferred to [facility]. Stress test was negative. TTE showed slightly reduced EF 35% (10/21) from 40-45% on prior TTE (09/2020). Hospital Course:    81-year-old male with history of ischemic cardiomyopathy was admitted for acute on chronic respiratory failure with hypoxemia secondary to COVID-19 pneumonia. Patient was treated with Rocephin and doxycycline for superimposed bacterial infection as well as dexamethasone. Patient was also diuresed for acute on chronic systolic heart failure with last known ejection fraction of 25 to 30%. Patient also with history of chronic kidney disease, nephrology is following and renally adjusted medications. Patient required minimal O2 support and was weaned to room air on day of discharge. He continued to be weak and debilitated and is being discharged to nursing home for further rehabilitation physical therapy. Follow-up with PCP as outpatient. Consultants:  Patient Care Team:  Ramiro Lombardo as PCP - General (Certified Nurse Practitioner)    Discharge Medications:       Medication List      START taking these medications    hydrALAZINE 50 MG tablet  Commonly known as: APRESOLINE  Take 1 tablet by mouth every 8 hours     ipratropium-albuterol 0.5-2.5 (3) MG/3ML Soln nebulizer solution  Commonly known as: DUONEB  Inhale 3 mLs into the lungs every 4 hours as needed for Shortness of Breath     isosorbide dinitrate 10 MG tablet  Commonly known as: ISORDIL  Take 1 tablet by mouth 2 times daily        CHANGE how you take these medications    amLODIPine 10 MG tablet  Commonly known as: NORVASC  Take 1 tablet by mouth daily  Start taking on: January 7, 2022  What changed:   · medication strength  · how much to take  · when to take this  · Another medication with the same name was removed. Continue taking this medication, and follow the directions you see here.      bumetanide 1 MG tablet  Commonly known as: BUMEX  Take 1 tablet by mouth 2 times daily  What changed:   · medication strength  · how much to take     insulin glargine 100 UNIT/ML injection vial  Commonly known as: LANTUS  Inject 10 Units into the skin nightly  What changed:   · medication strength  · how much to take  · when to take this        CONTINUE taking these medications    acetaminophen 325 MG tablet  Commonly known as: TYLENOL     aspirin EC 81 MG EC tablet  Take 1 tablet by mouth daily     atorvastatin 40 MG tablet  Commonly known as: LIPITOR  Take 1 tablet by mouth daily     carvedilol 12.5 MG tablet  Commonly known as: COREG  Take 1 tablet by mouth 2 times daily     CoQ-10 50 MG Caps     ferrous sulfate 325 (65 Fe) MG tablet  Commonly known as: IRON 325  Take 1 tablet by mouth 2 times daily     latanoprost 0.005 % ophthalmic solution  Commonly known as: XALATAN     nitroGLYCERIN 0.4 MG SL tablet  Commonly known as: Nitrostat  Place 1 tablet under the tongue every 5 minutes as needed for Chest pain     potassium chloride 20 MEQ extended release tablet  Commonly known as: KLOR-CON M  Take 1 tablet by mouth daily     sertraline 50 MG tablet  Commonly known as: ZOLOFT     tamsulosin 0.4 MG capsule  Commonly known as: FLOMAX  Take 1 capsule by mouth daily        STOP taking these medications    BD Pen Needle Micro U/F 32G X 6 MM Misc  Generic drug: Insulin Pen Needle     isosorbide mononitrate 30 MG extended release tablet  Commonly known as: IMDUR     lisinopril 10 MG tablet  Commonly known as: PRINIVIL;ZESTRIL     metOLazone 2.5 MG tablet  Commonly known as: ZAROXOLYN           Where to Get Your Medications      Information about where to get these medications is not yet available    Ask your nurse or doctor about these medications  · amLODIPine 10 MG tablet  · bumetanide 1 MG tablet  · hydrALAZINE 50 MG tablet  · insulin glargine 100 UNIT/ML injection vial  · ipratropium-albuterol 0.5-2.5 (3) MG/3ML Soln nebulizer solution  · isosorbide dinitrate 10 MG tablet           Physical Exam:    Vitals:  Vitals:    01/05/22 2020 01/05/22 2312 01/06/22 0300 01/06/22 0800   BP: (!) 155/55 (!) 149/61 (!) 137/59 (!) 173/79   Pulse: 66 70 72 80   Resp: 16 16 18 18   Temp: 98.2 °F (36.8 °C) 98 °F (36.7 °C) 97.9 °F (36.6 °C) 97.3 °F (36.3 °C)   TempSrc: Oral Oral Oral Oral   SpO2: 95% 95% 98%    Weight:         Weight: Weight: 188 lb 14.4 oz (85.7 kg)     24 hour intake/output:    Intake/Output Summary (Last 24 hours) at 1/6/2022 1422  Last data filed at 1/6/2022 4704  Gross per 24 hour   Intake --   Output 1250 ml   Net -1250 ml       General appearance - alert awake appears to be in no acute distress  Chest - Bilateral air entry, no wheeze  Heart - S1S2 RRR, no murmurs or gallops  Abdomen - soft, non tender, normoactive bowel sounds  Neurological - non focal  Extremities - no edema  Skin - no rashes or lesions    Procedures:  na    Diagnostic Test:  na    Radiology reports as per the Radiologist  Radiology:  ECHO Complete 2D W Doppler W Color    Result Date: 12/26/2021  Transthoracic Echocardiography Report (TTE)  Demographics   Patient Name  Kaiden Shepherd Gender             Male   MR #          [id number]   Race                                            Ethnicity   Account #     [de-identified]   Room Number        0009   Accession     [id number]  Date of Study      12/26/2021  Number   Date of Birth 1945  Referring          [facility]                            Physician          Rajani Bertrand   Age           78 year(s)  Sonographer        ABBY Tinoco, RDCS,                                               RDMS, RVT                             Interpreting       Brittney Elizabeth MD                            Physician  Procedure Type of Study   TTE procedure:ECHOCARDIOGRAM COMPLETE 2D W DOPPLER W COLOR. Procedure Date Date: 12/26/2021 Start: 09:51 AM Study Location: Bedside Technical Quality: Limited visualization Indications:Congestive heart failure.  Additional Medical History:coronary artery bypass grafting, Maze procedure, mitral valve repklacement, diabetes, hypertension, hyperlipidemia, exsmoker, NSTEMI, history of cardiac arrest, chronic kidney disease, anemia, ischemic cardiomyopathy, congestive heart failure Patient Status: Routine Height: 71 inches Weight: 200 pounds BSA: 2.11 m^2 BMI: 27.89 kg/m^2 BP: 171/81 mmHg Allergies   - See Epic. Conclusions   Summary  Left ventricle size is normal.  Normal left ventricular wall thickness. There was severe global hypokinesis of the left ventricle. Systolic function was severely reduced. Ejection fraction is visually estimated in the range of 30% to 35%. The left atrium is Mildly dilated. Moderate aortic regurgitation is noted. Signature   ----------------------------------------------------------------  Electronically signed by Tonda Spurling MD (Interpreting  physician) on 12/26/2021 at 07:14 PM  ----------------------------------------------------------------   Findings   Mitral Valve  Normally functioning bioprosthetic artificial valve in mitral position. Trace mitral regurgitation is present. Aortic Valve  The aortic valve was trileaflet with normal thickness and cuspal  separation. DOPPLER: Transaortic velocity was within the normal range with  no evidence of aortic stenosis. Moderate aortic regurgitation is noted. Tricuspid Valve  The tricuspid valve structure was normal with normal leaflet separation. DOPPLER: There was no evidence of tricuspid stenosis. Mild tricuspid  regurgitation visualized. Pulmonic Valve  The pulmonic valve leaflets exhibited normal thickness, no calcification,  and normal cuspal separation. DOPPLER: The transpulmonic velocity was  within the normal range with no evidence for regurgitation. Left Atrium  The left atrium is Mildly dilated. Left Ventricle  Left ventricle size is normal.  Normal left ventricular wall thickness. There was severe global hypokinesis of the left ventricle.   Systolic function was severely reduced. Ejection fraction is visually estimated in the range of 30% to 35%. Right Atrium  Right atrial size was normal.   Right Ventricle  The right ventricular size was normal with normal systolic function and  wall thickness. Pericardial Effusion  The pericardium was normal in appearance with no evidence of a pericardial  effusion. Pleural Effusion  No evidence of pleural effusion. Aorta / Great Vessels  -Aortic root dimension within normal limits.  -The Pulmonary artery is within normal limits. -IVC size is within normal limits with normal respiratory phasic changes.   M-Mode/2D Measurements & Calculations   LV Diastolic    LV Systolic Dimension: 3.4  AV Cusp Separation: 1.6 cmLA  Dimension: 4.6  cm                          Dimension: 3.8 cmAO Root  cm              LV Volume Diastolic: 78.3   Dimension: 2.9 cm  LV FS:26.1 %    ml  LV PW           LV Volume Systolic: 80.7 ml  Diastolic: 0.9  LV EDV/LV EDV Index: 97.3  cm              ml/46 m^2LV ESV/LV ESV      RV Diastolic Dimension: 2.8 cm  Septum          Index: 47.4 YW/17 m^2  Diastolic: 1.1  EF Calculated: 51.3 %       LA/Aorta: 1.31  cm  Doppler Measurements & Calculations   MV Peak E-Wave: 158 cm/s AV Peak Velocity: 125  LVOT Peak Velocity: 53.6                           cm/s                   cm/s  MV Peak Gradient: 9.99   AV Peak Gradient: 6.25 LVOT Peak Gradient: 1 mmHg  mmHg                     mmHg  MV Mean Gradient: 3 mmHg                        TV Peak E-Wave: 63.9 cm/s  MV Mean Velocity: 66.9  cm/s                                            TV Peak Gradient: 1.63  MV Deceleration Time:                           mmHg  229 msec                 AV P1/2t: 427 msec     TR Velocity:266 cm/s  MV P1/2t: 75 msec                               TR Gradient:28.3 mmHg  MVA by PHT:2.93 cm^2                            PV Peak Velocity: 45.6                                                  cm/s                           AV DVI (Vmax):0.43     PV Peak Gradient: 0.83                                                  mmHg   MR Velocity: 391 cm/s  [email or web address] [id number]    XR CHEST (2 VW)    Result Date: 12/25/2021  2 view chest x-ray Comparison: None Findings: Interstitial densities throughout the bilateral lungs greater on the right than left. Right basilar pleural and parenchymal opacities likely representing small right basilar effusion with atelectasis. Fluid within the minor fissure. Possible tiny left basilar effusion. Cardiomegaly with pulmonary vascular prominence. Median sternotomy wires. Atrial appendage closure device. Valvular prosthesis. No pneumothorax. Changes of CABG. No acute fracture. Spondylosis of the thoracic spine. 1. Interstitial densities throughout the bilateral lungs greater on the right than left most consistent with edema. Right basilar pleural and parenchymal opacities likely representing small right basilar effusion with atelectasis. Fluid within the minor fissure. Possible tiny left basilar effusion. Overall findings are likely reflective of congestive heart failure. This document has been electronically signed by: Tad Quintana DO on 12/25/2021 12:27 AM    US RENAL COMPLETE    Result Date: 12/25/2021  PROCEDURE: US RENAL COMPLETE CLINICAL INFORMATION: Acute kidney injury TECHNIQUE: Ultrasound of the kidneys and urinary bladder was performed. Grayscale and color images were obtained. COMPARISON: None FINDINGS: The right kidney measures 9.0 x 4.7 x 5.0 cm and the left kidney measures 9.9 x 4.9 x 4.4 cm. There is mild cortical thinning on the right side. There is no hydronephrosis, calculi or intrarenal mass on either side. Color Doppler demonstrates expected wave forms in the right renal artery.  The right arcuate resistive index is at the upper limits of normal. The left arcuate resistive index was not obtained. The urinary bladder is incompletely distended and cannot be evaluated on the current study. There is an incompletely visualized ascites. 1. Mild right-sided renal cortical thinning. 2. Incompletely distended urinary bladder. Final report electronically signed by Dr. Mónica Weeks on 12/25/2021 11:06 PM    XR CHEST PORTABLE    Result Date: 12/27/2021  PROCEDURE: XR CHEST PORTABLE CLINICAL INFORMATION: SOB, concern for aspiration. COMPARISON: Chest radiographs dated 12/25/2021. TECHNIQUE: AP upright view of the chest. FINDINGS: There are stable median sternotomy wires and mediastinal surgical clips. There is stable atrial appendage clip and valvuloplasty. There is a small pleural effusion on the right which appears similar to prior exam. There are stable patchy opacities at the  bilateral lung bases. The cardiac silhouette is enlarged, similar to prior exam. As osseous structures appear grossly intact. Stable small right pleural effusion and bibasilar patchy opacities. **This report has been created using voice recognition software. It may contain minor errors which are inherent in voice recognition technology. ** Final report electronically signed by Dr. Flavia Mejia MD on 12/27/2021 11:56 AM    VL DUP LOWER EXTREMITY VENOUS BILATERAL    Result Date: 12/27/2021  PROCEDURE: VL DUP LOWER EXTREMITY VENOUS BILATERAL CLINICAL INFORMATION: LE edema, rule out clot in setting of covid. Elevated d-dimer. COMPARISON: No prior study. TECHNIQUE: Venous doppler ultrasound was performed of the bilateral lower extremities using gray scale, color flow and spectral doppler imaging. FINDINGS: There is normal color flow, spectral analysis and compressibility of the common femoral vein, femoral vein and popliteal vein bilaterally . There is normal color flow and compressibility in the posterior tibial veins, anterior tibial veins and peroneal veins.  There is normal flow in the greater saphenous veins and gastrocnemius veins. No evidence of a DVT. **This report has been created using voice recognition software. It may contain minor errors which are inherent in voice recognition technology. ** Final report electronically signed by Dr. Karen Enrique on 12/27/2021 7:23 AM    FL MODIFIED BARIUM SWALLOW W VIDEO    Result Date: 12/28/2021  PROCEDURE: FL MODIFIED BARIUM SWALLOW W VIDEO CLINICAL INFORMATION: Dysphasia TECHNIQUE: Fluoroscopy was provided for modified barium swallowing study performed by speech therapy. With the patient in the lateral projection, swallowing mechanism was evaluated using barium of various consistencies. The radiologist was available for the entire examination. 29 video clips were obtained. Total fluoroscopy time 4 minutes 48 seconds. COMPARISON: Modified barium swallow study 2/9/2019 FINDINGS: Oral, pharyngeal and esophageal structures appear normal. There is laryngeal penetration of honey thick, nectar thick, thin and soft barium. No aspiration is identified. 1. Laryngeal penetration of honey thick, nectar thick, thin and soft barium without evidence of aspiration. 2. Additional recommendations from the speech therapist will follow. **This report has been created using voice recognition software. It may contain minor errors which are inherent in voice recognition technology. ** Final report electronically signed by Dr. Jace Sanford on 12/28/2021 1:07 PM      Results for orders placed or performed during the hospital encounter of 12/24/21   COVID-19, Rapid    Specimen: Nasopharyngeal Swab   Result Value Ref Range    SARS-CoV-2, NAAT DETECTED (AA) NOT DETECTED   Culture, Blood 1    Specimen: Blood   Result Value Ref Range    Blood Culture, Routine No growth-preliminary No growth     CBC Auto Differential   Result Value Ref Range    WBC 8.5 4.8 - 10.8 thou/mm3    RBC 4.51 (L) 4.70 - 6.10 mill/mm3    Hemoglobin 12.3 (L) 14.0 - 18.0 gm/dl    Hematocrit 39.2 (L) 42.0 - 52.0 %    MCV 86.9 80.0 - 94.0 fL    MCH 27.3 26.0 - 33.0 pg    MCHC 31.4 (L) 32.2 - 35.5 gm/dl    RDW-CV 16.9 (H) 11.5 - 14.5 %    RDW-SD 53.8 (H) 35.0 - 45.0 fL    Platelets 563 755 - 823 thou/mm3    MPV 11.4 9.4 - 12.4 fL    Seg Neutrophils 80.7 %    Lymphocytes 8.6 %    Monocytes 9.8 %    Eosinophils 0.1 %    Basophils 0.1 %    Immature Granulocytes 0.7 %    Segs Absolute 6.9 1.8 - 7.7 thou/mm3    Lymphocytes Absolute 0.7 (L) 1.0 - 4.8 thou/mm3    Monocytes Absolute 0.8 0.4 - 1.3 thou/mm3    Eosinophils Absolute 0.0 0.0 - 0.4 thou/mm3    Basophils Absolute 0.0 0.0 - 0.1 thou/mm3    Immature Grans (Abs) 0.06 0.00 - 0.07 thou/mm3    nRBC 0 /100 wbc   Blood Gas, Arterial   Result Value Ref Range    pH, Blood Gas 7.47 (H) 7.35 - 7.45    PCO2 29 (L) 35 - 45 mmhg    PO2 68 (L) 71 - 104 mmhg    HCO3 21 (L) 23 - 28 mmol/l    Base Excess (Calculated) -1.5 -2.5 - 2.5 mmol/l    O2 Sat 95 %    IFIO2 24     DEVICE Cannula     Taz Test Positive     Source: L Radial     COLLECTED BY: [id number]    SPECIMEN REJECTION   Result Value Ref Range    Rejected Test chem     Reason for Rejection see below    Comprehensive Metabolic Panel w/ Reflex to MG   Result Value Ref Range    Glucose 269 (H) 70 - 108 mg/dL    CREATININE 3.3 (HH) 0.4 - 1.2 mg/dL    BUN 89 (H) 7 - 22 mg/dL    Sodium 130 (L) 135 - 145 meq/L    Potassium reflex Magnesium 4.3 3.5 - 5.2 meq/L    Chloride 95 (L) 98 - 111 meq/L    CO2 21 (L) 23 - 33 meq/L    Calcium 8.2 (L) 8.5 - 10.5 mg/dL    AST 87 (H) 5 - 40 U/L    Alkaline Phosphatase 99 38 - 126 U/L    Total Protein 6.6 6.1 - 8.0 g/dL    Albumin 3.1 (L) 3.5 - 5.1 g/dL    Total Bilirubin 0.5 0.3 - 1.2 mg/dL    ALT 59 11 - 66 U/L   Troponin   Result Value Ref Range    Troponin T 0.048 (A) ng/ml   Brain Natriuretic Peptide   Result Value Ref Range    Pro-BNP 22802.0 (H) 0.0 - 900.0 pg/mL   Anion Gap   Result Value Ref Range    Anion Gap 14.0 8.0 - 16.0 meq/L   Glomerular Filtration Rate, Estimated   Result Value Ref Range    Est, Glom Filt Rate 18 (A) ml/min/1.73m2   Osmolality   Result Value Ref Range    Osmolality Calc 297.5 275.0 - 300.0 mOsmol/kg   Troponin   Result Value Ref Range    Troponin T 0.045 (A) ng/ml   CBC   Result Value Ref Range    WBC 4.9 4.8 - 10.8 thou/mm3    RBC 3.75 (L) 4.70 - 6.10 mill/mm3    Hemoglobin 10.3 (L) 14.0 - 18.0 gm/dl    Hematocrit 33.2 (L) 42.0 - 52.0 %    MCV 88.5 80.0 - 94.0 fL    MCH 27.5 26.0 - 33.0 pg    MCHC 31.0 (L) 32.2 - 35.5 gm/dl    RDW-CV 16.8 (H) 11.5 - 14.5 %    RDW-SD 54.5 (H) 35.0 - 45.0 fL    Platelets 016 (L) 060 - 400 thou/mm3    MPV 12.1 9.4 - 12.4 fL   Lactate dehydrogenase   Result Value Ref Range     (H) 100 - 190 U/L   C-reactive protein   Result Value Ref Range    CRP 10.07 (H) 0.00 - 1.00 mg/dl   Ferritin   Result Value Ref Range    Ferritin 497 (H) 22 - 322 ng/mL   Magnesium   Result Value Ref Range    Magnesium 2.2 1.6 - 2.4 mg/dL   C-reactive protein   Result Value Ref Range    CRP 10.53 (H) 0.00 - 1.00 mg/dl   Procalcitonin   Result Value Ref Range    Procalcitonin 19.26 (H) 0.01 - 0.09 ng/mL   Basic Metabolic Panel w/ Reflex to MG   Result Value Ref Range    Sodium 135 135 - 145 meq/L    Potassium reflex Magnesium 4.0 3.5 - 5.2 meq/L    Chloride 97 (L) 98 - 111 meq/L    CO2 21 (L) 23 - 33 meq/L    Glucose 224 (H) 70 - 108 mg/dL    BUN 90 (H) 7 - 22 mg/dL    CREATININE 3.5 (HH) 0.4 - 1.2 mg/dL    Calcium 7.9 (L) 8.5 - 10.5 mg/dL   Anion Gap   Result Value Ref Range    Anion Gap 17.0 (H) 8.0 - 16.0 meq/L   Glomerular Filtration Rate, Estimated   Result Value Ref Range    Est, Glom Filt Rate 17 (A) ml/min/1.73m2   Osmolality   Result Value Ref Range    Osmolality Calc 304.7 (H) 275.0 - 300.0 mOsmol/kg   Urinalysis with microscopic   Result Value Ref Range    Glucose, Urine NEGATIVE NEGATIVE mg/dl    Bilirubin Urine NEGATIVE NEGATIVE    Ketones, Urine NEGATIVE NEGATIVE    Specific Gravity, UA 1.014 1.002 - 1.030    Blood, Urine SMALL (A) NEGATIVE    pH, UA 5.0 5.0 - 9.0    Protein,  (A) NEGATIVE mg/dl    Urobilinogen, Urine 0.2 0.0 - 1.0 eu/dl    Nitrite, Urine NEGATIVE NEGATIVE    Leukocyte Esterase, Urine NEGATIVE NEGATIVE    Color, UA YELLOW YELLOW-STRAW    Character, Urine CLEAR [id number]    RBC, UA 0-2 0-2/hpf /hpf    WBC, UA 0-2 0-4/hpf /hpf    Epithelial Cells, UA NONE SEEN 3-5/hpf /hpf    Bacteria, UA NONE SEEN FEW/NONE SEEN    Casts NONE SEEN NONE SEEN /lpf    Crystals NONE SEEN NONE SEEN    Renal Epithelial, UA NONE SEEN NONE SEEN    Yeast, UA NONE SEEN NONE SEEN    Casts NONE SEEN /lpf    Miscellaneous Lab Test Result NONE SEEN    Calcium, Ionized   Result Value Ref Range    Calcium, Ion 0.98 (L) 1.12 - 1.32 mmol/L   Basic Metabolic Panel   Result Value Ref Range    Sodium 132 (L) 135 - 145 meq/L    Potassium 4.5 3.5 - 5.2 meq/L    Chloride 94 (L) 98 - 111 meq/L    CO2 24 23 - 33 meq/L    Glucose 189 (H) 70 - 108 mg/dL    BUN 93 (H) 7 - 22 mg/dL    CREATININE 3.6 (HH) 0.4 - 1.2 mg/dL    Calcium 8.2 (L) 8.5 - 10.5 mg/dL   Anion Gap   Result Value Ref Range    Anion Gap 14.0 8.0 - 16.0 meq/L   Glomerular Filtration Rate, Estimated   Result Value Ref Range    Est, Glom Filt Rate 17 (A) ml/min/1.73m2   CBC   Result Value Ref Range    WBC 8.2 4.8 - 10.8 thou/mm3    RBC 4.48 (L) 4.70 - 6.10 mill/mm3    Hemoglobin 12.3 (L) 14.0 - 18.0 gm/dl    Hematocrit 38.4 (L) 42.0 - 52.0 %    MCV 85.7 80.0 - 94.0 fL    MCH 27.5 26.0 - 33.0 pg    MCHC 32.0 (L) 32.2 - 35.5 gm/dl    RDW-CV 16.9 (H) 11.5 - 14.5 %    RDW-SD 52.5 (H) 35.0 - 45.0 fL    Platelets 941 544 - 371 thou/mm3    MPV 11.0 9.4 - 12.4 fL   C-reactive protein   Result Value Ref Range    CRP 10.06 (H) 0.00 - 1.00 mg/dl   Comprehensive metabolic panel   Result Value Ref Range    Sodium 132 (L) 135 - 145 meq/L    Chloride 95 (L) 98 - 111 meq/L    CO2 20 (L) 23 - 33 meq/L    Glucose 179 (H) 70 - 108 mg/dL     (H) 7 - 22 mg/dL    CREATININE 3.7 (HH) 0.4 - 1.2 mg/dL    Calcium 8.3 (L) 8.5 - 10.5 mg/dL    Albumin 3.1 (L) 3.5 - 5.1 g/dL    Total Bilirubin 0.5 0.3 - 1.2 mg/dL    Alkaline Phosphatase 106 38 - 126 U/L    AST 45 (H) 5 - 40 U/L    ALT 47 11 - 66 U/L    Total Protein 7.1 6.1 - 8.0 g/dL    Potassium 4.4 3.5 - 5.2 meq/L   CK   Result Value Ref Range    Total  55 - 170 U/L   Phosphorus   Result Value Ref Range    Phosphorus 4.8 (H) 2.4 - 4.7 mg/dL   Sodium, urine, random   Result Value Ref Range    Sodium, Ur < 20 meq/l   Urea nitrogen, urine   Result Value Ref Range    Urea Nitrogen, Ur 834 mg/dl   Osmolality, urine   Result Value Ref Range    Osmolality, Ur 482 250 - 750 mosmol/kg   Protein / creatinine ratio, urine   Result Value Ref Range    Protein, Urine 63.1 mg/dl    Creatinine, Urine 110.1 mg/dl    Prot/Creat Ratio, Ur 0.57    Glomerular Filtration Rate, Estimated   Result Value Ref Range    Est, Glom Filt Rate 16 (A) ml/min/1.73m2   Anion Gap   Result Value Ref Range    Anion Gap 17.0 (H) 8.0 - 16.0 meq/L   CBC   Result Value Ref Range    WBC 9.3 4.8 - 10.8 thou/mm3    RBC 4.50 (L) 4.70 - 6.10 mill/mm3    Hemoglobin 12.4 (L) 14.0 - 18.0 gm/dl    Hematocrit 38.8 (L) 42.0 - 52.0 %    MCV 86.2 80.0 - 94.0 fL    MCH 27.6 26.0 - 33.0 pg    MCHC 32.0 (L) 32.2 - 35.5 gm/dl    RDW-CV 16.7 (H) 11.5 - 14.5 %    RDW-SD 51.9 (H) 35.0 - 45.0 fL    Platelets 164 (L) 344 - 400 thou/mm3    MPV 12.3 9.4 - 12.4 fL   C-reactive protein   Result Value Ref Range    CRP 11.94 (H) 0.00 - 1.00 mg/dl   Basic Metabolic Panel   Result Value Ref Range    Sodium 132 (L) 135 - 145 meq/L    Potassium 4.7 3.5 - 5.2 meq/L    Chloride 97 (L) 98 - 111 meq/L    CO2 19 (L) 23 - 33 meq/L    Glucose 134 (H) 70 - 108 mg/dL     (H) 7 - 22 mg/dL    CREATININE 4.0 (HH) 0.4 - 1.2 mg/dL    Calcium 8.3 (L) 8.5 - 10.5 mg/dL   Hepatic Function Panel   Result Value Ref Range    Albumin 3.3 (L) 3.5 - 5.1 g/dL    Total Bilirubin 0.4 0.3 - 1.2 mg/dL    Bilirubin, Direct <0.2 0.0 - 0.3 mg/dL    Alkaline Phosphatase 101 38 - 126 U/L    AST 43 (H) 5 - 40 U/L ALT 51 11 - 66 U/L    Total Protein 6.4 6.1 - 8.0 g/dL   Anion Gap   Result Value Ref Range    Anion Gap 16.0 8.0 - 16.0 meq/L   Glomerular Filtration Rate, Estimated   Result Value Ref Range    Est, Glom Filt Rate 15 (A) ml/min/1.73m2   C-reactive protein   Result Value Ref Range    CRP 15.44 (H) 0.00 - 1.00 mg/dl   CBC   Result Value Ref Range    WBC 8.6 4.8 - 10.8 thou/mm3    RBC 4.43 (L) 4.70 - 6.10 mill/mm3    Hemoglobin 12.2 (L) 14.0 - 18.0 gm/dl    Hematocrit 38.3 (L) 42.0 - 52.0 %    MCV 86.5 80.0 - 94.0 fL    MCH 27.5 26.0 - 33.0 pg    MCHC 31.9 (L) 32.2 - 35.5 gm/dl    RDW-CV 16.8 (H) 11.5 - 14.5 %    RDW-SD 53.4 (H) 35.0 - 45.0 fL    Platelets 097 (L) 173 - 400 thou/mm3   Basic Metabolic Panel   Result Value Ref Range    Sodium 133 (L) 135 - 145 meq/L    Potassium 4.4 3.5 - 5.2 meq/L    Chloride 97 (L) 98 - 111 meq/L    CO2 19 (L) 23 - 33 meq/L    Glucose 161 (H) 70 - 108 mg/dL     (H) 7 - 22 mg/dL    CREATININE 4.1 (HH) 0.4 - 1.2 mg/dL    Calcium 8.5 8.5 - 10.5 mg/dL   Hepatic Function Panel   Result Value Ref Range    Albumin 3.0 (L) 3.5 - 5.1 g/dL    Total Bilirubin 0.4 0.3 - 1.2 mg/dL    Bilirubin, Direct <0.2 0.0 - 0.3 mg/dL    Alkaline Phosphatase 87 38 - 126 U/L    AST 32 5 - 40 U/L    ALT 45 11 - 66 U/L    Total Protein 6.5 6.1 - 8.0 g/dL   Anion Gap   Result Value Ref Range    Anion Gap 17.0 (H) 8.0 - 16.0 meq/L   Glomerular Filtration Rate, Estimated   Result Value Ref Range    Est, Glom Filt Rate 14 (A) ml/min/1.73m2   Sodium, urine, random   Result Value Ref Range    Sodium, Ur 20 meq/l   Chloride, Random Urine   Result Value Ref Range    Chloride, Urine < 20.0 meq/l   C-reactive protein   Result Value Ref Range    CRP 13.58 (H) 0.00 - 1.00 mg/dl   Basic Metabolic Panel   Result Value Ref Range    Sodium 136 135 - 145 meq/L    Potassium 4.2 3.5 - 5.2 meq/L    Chloride 100 98 - 111 meq/L    CO2 21 (L) 23 - 33 meq/L    Glucose 209 (H) 70 - 108 mg/dL     (H) 7 - 22 mg/dL CREATININE 3.5 (HH) 0.4 - 1.2 mg/dL    Calcium 8.2 (L) 8.5 - 10.5 mg/dL   Anion Gap   Result Value Ref Range    Anion Gap 15.0 8.0 - 16.0 meq/L   Glomerular Filtration Rate, Estimated   Result Value Ref Range    Est, Glom Filt Rate 17 (A) ml/min/1.73m2   C-reactive protein   Result Value Ref Range    CRP 7.99 (H) 0.00 - 1.00 mg/dl   Basic Metabolic Panel   Result Value Ref Range    Sodium 138 135 - 145 meq/L    Potassium 4.0 3.5 - 5.2 meq/L    Chloride 103 98 - 111 meq/L    CO2 21 (L) 23 - 33 meq/L    Glucose 161 (H) 70 - 108 mg/dL    BUN >336 (H) 7 - 22 mg/dL    CREATININE 3.3 (HH) 0.4 - 1.2 mg/dL    Calcium 8.1 (L) 8.5 - 10.5 mg/dL   CBC   Result Value Ref Range    WBC 5.3 4.8 - 10.8 thou/mm3    RBC 3.98 (L) 4.70 - 6.10 mill/mm3    Hemoglobin 10.6 (L) 14.0 - 18.0 gm/dl    Hematocrit 34.6 (L) 42.0 - 52.0 %    MCV 86.9 80.0 - 94.0 fL    MCH 26.6 26.0 - 33.0 pg    MCHC 30.6 (L) 32.2 - 35.5 gm/dl    RDW-CV 16.2 (H) 11.5 - 14.5 %    RDW-SD 52.0 (H) 35.0 - 45.0 fL    Platelets 385 (L) 680 - 400 thou/mm3    MPV 12.7 (H) 9.4 - 12.4 fL   Anion Gap   Result Value Ref Range    Anion Gap 14.0 8.0 - 16.0 meq/L   Glomerular Filtration Rate, Estimated   Result Value Ref Range    Est, Glom Filt Rate 18 (A) UP/ULB/0.40H3   Basic Metabolic Panel   Result Value Ref Range    Sodium 135 135 - 145 meq/L    Potassium 4.1 3.5 - 5.2 meq/L    Chloride 103 98 - 111 meq/L    CO2 18 (L) 23 - 33 meq/L    Glucose 196 (H) 70 - 108 mg/dL     (H) 7 - 22 mg/dL    CREATININE 3.2 (HH) 0.4 - 1.2 mg/dL    Calcium 8.0 (L) 8.5 - 10.5 mg/dL   Anion Gap   Result Value Ref Range    Anion Gap 14.0 8.0 - 16.0 meq/L   Glomerular Filtration Rate, Estimated   Result Value Ref Range    Est, Glom Filt Rate 19 (A) ml/min/1.73m2   C-reactive protein   Result Value Ref Range    CRP 5.72 (H) 0.00 - 1.00 mg/dl   Basic Metabolic Panel   Result Value Ref Range    Sodium 136 135 - 145 meq/L    Potassium 4.0 3.5 - 5.2 meq/L    Chloride 104 98 - 111 meq/L    CO2 20 (L) 23 - 33 meq/L    Glucose 229 (H) 70 - 108 mg/dL     (H) 7 - 22 mg/dL    CREATININE 2.9 (H) 0.4 - 1.2 mg/dL    Calcium 8.2 (L) 8.5 - 10.5 mg/dL   Lactate, Sepsis   Result Value Ref Range    Lactic Acid, Sepsis 1.9 0.5 - 1.9 mmol/L   CBC   Result Value Ref Range    WBC 5.7 4.8 - 10.8 thou/mm3    RBC 4.16 (L) 4.70 - 6.10 mill/mm3    Hemoglobin 11.5 (L) 14.0 - 18.0 gm/dl    Hematocrit 36.5 (L) 42.0 - 52.0 %    MCV 87.7 80.0 - 94.0 fL    MCH 27.6 26.0 - 33.0 pg    MCHC 31.5 (L) 32.2 - 35.5 gm/dl    RDW-CV 16.4 (H) 11.5 - 14.5 %    RDW-SD 52.7 (H) 35.0 - 45.0 fL    Platelets 700 (L) 404 - 400 thou/mm3    MPV 13.3 (H) 9.4 - 12.4 fL   Anion Gap   Result Value Ref Range    Anion Gap 12.0 8.0 - 16.0 meq/L   Glomerular Filtration Rate, Estimated   Result Value Ref Range    Est, Glom Filt Rate 21 (A) ml/min/1.73m2   C-reactive protein   Result Value Ref Range    CRP 4.02 (H) 0.00 - 1.00 mg/dl   Basic Metabolic Panel   Result Value Ref Range    Sodium 139 135 - 145 meq/L    Potassium 3.8 3.5 - 5.2 meq/L    Chloride 106 98 - 111 meq/L    CO2 21 (L) 23 - 33 meq/L    Glucose 98 70 - 108 mg/dL     (H) 7 - 22 mg/dL    CREATININE 2.8 (H) 0.4 - 1.2 mg/dL    Calcium 8.1 (L) 8.5 - 10.5 mg/dL   Anion Gap   Result Value Ref Range    Anion Gap 12.0 8.0 - 16.0 meq/L   Glomerular Filtration Rate, Estimated   Result Value Ref Range    Est, Glom Filt Rate 22 (A) ml/min/1.73m2   C-reactive protein   Result Value Ref Range    CRP 3.19 (H) 0.00 - 1.00 mg/dl   Basic Metabolic Panel   Result Value Ref Range    Sodium 139 135 - 145 meq/L    Potassium 4.1 3.5 - 5.2 meq/L    Chloride 108 98 - 111 meq/L    CO2 22 (L) 23 - 33 meq/L    Glucose 59 (L) 70 - 108 mg/dL    BUN 92 (H) 7 - 22 mg/dL    CREATININE 2.6 (H) 0.4 - 1.2 mg/dL    Calcium 8.5 8.5 - 10.5 mg/dL   Anion Gap   Result Value Ref Range    Anion Gap 9.0 8.0 - 16.0 meq/L   Glomerular Filtration Rate, Estimated   Result Value Ref Range    Est, Glom Filt Rate 24 (A) ml/min/1.73m2 C-reactive protein   Result Value Ref Range    CRP 2.35 (H) 0.00 - 1.00 mg/dl   Basic Metabolic Panel   Result Value Ref Range    Sodium 140 135 - 145 meq/L    Potassium 4.3 3.5 - 5.2 meq/L    Chloride 110 98 - 111 meq/L    CO2 21 (L) 23 - 33 meq/L    Glucose 57 (L) 70 - 108 mg/dL    BUN 81 (H) 7 - 22 mg/dL    CREATININE 2.4 (H) 0.4 - 1.2 mg/dL    Calcium 8.4 (L) 8.5 - 10.5 mg/dL   Anion Gap   Result Value Ref Range    Anion Gap 9.0 8.0 - 16.0 meq/L   Glomerular Filtration Rate, Estimated   Result Value Ref Range    Est, Glom Filt Rate 27 (A) YT/ZBJ/6.77Q7   Basic Metabolic Panel   Result Value Ref Range    Sodium 136 135 - 145 meq/L    Potassium 4.3 3.5 - 5.2 meq/L    Chloride 108 98 - 111 meq/L    CO2 18 (L) 23 - 33 meq/L    Glucose 120 (H) 70 - 108 mg/dL    BUN 78 (H) 7 - 22 mg/dL    CREATININE 2.4 (H) 0.4 - 1.2 mg/dL    Calcium 8.3 (L) 8.5 - 10.5 mg/dL   Anion Gap   Result Value Ref Range    Anion Gap 10.0 8.0 - 16.0 meq/L   Glomerular Filtration Rate, Estimated   Result Value Ref Range    Est, Glom Filt Rate 27 (A) FG/TPD/2.83N5   Basic Metabolic Panel   Result Value Ref Range    Sodium 138 135 - 145 meq/L    Potassium 4.4 3.5 - 5.2 meq/L    Chloride 107 98 - 111 meq/L    CO2 22 (L) 23 - 33 meq/L    Glucose 49 (L) 70 - 108 mg/dL    BUN 71 (H) 7 - 22 mg/dL    CREATININE 2.4 (H) 0.4 - 1.2 mg/dL    Calcium 8.2 (L) 8.5 - 10.5 mg/dL   Anion Gap   Result Value Ref Range    Anion Gap 9.0 8.0 - 16.0 meq/L   Glomerular Filtration Rate, Estimated   Result Value Ref Range    Est, Glom Filt Rate 27 (A) MR/WVI/2.30J6   Basic Metabolic Panel   Result Value Ref Range    Sodium 137 135 - 145 meq/L    Potassium 4.1 3.5 - 5.2 meq/L    Chloride 104 98 - 111 meq/L    CO2 23 23 - 33 meq/L    Glucose 212 (H) 70 - 108 mg/dL    BUN 67 (H) 7 - 22 mg/dL    CREATININE 2.4 (H) 0.4 - 1.2 mg/dL    Calcium 8.0 (L) 8.5 - 10.5 mg/dL   Anion Gap   Result Value Ref Range    Anion Gap 10.0 8.0 - 16.0 meq/L   Glomerular Filtration Rate, Estimated   Result Value Ref Range    Est, Glom Filt Rate 27 (A) ml/min/1.73m2   POCT Glucose   Result Value Ref Range    POC Glucose 248 (H) 70 - 108 mg/dl   POCT Glucose   Result Value Ref Range    POC Glucose 260 (H) 70 - 108 mg/dl   POCT Glucose   Result Value Ref Range    POC Glucose 193 (H) 70 - 108 mg/dl   POCT Glucose   Result Value Ref Range    POC Glucose 177 (H) 70 - 108 mg/dl   POCT Glucose   Result Value Ref Range    POC Glucose 194 (H) 70 - 108 mg/dl   POCT Glucose   Result Value Ref Range    POC Glucose 239 (H) 70 - 108 mg/dl   POCT Glucose   Result Value Ref Range    POC Glucose 206 (H) 70 - 108 mg/dl   POCT Glucose   Result Value Ref Range    POC Glucose 142 (H) 70 - 108 mg/dl   POCT Glucose   Result Value Ref Range    POC Glucose 164 (H) 70 - 108 mg/dl   POCT Glucose   Result Value Ref Range    POC Glucose 193 (H) 70 - 108 mg/dl   POCT Glucose   Result Value Ref Range    POC Glucose 203 (H) 70 - 108 mg/dl   POCT Glucose   Result Value Ref Range    POC Glucose 162 (H) 70 - 108 mg/dl   POCT Glucose   Result Value Ref Range    POC Glucose 278 (H) 70 - 108 mg/dl   POCT Glucose   Result Value Ref Range    POC Glucose 238 (H) 70 - 108 mg/dl   POCT Glucose   Result Value Ref Range    POC Glucose 247 (H) 70 - 108 mg/dl   POCT Glucose   Result Value Ref Range    POC Glucose 235 (H) 70 - 108 mg/dl   POCT Glucose   Result Value Ref Range    POC Glucose 276 (H) 70 - 108 mg/dl   POCT Glucose   Result Value Ref Range    POC Glucose 350 (H) 70 - 108 mg/dl   POCT Glucose   Result Value Ref Range    POC Glucose 309 (H) 70 - 108 mg/dl   POCT Glucose   Result Value Ref Range    POC Glucose 159 (H) 70 - 108 mg/dl   POCT Glucose   Result Value Ref Range    POC Glucose 194 (H) 70 - 108 mg/dl   POCT Glucose   Result Value Ref Range    POC Glucose 234 (H) 70 - 108 mg/dl   POCT Glucose   Result Value Ref Range    POC Glucose 293 (H) 70 - 108 mg/dl   POCT Glucose   Result Value Ref Range    POC Glucose 221 (H) 70 - 108 mg/dl   POCT Glucose   Result Value Ref Range    POC Glucose 205 (H) 70 - 108 mg/dl   POCT Glucose   Result Value Ref Range    POC Glucose 126 (H) 70 - 108 mg/dl   POCT Glucose   Result Value Ref Range    POC Glucose 194 (H) 70 - 108 mg/dl   POCT Glucose   Result Value Ref Range    POC Glucose 104 70 - 108 mg/dl   POCT Glucose   Result Value Ref Range    POC Glucose 118 (H) 70 - 108 mg/dl   POCT Glucose   Result Value Ref Range    POC Glucose 140 (H) 70 - 108 mg/dl   POCT Glucose   Result Value Ref Range    POC Glucose 191 (H) 70 - 108 mg/dl   POCT Glucose   Result Value Ref Range    POC Glucose 70 70 - 108 mg/dl   POCT Glucose   Result Value Ref Range    POC Glucose 117 (H) 70 - 108 mg/dl   POCT Glucose   Result Value Ref Range    POC Glucose 155 (H) 70 - 108 mg/dl   POCT Glucose   Result Value Ref Range    POC Glucose 147 (H) 70 - 108 mg/dl   POCT Glucose   Result Value Ref Range    POC Glucose 64 (L) 70 - 108 mg/dl   POCT Glucose   Result Value Ref Range    POC Glucose 159 (H) 70 - 108 mg/dl   POCT Glucose   Result Value Ref Range    POC Glucose 236 (H) 70 - 108 mg/dl   POCT Glucose   Result Value Ref Range    POC Glucose 276 (H) 70 - 108 mg/dl   POCT Glucose   Result Value Ref Range    POC Glucose 139 (H) 70 - 108 mg/dl   POCT Glucose   Result Value Ref Range    POC Glucose 168 (H) 70 - 108 mg/dl   POCT Glucose   Result Value Ref Range    POC Glucose 281 (H) 70 - 108 mg/dl   POCT Glucose   Result Value Ref Range    POC Glucose 281 (H) 70 - 108 mg/dl   POCT Glucose   Result Value Ref Range    POC Glucose 41 (L) 70 - 108 mg/dl   POCT Glucose   Result Value Ref Range    POC Glucose 75 70 - 108 mg/dl   POCT Glucose   Result Value Ref Range    POC Glucose 100 70 - 108 mg/dl   POCT Glucose   Result Value Ref Range    POC Glucose 146 (H) 70 - 108 mg/dl   POCT Glucose   Result Value Ref Range    POC Glucose 236 (H) 70 - 108 mg/dl   POCT Glucose   Result Value Ref Range    POC Glucose 176 (H) 70 - 108 mg/dl   EKG 12 Lead Result Value Ref Range    Ventricular Rate 88 BPM    Atrial Rate 88 BPM    P-R Interval 172 ms    QRS Duration 90 ms    Q-T Interval 358 ms    QTc Calculation (Bazett) 433 ms    P Axis 66 degrees    R Axis 66 degrees    T Axis -91 degrees   EKG 12 Lead   Result Value Ref Range    Ventricular Rate 70 BPM    Atrial Rate 70 BPM    P-R Interval 186 ms    QRS Duration 100 ms    Q-T Interval 482 ms    QTc Calculation (Bazett) 520 ms    P Axis 81 degrees    R Axis 56 degrees    T Axis 118 degrees   EKG 12 Lead   Result Value Ref Range    Ventricular Rate 60 BPM    Atrial Rate 60 BPM    P-R Interval 166 ms    QRS Duration 102 ms    Q-T Interval 488 ms    QTc Calculation (Bazett) 488 ms    P Axis 52 degrees    R Axis 60 degrees    T Axis 115 degrees       Diet:  ADULT DIET; Dysphagia - Soft and Bite Sized;  Moderately Thick (Honey)    Activity:  Activity as tolerated (Patient may move about with assist as indicated or with supervision.)    Follow-up:  in the next few days with Raymundo Ivey    Disposition: SNF    Condition: Stable      Time Spent: 35 minutes    Electronically signed by Maria De Jesus Evans MD on 1/6/2022 at 2:22 PM    Discharging Hospitalist

## 2022-01-06 NOTE — CARE COORDINATION
Pt verbalized understanding and gave permission for possible discharge within 4 hours of receiving IMM. [facility] notified of pt's discharge planned for today.

## 2022-01-06 NOTE — PROGRESS NOTES
[facility]   Nephrology progress note  1/6/2022, 9:38 AM      Pt Name:    Jase Freeman  MRN:     [id number]     YOB: 1945  Admit Date:    12/24/2021 11:12 PM  Primary Care Physician:  Norberto Kaplan   Room number  6A-09/009-A    Chief Complaint: Nephrology following for FATEMEH/CKD    Subjective:  Patient seen and examined  Seen earlier today during rounds  On room air    Objective:  24HR INTAKE/OUTPUT:      Intake/Output Summary (Last 24 hours) at 1/6/2022 7593  Last data filed at 1/6/2022 0521  Gross per 24 hour   Intake --   Output 1250 ml   Net -1250 ml     I/O last 3 completed shifts:  In: -   Out: 1250 [Urine:1250]  No intake/output data recorded. Admission weight: 200 lb (90.7 kg)  Wt Readings from Last 3 Encounters:   01/03/22 188 lb 14.4 oz (85.7 kg)   11/29/21 188 lb (85.3 kg)   11/03/21 184 lb (83.5 kg)     Body mass index is 26.35 kg/m². Physical examination  VITALS:     Vitals:    01/05/22 2020 01/05/22 2312 01/06/22 0300 01/06/22 0800   BP: (!) 155/55 (!) 149/61 (!) 137/59 (!) 173/79   Pulse: 66 70 72 80   Resp: 16 16 18 18   Temp: 98.2 °F (36.8 °C) 98 °F (36.7 °C) 97.9 °F (36.6 °C) 97.3 °F (36.3 °C)   TempSrc: Oral Oral Oral Oral   SpO2: 95% 95% 98%    Weight:         General Appearance: alert and cooperative with exam, appears comfortable, no distress  Lungs: Diminished, without use of accessory muscles, no rhonchi, no rales  Heart: S1-S2 without any S3 gallops  GI: soft, non-tender, no guarding  Extremities: No significant LE edema      Lab Data  CBC:   No results for input(s): WBC, HGB, HCT, PLT in the last 72 hours.   BMP:  Recent Labs     01/04/22  0645 01/05/22  0717 01/06/22  0702    138 137   K 4.3 4.4 4.1    107 104   CO2 18* 22* 23   BUN 78* 71* 67*   CREATININE 2.4* 2.4* 2.4*   GLUCOSE 120* 49* 212*   CALCIUM 8.3* 8.2* 8.0*     Hepatic:   No results for input(s): LABALBU, AST, ALT, ALB, BILITOT, ALKPHOS in the last 72 hours.      Meds:  Infusion:    sodium chloride Stopped (12/27/21 1336)    dextrose       Meds:    insulin glargine  10 Units SubCUTAneous Nightly    bumetanide  1 mg Oral BID    insulin lispro  0-6 Units SubCUTAneous TID WC    insulin lispro  0-3 Units SubCUTAneous Nightly    amLODIPine  10 mg Oral Daily    hydrALAZINE  50 mg Oral 3 times per day    famotidine  20 mg Oral Every Other Day    heparin (porcine)  5,000 Units SubCUTAneous BID    isosorbide dinitrate  10 mg Oral BID    sodium chloride flush  5-40 mL IntraVENous 2 times per day    aspirin EC  81 mg Oral Daily    atorvastatin  40 mg Oral Daily    carvedilol  12.5 mg Oral BID WC    sertraline  50 mg Oral Daily    tamsulosin  0.4 mg Oral Daily    calcium replacement protocol   Other RX Placeholder     Meds prn: hydrALAZINE, ipratropium-albuterol, sodium chloride flush, sodium chloride, polyethylene glycol, acetaminophen **OR** acetaminophen, glucose, dextrose, glucagon (rDNA), dextrose, albuterol sulfate HFA       Impression and Plan:  1. FATEMEH on CKD 4. BUN and creatinine both improving  Creatinine down to 2.4, stable now  No need for renal replacement therapy  Awaiting pre-CERT for discharge  No signs or symptoms of uremia  Discussed with patient in detail  Electrolytes are stable  Check H&H in a.m. Continue with diuretics    2. Azotemia due to FATEMEH/CKD and steroids: As above, improving. No uremic signs  3. Mild metabolic acidosis. Improved  4. Insulin-dependent diabetes mellitus  5. Chronic systolic cardiomyopathy with EF 30 to 35%: Continue bumex 1 mg BID and adjust if needed  6. Mild thrombocytopenia  7. Diabetic nephropathy  8. Proteinuria secondary to diabetic nephropathy  9. Monoclonal gammopathy seeing oncology/Dr. Jonel Garcia  10.   Awaiting placement    Discussed with patient  Nephrology will sign off  See me in office in 4 weeks with Lily Guevara MD  [facility]

## 2022-01-06 NOTE — PROGRESS NOTES
Patient left by EMS. Spoke to Jose, spouse took belongings home yesterday. Made aware of transport to facility. Questions answered. Report called Kimberly Whitfield to [facility].

## 2022-01-14 NOTE — TELEPHONE ENCOUNTER
Jose called concerned about patients legs are swollen and weeping. She states they did not send him to the nursing facility on metolazone.   Patient is Lisa at [address] [place] [address]

## 2022-01-14 NOTE — TELEPHONE ENCOUNTER
Patient daughter, Kymberly Cummins, called in. Patient is now at [facility] in [address]. He was discharged without his 2x/wk metolazone. He has a lot of swelling everywhere, arms and legs. While admitted, was dehydrated so they stopped the Metolazone. Just wondering what can be done for patient with how swollen he is. She also called Dr Rossana Rodríguez office since he follows closely to get his recommendation.

## 2022-01-15 NOTE — TELEPHONE ENCOUNTER
Saw Dr Elaine Led note/orders today (I was off on Friday) - given Ana Maria La symptoms/hx recent admit CHF/Covid, didn't want him to get in trouble over weekend. I went ahead and called NH today and gave verbal orders of below to nurse Zabrina Hartman. I will fax them written orders Monday and make sure they have your fax # for the BMP.

## 2022-08-28 NOTE — BRIEF OP NOTE
Blood cultures collected and sent to malissa Claros RN  08/28/22 7990 Post Procedure Progress Note    9/8/2020  Pre-Procedure Diagnosis: Abnormal blood work  Post-Procedure Diagnosis: Same  Physician: Nicole Real MD  Anesthesia: 3 mg Versed IV; 100 micrograms Fentanyl IV; 2% lidocaine local  Procedure Performed: CT guided bone marrow biopsy left iliac bone  Specimen Removed: 12 ml bone marrow aspirate; two 8 gauge bone marrow cores  Disposition of Specimen: Pathology  Estimated Blood Loss: Minimal  Complications: None

## (undated) DEVICE — SURGICAL PROCEDURE PACK OXGNTR ST MARYS

## (undated) DEVICE — DRESSING TRNSPAR W5XL4.5IN FLM SHT SEMIPERMEABLE WIND

## (undated) DEVICE — BLOOD TRANSFUSION FILTER: Brand: HAEMONETICS

## (undated) DEVICE — Device: Brand: MOUTHPIECE

## (undated) DEVICE — SET LNR RED GRN W/ BASE CLEANASCOPE

## (undated) DEVICE — FOGARTY - HYDRAGRIP SURGICAL - CLAMP INSERTS: Brand: FOGARTY SOFTJAW

## (undated) DEVICE — CANNULA PVC RCSP 15FR SLD STYL W/ HNDL OVERALL LEN 11 IN

## (undated) DEVICE — BLADE OPHTH GRN ROUNDED TIP 1 SIDE SHRP GRINDLESS MINI-BLDE

## (undated) DEVICE — CLAMP ABLAT JAW L53MM L CRV 2 ELECTRD BPLR

## (undated) DEVICE — Z INACTIVE USE 2735373 APPLICATOR FBR LAIN COT WOOD TIP ECONOMICAL

## (undated) DEVICE — ENDO KIT: Brand: MEDLINE INDUSTRIES, INC.

## (undated) DEVICE — CLIP LIG SM TI 6 BLU HNDL FOR OPN AND ENDOSCP SGL APPL

## (undated) DEVICE — WAX SURG 2.5GM HEMSTAT BNE BEESWAX PARAFFIN ISO PALMITATE

## (undated) DEVICE — EXCEL 10FT (3.05 M) INSUFFLATION TUBING SET WITH 0.1 MICRON FILTER: Brand: EXCEL

## (undated) DEVICE — SOLUTION IV 1000ML 0.9% SOD CHL PH 5 INJ USP VIAFLX PLAS

## (undated) DEVICE — CONNECTOR PERF 3/8X3/8X3/8IN EQL WYE

## (undated) DEVICE — SOLUTION IV 250ML 0.9% SOD CHL PH 5 INJ USP VIAFLX PLAS

## (undated) DEVICE — SYSTEM ENDOSCP VES HARV W/ TOOL CANN SEAL SHT PRT BLNT TIP

## (undated) DEVICE — CANNULA PERF 24FR CONN SITE 3/8IN SGL STG VEN W/ R ANG MTL

## (undated) DEVICE — FORCEP RAD JAW W/NEEDLE 160CM

## (undated) DEVICE — Z DUP USE 2582811 CATHETER IABP 8FR 50CC FBROPT CONN W/ INSRT KT TWO STATLOK

## (undated) DEVICE — DRESSING FOAM DISK DIA1IN H 7MM HYDRPHLC CHG IMPREG IN SL

## (undated) DEVICE — GLOVE ORANGE PI 8 1/2   MSG9085

## (undated) DEVICE — PACK SUCT CATHETER 14FR OPN WHSTL W NO VLV

## (undated) DEVICE — DRESSING TRNSPAR W2XL2.75IN FLM SHT SEMIPERMEABLE WIND

## (undated) DEVICE — PRESSURE MONITORING SET: Brand: TRUWAVE

## (undated) DEVICE — CATHETER IV RADPQ FEP POLYMER STR HUB N SFTY 18GAX1 1/4IN

## (undated) DEVICE — TUBING PRSS 36 M F

## (undated) DEVICE — EZ GLIDE AORTIC CANNULA: Brand: EDWARDS LIFESCIENCES EZ GLIDE AORTIC CANNULA

## (undated) DEVICE — GOWN,SIRUS,NON REINFRCD,LARGE,SET IN SL: Brand: MEDLINE

## (undated) DEVICE — KIT BLWR MISTER 5P 15L W/ TBNG SET IRRIG MIST TO IMPROVE

## (undated) DEVICE — PROBE CRYOABLATION L10CM ALUM SMOOTH MAL RETRCT HND FLX TB

## (undated) DEVICE — SUTURE PROL SZ 7-0 L24IN NONABSORBABLE BLU L9.3MM CC 3/8 8704H

## (undated) DEVICE — SOLUTION IV 500ML 0.9% SOD CHL PH 5 INJ USP VIAFLX PLAS

## (undated) DEVICE — CANNULA PERF L5.5IN DIA9FR AORT ROOT AG STD TIP W/ VENT LN

## (undated) DEVICE — MARKER,SKIN,WI/RULER AND LABELS: Brand: MEDLINE

## (undated) DEVICE — SOLUTION IV IRRIG WATER 1000ML POUR BRL 2F7114

## (undated) DEVICE — GAUZE,SPONGE,4"X4",12PLY,STERILE,LF,2'S: Brand: MEDLINE

## (undated) DEVICE — GLOVE ORANGE PI 7 1/2   MSG9075

## (undated) DEVICE — HEMOCONCENTRATOR AUTOTRNS L6IN OD0.25IN W/ TBNG CONN DHF 0.6

## (undated) DEVICE — ATTACHMENT POS 2S FNGR DISP STABLESOFT

## (undated) DEVICE — CONMED SCOPE SAVER BITE BLOCK, 20X27 MM: Brand: SCOPE SAVER

## (undated) DEVICE — GLOVE ORANGE PI 7   MSG9070

## (undated) DEVICE — CONNECTOR TBNG AUX H2O JET DISP FOR OLY 160/180 SER

## (undated) DEVICE — SET PRSS DISPOSABLE LAY L45IN COIL TBNG SIL DIAPH PLAS DOME

## (undated) DEVICE — SOLUTION IV 1000ML 0.45% SOD CHL PH 5 INJ USP VIAFLX PLAS

## (undated) DEVICE — THORACIC CATHETER,RIGHT ANGLE: Brand: ARGYLE

## (undated) DEVICE — KIT VEN DRNGE VAC ACCSRY PERF VAVD STOCK W/ SPEC TRAP

## (undated) DEVICE — PROBE VASC STD 1-1.5 MM 15 CM OLV

## (undated) DEVICE — 500ML,PRESSURE INFUSER W/STOPCOCK: Brand: MEDLINE

## (undated) DEVICE — SUTURE NONABSORBABLE MONOFILAMENT 4-0 RB-1 36 IN BLU PROLENE 8557H

## (undated) DEVICE — LINER SUCT CANSTR 1500CC SEMI RIG W/ POR HYDROPHOBIC SHUT

## (undated) DEVICE — Device

## (undated) DEVICE — GLOVE SURG SZ 7 L12IN FNGR THK94MIL TRNSLUC YEL LTX HYDRGEL

## (undated) DEVICE — SUTURE ETHBND 2-0 L30IN NONABSORBABLE GRN WHT V-5 L17IN 1/2 10X52

## (undated) DEVICE — STRIP,CLOSURE,WOUND,MEDI-STRIP,1/2X4: Brand: MEDLINE

## (undated) DEVICE — TUBING IV STOPCOCK 48 CM 3 W

## (undated) DEVICE — SUTURE SZ 7 L18IN NONABSORBABLE SIL CCS L48MM 1/2 CIR STRNM M655G

## (undated) DEVICE — CLIP LIG M TI 6 SIL HNDL FOR OPN AND ENDOSCP SGL APPL

## (undated) DEVICE — DRESSING TRNSPAR W4XL10IN FLM MIC POR SURESITE 123

## (undated) DEVICE — SET AUTOTRNS C175ML BOWL BTM OUTLT RESERVOIRXTRA

## (undated) DEVICE — COUNTER NDL 40 COUNT HLD 70 FOAM BLK ADH W/ MAG

## (undated) DEVICE — GLOVE SURG SZ 65 THK91MIL LTX FREE SYN POLYISOPRENE

## (undated) DEVICE — SET TRNQT STD 12FR TB LEN 7 IN 2 RED 2 BLU 2 CLR 16FR 2 L

## (undated) DEVICE — THORACIC CATHETER,STRAIGHT: Brand: ARGYLE

## (undated) DEVICE — GLOVE SURG SZ 65 L12IN THK75MIL DK GRN LTX FREE

## (undated) DEVICE — SPONGE LAP W18XL18IN WHT COT 4 PLY FLD STRUNG RADPQ DISP ST

## (undated) DEVICE — CONNECTOR PERF 1/2X3/8X3/8IN REDUC WYE

## (undated) DEVICE — GOWN,SIRUS,NONRNF,SETINSLV,XL,20/CS: Brand: MEDLINE

## (undated) DEVICE — COVER US PRB W5XL96IN LTX W/ GEL

## (undated) DEVICE — APPLIER CLP L9.375IN APER 2.1MM CLS L3.8MM 20 SM TI CLP

## (undated) DEVICE — SINGLE STAGE VENOUS RETURN CANNULA: Brand: THIN-FLEX SINGLE STAGE VENOUS DRAINAGE CANNULA

## (undated) DEVICE — SUTURE PROL SZ 4-0 L36IN NONABSORBABLE BLU L26MM SH 1/2 CIR 8521H

## (undated) DEVICE — Device: Brand: SUCTION TIP

## (undated) DEVICE — CONNECTOR STR 3/8IN ST 050506000 375STRCONN

## (undated) DEVICE — SET ADMIN 25ML L117IN PMP MOD CK VLV RLER CLMP 2 SMRTSITE

## (undated) DEVICE — TUBING PRESSURE MONITORING FIX M TO M LUER

## (undated) DEVICE — SKIN AFFIX SURG ADHESIVE 72/CS 0.55ML: Brand: MEDLINE

## (undated) DEVICE — GLOVE SURG SZ 6 THK91MIL LTX FREE SYN POLYISOPRENE ANTI

## (undated) DEVICE — SENSOR OXMTR L AD WT GREATER THAN 40KG FORE-SIGHT ELITE